# Patient Record
Sex: FEMALE | Race: WHITE | Employment: UNEMPLOYED | URBAN - NONMETROPOLITAN AREA
[De-identification: names, ages, dates, MRNs, and addresses within clinical notes are randomized per-mention and may not be internally consistent; named-entity substitution may affect disease eponyms.]

---

## 2020-07-23 ENCOUNTER — OFFICE VISIT (OUTPATIENT)
Dept: ORTHOPEDIC SURGERY | Age: 75
End: 2020-07-23
Payer: MEDICARE

## 2020-07-23 VITALS — HEIGHT: 61 IN | WEIGHT: 153 LBS | BODY MASS INDEX: 28.89 KG/M2

## 2020-07-23 PROBLEM — M23.203 DEGENERATIVE TEAR OF MEDIAL MENISCUS OF RIGHT KNEE: Status: ACTIVE | Noted: 2020-07-23

## 2020-07-23 PROBLEM — M25.561 ACUTE PAIN OF RIGHT KNEE: Status: ACTIVE | Noted: 2020-07-23

## 2020-07-23 PROBLEM — M17.11 PRIMARY OSTEOARTHRITIS OF RIGHT KNEE: Status: ACTIVE | Noted: 2020-07-23

## 2020-07-23 PROBLEM — M23.300 DEGENERATIVE TEAR OF LATERAL MENISCUS OF RIGHT KNEE: Status: ACTIVE | Noted: 2020-07-23

## 2020-07-23 PROCEDURE — 99203 OFFICE O/P NEW LOW 30 MIN: CPT | Performed by: ORTHOPAEDIC SURGERY

## 2020-07-23 NOTE — PROGRESS NOTES
KNEE VISIT      HISTORY OF PRESENT ILLNESS    Eli Grajeda is a 66 y.o. female who presents for evaluation of right knee pain she has had for last 4 to 5 years. She has had recurrent pain treated by medication injections in the past.  She has had bracing. She is not had an MRI but had some issues that involved her left hip done her right hip and her back that put her right knee on the back burner for the time being. Now other things to resolve and that she is left with right knee pain that she grades 6/10 despite wearing a copper sleeve as well as a hinged knee support. She complains of swelling and stiffness although Celebrex that she is taking seems to taken some the swelling out of her leg. She is having more more difficulty getting around walking on feels that the issue is getting worse over time. ROS    Well-documented patient history form dated 7/23/2020  All other ROS negative except for above. Past Surgical history    Past Surgical History:   Procedure Laterality Date    CATARACT REMOVAL WITH IMPLANT  1/14/11    LEFT EYE    EYE SURGERY  12/14/2010    cataract rt eye    HYSTERECTOMY      JOINT REPLACEMENT  1997    left hip       PAST MEDICAL    Past Medical History:   Diagnosis Date    Arthritis     Hypertension     Localized osteoarthrosis not specified whether primary or secondary, pelvic region and thigh 5/8/2015       Allergies    Allergies   Allergen Reactions    Prednisone Other (See Comments)     Face gets red and hot       Meds    Current Outpatient Medications   Medication Sig Dispense Refill    gabapentin (NEURONTIN) 300 MG capsule Take 1 capsule by mouth 3 times daily 90 capsule 0    diclofenac (VOLTAREN) 75 MG EC tablet Take 1 tablet by mouth 2 times daily 60 tablet 3    atenolol (TENORMIN) 50 MG tablet Take 50 mg by mouth every evening.  Naproxen Sodium (ALEVE) 220 MG CAPS Take 2 tablets by mouth daily.  Indications: as needed-last taken 11/29/10       No current Strength:  Intact. Reflexes:  Intact. Pulses:  Intact. Knee Exam:    Effusion: Negative    Range of Motion Right Left   Extension 0 0   Flexion 120 120     Provocative Test Right Left    Positive Negative Positive Negative   Anterior drawer [] [x] [] [x]   Lachman [] [x] [] [x]   Posterior drawer [] [x] [] [x]   Varus testing [x] [x] [] [x]   Valgus testing [x] [] [] [x]   Joint line tenderness [x] [] [] [x]     Additional Exam Comments: Her neurocirculatory lymphatic exam otherwise is normal symmetric in both lower extremities. She has pain mostly around the medial compartment of her knee and some laterally with direct palpation Baldemar's maneuver and varus and valgus stress. She has no gross instability. \    IMAGING STUDIES    X-rays 3 views of her right knee that she had taken prior to this visit demonstrate some grade 2 osteoarthritis most in the lateral compartment, with greater than 50% of joint space remaining    IMPRESSION    Right knee pain secondary to osteoarthritis with medial lateral meniscus tears degenerative    PLAN      1. Conservative care options including physical therapy, NSAIDs, bracing, and activity modification were discussed. 2.  The indications for therapeutic injections were discussed. 3.  The indications for additional imaging studies were discussed. 4.  After considering the various options discussed, the patient elected to pursue a course that includes obtaining MRI of the right knee for disposition since she has had 5 years of pain despite other intervention with no resolution. She should return after testing for disposition.

## 2020-07-24 ENCOUNTER — TELEPHONE (OUTPATIENT)
Dept: ORTHOPEDIC SURGERY | Age: 75
End: 2020-07-24

## 2020-07-24 NOTE — TELEPHONE ENCOUNTER
SPOKE WITH PATIENT IN REGARDS TO MRI APPROVAL RIGHT KNEE. INFORMED PATIENT MRI ORDER ALONG WITH MRI AUTHORIZATION WAS FAXED TO [address]. INFORMED PATIENT TO CALL AT THEIR CONVENIENCE TO SCHEDULE THAT MRI. ALSO, INFORMED PATIENT TO CALL OUR SCHEDULING DEPT TO SCHEDULE FOLLOW UP APPOINTMENT  WITH DR SOLORIO TO GO OVER THOSE RESULTS . INFORMED PATIENT HE WILL NOT GIVE RESULTS OVER THE TELEPHONE.  THE PATIENT UNDERSTOOD AND AGREED WITH THE PLAN

## 2020-08-12 ENCOUNTER — OFFICE VISIT (OUTPATIENT)
Dept: ORTHOPEDIC SURGERY | Age: 75
End: 2020-08-12
Payer: MEDICARE

## 2020-08-12 VITALS — BODY MASS INDEX: 28.89 KG/M2 | WEIGHT: 153 LBS | HEIGHT: 61 IN

## 2020-08-12 PROBLEM — M84.453A CLOSED SUBCHONDRAL INSUFFICIENCY FRACTURE OF FEMORAL CONDYLE (HCC): Status: ACTIVE | Noted: 2020-08-12

## 2020-08-12 PROBLEM — M84.469A INSUFFICIENCY FRACTURE OF TIBIA: Status: ACTIVE | Noted: 2020-08-12

## 2020-08-12 PROCEDURE — 4004F PT TOBACCO SCREEN RCVD TLK: CPT | Performed by: ORTHOPAEDIC SURGERY

## 2020-08-12 PROCEDURE — 1123F ACP DISCUSS/DSCN MKR DOCD: CPT | Performed by: ORTHOPAEDIC SURGERY

## 2020-08-12 PROCEDURE — G8417 CALC BMI ABV UP PARAM F/U: HCPCS | Performed by: ORTHOPAEDIC SURGERY

## 2020-08-12 PROCEDURE — G8428 CUR MEDS NOT DOCUMENT: HCPCS | Performed by: ORTHOPAEDIC SURGERY

## 2020-08-12 PROCEDURE — 99213 OFFICE O/P EST LOW 20 MIN: CPT | Performed by: ORTHOPAEDIC SURGERY

## 2020-08-12 PROCEDURE — 4040F PNEUMOC VAC/ADMIN/RCVD: CPT | Performed by: ORTHOPAEDIC SURGERY

## 2020-08-12 PROCEDURE — G8400 PT W/DXA NO RESULTS DOC: HCPCS | Performed by: ORTHOPAEDIC SURGERY

## 2020-08-12 PROCEDURE — 1090F PRES/ABSN URINE INCON ASSESS: CPT | Performed by: ORTHOPAEDIC SURGERY

## 2020-08-12 NOTE — PROGRESS NOTES
KNEE VISIT      HISTORY OF PRESENT ILLNESS    Hrotencia Arnold is a 66 y.o. female who presents for evaluation of right knee pain. She has had an MRI and is here for review. The nature and character of her pain remain the same. \  ROS    Well-documented patient history form dated 7/23/2020  All other ROS negative except for above. Past Surgical history    Past Surgical History:   Procedure Laterality Date    CATARACT REMOVAL WITH IMPLANT  1/14/11    LEFT EYE    EYE SURGERY  12/14/2010    cataract rt eye    HYSTERECTOMY      JOINT REPLACEMENT  1997    left hip       PAST MEDICAL    Past Medical History:   Diagnosis Date    Arthritis     Closed subchondral insufficiency fracture of femoral condyle ([address] 8/12/2020    Hypertension     Localized osteoarthrosis not specified whether primary or secondary, pelvic region and thigh 5/8/2015       Allergies    Allergies   Allergen Reactions    Prednisone Other (See Comments)     Face gets red and hot       Meds    Current Outpatient Medications   Medication Sig Dispense Refill    gabapentin (NEURONTIN) 300 MG capsule Take 1 capsule by mouth 3 times daily 90 capsule 0    diclofenac (VOLTAREN) 75 MG EC tablet Take 1 tablet by mouth 2 times daily 60 tablet 3    atenolol (TENORMIN) 50 MG tablet Take 50 mg by mouth every evening.  Naproxen Sodium (ALEVE) 220 MG CAPS Take 2 tablets by mouth daily. Indications: as needed-last taken 11/29/10       No current facility-administered medications for this visit. Social    Social History     Socioeconomic History    Marital status:       Spouse name: Not on file    Number of children: Not on file    Years of education: Not on file    Highest education level: Not on file   Occupational History    Not on file   Social Needs    Financial resource strain: Not on file    Food insecurity     Worry: Not on file     Inability: Not on file    Transportation needs     Medical: Not on file     Non-medical: Not on file   Tobacco Use    Smoking status: Never Smoker    Smokeless tobacco: Never Used   Substance and Sexual Activity    Alcohol use: No    Drug use: No    Sexual activity: Yes   Lifestyle    Physical activity     Days per week: Not on file     Minutes per session: Not on file    Stress: Not on file   Relationships    Social connections     Talks on phone: Not on file     Gets together: Not on file     Attends Restorationist service: Not on file     Active member of club or organization: Not on file     Attends meetings of clubs or organizations: Not on file     Relationship status: Not on file    Intimate partner violence     Fear of current or ex partner: Not on file     Emotionally abused: Not on file     Physically abused: Not on file     Forced sexual activity: Not on file   Other Topics Concern    Not on file   Social History Narrative    Not on file       Family HISTORY    Family History   Problem Relation Age of Onset    Arthritis Mother     Diabetes Mother     High Blood Pressure Mother     Diabetes Sister     High Blood Pressure Sister     Diabetes Brother     High Blood Pressure Brother        PHYSICAL EXAM    Vital Signs:  Ht 5' 1\" (1.549 m)   Wt 153 lb (69.4 kg)   BMI 28.91 kg/m²   General Appearance:  Normal body habitus. Alert and oriented to person, place, and time. Affect:  Normal.   Gait: Antalgic. Poor balance and coordination. Skin:  Intact. Sensation:  Intact. Strength:  Intact. Reflexes:  Intact. Pulses:  Intact.    Knee Exam:    Effusion: Negative    Range of Motion Right Left   Extension 0 0   Flexion 120 120     Provocative Test Right Left    Positive Negative Positive Negative   Anterior drawer [] [x] [] [x]   Lachman [] [x] [] [x]   Posterior drawer [] [x] [] [x]   Varus testing [x] [x] [] [x]   Valgus testing [x] [] [] [x]   Joint line tenderness [x] [] [] [x]     Additional Exam Comments: Her neurocirculatory lymphatic exam otherwise is normal symmetric in well as the necessity of other members of the healthcare team participating in the procedure. All questions were answered and the patient elected to proceed with the proposed procedure and signed the informed consent form. The patient was counseled at length about the risks of estevan Covid-19 during their perioperative period and any recovery window from their procedure. The patient was made aware that estevan Covid-19  may worsen their prognosis for recovering from their procedure  and lend to a higher morbidity and/or mortality risk. All material risks, benefits, and reasonable alternatives including postponing the procedure were discussed. The patient does wish to proceed with the procedure at this time.

## 2020-09-18 LAB
ADENOVIRUS: NOT DETECTED
BORDETELLA PARAPERTUSSIS: NOT DETECTED
BORDETELLA PERTUSSIS: NOT DETECTED
CHLAMYDOPHILA PNEUMONIA PCR: NOT DETECTED
CORONAVIRUS 229E: NOT DETECTED
CORONAVIRUS HKU1: NOT DETECTED
CORONAVIRUS NL63: NOT DETECTED
CORONAVIRUS OC43: NOT DETECTED
HUMAN METAPNEUMOVIRUS: NOT DETECTED
HUMAN RHINOVIRUS/ENTEROVIRUS: NOT DETECTED
INFLUENZA A (NO SUBTYPE) BY PCR: NOT DETECTED
INFLUENZA A H1-2009: NOT DETECTED
INFLUENZA A H3: NOT DETECTED
INFLUENZA A: NOT DETECTED
INFLUENZA B: NOT DETECTED
MYCOPLASMA PNEUMONIAE: NOT DETECTED
PARAINFLUENZA 2: NOT DETECTED
PARAINFLUENZA 3: NOT DETECTED
PARAINFLUENZA 4: NOT DETECTED
PARAINFLUENZA1: NOT DETECTED
RESPIRATORY SYNCYTIAL VIRUS: NOT DETECTED
SARS-COV-2: NOT DETECTED

## 2020-09-24 ENCOUNTER — OFFICE VISIT (OUTPATIENT)
Dept: ORTHOPEDIC SURGERY | Age: 75
End: 2020-09-24

## 2020-09-24 PROCEDURE — 99024 POSTOP FOLLOW-UP VISIT: CPT | Performed by: ORTHOPAEDIC SURGERY

## 2020-09-24 NOTE — PROGRESS NOTES
FOLLOW-UP VISIT      The patient returns for follow-up s/p right knee been arthroscopy with partial medial lateral meniscectomy and sub-chondroplasty of the lateral femoral condyle and lateral tibial plateau    Date of Surgery    9/18/2020    Wound Status    Sutures Removed, Incisions are healing well with no surrounding erythema, and minimal ecchymosis. Exam    She has no signs of infection DVT. She is happy with where her knee feels and says she can believe all the pain she had before surgery and how much relief she is gotten afterwards. She is walk without ambulatory aids and doing well.     Plan    Slow return to normal activity    Follow-up Appointment    4 weeks as needed

## 2021-07-26 ENCOUNTER — TELEPHONE (OUTPATIENT)
Dept: ORTHOPEDIC SURGERY | Age: 76
End: 2021-07-26

## 2021-08-03 ENCOUNTER — OFFICE VISIT (OUTPATIENT)
Dept: ORTHOPEDIC SURGERY | Age: 76
End: 2021-08-03
Payer: MEDICARE

## 2021-08-03 VITALS — WEIGHT: 153 LBS | BODY MASS INDEX: 27.11 KG/M2 | HEIGHT: 63 IN

## 2021-08-03 DIAGNOSIS — M25.561 ACUTE PAIN OF RIGHT KNEE: ICD-10-CM

## 2021-08-03 DIAGNOSIS — M17.11 PRIMARY OSTEOARTHRITIS OF RIGHT KNEE: Primary | ICD-10-CM

## 2021-08-03 PROCEDURE — 1123F ACP DISCUSS/DSCN MKR DOCD: CPT | Performed by: ORTHOPAEDIC SURGERY

## 2021-08-03 PROCEDURE — G8400 PT W/DXA NO RESULTS DOC: HCPCS | Performed by: ORTHOPAEDIC SURGERY

## 2021-08-03 PROCEDURE — 1036F TOBACCO NON-USER: CPT | Performed by: ORTHOPAEDIC SURGERY

## 2021-08-03 PROCEDURE — G8427 DOCREV CUR MEDS BY ELIG CLIN: HCPCS | Performed by: ORTHOPAEDIC SURGERY

## 2021-08-03 PROCEDURE — G8417 CALC BMI ABV UP PARAM F/U: HCPCS | Performed by: ORTHOPAEDIC SURGERY

## 2021-08-03 PROCEDURE — 4040F PNEUMOC VAC/ADMIN/RCVD: CPT | Performed by: ORTHOPAEDIC SURGERY

## 2021-08-03 PROCEDURE — 99213 OFFICE O/P EST LOW 20 MIN: CPT | Performed by: ORTHOPAEDIC SURGERY

## 2021-08-03 PROCEDURE — 1090F PRES/ABSN URINE INCON ASSESS: CPT | Performed by: ORTHOPAEDIC SURGERY

## 2021-08-03 NOTE — PROGRESS NOTES
KNEE VISIT      HISTORY OF PRESENT ILLNESS    Jama Arellano is a 64 y.o. female who presents for evaluation of right knee pain she has had of an increasingly severe nature over the last several months. She is known to past have osteoarthritis and although she had hip replacement 1997, at this time she is having pain in the knee and notices her knee is becoming more crooked and knock kneed and is causing severe pain laterally in the knee. She has pain down the other side as well as the hip and back. She did have a subchondroplasty almost a year ago which helped her for several months. She says she feels like she is falling apart. She denies any history of trauma. ROS    Well-documented patient history form dated 8/3/2021  All other ROS negative except for above. Past Surgical history    Past Surgical History:   Procedure Laterality Date    CATARACT REMOVAL WITH IMPLANT  1/14/11    LEFT EYE    EYE SURGERY  12/14/2010    cataract rt eye    HYSTERECTOMY      JOINT REPLACEMENT  1997    left hip       PAST MEDICAL    Past Medical History:   Diagnosis Date    Arthritis     Closed subchondral insufficiency fracture of femoral condyle ([address] 8/12/2020    Hypertension     Localized osteoarthrosis not specified whether primary or secondary, pelvic region and thigh 5/8/2015       Allergies    Allergies   Allergen Reactions    Prednisone Other (See Comments)     Face gets red and hot       Meds    Current Outpatient Medications   Medication Sig Dispense Refill    [START ON 8/25/2021] mupirocin (BACTROBAN) 2 % ointment APPLY 1/2 INCH TO INSIDE OF EACH NOSTRIL Q 12 HR STARTING 5 DAYS PRIOR TO SURGERY INCLUDING MORNING OF SURGERY. 1 Tube 0    gabapentin (NEURONTIN) 300 MG capsule Take 1 capsule by mouth 3 times daily 90 capsule 0    diclofenac (VOLTAREN) 75 MG EC tablet Take 1 tablet by mouth 2 times daily 60 tablet 3    atenolol (TENORMIN) 50 MG tablet Take 50 mg by mouth every evening.       Naproxen Sodium (ALEVE) 220 MG CAPS Take 2 tablets by mouth daily. Indications: as needed-last taken 11/29/10       No current facility-administered medications for this visit. Social    Social History     Socioeconomic History    Marital status:      Spouse name: Not on file    Number of children: Not on file    Years of education: Not on file    Highest education level: Not on file   Occupational History    Not on file   Tobacco Use    Smoking status: Never Smoker    Smokeless tobacco: Never Used   Substance and Sexual Activity    Alcohol use: No    Drug use: No    Sexual activity: Yes   Other Topics Concern    Not on file   Social History Narrative    Not on file     Social Determinants of Health     Financial Resource Strain:     Difficulty of Paying Living Expenses:    Food Insecurity:     Worried About Running Out of Food in the Last Year:     [address] in the Last Year:    Transportation Needs:     Lack of Transportation (Medical):      Lack of Transportation (Non-Medical):    Physical Activity:     Days of Exercise per Week:     Minutes of Exercise per Session:    Stress:     Feeling of Stress :    Social Connections:     Frequency of Communication with Friends and Family:     Frequency of Social Gatherings with Friends and Family:     Attends Latter-day Services:     Active Member of Clubs or Organizations:     Attends Club or Organization Meetings:     Marital Status:    Intimate Partner Violence:     Fear of Current or Ex-Partner:     Emotionally Abused:     Physically Abused:     Sexually Abused:        Family HISTORY    Family History   Problem Relation Age of Onset    Arthritis Mother     Diabetes Mother     High Blood Pressure Mother     Diabetes Sister     High Blood Pressure Sister     Diabetes Brother     High Blood Pressure Brother        PHYSICAL EXAM    Vital Signs:  Ht 5' 3\" (1.6 m)   Wt 153 lb (69.4 kg)   BMI 27.10 kg/m²   General Appearance: Normal body habitus. Alert and oriented to person, place, and time. Affect:  Normal.   Gait: Antalgic with genu valgum on the right versus the left. Good balance and coordination. Skin:  Intact. Sensation:  Intact. Strength:  Intact. Reflexes:  Intact. Pulses:  Intact. Knee Exam:    Effusion: 1+    Range of Motion Right Left   Extension -8 0   Flexion 105 115     Provocative Test Right Left    Positive Negative Positive Negative   Anterior drawer [] [] [] []   Lachman [] [] [] []   Posterior drawer [] [] [] []   Varus testing [x] [] [] [x]   Valgus testing [x] [] [] [x]   Joint line tenderness [x] [] [] [x]     Additional Exam Comments: Her neurocirculatory lymphatic exam otherwise appears normal symmetric in both lower extremities. She does have lateral compartment pain and crepitus and a valgus moment of about 15 degrees on her right side. She has pain with stress but no gross instability. IMAGING STUDIES    X-rays 3 views right knee demonstrate advanced arthritis tricompartmentally with complete bone-on-bone changes in the lateral compartment of her knee with increased valgus moment. IMPRESSION    Right knee pain secondary to severe end-stage osteoarthritis with the lateral compartment most affected    PLAN      1. Conservative care options including physical therapy, NSAIDs, bracing, and activity modification were discussed. 2.  The indications for therapeutic injections were discussed. 3.  The indications for additional imaging studies were discussed. 4.  After considering the various options discussed, the patient elected to pursue a course that includes proceeding with right total knee replacement 1 can be arranged. The patient was counseled at length about the risks of estevan Covid-19 during their perioperative period and any recovery window from their procedure.   The patient was made aware that estevan Covid-19  may worsen their prognosis for recovering from

## 2021-08-04 ENCOUNTER — TELEPHONE (OUTPATIENT)
Dept: ORTHOPEDIC SURGERY | Age: 76
End: 2021-08-04

## 2021-08-09 NOTE — TELEPHONE ENCOUNTER
AUTHORIZATION FOR OBSERVATION ONLY!     Auth: # [id number]    Date: 8/30/2021 THRU 9/29/2021  Type of SX:  Observation  [place] [facility] [id number]   DX Code: M17.11  SX area: Rt knee  Insurance: [facility]

## 2021-08-13 ENCOUNTER — HOSPITAL ENCOUNTER (OUTPATIENT)
Age: 76
Discharge: HOME | End: 2021-08-13
Payer: MEDICARE

## 2021-08-13 LAB
ALBUMIN SERPL-MCNC: 4.4 G/DL (ref 3.4–5)
ANION GAP SERPL CALCULATED.3IONS-SCNC: 14 MMOL/L (ref 3–16)
APTT: 34.3 SEC (ref 26.2–38.6)
BASOPHILS ABSOLUTE: 0.1 K/UL (ref 0–0.2)
BASOPHILS RELATIVE PERCENT: 1 %
BILIRUBIN URINE: NEGATIVE
BLOOD, URINE: NEGATIVE
BUN BLDV-MCNC: 25 MG/DL (ref 7–20)
CALCIUM SERPL-MCNC: 11 MG/DL (ref 8.3–10.6)
CHLORIDE BLD-SCNC: 97 MMOL/L (ref 99–110)
CLARITY: CLEAR
CO2: 26 MMOL/L (ref 21–32)
COLOR: YELLOW
CREAT SERPL-MCNC: 1.8 MG/DL (ref 0.6–1.2)
EKG ATRIAL RATE: 74 BPM
EKG DIAGNOSIS: NORMAL
EKG P AXIS: 112 DEGREES
EKG P-R INTERVAL: 144 MS
EKG Q-T INTERVAL: 398 MS
EKG QRS DURATION: 88 MS
EKG QTC CALCULATION (BAZETT): 441 MS
EKG R AXIS: 0 DEGREES
EKG T AXIS: 62 DEGREES
EKG VENTRICULAR RATE: 74 BPM
EOSINOPHILS ABSOLUTE: 0.3 K/UL (ref 0–0.6)
EOSINOPHILS RELATIVE PERCENT: 3.9 %
GFR AFRICAN AMERICAN: 33
GFR NON-AFRICAN AMERICAN: 27
GLUCOSE BLD-MCNC: 99 MG/DL (ref 70–99)
GLUCOSE URINE: NEGATIVE MG/DL
HCT VFR BLD CALC: 34.8 % (ref 36–48)
HEMOGLOBIN: 11.7 G/DL (ref 12–16)
INR BLD: 1 (ref 0.88–1.12)
KETONES, URINE: NEGATIVE MG/DL
LEUKOCYTE ESTERASE, URINE: NEGATIVE
LYMPHOCYTES ABSOLUTE: 1.4 K/UL (ref 1–5.1)
LYMPHOCYTES RELATIVE PERCENT: 18.7 %
MCH RBC QN AUTO: 28.7 PG (ref 26–34)
MCHC RBC AUTO-ENTMCNC: 33.5 G/DL (ref 31–36)
MCV RBC AUTO: 85.7 FL (ref 80–100)
MICROSCOPIC EXAMINATION: NORMAL
MONOCYTES ABSOLUTE: 0.8 K/UL (ref 0–1.3)
MONOCYTES RELATIVE PERCENT: 9.9 %
NEUTROPHILS ABSOLUTE: 5 K/UL (ref 1.7–7.7)
NEUTROPHILS RELATIVE PERCENT: 66.5 %
NITRITE, URINE: NEGATIVE
PDW BLD-RTO: 13.4 % (ref 12.4–15.4)
PH UA: 7.5 (ref 5–8)
PLATELET # BLD: 252 K/UL (ref 135–450)
PMV BLD AUTO: 8.2 FL (ref 5–10.5)
POTASSIUM SERPL-SCNC: 4.1 MMOL/L (ref 3.5–5.1)
PROTEIN UA: NEGATIVE MG/DL
PROTHROMBIN TIME: 11.3 SEC (ref 9.9–12.7)
RBC # BLD: 4.06 M/UL (ref 4–5.2)
SODIUM BLD-SCNC: 137 MMOL/L (ref 136–145)
SPECIFIC GRAVITY UA: 1.01 (ref 1–1.03)
TRANSFERRIN: 269 MG/DL (ref 200–360)
URINE TYPE: NORMAL
UROBILINOGEN, URINE: 0.2 E.U./DL
WBC # BLD: 7.6 K/UL (ref 4–11)

## 2021-08-13 PROCEDURE — 93005 ELECTROCARDIOGRAM TRACING: CPT

## 2021-08-13 PROCEDURE — 85025 COMPLETE CBC W/AUTO DIFF WBC: CPT

## 2021-08-13 PROCEDURE — 36415 COLL VENOUS BLD VENIPUNCTURE: CPT

## 2021-08-13 PROCEDURE — 81003 URINALYSIS AUTO W/O SCOPE: CPT

## 2021-08-13 PROCEDURE — 85730 THROMBOPLASTIN TIME PARTIAL: CPT

## 2021-08-13 PROCEDURE — 87086 URINE CULTURE/COLONY COUNT: CPT

## 2021-08-13 PROCEDURE — 84466 ASSAY OF TRANSFERRIN: CPT

## 2021-08-13 PROCEDURE — 82040 ASSAY OF SERUM ALBUMIN: CPT

## 2021-08-13 PROCEDURE — 80048 BASIC METABOLIC PNL TOTAL CA: CPT

## 2021-08-13 PROCEDURE — 85610 PROTHROMBIN TIME: CPT

## 2021-08-14 LAB — URINE CULTURE, ROUTINE: NORMAL

## 2021-08-23 ENCOUNTER — TELEPHONE (OUTPATIENT)
Dept: ORTHOPEDIC SURGERY | Age: 76
End: 2021-08-23

## 2021-08-24 ENCOUNTER — TELEPHONE (OUTPATIENT)
Dept: ORTHOPEDIC SURGERY | Age: 76
End: 2021-08-24

## 2021-09-02 DIAGNOSIS — M17.11 PRIMARY OSTEOARTHRITIS OF RIGHT KNEE: Primary | ICD-10-CM

## 2021-09-13 NOTE — TELEPHONE ENCOUNTER
OBSERVATION ONLY    Surgery date rescheduled to 10/4/2021. Authorization information the same.    New date range  10/4/2021 thru 11/03/2021

## 2021-09-15 ENCOUNTER — TELEPHONE (OUTPATIENT)
Dept: ORTHOPEDIC SURGERY | Age: 76
End: 2021-09-15

## 2021-09-15 NOTE — TELEPHONE ENCOUNTER
Surgery cancelled at this time. COVID: Pt to get a week prior to surgery  Pt had back surgery and due to complications now self caths every 4-6 hours. Orthopedic Nurse Navigator Summary  -  Patient Name: Chrissy Wong  Anticipated Date of Surgery:10/4/21  Using [facility]? Yes, Are they Registered: Yes  Attended Pre-Op Education Class: No  If No, why not? PCP:  Phone #:  Date of PCP Visit for H&P: 09/20/2021  Any Noted Concerns from PCP prior to surgery: No  If Yes, what concerns?:  Is the Patient in a Pain Management Program?: No  Review of Past Medical History Reveals History of:  -  Critical Lab Values:  - Hemoglobin (g/dL): Date Value 11.7  - Hematocrit (%): Date Value  - HgbA1C : Date Value 0.0  - Albumin : Date Value 4.4  - BUN (mg/dL) : Date Value 25.0  - CREATININE (mg/dL) : Date Value 1.8  - BMI (kg/m2) : Date Value  -  Coronary Artery Disease/HTN/CHF History: Yes  Cardiologist: HTN  Cardiac Clearance Necessary: No  Date of Cardiac Clearance Appt: On Plavix? No  If YES, when will they stop taking? Final Cardiac Recommendations: On any anticoagulation: No  -  Diabetes History: No  Most Recent HgbA1C: 0.0  PCP or Endocrine Recommendations:  Nutritionist/Dietician Consult Scheduled:  Final Plan For Diabetic Control:  Pulmonary: COPD/Emphysema/ Use of home oxygen: NONE  Alcohol use: Non-Drinker  -  DVT Risk Stratification: High Risk  Vascular Consult Ordered:  Date of Vascular Appt:  Hematology/Oncology Consult Ordered:  Date of Hematology/Oncology Appt:  Final Recommendation For DVT Prophylaxis:  Smoking history: Non-Smoker  Use of Estrogen:  -  BMI Greater than 40 at time of scheduling?: No  Has Surgeon been notified of BMI concern? No  Weight Loss Clinic Consult Ordered No  Date of Wt Loss Clinic Appt:  BMI at time of surgery (if went through [facility]):  -  Additional Medical Concerns: History of DVT following THR 5 years ago.   Additional Recommendations for above concerns:  Attended Pre-Hab Program: No  Anticipated Discharge Disposition: D/C home  Who will be with patient at home following discharge? 2 daughters will be staying and checking in on pt.   Equipment patient already has: standard walker  Bedroom on first or second floor: first  Bathroom on first or second floor: first  Weight bearing status: full  Pre-op ambulatory status:  Number of entry steps: ZERO if enters through back door  Caregiver assistance: full time  [facility]  09/15/2021

## 2021-09-17 ENCOUNTER — TELEPHONE (OUTPATIENT)
Dept: ORTHOPEDIC SURGERY | Age: 76
End: 2021-09-17

## 2021-09-20 ENCOUNTER — TELEPHONE (OUTPATIENT)
Dept: ORTHOPEDIC SURGERY | Age: 76
End: 2021-09-20

## 2021-09-20 NOTE — TELEPHONE ENCOUNTER
PATIENT IS AWARE [facility]LECT[facility]  [facility] WILL CALL PATIENT ONCE WE  [facility] SURGERY.

## 2021-10-15 ENCOUNTER — CLINICAL DOCUMENTATION (OUTPATIENT)
Dept: OTHER | Age: 76
End: 2021-10-15

## 2021-10-29 ENCOUNTER — TELEPHONE (OUTPATIENT)
Dept: ORTHOPEDIC SURGERY | Age: 76
End: 2021-10-29

## 2021-10-29 NOTE — TELEPHONE ENCOUNTER
LEFT MESSAGE FOR PATIENT RE: [facility] TKR. IF PATIENT WANTS SX SOONER CAN REF TO DR. Larry Raines.

## 2021-11-18 NOTE — TELEPHONE ENCOUNTER
OBSERVATION ONLY    Surgery date rescheduled to 12/06/2021. Authorization information the same.    New date range  12/06/2021 thru 01/05/2022

## 2021-11-23 ENCOUNTER — HOSPITAL ENCOUNTER (OUTPATIENT)
Age: 76
Discharge: HOME | End: 2021-11-23
Payer: MEDICARE

## 2021-11-23 LAB
APTT: 34.8 SEC (ref 26.2–38.6)
BILIRUBIN URINE: NEGATIVE
BLOOD, URINE: NEGATIVE
CLARITY: CLEAR
COLOR: YELLOW
GLUCOSE URINE: NEGATIVE MG/DL
INR BLD: 0.98 (ref 0.88–1.12)
KETONES, URINE: NEGATIVE MG/DL
LEUKOCYTE ESTERASE, URINE: NEGATIVE
MICROSCOPIC EXAMINATION: NORMAL
NITRITE, URINE: NEGATIVE
PH UA: 6 (ref 5–8)
PROTEIN UA: NEGATIVE MG/DL
PROTHROMBIN TIME: 11.1 SEC (ref 9.9–12.7)
SPECIFIC GRAVITY UA: 1.02 (ref 1–1.03)
URINE TYPE: NORMAL
UROBILINOGEN, URINE: 0.2 E.U./DL

## 2021-11-23 PROCEDURE — 85025 COMPLETE CBC W/AUTO DIFF WBC: CPT

## 2021-11-23 PROCEDURE — 82040 ASSAY OF SERUM ALBUMIN: CPT

## 2021-11-23 PROCEDURE — 81003 URINALYSIS AUTO W/O SCOPE: CPT

## 2021-11-23 PROCEDURE — 80048 BASIC METABOLIC PNL TOTAL CA: CPT

## 2021-11-23 PROCEDURE — 83036 HEMOGLOBIN GLYCOSYLATED A1C: CPT

## 2021-11-23 PROCEDURE — 87086 URINE CULTURE/COLONY COUNT: CPT

## 2021-11-23 PROCEDURE — 84466 ASSAY OF TRANSFERRIN: CPT

## 2021-11-23 PROCEDURE — 85610 PROTHROMBIN TIME: CPT

## 2021-11-23 PROCEDURE — 85730 THROMBOPLASTIN TIME PARTIAL: CPT

## 2021-11-24 LAB
ALBUMIN SERPL-MCNC: 4.4 G/DL (ref 3.4–5)
ANION GAP SERPL CALCULATED.3IONS-SCNC: 15 MMOL/L (ref 3–16)
BASOPHILS ABSOLUTE: 0 K/UL (ref 0–0.2)
BASOPHILS RELATIVE PERCENT: 0.6 %
BUN BLDV-MCNC: 31 MG/DL (ref 7–20)
CALCIUM SERPL-MCNC: 10 MG/DL (ref 8.3–10.6)
CHLORIDE BLD-SCNC: 96 MMOL/L (ref 99–110)
CO2: 26 MMOL/L (ref 21–32)
CREAT SERPL-MCNC: 1.8 MG/DL (ref 0.6–1.2)
EOSINOPHILS ABSOLUTE: 0.2 K/UL (ref 0–0.6)
EOSINOPHILS RELATIVE PERCENT: 3 %
ESTIMATED AVERAGE GLUCOSE: 108.3 MG/DL
GFR AFRICAN AMERICAN: 33
GFR NON-AFRICAN AMERICAN: 27
GLUCOSE BLD-MCNC: 92 MG/DL (ref 70–99)
HBA1C MFR BLD: 5.4 %
HCT VFR BLD CALC: 35.2 % (ref 36–48)
HEMOGLOBIN: 11.3 G/DL (ref 12–16)
LYMPHOCYTES ABSOLUTE: 1.4 K/UL (ref 1–5.1)
LYMPHOCYTES RELATIVE PERCENT: 21.5 %
MCH RBC QN AUTO: 27.6 PG (ref 26–34)
MCHC RBC AUTO-ENTMCNC: 32.1 G/DL (ref 31–36)
MCV RBC AUTO: 85.8 FL (ref 80–100)
MONOCYTES ABSOLUTE: 0.7 K/UL (ref 0–1.3)
MONOCYTES RELATIVE PERCENT: 10.1 %
NEUTROPHILS ABSOLUTE: 4.2 K/UL (ref 1.7–7.7)
NEUTROPHILS RELATIVE PERCENT: 64.8 %
PDW BLD-RTO: 13.4 % (ref 12.4–15.4)
PLATELET # BLD: 300 K/UL (ref 135–450)
PMV BLD AUTO: 8.6 FL (ref 5–10.5)
POTASSIUM SERPL-SCNC: 4 MMOL/L (ref 3.5–5.1)
RBC # BLD: 4.11 M/UL (ref 4–5.2)
SODIUM BLD-SCNC: 137 MMOL/L (ref 136–145)
TRANSFERRIN: 279 MG/DL (ref 200–360)
URINE CULTURE, ROUTINE: NORMAL
WBC # BLD: 6.5 K/UL (ref 4–11)

## 2021-11-29 ENCOUNTER — HOSPITAL ENCOUNTER (OUTPATIENT)
Age: 76
Discharge: HOME | End: 2021-11-29
Payer: MEDICARE

## 2021-11-29 LAB — SARS-COV-2: NOT DETECTED

## 2021-11-29 PROCEDURE — U0005 INFEC AGEN DETEC AMPLI PROBE: HCPCS

## 2021-11-29 PROCEDURE — U0003 INFECTIOUS AGENT DETECTION BY NUCLEIC ACID (DNA OR RNA); SEVERE ACUTE RESPIRATORY SYNDROME CORONAVIRUS 2 (SARS-COV-2) (CORONAVIRUS DISEASE [COVID-19]), AMPLIFIED PROBE TECHNIQUE, MAKING USE OF HIGH THROUGHPUT TECHNOLOGIES AS DESCRIBED BY CMS-2020-01-R: HCPCS

## 2021-12-01 NOTE — PROGRESS NOTES

## 2021-12-01 NOTE — PROGRESS NOTES
Preoperative Screening for Elective Surgery/Invasive Procedures While COVID-19 present in the community     Have you had any of the following symptoms?no  o Fever, chills  o Cough  o Shortness of breath  o Muscle aches/pain  o Diarrhea  o Abdominal pain, nausea, vomiting  o Loss or decrease in taste and / or smell   Risk of Exposure  o Have you recently been hospitalized for COVID-19 or flu-like illness, if so when?no  o Recently diagnosed with COVID-19, if so when?no  o Recently tested for COVID-19, if so when?yes 11/29/21 for surgery  o Have you been in close contact with a person or family member who currently has or recently had COVID-19? If yes, when and in what context?no  o Do you live with anybody who in the last 14 days has had fever, chills, shortness of breath, muscle aches, flu-like illness?no  o Do you have any close contacts or family members who are currently in the hospital for COVID-19 or flu-like illness? If yes, assess recent close contact with this person. no    Indicate if the patient has a positive screen by answering yes to one or more of the above questions. Patients who test positive or screen positive prior to surgery or on the day of surgery should be evaluated in conjunction with the surgeon/proceduralist/anesthesiologist to determine the urgency of the procedure.

## 2021-12-01 NOTE — PROGRESS NOTES
PRE OP INSTRUCTION SHEET   1. Do not eat or drink anything after 12 midnight  prior to surgery. This includes no water, chewing gum or mints. 2. Take the following pills will a small sip of water (see MAR)                                        3. Aspirin, Ibuprofen, Advil, Naproxen, Vitamin E, fish oil and other Anti-inflammatory products should be stopped for 5 days before surgery or as directed by your physician. 4. Check with your Doctor regarding stopping Plavix, Coumadin, Lovenox, Fragmin or other blood thinners   5. Do not smoke, and do not drink any alcoholic beverages 24 hours prior to surgery. This includes NA Beer. 6. You may brush your teeth and gargle the morning of surgery. DO NOT SWALLOW WATER   7. You MUST make arrangements for a responsible adult to take you home after your surgery. You will not be allowed to leave alone or drive yourself home. It is strongly suggested someone stay with you the first 24 hrs. Your surgery will be cancelled if you do not have a ride home. 8. A parent/legal guardian must accompany a child scheduled for surgery and plan to stay at the hospital until the child is discharged. Please do not bring other children with you. 9. Please wear simple, loose fitting clothing to the hospital.  Johana Eden not bring valuables (money, credit cards, checkbooks, etc.) Do not wear any makeup (including no eye makeup) or nail polish on your fingers or toes. 10. DO NOT wear any jewelry or piercings on day of surgery. All body piercing jewelry must be removed. 11. If you have dentures,glasses, or contacts they will be removed before going to the OR; we will provide you a container. 12. Please see your family doctor/and cardiologist for a history & physical and/or concerning medications. Bring any test results/reports from your physician's office. Have history and labs faxed to [phone].  Remember to bring Blood Bank bracelet on the day of surgery. 14. If you have a Living Will and Durable Power of  for Healthcare, please bring in a copy. 13. Notify your Surgeon if you develop any illness between now and surgery  time, cough, cold, fever, sore throat, nausea, vomiting, etc.  Please notify your surgeon if you experience dizziness, shortness of breath or blurred vision between now & the time of your surgery   16. DO NOT shave your operative site 96 hours prior to surgery. For face & neck surgery, men may use an electric razor 48 hours prior to surgery. 17. Shower with _x__Antibacterial soap (x_chlorhexidine for total joint  Pt's) shower two times before surgery.(the morning of and the night before. 18. To provide excellent care visitors will be limited to one in the room at any given time.   Please call pre admission testing if you any further questions [phone] or [phone]

## 2021-12-03 ENCOUNTER — TELEPHONE (OUTPATIENT)
Dept: ORTHOPEDIC SURGERY | Age: 76
End: 2021-12-03

## 2021-12-03 NOTE — TELEPHONE ENCOUNTER
Orthopedic Nurse Navigator Summary    COVID:  Covid test 11/29/21- negative  -  Patient Name: Brock Wyatt  Anticipated Date of Surgery: 12/06/21  Using [employer]? Yes, Are they Registered: Yes  Attended Pre-Op Education Class: Yes  If No, why not? PCP: Hung Drew  Phone #:  Date of PCP Visit for H&P: 11/29/2021  Any Noted Concerns from PCP prior to surgery: No  If Yes, what concerns?:  Is the Patient in a Pain Management Program?: No  Review of Past Medical History Reveals History of:  -  Critical Lab Values:  - Hemoglobin (g/dL): Date 11/23/2021 Value 11.3  - Hematocrit (%): Date 11/23/2021 Value 35.2  - HgbA1C : Date 11/23/2021 Value 5.4  - Albumin : Date 11/23/2021 Value 4.4  - BUN (mg/dL) : Date 11/23/2021 Value 31.0  - CREATININE (mg/dL) : Date 11/23/2021 Value 1.8  - BMI (kg/m2) : Date Value 27.0  -  Coronary Artery Disease/HTN/CHF History: Yes  Cardiologist:  Cardiac Clearance Necessary: No  Date of Cardiac Clearance Appt: On Plavix? If YES, when will they stop taking? Final Cardiac Recommendations: On any anticoagulation: No  -  Diabetes History: No  Most Recent HgbA1C: 5.4  PCP or Endocrine Recommendations:  Nutritionist/Dietician Consult Scheduled:  Final Plan For Diabetic Control:  Pulmonary: COPD/Emphysema/ Use of home oxygen:  Alcohol use: Non-Drinker  -  DVT Risk Stratification: High Risk  Vascular Consult Ordered:  Date of Vascular Appt:  Hematology/Oncology Consult Ordered:  Date of Hematology/Oncology Appt:  Final Recommendation For DVT Prophylaxis:  Smoking history: Non-Smoker  Use of Estrogen:  -  BMI Greater than 40 at time of scheduling?: No  Has Surgeon been notified of BMI concern? Weight Loss Clinic Consult Ordered  Date of Wt Loss Clinic Appt:  BMI at time of surgery (if went through [facility]):  -  Additional Medical Concerns:   Additional Recommendations for above concerns:  Attended Pre-Hab Program: No  Anticipated Discharge Disposition: Home with [facility] AT [facility]  Who will be with patient at home following discharge? daughter  Equipment patient already has: walker  Bedroom on first or second floor: first  Bathroom on first or second floor: first  Weight bearing status: wbat  Pre-op ambulatory status:  Number of entry steps: ZERO  Caregiver assistance: full time  -  Corey Sierra RN  12/03/2021

## 2021-12-06 ENCOUNTER — ANESTHESIA EVENT (OUTPATIENT)
Dept: OPERATING ROOM | Age: 76
End: 2021-12-06
Payer: MEDICARE

## 2021-12-06 ENCOUNTER — APPOINTMENT (OUTPATIENT)
Dept: GENERAL RADIOLOGY | Age: 76
End: 2021-12-06
Attending: ORTHOPAEDIC SURGERY
Payer: MEDICARE

## 2021-12-06 ENCOUNTER — ANESTHESIA (OUTPATIENT)
Dept: OPERATING ROOM | Age: 76
End: 2021-12-06
Payer: MEDICARE

## 2021-12-06 ENCOUNTER — HOSPITAL ENCOUNTER (OUTPATIENT)
Age: 76
Setting detail: OBSERVATION
Discharge: HOME | End: 2021-12-07
Attending: ORTHOPAEDIC SURGERY | Admitting: ORTHOPAEDIC SURGERY
Payer: MEDICARE

## 2021-12-06 VITALS
DIASTOLIC BLOOD PRESSURE: 53 MMHG | SYSTOLIC BLOOD PRESSURE: 99 MMHG | RESPIRATION RATE: 22 BRPM | OXYGEN SATURATION: 99 %

## 2021-12-06 DIAGNOSIS — M17.11 PRIMARY OSTEOARTHRITIS OF RIGHT KNEE: Primary | ICD-10-CM

## 2021-12-06 LAB
ABO/RH: NORMAL
ANTIBODY SCREEN: NORMAL

## 2021-12-06 PROCEDURE — 6360000002 HC RX W HCPCS: Performed by: ORTHOPAEDIC SURGERY

## 2021-12-06 PROCEDURE — G0378 HOSPITAL OBSERVATION PER HR: HCPCS

## 2021-12-06 PROCEDURE — 2500000003 HC RX 250 WO HCPCS: Performed by: NURSE ANESTHETIST, CERTIFIED REGISTERED

## 2021-12-06 PROCEDURE — 3600000015 HC SURGERY LEVEL 5 ADDTL 15MIN: Performed by: ORTHOPAEDIC SURGERY

## 2021-12-06 PROCEDURE — 3700000001 HC ADD 15 MINUTES (ANESTHESIA): Performed by: ORTHOPAEDIC SURGERY

## 2021-12-06 PROCEDURE — 3700000000 HC ANESTHESIA ATTENDED CARE: Performed by: ORTHOPAEDIC SURGERY

## 2021-12-06 PROCEDURE — 7100000000 HC PACU RECOVERY - FIRST 15 MIN: Performed by: ORTHOPAEDIC SURGERY

## 2021-12-06 PROCEDURE — 2709999900 HC NON-CHARGEABLE SUPPLY: Performed by: ORTHOPAEDIC SURGERY

## 2021-12-06 PROCEDURE — C1776 JOINT DEVICE (IMPLANTABLE): HCPCS | Performed by: ORTHOPAEDIC SURGERY

## 2021-12-06 PROCEDURE — 36415 COLL VENOUS BLD VENIPUNCTURE: CPT

## 2021-12-06 PROCEDURE — 7100000001 HC PACU RECOVERY - ADDTL 15 MIN: Performed by: ORTHOPAEDIC SURGERY

## 2021-12-06 PROCEDURE — 86900 BLOOD TYPING SEROLOGIC ABO: CPT

## 2021-12-06 PROCEDURE — 6360000002 HC RX W HCPCS: Performed by: NURSE ANESTHETIST, CERTIFIED REGISTERED

## 2021-12-06 PROCEDURE — 73560 X-RAY EXAM OF KNEE 1 OR 2: CPT

## 2021-12-06 PROCEDURE — 86850 RBC ANTIBODY SCREEN: CPT

## 2021-12-06 PROCEDURE — 2500000003 HC RX 250 WO HCPCS: Performed by: ORTHOPAEDIC SURGERY

## 2021-12-06 PROCEDURE — 86901 BLOOD TYPING SEROLOGIC RH(D): CPT

## 2021-12-06 PROCEDURE — L1830 KO IMMOB CANVAS LONG PRE OTS: HCPCS | Performed by: ORTHOPAEDIC SURGERY

## 2021-12-06 PROCEDURE — 6370000000 HC RX 637 (ALT 250 FOR IP): Performed by: ORTHOPAEDIC SURGERY

## 2021-12-06 PROCEDURE — 2580000003 HC RX 258: Performed by: ANESTHESIOLOGY

## 2021-12-06 PROCEDURE — 2580000003 HC RX 258: Performed by: ORTHOPAEDIC SURGERY

## 2021-12-06 PROCEDURE — C1713 ANCHOR/SCREW BN/BN,TIS/BN: HCPCS | Performed by: ORTHOPAEDIC SURGERY

## 2021-12-06 PROCEDURE — 3600000005 HC SURGERY LEVEL 5 BASE: Performed by: ORTHOPAEDIC SURGERY

## 2021-12-06 PROCEDURE — 2580000003 HC RX 258: Performed by: NURSE ANESTHETIST, CERTIFIED REGISTERED

## 2021-12-06 PROCEDURE — C9290 INJ, BUPIVACAINE LIPOSOME: HCPCS | Performed by: ORTHOPAEDIC SURGERY

## 2021-12-06 DEVICE — SIZE 4 TIBIAL TRAY NONPOROUS
Type: IMPLANTABLE DEVICE | Site: KNEE | Status: FUNCTIONAL
Brand: BALANCED KNEE SYSTEM

## 2021-12-06 DEVICE — SIZE 4 7MM TIBIAL INSERT CR
Type: IMPLANTABLE DEVICE | Site: KNEE | Status: FUNCTIONAL
Brand: BKS E-VITALIZE

## 2021-12-06 DEVICE — CEMENT BNE 40GM W/ GENT HI VISC RADPQ FOR REV SURG: Type: IMPLANTABLE DEVICE | Site: KNEE | Status: FUNCTIONAL

## 2021-12-06 DEVICE — RT SIZE 4 FEMORAL CR NONPOROUS
Type: IMPLANTABLE DEVICE | Site: KNEE | Status: FUNCTIONAL
Brand: BKS TRIMAX

## 2021-12-06 DEVICE — 32MM PATELLA, VITAMIN E
Type: IMPLANTABLE DEVICE | Site: KNEE | Status: FUNCTIONAL
Brand: BKS E-VITALIZE

## 2021-12-06 RX ORDER — BUPIVACAINE HYDROCHLORIDE 7.5 MG/ML
INJECTION, SOLUTION INTRASPINAL PRN
Status: DISCONTINUED | OUTPATIENT
Start: 2021-12-06 | End: 2021-12-06 | Stop reason: SDUPTHER

## 2021-12-06 RX ORDER — HYDROCHLOROTHIAZIDE 25 MG/1
25 TABLET ORAL DAILY
Status: DISCONTINUED | OUTPATIENT
Start: 2021-12-06 | End: 2021-12-07 | Stop reason: HOSPADM

## 2021-12-06 RX ORDER — MORPHINE SULFATE 2 MG/ML
2 INJECTION, SOLUTION INTRAMUSCULAR; INTRAVENOUS EVERY 5 MIN PRN
Status: DISCONTINUED | OUTPATIENT
Start: 2021-12-06 | End: 2021-12-06 | Stop reason: HOSPADM

## 2021-12-06 RX ORDER — MAGNESIUM HYDROXIDE 1200 MG/15ML
LIQUID ORAL CONTINUOUS PRN
Status: COMPLETED | OUTPATIENT
Start: 2021-12-06 | End: 2021-12-06

## 2021-12-06 RX ORDER — ONDANSETRON 4 MG/1
4 TABLET, ORALLY DISINTEGRATING ORAL EVERY 8 HOURS PRN
Status: DISCONTINUED | OUTPATIENT
Start: 2021-12-06 | End: 2021-12-07 | Stop reason: HOSPADM

## 2021-12-06 RX ORDER — LIDOCAINE HYDROCHLORIDE 10 MG/ML
0.3 INJECTION, SOLUTION EPIDURAL; INFILTRATION; INTRACAUDAL; PERINEURAL
Status: DISCONTINUED | OUTPATIENT
Start: 2021-12-06 | End: 2021-12-06 | Stop reason: HOSPADM

## 2021-12-06 RX ORDER — SODIUM CHLORIDE 9 MG/ML
25 INJECTION, SOLUTION INTRAVENOUS PRN
Status: DISCONTINUED | OUTPATIENT
Start: 2021-12-06 | End: 2021-12-06 | Stop reason: HOSPADM

## 2021-12-06 RX ORDER — OXYCODONE HYDROCHLORIDE 5 MG/1
5 TABLET ORAL EVERY 4 HOURS PRN
Status: DISCONTINUED | OUTPATIENT
Start: 2021-12-06 | End: 2021-12-07 | Stop reason: HOSPADM

## 2021-12-06 RX ORDER — OXYCODONE HYDROCHLORIDE AND ACETAMINOPHEN 5; 325 MG/1; MG/1
2 TABLET ORAL PRN
Status: DISCONTINUED | OUTPATIENT
Start: 2021-12-06 | End: 2021-12-06 | Stop reason: HOSPADM

## 2021-12-06 RX ORDER — SODIUM CHLORIDE 9 MG/ML
INJECTION, SOLUTION INTRAVENOUS CONTINUOUS
Status: DISCONTINUED | OUTPATIENT
Start: 2021-12-06 | End: 2021-12-07 | Stop reason: HOSPADM

## 2021-12-06 RX ORDER — MORPHINE SULFATE 2 MG/ML
1 INJECTION, SOLUTION INTRAMUSCULAR; INTRAVENOUS EVERY 5 MIN PRN
Status: DISCONTINUED | OUTPATIENT
Start: 2021-12-06 | End: 2021-12-06 | Stop reason: HOSPADM

## 2021-12-06 RX ORDER — ONDANSETRON 2 MG/ML
4 INJECTION INTRAMUSCULAR; INTRAVENOUS EVERY 6 HOURS PRN
Status: DISCONTINUED | OUTPATIENT
Start: 2021-12-06 | End: 2021-12-07 | Stop reason: HOSPADM

## 2021-12-06 RX ORDER — SODIUM CHLORIDE 0.9 % (FLUSH) 0.9 %
5-40 SYRINGE (ML) INJECTION PRN
Status: DISCONTINUED | OUTPATIENT
Start: 2021-12-06 | End: 2021-12-07 | Stop reason: HOSPADM

## 2021-12-06 RX ORDER — FAMOTIDINE 20 MG/1
20 TABLET, FILM COATED ORAL 2 TIMES DAILY
Status: DISCONTINUED | OUTPATIENT
Start: 2021-12-06 | End: 2021-12-07 | Stop reason: HOSPADM

## 2021-12-06 RX ORDER — SENNA AND DOCUSATE SODIUM 50; 8.6 MG/1; MG/1
1 TABLET, FILM COATED ORAL 2 TIMES DAILY
Status: DISCONTINUED | OUTPATIENT
Start: 2021-12-06 | End: 2021-12-07 | Stop reason: HOSPADM

## 2021-12-06 RX ORDER — SODIUM CHLORIDE 9 MG/ML
25 INJECTION, SOLUTION INTRAVENOUS PRN
Status: DISCONTINUED | OUTPATIENT
Start: 2021-12-06 | End: 2021-12-07 | Stop reason: HOSPADM

## 2021-12-06 RX ORDER — PROPOFOL 10 MG/ML
INJECTION, EMULSION INTRAVENOUS CONTINUOUS PRN
Status: DISCONTINUED | OUTPATIENT
Start: 2021-12-06 | End: 2021-12-06 | Stop reason: SDUPTHER

## 2021-12-06 RX ORDER — SODIUM CHLORIDE 0.9 % (FLUSH) 0.9 %
5-40 SYRINGE (ML) INJECTION EVERY 12 HOURS SCHEDULED
Status: DISCONTINUED | OUTPATIENT
Start: 2021-12-06 | End: 2021-12-07 | Stop reason: HOSPADM

## 2021-12-06 RX ORDER — ACETAMINOPHEN 325 MG/1
650 TABLET ORAL EVERY 6 HOURS
Status: DISCONTINUED | OUTPATIENT
Start: 2021-12-06 | End: 2021-12-07 | Stop reason: HOSPADM

## 2021-12-06 RX ORDER — ATENOLOL 50 MG/1
50 TABLET ORAL 2 TIMES DAILY
Status: DISCONTINUED | OUTPATIENT
Start: 2021-12-06 | End: 2021-12-07 | Stop reason: HOSPADM

## 2021-12-06 RX ORDER — SODIUM CHLORIDE 0.9 % (FLUSH) 0.9 %
5-40 SYRINGE (ML) INJECTION EVERY 12 HOURS SCHEDULED
Status: DISCONTINUED | OUTPATIENT
Start: 2021-12-06 | End: 2021-12-06 | Stop reason: HOSPADM

## 2021-12-06 RX ORDER — SODIUM CHLORIDE, SODIUM LACTATE, POTASSIUM CHLORIDE, CALCIUM CHLORIDE 600; 310; 30; 20 MG/100ML; MG/100ML; MG/100ML; MG/100ML
INJECTION, SOLUTION INTRAVENOUS CONTINUOUS PRN
Status: DISCONTINUED | OUTPATIENT
Start: 2021-12-06 | End: 2021-12-06 | Stop reason: SDUPTHER

## 2021-12-06 RX ORDER — SODIUM CHLORIDE, SODIUM LACTATE, POTASSIUM CHLORIDE, CALCIUM CHLORIDE 600; 310; 30; 20 MG/100ML; MG/100ML; MG/100ML; MG/100ML
INJECTION, SOLUTION INTRAVENOUS CONTINUOUS
Status: DISCONTINUED | OUTPATIENT
Start: 2021-12-06 | End: 2021-12-06

## 2021-12-06 RX ORDER — MEPERIDINE HYDROCHLORIDE 25 MG/ML
12.5 INJECTION INTRAMUSCULAR; INTRAVENOUS; SUBCUTANEOUS EVERY 5 MIN PRN
Status: DISCONTINUED | OUTPATIENT
Start: 2021-12-06 | End: 2021-12-06 | Stop reason: HOSPADM

## 2021-12-06 RX ORDER — ONDANSETRON 2 MG/ML
4 INJECTION INTRAMUSCULAR; INTRAVENOUS
Status: DISCONTINUED | OUTPATIENT
Start: 2021-12-06 | End: 2021-12-06 | Stop reason: HOSPADM

## 2021-12-06 RX ORDER — TRANEXAMIC ACID 100 MG/ML
1000 INJECTION, SOLUTION INTRAVENOUS 2 TIMES DAILY PRN
Status: DISCONTINUED | OUTPATIENT
Start: 2021-12-06 | End: 2021-12-06 | Stop reason: HOSPADM

## 2021-12-06 RX ORDER — OXYCODONE HYDROCHLORIDE AND ACETAMINOPHEN 5; 325 MG/1; MG/1
1 TABLET ORAL PRN
Status: DISCONTINUED | OUTPATIENT
Start: 2021-12-06 | End: 2021-12-06 | Stop reason: HOSPADM

## 2021-12-06 RX ORDER — OXYCODONE HYDROCHLORIDE 5 MG/1
10 TABLET ORAL EVERY 4 HOURS PRN
Status: DISCONTINUED | OUTPATIENT
Start: 2021-12-06 | End: 2021-12-07 | Stop reason: HOSPADM

## 2021-12-06 RX ORDER — SODIUM CHLORIDE 0.9 % (FLUSH) 0.9 %
5-40 SYRINGE (ML) INJECTION PRN
Status: DISCONTINUED | OUTPATIENT
Start: 2021-12-06 | End: 2021-12-06 | Stop reason: HOSPADM

## 2021-12-06 RX ADMIN — VANCOMYCIN HYDROCHLORIDE 1000 MG: 1 INJECTION, POWDER, LYOPHILIZED, FOR SOLUTION INTRAVENOUS at 07:55

## 2021-12-06 RX ADMIN — SODIUM CHLORIDE, POTASSIUM CHLORIDE, SODIUM LACTATE AND CALCIUM CHLORIDE: 600; 310; 30; 20 INJECTION, SOLUTION INTRAVENOUS at 07:20

## 2021-12-06 RX ADMIN — HYDROMORPHONE HYDROCHLORIDE 0.5 MG: 1 INJECTION, SOLUTION INTRAMUSCULAR; INTRAVENOUS; SUBCUTANEOUS at 16:52

## 2021-12-06 RX ADMIN — OXYCODONE 10 MG: 5 TABLET ORAL at 20:04

## 2021-12-06 RX ADMIN — PROPOFOL 100 MCG/KG/MIN: 10 INJECTION, EMULSION INTRAVENOUS at 08:16

## 2021-12-06 RX ADMIN — ASPIRIN 325 MG: 325 TABLET, COATED ORAL at 20:03

## 2021-12-06 RX ADMIN — HYDROMORPHONE HYDROCHLORIDE 0.5 MG: 1 INJECTION, SOLUTION INTRAMUSCULAR; INTRAVENOUS; SUBCUTANEOUS at 14:11

## 2021-12-06 RX ADMIN — SODIUM CHLORIDE: 9 INJECTION, SOLUTION INTRAVENOUS at 19:59

## 2021-12-06 RX ADMIN — DOCUSATE SODIUM 50MG AND SENNOSIDES 8.6MG 1 TABLET: 8.6; 5 TABLET, FILM COATED ORAL at 20:03

## 2021-12-06 RX ADMIN — ASPIRIN 325 MG: 325 TABLET, COATED ORAL at 12:59

## 2021-12-06 RX ADMIN — OXYCODONE 10 MG: 5 TABLET ORAL at 12:59

## 2021-12-06 RX ADMIN — ACETAMINOPHEN 650 MG: 325 TABLET ORAL at 18:52

## 2021-12-06 RX ADMIN — BUPIVACAINE HYDROCHLORIDE IN DEXTROSE 1.6 ML: 7.5 INJECTION, SOLUTION SUBARACHNOID at 08:10

## 2021-12-06 RX ADMIN — ACETAMINOPHEN 650 MG: 325 TABLET ORAL at 12:59

## 2021-12-06 RX ADMIN — VANCOMYCIN HYDROCHLORIDE 1000 MG: 1 INJECTION, POWDER, LYOPHILIZED, FOR SOLUTION INTRAVENOUS at 20:02

## 2021-12-06 RX ADMIN — SODIUM CHLORIDE: 9 INJECTION, SOLUTION INTRAVENOUS at 12:52

## 2021-12-06 RX ADMIN — SODIUM CHLORIDE, POTASSIUM CHLORIDE, SODIUM LACTATE AND CALCIUM CHLORIDE: 600; 310; 30; 20 INJECTION, SOLUTION INTRAVENOUS at 07:58

## 2021-12-06 RX ADMIN — HYDROMORPHONE HYDROCHLORIDE 0.5 MG: 1 INJECTION, SOLUTION INTRAMUSCULAR; INTRAVENOUS; SUBCUTANEOUS at 21:05

## 2021-12-06 RX ADMIN — HYDROCHLOROTHIAZIDE 25 MG: 25 TABLET ORAL at 12:59

## 2021-12-06 RX ADMIN — DOCUSATE SODIUM 50MG AND SENNOSIDES 8.6MG 1 TABLET: 8.6; 5 TABLET, FILM COATED ORAL at 12:59

## 2021-12-06 RX ADMIN — FAMOTIDINE 20 MG: 20 TABLET, FILM COATED ORAL at 20:05

## 2021-12-06 RX ADMIN — ATENOLOL 50 MG: 50 TABLET ORAL at 20:04

## 2021-12-06 ASSESSMENT — PAIN SCALES - GENERAL
PAINLEVEL_OUTOF10: 0
PAINLEVEL_OUTOF10: 7
PAINLEVEL_OUTOF10: 8
PAINLEVEL_OUTOF10: 0
PAINLEVEL_OUTOF10: 7
PAINLEVEL_OUTOF10: 0
PAINLEVEL_OUTOF10: 9
PAINLEVEL_OUTOF10: 6
PAINLEVEL_OUTOF10: 8
PAINLEVEL_OUTOF10: 9

## 2021-12-06 ASSESSMENT — PAIN DESCRIPTION - ONSET: ONSET: GRADUAL

## 2021-12-06 ASSESSMENT — PULMONARY FUNCTION TESTS
PIF_VALUE: 0
PIF_VALUE: 1
PIF_VALUE: 0
PIF_VALUE: 1
PIF_VALUE: 0
PIF_VALUE: 1
PIF_VALUE: 0
PIF_VALUE: 1
PIF_VALUE: 0
PIF_VALUE: 1
PIF_VALUE: 0
PIF_VALUE: 1
PIF_VALUE: 0
PIF_VALUE: 0

## 2021-12-06 ASSESSMENT — PAIN DESCRIPTION - PAIN TYPE
TYPE: ACUTE PAIN;SURGICAL PAIN
TYPE: ACUTE PAIN;SURGICAL PAIN

## 2021-12-06 ASSESSMENT — PAIN DESCRIPTION - DESCRIPTORS
DESCRIPTORS: ACHING;CONSTANT;DISCOMFORT
DESCRIPTORS: ACHING;SORE

## 2021-12-06 ASSESSMENT — PAIN DESCRIPTION - PROGRESSION: CLINICAL_PROGRESSION: GRADUALLY WORSENING

## 2021-12-06 ASSESSMENT — PAIN DESCRIPTION - LOCATION
LOCATION: KNEE
LOCATION: KNEE

## 2021-12-06 ASSESSMENT — PAIN - FUNCTIONAL ASSESSMENT
PAIN_FUNCTIONAL_ASSESSMENT: 0-10
PAIN_FUNCTIONAL_ASSESSMENT: PREVENTS OR INTERFERES SOME ACTIVE ACTIVITIES AND ADLS

## 2021-12-06 ASSESSMENT — PAIN DESCRIPTION - ORIENTATION
ORIENTATION: RIGHT
ORIENTATION: RIGHT

## 2021-12-06 ASSESSMENT — ENCOUNTER SYMPTOMS: SHORTNESS OF BREATH: 0

## 2021-12-06 ASSESSMENT — LIFESTYLE VARIABLES: SMOKING_STATUS: 0

## 2021-12-06 ASSESSMENT — PAIN DESCRIPTION - FREQUENCY: FREQUENCY: INTERMITTENT

## 2021-12-06 NOTE — OP NOTE
[address]                 [address] [address]                                OPERATIVE REPORT    PATIENT NAME: Shakila Pollock                     :        1945  MED REC NO:   [id number]                          ROOM:       3342  ACCOUNT NO:   [de-identified]                           ADMIT DATE: 2021  PROVIDER:     Lizette Borja MD    DATE OF PROCEDURE:  2021    PREOPERATIVE DIAGNOSIS:  Right knee osteoarthritis. POSTOPERATIVE DIAGNOSIS:  Right knee osteoarthritis. OPERATION PERFORMED:  Right total knee replacement. SURGEON:  Lizette Borja MD    ANESTHESIA:  Spinal.    BLOOD LOSS:  Less than 50 mL. COMPLICATIONS:  None noted. INDICATIONS FOR OPERATION:  This 68-year-old female who presented with a  history, exam, and testing consistent with severe end-stage  osteoarthritis, and after failure of all other modalities over the last  two years, she elected for further recommendation of surgical  intervention. DESCRIPTION OF THE OPERATION:  The patient was taken to the operating  room where a spinal anesthetic had been obtained. Antibiotics were  given IV piggyback preoperatively. A tourniquet was placed around the  right proximal thigh. The right knee was sterilely prepped and draped. The leg was exsanguinated with an Esmarch, and the tourniquet was  inflated to 350 mmHg. A longitudinal incision was then made over the  anterior knee, and soft tissue dissected down through skin and through  subcutaneous tissue in midline anterior approach. A medial parapatellar  approach was then performed and the patella was everted. The knee was  flexed and the fat pad was removed. Using the [employer] total knee replacement system, an  intramedullary guide was placed in the femur. Using the 7-degree valgus  cut, a flat cut was made. It was sized for a size 4 right non-beaded  cruciate-retaining femoral component. subcuticular stitch and Dermabond will  be used to glue the skin. The wound will be dressed in appropriate  manner and placed in a knee immobilizer. The patient appeared to tolerate the procedure well and will be taken to  the recovery room in stable condition.         Mike Shay MD    D: 12/06/2021 9:37:21       T: 12/06/2021 11:58:13     [email or web address]  Job#: [id number]     Doc#: [id number]    CC:

## 2021-12-06 NOTE — BRIEF OP NOTE
Brief Postoperative Note      Patient: Carilyn Cheadle  YOB: 1945  MRN: [id number]    Date of Procedure: 12/6/2021    Pre-Op Diagnosis: RIGHT KNEE OSTEOARTHRITIS    Post-Op Diagnosis: Same       Procedure(s):  RIGHT TOTAL KNEE REPLACEMENT    Surgeon(s):  Shelly Farrar MD    Assistant:  Surgical Assistant: Jatinder Fernando    Anesthesia: Monitor Anesthesia Care    Estimated Blood Loss (mL): Minimal    Complications: None    Specimens:   * No specimens in log *    Implants:  Implant Name Type Inv.  Item Serial No.  Lot No. LRB No. Used Action   CEMENT BNE 40GM W/ GENT HI VISC RADPQ FOR REV SURG  CEMENT BNE 40GM W/ GENT HI VISC RADPQ FOR REV SURG  [employer] [id number] Right 2 Implanted   COMPONENT PAT H32MM STD E VITALIZE TEJINDER SHABANA RND BAL SYS  COMPONENT PAT H32MM STD E VITALIZE TEJINDER SHABANA RND BAL SYS  [employer]WD [id number] Right 1 Implanted   INSERT TIB SZ 4 THK7MM CRUCE RET BKS E VITALIZE  INSERT TIB SZ 4 THK7MM CRUCE RET BKS E VITALIZE  [employer]TH[employer]EL[employer]WD [id number] Right 1 Implanted   TRAY TIB SZ 4 NP A BAL KNEE  TRAY TIB SZ 4 NP A BAL KNEE  [employer] [id number] Right 1 Implanted   COMPONENT FEM SZ 4 R NP CRUCE RET BKS TRIMAX  COMPONENT FEM SZ 4 R NP CRUCE RET BKS TRIMAX  ORTH[employer]ELOP[employer]WD [id number] Right 1 Implanted         Drains: * No LDAs found *    Findings: ryan    Electronically signed by Shelly Farrar MD on 12/6/2021 at 9:21 AM

## 2021-12-06 NOTE — PROGRESS NOTES
4 Eyes Skin Assessment   Four eyes skin assessment completed at this time by Lennox Lins, RN   and CHELI Clarke RN. Assessment revealed: intact skin, except very small scab left shin. The patient is being assessed for  admission    I agree that 2 RN's have performed a thorough Head to Toe Skin Assessment on the patient. ALL assessment sites listed below have been assessed.        Areas assessed by both nurses:  [x]   Head, Face, and Ears   [x]   Shoulders, Back, and Chest  [x]   Arms, Elbows, and Hands   [x]   Coccyx, Sacrum, and Ischum  [x]   Legs, Feet, and Skip Sever, RN

## 2021-12-06 NOTE — PROGRESS NOTES
4 Eyes Skin Assessment     The patient is being assess for   Admission    I agree that 2 RN's have performed a thorough Head to Toe Skin Assessment on the patient. ALL assessment sites listed below have been assessed. Areas assessed for pressure by both nurses:   [x]   Head, Face, and Ears   [x]   Shoulders, Back, and Chest, Abdomen  [x]   Arms, Elbows, and Hands   [x]   Coccyx, Sacrum, and Ischium  [x]   Legs, Feet, and Heels  Bruise right forearm,ace wrap knee immobilizer RLE. Skin Assessed Under all Medical Devices by both nurses:               All Mepilex Borders were peeled back and area peeked at by both nurses:  none  Please list where Mepilex Borders are located:  none             **SHARE this note so that the co-signing nurse is able to place an eSignature**    Co-signer eSignature: Electronically signed by Yasmin Smith RN on 44/5/14 at 2:39 PM EST    Does the Patient have Skin Breakdown related to pressure?   No              José Miguel Prevention initiated:  No   Wound Care Orders initiated:  No      [facility] nurse consulted for Pressure Injury (Stage 3,4, Unstageable, DTI, NWPT, Complex wounds)and New or Established Ostomies:  No      Primary Nurse eSignature: Electronically signed by Fior Cary RN on 12/6/21 at 2:20 PM EST

## 2021-12-06 NOTE — H&P
Update History & Physical    The patient's History and Physical was reviewed with the patient and I examined the patient. There was no change. The surgical site was confirmed by the patient and me. Plan: The risks, benefits, expected outcome, and alternative to the recommended procedure have been discussed with the patient. Patient understands and wants to proceed with the procedure.      Electronically signed by Cosmo Monteiro MD on 12/6/2021 at 7:59 AM

## 2021-12-06 NOTE — PROGRESS NOTES
Occupational/Physical Therapy  Orders received, chart reviewed. Attempted 2x to see patient this pm.  RN reports patient with high pain levels requiring medication on first attempt. On second attempt, patient still with pain and reports severe drowsiness due to pain meds and being up all night prior to surgery. Patient and family request OT/PT evaluations be completed tomorrow, 12/7/21. Will reattempt at that time.   Roni Trevino, OTR/L [id number]  CLAUDIA An[facility]

## 2021-12-06 NOTE — ANESTHESIA PROCEDURE NOTES
Spinal Block    Start time: 12/6/2021 8:06 AM  End time: 12/6/2021 8:12 AM  Reason for block: primary anesthetic  Staffing  Performed: resident/CRNA   Anesthesiologist: Mee Apley, MD  Resident/CRNA: IMELDA Murcia CRNA  Preanesthetic Checklist  Completed: patient identified, IV checked, site marked, risks and benefits discussed, surgical consent, monitors and equipment checked, pre-op evaluation, timeout performed, anesthesia consent given, oxygen available and patient being monitored  Spinal Block  Patient position: sitting  Prep: Betadine  Patient monitoring: cardiac monitor, continuous pulse ox and continuous capnometry  Approach: midline  Location: L3/L4  Provider prep: mask and sterile gloves  Local infiltration: lidocaine  Agent: bupivacaine  Dose: 1.6  Dose: 1.6  Needle  Needle type: Pencan   Needle gauge: 25 G  Needle length: 3.5 in  Lot number: [id number]  Expiration date: 3/30/2022  Assessment  Sensory level: T6  Events: cerebrospinal fluid  Swirl obtained: Yes  CSF: clear  Attempts: 1  Hemodynamics: stable

## 2021-12-06 NOTE — ANESTHESIA PRE PROCEDURE
Department of Anesthesiology  Preprocedure Note       Name:  Brain Pierre   Age:  64 y.o.  :  1945                                          MRN:  [id number]         Date:  2021      Surgeon: Arpita Sims):  Alfa Adams MD    Procedure: Procedure(s):  RIGHT TOTAL KNEE REPLACEMENT    Medications prior to admission:   Prior to Admission medications    Medication Sig Start Date End Date Taking? Authorizing Provider   celecoxib (CELEBREX) 200 MG capsule Take 200 mg by mouth daily   Yes Historical Provider, MD   famotidine (PEPCID) 20 MG tablet Take 20 mg by mouth 2 times daily   Yes Historical Provider, MD   hydroCHLOROthiazide (HYDRODIURIL) 25 MG tablet Take 25 mg by mouth daily   Yes Historical Provider, MD   mupirocin (BACTROBAN) 2 % ointment APPLY 1/2 INCH TO INSIDE OF EACH NOSTRIL Q 12 HR STARTING 5 DAYS PRIOR TO SURGERY INCLUDING MORNING OF SURGERY. 21  Yes Alfa Adams MD   atenolol (TENORMIN) 50 MG tablet Take 50 mg by mouth 2 times daily  12/13/10  Yes Historical Provider, MD       Current medications:    Current Facility-Administered Medications   Medication Dose Route Frequency Provider Last Rate Last Admin    vancomycin 1000 mg IVPB in 250 mL D5W addavial  1,000 mg IntraVENous Once Alfa Adams MD        tranexamic acid (CYKLOKAPRON) injection 1,000 mg  1,000 mg IntraVENous BID PRN Alfa Adams MD        lactated ringers infusion   IntraVENous Continuous Humphrey Shannon MD        sodium chloride flush 0.9 % injection 5-40 mL  5-40 mL IntraVENous 2 times per day Humphrey Shannon MD        sodium chloride flush 0.9 % injection 5-40 mL  5-40 mL IntraVENous PRN Humphrey Shannon MD        0.9 % sodium chloride infusion  25 mL IntraVENous PRN Humphrey Shannon MD        lidocaine PF 1 % injection 0.3 mL  0.3 mL IntraDERmal Once PRN Humphrey Shannon MD           Allergies:     Allergies   Allergen Reactions    Prednisone Other (See Comments)     Face gets red and hot       Problem List:    Patient Active Problem List   Diagnosis Code    Localized osteoarthrosis not specified whether primary or secondary, pelvic region and thigh M16.9    DDD (degenerative disc disease), lumbar M51.36    Acute pain of right knee M25.561    Primary osteoarthritis of right knee M17.11    Degenerative tear of lateral meniscus of right knee M23.300    Closed subchondral insufficiency fracture of femoral condyle (HCC) M84.453A    Insufficiency fracture of tibia M84.469A       Past Medical History:        Diagnosis Date    Arthritis     Closed subchondral insufficiency fracture of femoral condyle ([address] 8/12/2020    Hypertension     Localized osteoarthrosis not specified whether primary or secondary, pelvic region and thigh 5/8/2015       Past Surgical History:        Procedure Laterality Date    CATARACT REMOVAL WITH IMPLANT  1/14/11    LEFT EYE    EYE SURGERY  12/14/2010    cataract rt eye    HYSTERECTOMY      JOINT REPLACEMENT  1997    left hip       Social History:    Social History     Tobacco Use    Smoking status: Never Smoker    Smokeless tobacco: Never Used   Substance Use Topics    Alcohol use:  No                                Counseling given: Not Answered      Vital Signs (Current):   Vitals:    12/01/21 1530 12/06/21 0636   BP:  (!) 181/96   Pulse:  73   Resp:  20   Temp:  97 °F (36.1 °C)   TempSrc:  Infrared   SpO2:  98%   Weight: 151 lb (68.5 kg)    Height: 5' (1.524 m)                                               BP Readings from Last 3 Encounters:   12/06/21 (!) 181/96   09/24/15 112/58   09/08/15 172/74       NPO Status: Time of last liquid consumption: 2000                        Time of last solid consumption: 1700                        Date of last liquid consumption: 12/05/21                        Date of last solid food consumption: 12/05/21    BMI:   Wt Readings from Last 3 Encounters:   12/01/21 151 lb (68.5 kg)   08/03/21 153 lb (69.4 kg)   08/12/20 153 lb (69.4 kg) Body mass index is 29.49 kg/m². CBC:   Lab Results   Component Value Date    WBC 6.5 11/23/2021    RBC 4.11 11/23/2021    HGB 11.3 11/23/2021    HCT 35.2 11/23/2021    MCV 85.8 11/23/2021    RDW 13.4 11/23/2021     11/23/2021       CMP:   Lab Results   Component Value Date     11/23/2021    K 4.0 11/23/2021    CL 96 11/23/2021    CO2 26 11/23/2021    BUN 31 11/23/2021    CREATININE 1.8 11/23/2021    GFRAA 33 11/23/2021    LABGLOM 27 11/23/2021    GLUCOSE 92 11/23/2021    CALCIUM 10.0 11/23/2021       POC Tests: No results for input(s): POCGLU, POCNA, POCK, POCCL, POCBUN, POCHEMO, POCHCT in the last 72 hours. Coags:   Lab Results   Component Value Date    PROTIME 11.1 11/23/2021    INR 0.98 11/23/2021    APTT 34.8 11/23/2021       HCG (If Applicable): No results found for: PREGTESTUR, PREGSERUM, HCG, HCGQUANT     ABGs: No results found for: PHART, PO2ART, TEI8RNO, BEO0KUI, BEART, X9BDPKDC     Type & Screen (If Applicable):  No results found for: LABABO, LABRH    Drug/Infectious Status (If Applicable):  No results found for: HIV, HEPCAB    COVID-19 Screening (If Applicable):   Lab Results   Component Value Date    COVID19 Not Detected 11/29/2021           Anesthesia Evaluation  Patient summary reviewed no history of anesthetic complications:   Airway: Mallampati: II  TM distance: >3 FB   Neck ROM: full  Mouth opening: > = 3 FB Dental:    (+) upper dentures and lower dentures      Pulmonary:Negative Pulmonary ROS breath sounds clear to auscultation  (+) asthma (PRN INHALER, NO O2 REQ.):     (-) COPD, shortness of breath, recent URI, sleep apnea and not a current smoker          Patient did not smoke on day of surgery.                  Cardiovascular:    (+) hypertension:,     (-) pacemaker, valvular problems/murmurs, past MI, CAD, CABG/stent, dysrhythmias,  angina and  CHF    ECG reviewed  Rhythm: regular  Rate: normal           Beta Blocker:  Dose within 24 Hrs         Neuro/Psych:      (-) seizures, TIA, CVA and psychiatric history           GI/Hepatic/Renal:   (+) PUD (DISTANT, TAKES PEPCID), renal disease (self caths post back surg.):,      (-) GERD, liver disease, bowel prep and no morbid obesity       Endo/Other:    (+) : arthritis:., no malignancy/cancer. (-) diabetes mellitus, hypothyroidism, hyperthyroidism, blood dyscrasia, no malignancy/cancer               Abdominal:             Vascular: negative vascular ROS. Other Findings:           Anesthesia Plan      spinal     ASA 2     (PRN GETA IF CANNOT GET INTO SPINAL SPACE, IV SED W/ SAB  /  PT DECLINED PREOP BLK.)  Induction: intravenous. MIPS: Postoperative opioids intended and Prophylactic antiemetics administered. Anesthetic plan and risks discussed with patient. Plan discussed with CRNA. This pre-anesthesia assessment may be used as a history and physical.    DOS STAFF ADDENDUM:    Pt seen and examined, chart reviewed (including anesthesia, drug and allergy history). No interval changes to history and physical examination. Anesthetic plan, risks, benefits, alternatives, and personnel involved discussed with patient. Patient verbalized an understanding and agrees to proceed.       Hannah Sandhu MD  December 6, 2021  7:08 AM      Hannah Sandhu MD   12/6/2021

## 2021-12-06 NOTE — PROGRESS NOTES
Consult has been called to Dr. Shellie Morales on 12/6/21. Spoke with calista garza.  11:42 AM    Va Billings  12/6/2021

## 2021-12-07 VITALS
HEART RATE: 75 BPM | DIASTOLIC BLOOD PRESSURE: 73 MMHG | HEIGHT: 60 IN | RESPIRATION RATE: 16 BRPM | BODY MASS INDEX: 29.64 KG/M2 | OXYGEN SATURATION: 98 % | WEIGHT: 151 LBS | SYSTOLIC BLOOD PRESSURE: 151 MMHG | TEMPERATURE: 97.9 F

## 2021-12-07 PROBLEM — I10 HYPERTENSION: Status: ACTIVE | Noted: 2021-12-07

## 2021-12-07 LAB
BASOPHILS ABSOLUTE: 0 K/UL (ref 0–0.2)
BASOPHILS RELATIVE PERCENT: 0.4 %
EOSINOPHILS ABSOLUTE: 0.1 K/UL (ref 0–0.6)
EOSINOPHILS RELATIVE PERCENT: 1.5 %
HCT VFR BLD CALC: 28.6 % (ref 36–48)
HEMOGLOBIN: 9.2 G/DL (ref 12–16)
LYMPHOCYTES ABSOLUTE: 0.7 K/UL (ref 1–5.1)
LYMPHOCYTES RELATIVE PERCENT: 9.4 %
MCH RBC QN AUTO: 28 PG (ref 26–34)
MCHC RBC AUTO-ENTMCNC: 32.3 G/DL (ref 31–36)
MCV RBC AUTO: 86.8 FL (ref 80–100)
MONOCYTES ABSOLUTE: 0.8 K/UL (ref 0–1.3)
MONOCYTES RELATIVE PERCENT: 11.1 %
NEUTROPHILS ABSOLUTE: 5.6 K/UL (ref 1.7–7.7)
NEUTROPHILS RELATIVE PERCENT: 77.6 %
PDW BLD-RTO: 13.5 % (ref 12.4–15.4)
PLATELET # BLD: 212 K/UL (ref 135–450)
PMV BLD AUTO: 7.6 FL (ref 5–10.5)
RBC # BLD: 3.3 M/UL (ref 4–5.2)
WBC # BLD: 7.1 K/UL (ref 4–11)

## 2021-12-07 PROCEDURE — 97166 OT EVAL MOD COMPLEX 45 MIN: CPT

## 2021-12-07 PROCEDURE — 6360000002 HC RX W HCPCS: Performed by: INTERNAL MEDICINE

## 2021-12-07 PROCEDURE — G0378 HOSPITAL OBSERVATION PER HR: HCPCS

## 2021-12-07 PROCEDURE — 6360000002 HC RX W HCPCS: Performed by: ORTHOPAEDIC SURGERY

## 2021-12-07 PROCEDURE — 97161 PT EVAL LOW COMPLEX 20 MIN: CPT

## 2021-12-07 PROCEDURE — 97530 THERAPEUTIC ACTIVITIES: CPT

## 2021-12-07 PROCEDURE — 2580000003 HC RX 258: Performed by: ORTHOPAEDIC SURGERY

## 2021-12-07 PROCEDURE — 6370000000 HC RX 637 (ALT 250 FOR IP): Performed by: ORTHOPAEDIC SURGERY

## 2021-12-07 PROCEDURE — 99222 1ST HOSP IP/OBS MODERATE 55: CPT | Performed by: INTERNAL MEDICINE

## 2021-12-07 PROCEDURE — 97110 THERAPEUTIC EXERCISES: CPT

## 2021-12-07 PROCEDURE — 51701 INSERT BLADDER CATHETER: CPT

## 2021-12-07 PROCEDURE — 36415 COLL VENOUS BLD VENIPUNCTURE: CPT

## 2021-12-07 PROCEDURE — 85025 COMPLETE CBC W/AUTO DIFF WBC: CPT

## 2021-12-07 RX ORDER — OXYCODONE HYDROCHLORIDE 5 MG/1
5-10 TABLET ORAL
Qty: 40 TABLET | Refills: 0 | Status: SHIPPED | OUTPATIENT
Start: 2021-12-07 | End: 2021-12-14

## 2021-12-07 RX ORDER — HYDRALAZINE HYDROCHLORIDE 20 MG/ML
10 INJECTION INTRAMUSCULAR; INTRAVENOUS ONCE
Status: COMPLETED | OUTPATIENT
Start: 2021-12-07 | End: 2021-12-07

## 2021-12-07 RX ORDER — ONDANSETRON 4 MG/1
4 TABLET, ORALLY DISINTEGRATING ORAL EVERY 8 HOURS PRN
Qty: 15 TABLET | Refills: 0 | Status: SHIPPED | OUTPATIENT
Start: 2021-12-07 | End: 2022-10-11

## 2021-12-07 RX ADMIN — OXYCODONE 10 MG: 5 TABLET ORAL at 01:46

## 2021-12-07 RX ADMIN — HYDRALAZINE HYDROCHLORIDE 10 MG: 20 INJECTION INTRAMUSCULAR; INTRAVENOUS at 12:22

## 2021-12-07 RX ADMIN — OXYCODONE 10 MG: 5 TABLET ORAL at 12:22

## 2021-12-07 RX ADMIN — ACETAMINOPHEN 650 MG: 325 TABLET ORAL at 05:30

## 2021-12-07 RX ADMIN — ONDANSETRON HYDROCHLORIDE 4 MG: 2 INJECTION, SOLUTION INTRAMUSCULAR; INTRAVENOUS at 12:58

## 2021-12-07 RX ADMIN — HYDROCHLOROTHIAZIDE 25 MG: 25 TABLET ORAL at 09:02

## 2021-12-07 RX ADMIN — FAMOTIDINE 20 MG: 20 TABLET, FILM COATED ORAL at 09:02

## 2021-12-07 RX ADMIN — SODIUM CHLORIDE: 9 INJECTION, SOLUTION INTRAVENOUS at 03:37

## 2021-12-07 RX ADMIN — HYDROMORPHONE HYDROCHLORIDE 0.5 MG: 1 INJECTION, SOLUTION INTRAMUSCULAR; INTRAVENOUS; SUBCUTANEOUS at 10:14

## 2021-12-07 RX ADMIN — ASPIRIN 325 MG: 325 TABLET, COATED ORAL at 09:02

## 2021-12-07 RX ADMIN — OXYCODONE 10 MG: 5 TABLET ORAL at 05:30

## 2021-12-07 RX ADMIN — DOCUSATE SODIUM 50MG AND SENNOSIDES 8.6MG 1 TABLET: 8.6; 5 TABLET, FILM COATED ORAL at 09:02

## 2021-12-07 RX ADMIN — ATENOLOL 50 MG: 50 TABLET ORAL at 09:02

## 2021-12-07 RX ADMIN — ACETAMINOPHEN 650 MG: 325 TABLET ORAL at 00:06

## 2021-12-07 RX ADMIN — ACETAMINOPHEN 650 MG: 325 TABLET ORAL at 12:22

## 2021-12-07 RX ADMIN — HYDROMORPHONE HYDROCHLORIDE 0.5 MG: 1 INJECTION, SOLUTION INTRAMUSCULAR; INTRAVENOUS; SUBCUTANEOUS at 02:46

## 2021-12-07 RX ADMIN — Medication 10 ML: at 09:02

## 2021-12-07 ASSESSMENT — PAIN DESCRIPTION - PROGRESSION
CLINICAL_PROGRESSION: NOT CHANGED
CLINICAL_PROGRESSION: NOT CHANGED
CLINICAL_PROGRESSION: GRADUALLY WORSENING
CLINICAL_PROGRESSION: NOT CHANGED

## 2021-12-07 ASSESSMENT — PAIN SCALES - GENERAL
PAINLEVEL_OUTOF10: 10
PAINLEVEL_OUTOF10: 4
PAINLEVEL_OUTOF10: 7
PAINLEVEL_OUTOF10: 8
PAINLEVEL_OUTOF10: 4
PAINLEVEL_OUTOF10: 4
PAINLEVEL_OUTOF10: 9
PAINLEVEL_OUTOF10: 10
PAINLEVEL_OUTOF10: 3

## 2021-12-07 ASSESSMENT — PAIN - FUNCTIONAL ASSESSMENT
PAIN_FUNCTIONAL_ASSESSMENT: PREVENTS OR INTERFERES SOME ACTIVE ACTIVITIES AND ADLS
PAIN_FUNCTIONAL_ASSESSMENT: PREVENTS OR INTERFERES SOME ACTIVE ACTIVITIES AND ADLS
PAIN_FUNCTIONAL_ASSESSMENT: PREVENTS OR INTERFERES WITH ALL ACTIVE AND SOME PASSIVE ACTIVITIES
PAIN_FUNCTIONAL_ASSESSMENT: PREVENTS OR INTERFERES SOME ACTIVE ACTIVITIES AND ADLS

## 2021-12-07 ASSESSMENT — PAIN DESCRIPTION - PAIN TYPE
TYPE: SURGICAL PAIN
TYPE: ACUTE PAIN;SURGICAL PAIN
TYPE: SURGICAL PAIN

## 2021-12-07 ASSESSMENT — PAIN DESCRIPTION - ONSET
ONSET: ON-GOING
ONSET: PROGRESSIVE

## 2021-12-07 ASSESSMENT — PAIN DESCRIPTION - FREQUENCY
FREQUENCY: CONTINUOUS
FREQUENCY: INTERMITTENT
FREQUENCY: INTERMITTENT
FREQUENCY: CONTINUOUS

## 2021-12-07 ASSESSMENT — PAIN DESCRIPTION - ORIENTATION
ORIENTATION: RIGHT

## 2021-12-07 ASSESSMENT — PAIN DESCRIPTION - DESCRIPTORS
DESCRIPTORS: ACHING;SORE
DESCRIPTORS: ACHING;SORE;DISCOMFORT
DESCRIPTORS: ACHING;SORE;DISCOMFORT
DESCRIPTORS: ACHING;SORE

## 2021-12-07 ASSESSMENT — PAIN DESCRIPTION - LOCATION
LOCATION: KNEE

## 2021-12-07 NOTE — PLAN OF CARE
Problem: Falls - Risk of:  Goal: Will remain free from falls  Description: Will remain free from falls  12/7/2021 1437 by eHide Martinez RN  Outcome: Completed     Problem: Falls - Risk of:  Goal: Absence of physical injury  Description: Absence of physical injury  12/7/2021 1437 by Heide Martinez RN  Outcome: Completed     Problem: Pain:  Goal: Pain level will decrease  Description: Pain level will decrease  12/7/2021 1437 by Heide Martinez RN  Outcome: Completed     Problem: Pain:  Goal: Control of acute pain  Description: Control of acute pain  12/7/2021 1437 by Heide Martinez RN  Outcome: Completed     Problem: Pain:  Goal: Control of chronic pain  Description: Control of chronic pain  12/7/2021 1437 by Heide Martinez RN  Outcome: Completed     Problem: Pain:  Goal: Patient's pain/discomfort is manageable  Description: Patient's pain/discomfort is manageable  12/7/2021 1437 by Heide Martinez RN  Outcome: Completed     Problem: Infection:  Goal: Will remain free from infection  Description: Will remain free from infection  12/7/2021 1437 by Heide Martinez RN  Outcome: Completed     Problem: Safety:  Goal: Free from accidental physical injury  Description: Free from accidental physical injury  12/7/2021 1437 by Heide Martinez RN  Outcome: Completed     Problem: Safety:  Goal: Free from intentional harm  Description: Free from intentional harm  12/7/2021 1437 by Heide Martinez RN  Outcome: Completed     Problem: Daily Care:  Goal: Daily care needs are met  Description: Daily care needs are met  12/7/2021 1437 by Heide Martinez RN  Outcome: Completed     Problem: Skin Integrity:  Goal: Skin integrity will stabilize  Description: Skin integrity will stabilize  12/7/2021 1437 by Heide Martinez RN  Outcome: Completed     Problem: Discharge Planning:  Goal: Patients continuum of care needs are met  Description: Patients continuum of care needs are met  12/7/2021 1437 by Heide Martinez RN  Outcome: Completed

## 2021-12-07 NOTE — PROGRESS NOTES
Inpatient Physical Therapy Evaluation and Treatment    Unit: [facility]  Date:  12/7/2021  Patient Name:    Nelsy Saul  Admitting diagnosis:  Primary osteoarthritis of right knee [M17.11]  Admit Date:  12/6/2021  Precautions/Restrictions/WB Status/ Lines/ Wounds/ Oxygen: Fall risk, Bed/chair alarm, Lines -IV, WB Restrictions (WBAT RLE) and Knee immobilizer   H/o SANJIV in 2016    Treatment Time:  8:05 - 8:55  Treatment Number:  1   Timed Code Treatment Minutes: 40 minutes  Total Treatment Minutes:  50  minutes    Patient Goals for Therapy: \" go home today \"          Discharge Recommendations: Home 24 hr supervision and with home PT   DME needs for discharge: Needs Met - pt owns RW       Therapy recommendation for EMS Transport: can transport by wheelchair    Therapy recommendations for staff:   Assist of 1 with use of rolling walker (RW) and gait belt for all transfers and ambulation to/from [facility]  to/from chair    History of Present Illness: Pt is s/p elective R TKA on 12/6/21. Home Health S4 Level Recommendation:  Level 3 Safety  AM-PAC Mobility Score    AM-PAC Inpatient Mobility Raw Score : [address] Kristina Zayas scored a 16/24 on the AM-PAC short mobility form. Current research shows that an AM-PAC score of 18 or greater is typically associated with a discharge to the patient's home setting. Based on the patient's AM-PAC score and their current functional mobility deficits, it is recommended that the patient have 2-3 sessions per week of Physical Therapy at d/c to increase the patient's independence. At this time, this patient demonstrates the endurance and safety to discharge home with home health PT and a follow up treatment frequency of 2-3x/wk. Please see assessment section for further patient specific details. If patient discharges prior to next session this note will serve as a discharge summary. Please see below for the latest assessment towards goals. Preadmission Environment    Pt.  Lives Alone  Home environment:  two story home  Steps to enter first floor: 1 steps to enter - no rails, threshold  Steps to second floor: Full flight of 12-13 and hand rail: bilateral  Bathroom: tub/shower unit, grab bars and standard height commode  Equipment owned: RW, shower chair/bench, SPC and raised toilet    Preadmission Status:  Pt. Able to drive: Yes  Pt Fully independent with ADLs: Yes  Pt. Required assistance from family for: Independent PTA  Pt. independent for transfers and gait and walked with No Device  History of falls No     Pt reports that her children will stay with her 24hr upon D/C home. Pain   Yes  Location: R knee  Ratin /10  Pain Medicine Status: Received pain med prior to tx    Cognition    A&O x4   Able to follow 2 step commands    Subjective  Patient lying supine in bed with no family present. Pt agreeable to this PT eval & tx. Upper Extremity ROM/Strength  Please see OT evaluation. Lower Extremity ROM / Strength   AROM WFL: No  ROM limitations:   R Knee ext PROM: -8 deg  R Knee flex PROM: 72 deg    Formal strength testing deferred recent sx on RLE.  and BLE strength WFL, but not formally assessed with MMT.    LLE WFL    Lower Extremity Sensation    WFL    Lower Extremity Proprioception:   NT    Coordination and Tone  NT    Balance  Sitting:  Good ; Supervision  Comments:     Standing: Fair +; CGA and Min A   Comments:     Bed Mobility   Supine to Sit:    Min A   Sit to Supine:   Not Tested  Rolling:   Not Tested  Scooting in sitting: SBA  Scooting in supine:  Not Tested    Transfer Training     Sit to stand:   Min A   Stand to sit:   Min A   Bed to Chair:   Min A  with use of gait belt and rolling walker (RW)    Gait gait completed as indicated below  Distance:      5 ft  Deviations (firm surface/linoleum):  decreased reji, forward flexed posture, step to pattern and decreased stance time on right  Assistive Device Used:    gait belt and rolling walker (RW)  Level of Assist:    Min A   Comment: steady, painful with wt bearing on RLE, up to 7/10 per pt report    Stair Training deferred, pt unsafe/ not appropriate to complete stairs at this time    Activity Tolerance   Pt completed therapy session with No adverse symptoms noted w/activity    Supine at rest BP: 109/68  Sitting EOB BP: unable to obtain x2 attempts    Positioning Needs   Pt reclined in chair, alarm set, positioned in proper neutral alignment and pressure relief provided. Call light provided and all needs within reach   Attempted drop and dangle - pt only able to tolerate 15 minutes and then needed to elevate BLE 2/2 pain in R knee    Exercises Initiated  all completed bilaterally unless indicated  Ankle Pumps x 20 reps  Quad sets x 10 reps  Heel slides x 10 reps  LAQ x 10 reps    Other  None. Patient/Family Education   Pt educated on role of inpatient PT, POC, importance of continued activity, DC recommendations, safety awareness, transfer techniques, pacing activity and calling for assist with mobility. Assessment  Pt seen for Physical Therapy evaluation in acute care setting. Pt demonstrated decreased Activity tolerance, Balance, ROM, Safety and Strength as well as decreased independence with Ambulation, Bed Mobility  and Transfers. Recommending Home 24 hr supervision and with home PT upon discharge as patient functioning below baseline level and would benefit from continued therapy services    Goals : To be met in 3 visits:  1). Independent with LE Ex x 10 reps    To be met in 6 visits:  1). Supine to/from sit: Supervision  2). Sit to/from stand: Supervision  3). Bed to chair: Supervision  4). Gait: Ambulate 150 ft.  with  Supervision and use of rolling walker (RW)  5). Tolerate B LE exercises 3 sets of 10-15 reps  6).   Ascend/descend 1 curb step with CGA with use of no handrail and rolling walker (RW)    Rehabilitation Potential: Good  Strengths for achieving goals include:   Pt motivated, PLOF, Family Support and Pt cooperative   Barriers to achieving goals include:    No Barriers    Plan    To be seen 5-7 x / week BID while in acute care setting for therapeutic exercises, bed mobility, transfers, progressive gait training, balance training, and family/patient education. Signature: Electronically signed by Westley Beltran PT on 12/7/2021 at 9:47 AM       If patient discharges from this facility prior to next visit, this note will serve as the Discharge Summary.

## 2021-12-07 NOTE — ACP (ADVANCE CARE PLANNING)
Advance Care Planning   Healthcare Decision Maker:    Primary Decision Maker: Becca Brewer - [phone]    Click here to complete Healthcare Decision Makers including selection of the Healthcare Decision Maker Relationship (ie \"Primary\").

## 2021-12-07 NOTE — FLOWSHEET NOTE
12/06/21 1948   Vital Signs   Temp 98.1 °F (36.7 °C)   Temp Source Oral   Pulse 75   Heart Rate Source Monitor   Resp 16   /70   BP Location Right upper arm   Patient Position Semi fowlers   Level of Consciousness Alert (0)   MEWS Score 1   Oxygen Therapy   SpO2 96 %   O2 Device None (Room air)   Assessment complete- see lfowsheets. Pt resting in bed, PRN medication given per pt request within PRN order parameters. Pt denies further needs, call light within reach, bed alarm in place. Surgical dressing to RLE CDI. Pt aware to call for any needs or changes. Will continue to monitor.   Mckinley Dumont RN

## 2021-12-07 NOTE — PROGRESS NOTES
Patient giving discharge instructions both verbally and written along with follow up appointments, patient expressed full understanding. Patient left via wheelchair via car accompanied by family all belongings sent.

## 2021-12-07 NOTE — DISCHARGE SUMMARY
Department of Orthopedic Surgery  Physician Assistant   Discharge Summary    The Christine Flores is a 64 y.o. female underwent total knee replacement procedure without complication. Christine Flores was admitted to the floor following Her recovery in the PACU. Discharge Diagnosis  right Knee Replacement    Current Inpatient Medications    Current Facility-Administered Medications: atenolol (TENORMIN) tablet 50 mg, 50 mg, Oral, BID  famotidine (PEPCID) tablet 20 mg, 20 mg, Oral, BID  hydroCHLOROthiazide (HYDRODIURIL) tablet 25 mg, 25 mg, Oral, Daily  0.9 % sodium chloride infusion, , IntraVENous, Continuous  sodium chloride flush 0.9 % injection 5-40 mL, 5-40 mL, IntraVENous, 2 times per day  sodium chloride flush 0.9 % injection 5-40 mL, 5-40 mL, IntraVENous, PRN  0.9 % sodium chloride infusion, 25 mL, IntraVENous, PRN  acetaminophen (TYLENOL) tablet 650 mg, 650 mg, Oral, Q6H  oxyCODONE (ROXICODONE) immediate release tablet 5 mg, 5 mg, Oral, Q4H PRN **OR** oxyCODONE (ROXICODONE) immediate release tablet 10 mg, 10 mg, Oral, Q4H PRN  HYDROmorphone (DILAUDID) injection 0.25 mg, 0.25 mg, IntraVENous, Q3H PRN **OR** HYDROmorphone (DILAUDID) injection 0.5 mg, 0.5 mg, IntraVENous, Q3H PRN  sennosides-docusate sodium (SENOKOT-S) 8.6-50 MG tablet 1 tablet, 1 tablet, Oral, BID  magnesium hydroxide (MILK OF MAGNESIA) 400 MG/5ML suspension 30 mL, 30 mL, Oral, Daily PRN  ondansetron (ZOFRAN-ODT) disintegrating tablet 4 mg, 4 mg, Oral, Q8H PRN **OR** ondansetron (ZOFRAN) injection 4 mg, 4 mg, IntraVENous, Q6H PRN  aspirin EC tablet 325 mg, 325 mg, Oral, BID    Post-operatively the patients diet was advanced as tolerated and their incision was checked on POD #1. The incision is dressing in place, clean, dry and intact with no signs of infection. The patient remained neurovascularly intact in the lower extremity and had intact pulses distally. Patients calf remained soft and showed no evidence of DVT.   The patient was able to move their leg and ankle/foot without any problems post-operatively. Physical therapy and occupational therapy were consulted and began working with the patient post-operatively. The patient progressed with PT/OT as would be expected and continued to improve through their stay. The patients pain was initially controlled with IV medications but we were able to transition to oral pain medications soon after arrival to the floor and their pain remained under good control through their hospital stay. From a medical standpoint the patient remained stable and continued to have the medicine team follow throughout their stay. The patients dressing was changed/incison was checked on day of d/c. The patient will be discharged at this time to Home  with their current diet restrictions and will continue to follow the total knee precautions outlined to them by us and PT/OT. Condition on Discharge: Stable    Plan  Return visit in 2 weeks. .  Patient was instructed on the use of pain medications, the signs and symptoms of infection, and was given our number to call should they have any questions or concerns following discharge. For opioid prescriptions given at discharge the following statement is provided for compliance with [employer]SM[employer] rules. Patient being given increased dosage/quantity of opoid pain medication in excess of OSMB guidelines which noted a 30 MED daily of opioids due to the fact that he/she has undergone major orthopaedic surgery as outlined in rule 4731-11-13. Dosages and further duration of the pain medication will be re-evaluated at her post op visit in 2 weeks. Patient was educated on dosing expectations and limits of prescribing as a result of the new [facility] Board rules enacted August 31, 2017. Please also note that this is not the initial opoid prescription issued to this patient but a continuation of medication utilized during the hospital admission as noted in the medical record. [place]ARRS report has also been utilized to screen for any abuse history or suspicious activity as outlined in [place]. All efforts have been taken to prevent abuse potential and misuse of opioid medications including education, screening, and close clinical follow up.

## 2021-12-07 NOTE — PLAN OF CARE
Problem: Falls - Risk of:  Goal: Will remain free from falls  Description: Will remain free from falls  12/7/2021 1315 by Jose L Brink RN  Outcome: Ongoing     Problem: Falls - Risk of:  Goal: Absence of physical injury  Description: Absence of physical injury  12/7/2021 1315 by Jose L Brink RN  Outcome: Ongoing     Problem: Pain:  Goal: Pain level will decrease  Description: Pain level will decrease  12/7/2021 1315 by Jose L Brink RN  Outcome: Ongoing     Problem: Pain:  Goal: Control of acute pain  Description: Control of acute pain  12/7/2021 1315 by Jose L Brink RN  Outcome: Ongoing     Problem: Pain:  Goal: Control of chronic pain  Description: Control of chronic pain  12/7/2021 1315 by Jose L Brink RN  Outcome: Ongoing     Problem: Pain:  Goal: Patient's pain/discomfort is manageable  Description: Patient's pain/discomfort is manageable  12/7/2021 1315 by Jose L Brink RN  Outcome: Ongoing     Problem: Infection:  Goal: Will remain free from infection  Description: Will remain free from infection  12/7/2021 1315 by Jose L Brink RN  Outcome: Ongoing     Problem: Safety:  Goal: Free from accidental physical injury  Description: Free from accidental physical injury  12/7/2021 1315 by Jose L Brink RN  Outcome: Ongoing     Problem: Safety:  Goal: Free from intentional harm  Description: Free from intentional harm  12/7/2021 1315 by Jose L Brink RN  Outcome: Ongoing     Problem: Daily Care:  Goal: Daily care needs are met  Description: Daily care needs are met  12/7/2021 1315 by Jose L Brink RN  Outcome: Ongoing     Problem: Skin Integrity:  Goal: Skin integrity will stabilize  Description: Skin integrity will stabilize  12/7/2021 1315 by Jose L Brink RN  Outcome: Ongoing     Problem: Discharge Planning:  Goal: Patients continuum of care needs are met  Description: Patients continuum of care needs are met  12/7/2021 1315 by Jose L Brink RN  Outcome: Ongoing

## 2021-12-07 NOTE — CONSULTS
Dora Olson;  MRN: [id number] ; Admit Date: 12/6/2021  6:20 AM   Current Date and Time: 12/7/2021 10:44 AM    PCP  --  Meghan Holguin MD         Reason for Consultation - Medical Management during hospitalisation     HPI:   Patient is a 64 y.o.  female presents for elective right total knee arthoplasty by Dr. Winnie Burgos yesterday. Underwent surgery uneventfully and was transferred to floor for further post operative management. Pt mentions to me that  pain is controlled well  and worsens with leg movement. Denies any chestpain, sob or dizziness. Past Medical History:   Diagnosis Date    Arthritis     Closed subchondral insufficiency fracture of femoral condyle ([address] 08/12/2020    Hypertension     Localized osteoarthrosis not specified whether primary or secondary, pelvic region and thigh 05/08/2015    Neurogenic bladder     caused by a back surgery in 2017 per pt      Past Surgical History:   Procedure Laterality Date    CATARACT REMOVAL WITH IMPLANT  1/14/11    LEFT EYE    EYE SURGERY  12/14/2010    cataract rt eye    HYSTERECTOMY      JOINT REPLACEMENT  1997    left hip    OTHER SURGICAL HISTORY Right     RIGHT TOTAL KNEE REPLACEMENT     TOTAL KNEE ARTHROPLASTY Right 12/6/2021    RIGHT TOTAL KNEE REPLACEMENT performed by Jovani Mcdonald [facility] at [facility]      Medications Prior to Admission: celecoxib (CELEBREX) 200 MG capsule, Take 200 mg by mouth daily  famotidine (PEPCID) 20 MG tablet, Take 20 mg by mouth 2 times daily  hydroCHLOROthiazide (HYDRODIURIL) 25 MG tablet, Take 25 mg by mouth daily  mupirocin (BACTROBAN) 2 % ointment, APPLY 1/2 INCH TO INSIDE OF EACH NOSTRIL Q 12 HR STARTING 5 DAYS PRIOR TO SURGERY INCLUDING MORNING OF SURGERY.   atenolol (TENORMIN) 50 MG tablet, Take 50 mg by mouth 2 times daily   Allergies   Allergen Reactions    Prednisone Other (See Comments)     Face gets red and hot      Social History     Tobacco Use    Smoking status: Never Smoker  Smokeless tobacco: Never Used   Substance Use Topics    Alcohol use: No      Family History   Problem Relation Age of Onset    Arthritis Mother     Diabetes Mother     High Blood Pressure Mother     Diabetes Sister     High Blood Pressure Sister     Diabetes Brother     High Blood Pressure Brother         Objective:     VS: Temp: 97 °F (36.1 °C)  Pulse: 73  BP: (!) 181/96 (right arm and 174/84 left arm)  SpO2: 98 %        Physical Exam:  General:  Awake, alert and oriented. Appears to be not in any distress  Mucous Membranes:  Pink , anicteric  Neck: No JVD, no carotid bruit, no thyromegaly  Chest:  Clear to auscultation bilaterally, no added sounds  Cardiovascular:  RRR S1S2 heard, no murmurs or gallops  Abdomen:  Soft, undistended, non tender, no organomegaly, BS present  Extremities: s/p right TKR, expected edema,   no cyanosis. Distal pulses well felt  Neurological : no focal deficits        LABS:  CBC:  Lab Results   Component Value Date    WBC 7.1 12/07/2021    HGB 9.2 12/07/2021    HCT 28.6 12/07/2021    MCV 86.8 12/07/2021     12/07/2021    NEUTOPHILPCT 77.6 12/07/2021    LYMPHOPCT 9.4 12/07/2021    MONOPCT 11.1 12/07/2021    BASOPCT 0.4 12/07/2021    NEUTROABS 5.6 12/07/2021    LYMPHSABS 0.7 12/07/2021    MONOSABS 0.8 12/07/2021    EOSABS 0.1 12/07/2021    BASOSABS 0.0 12/07/2021     BMP:   Lab Results   Component Value Date     11/23/2021    K 4.0 11/23/2021    CO2 26 11/23/2021    BUN 31 11/23/2021    CREATININE 1.8 11/23/2021    CALCIUM 10.0 11/23/2021     Coagulation:   Lab Results   Component Value Date    INR 0.98 11/23/2021    APTT 34.8 11/23/2021             ACTIVE PROBLEM LIST:     Active Problems:    Primary osteoarthritis of right knee  Resolved Problems:    * No resolved hospital problems. *       PLAN:     1. Continue post op management, pain medication to prevent drowsiness. 2. BP elevated. On HCTZ and Atenolol. Maybe pain related. Will recheck.  I will give a one time of dose of Hydralazine. 3  Pain control. 4. PT/OT , incentive spirometry to prevent atelectasis   5. Cbc daily. She has acute blood loss anemia due to surgery. H and H stable. ASA for DVT prophylaxis. [id number] Chetna Nicolas for discharge.       Lucy Turner MD, MD 12/7/2021 10:44 AM

## 2021-12-07 NOTE — CARE COORDINATION
Case Management Assessment  Initial Evaluation      Patient Name: José Plaza  YOB: 1945  Diagnosis: Primary osteoarthritis of right knee [M17.11]  Date / Time: 12/6/2021  6:20 AM    Admission status/Date:12/6/21 observation  Chart Reviewed: Yes      Patient Interviewed: Yes   Family Interviewed:  No      Hospitalization in the last 30 days:  No      Health Care Decision Maker :   Primary Decision Maker: Becca Brewer - Child - [phone]    (CM - must 1st enter selection under Navigator - emergency contact- Ashian Relationship and pick relationship)   Who do you trust or have selected to make healthcare decisions for you      Met with:  patient  Interview conducted  (bedside/phone):    Current PCP:  Geoffrey Avila required for SNF : Y         3 night stay required -  Julián Julio  Support Systems/Care Needs: Family Members  Transportation: self    Meal Preparation: self    Housing  Living Arrangements:  Lives 2 story home  Steps: 3  Intent for return to present living arrangements: Yes  Identified Issues:     [address] with [address] : No Agency:(Services)     Passport/Waiver : No  :                      Phone Number:    Passport/Waiver Services:           Durable Medical Equiptment   DME Provider:   Equipment:   Walker_x__Cane___RTS_x__ BSC_x__Shower Chair_x__Hospital Bed___W/C____Other________  02 at ____Liter(s)---wears(frequency)_______ HHN ___ CPAP___ BiPap___   N/A____      Home O2 Use :  No    If No for home O2---if presently on O2 during hospitalization:  No  if yes CM to follow for potential DC O2 need  Informed of need for care provider to bring portable home O2 tank on day of discharge for nursing to connect prior to leaving:   Not Indicated  Verbalized agreement/Understanding:   Not Indicated    Community Service Affiliation  Dialysis:  No    · Agency:  · Location:  · Dialysis Schedule:  · Phone: · Fax: Other Community Services: (ex:PT/OT,Mental Health,Wound Clinic, Cardio/Pul [address]) none    DISCHARGE PLAN: Explained Case Management role/services. Ordetr for dc noted. Reviewd chart and met with pt at bedside. Role of CM explained. States lives home alone but will have 24/7 care at dc. Staes typically IPTA with all care. Discussed HHC and pt is agreeable and chooses [facility]. Spoke with Wilmer Moise at [facility] re: referral and faxed HC order and dc info. No  Other dc needs identified.  Family at bedside to transpoert home

## 2021-12-07 NOTE — PROGRESS NOTES
Inpatient Occupational Therapy  Evaluation and Treatment    Unit: [facility]  Date:  2021  Patient Name:    Jamie Zimmerman  Admitting diagnosis:  Primary osteoarthritis of right knee [M17.11]  Admit Date:  2021  Precautions/Restrictions/WB Status/ Lines/ Wounds/ Oxygen: Fall risk, Bed/chair alarm, Lines -IV, WB Restrictions (WBAT RLE s/p TKR) and Knee immobilizer    Treatment Time:  7021-5086  Treatment Number: 1   Timed code treatment minutes 16 minutes   Total Treatment minutes:   26   minutes    Patient Goals for Therapy:  \" go home \"      Discharge Recommendations: Home 24 hr assist and with home OT   DME needs for discharge: Needs Met       Therapy recommendations for staff:   Assist of 1 with use of rolling walker (RW) and gait belt for all transfers to/from [facility]  to/from chair    History of Present Illness: Pt is s/p elective R TKA on 21. Home Health S4 Level Recommendation:  Level 3 Safety  AM-PAC Score: AM-PAC Inpatient Daily Activity Raw Score: 16    Preadmission Environment    Pt. Lives Alone  Home environment:    two story home  Steps to enter first floor: 1 steps to enter - no rails, threshold  Steps to second floor: Full flight of 12-13 and hand rail: bilateral  Bathroom: tub/shower unit, grab bars and standard height commode  Equipment owned: RW, shower chair/bench, SPC and raised toilet     Preadmission Status:  Pt. Able to drive: Yes  Pt Fully independent with ADLs: Yes  Pt. Required assistance from family for: Independent PTA  Pt. independent for transfers and gait and walked with No Device  History of falls No      Pt reports that her children will stay with her 24hr upon D/C home. Pain  Yes  Ratin  Location:R knee/leg  Pain Medicine Status: RN notified      Cognition    A&O x4   Able to follow 2 step commands   Distracted by pain and feeling \"lowsy\"-queasy and states unable to eat in addition to pain    Subjective  Patient sitting up in chair with no family present.  Requesting To be met in 3 Visits:  1). Bed to toilet/BSC: Supervision    To be met in 5 Visits:  1). Supine to/from Sit:  SBA  2). Upper Body Bathing:   SBA  3). Lower Body Bathing:   Min A   4). Upper Body Dressing:  SBA  5). Lower Body Dressing:  Min A   6). Pt to demonstrate UE exs x 15 reps with minimal cues    Rehabilitation Potential:  Good for goals listed above. Strengths for achieving goals include: Pt motivated, PLOF, Family Support and Pt cooperative  Barriers to achieving goals include:  Complexity of condition and Pain     Plan: To be seen 3-5 x/wk while in acute care setting for therapeutic exercises, bed mobility, transfers, dressing, bathing, family/patient education, ADL/IADL retraining, energy conservation training.      Teagan Gil, PAVITHRAR/L [id number]            If patient discharges from this facility prior to next visit, this note will serve as the Discharge Summary

## 2021-12-09 ENCOUNTER — TELEPHONE (OUTPATIENT)
Dept: ORTHOPEDIC SURGERY | Age: 76
End: 2021-12-09

## 2021-12-09 NOTE — TELEPHONE ENCOUNTER
Spoke with Sandra Manriquez- patient's daughter, patient currently asleep    Incision status: No drainage or redness    Edema/Swelling/Teds: No issues with swelling    Pain level and status: patient reports very little pain at this time, hasn't taken pain medication since last night    Use of pain medications: Oxycodone, not taking a lot of the pain medication due to nausea. She was prescribed Zofran and that is helping but still feeling slightly nauseous. Blood thinner: Aspirin 325 mg PO BID    Bowels: No BM since before surgery. Patient took stool softener today. We discussed foods that may potentially help with constipation. She will call if patient unable to go. Home Care Agency active: Yes, starting therapy 12/10/21    Outpatient therapy: Patient is starting home health therapy 12/10/21, she has been doing ROM exercises since her surgery    Do you have all of your medications: yes    Changes in medications: No    Ortho Vitals: NA    Instructed pt to call Nurse Navigator or surgeon's office with any questions or concerns.      Follow up appointments:    Future Appointments   Date Time Provider Marcela Little   12/29/2021  1:10 PM Sumaya Cordero MD [facility]

## 2021-12-30 ENCOUNTER — OFFICE VISIT (OUTPATIENT)
Dept: ORTHOPEDIC SURGERY | Age: 76
End: 2021-12-30

## 2021-12-30 VITALS — WEIGHT: 151 LBS | HEIGHT: 60 IN | BODY MASS INDEX: 29.64 KG/M2

## 2021-12-30 DIAGNOSIS — Z96.651 S/P TKR (TOTAL KNEE REPLACEMENT), RIGHT: Primary | ICD-10-CM

## 2021-12-30 PROCEDURE — 99024 POSTOP FOLLOW-UP VISIT: CPT | Performed by: ORTHOPAEDIC SURGERY

## 2021-12-30 NOTE — PROGRESS NOTES
FOLLOW-UP VISIT      The patient returns for follow-up s/p right total knee replacement    Date of Surgery    12/6/2021    Wound Status     Incisions are healing well with no surrounding erythema, and minimal ecchymosis. Exam    She doing well with no signs of infection or DVT. Range of motion 0 to 120 degrees. X-rays 2 views of right knee reveals excellent alignment of position of all components. She is walk without amatory aids and happy with the result. She says it is made a big difference for her.     Plan    Slow return to normal activity and therapy on her own    Follow-up Appointment    6 weeks

## 2022-01-07 ENCOUNTER — TELEPHONE (OUTPATIENT)
Dept: ORTHOPEDIC SURGERY | Age: 77
End: 2022-01-07

## 2022-01-11 ENCOUNTER — TELEPHONE (OUTPATIENT)
Dept: ORTHOPEDIC SURGERY | Age: 77
End: 2022-01-11

## 2022-01-21 ENCOUNTER — TELEPHONE (OUTPATIENT)
Dept: ORTHOPEDIC SURGERY | Age: 77
End: 2022-01-21

## 2022-02-10 ENCOUNTER — OFFICE VISIT (OUTPATIENT)
Dept: ORTHOPEDIC SURGERY | Age: 77
End: 2022-02-10

## 2022-02-10 VITALS — HEIGHT: 60 IN | BODY MASS INDEX: 29.64 KG/M2 | WEIGHT: 151 LBS

## 2022-02-10 DIAGNOSIS — Z96.651 S/P TKR (TOTAL KNEE REPLACEMENT), RIGHT: Primary | ICD-10-CM

## 2022-02-10 PROCEDURE — 99024 POSTOP FOLLOW-UP VISIT: CPT | Performed by: ORTHOPAEDIC SURGERY

## 2022-10-11 ENCOUNTER — APPOINTMENT (OUTPATIENT)
Dept: GENERAL RADIOLOGY | Age: 77
DRG: 309 | End: 2022-10-11
Payer: MEDICARE

## 2022-10-11 ENCOUNTER — HOSPITAL ENCOUNTER (INPATIENT)
Age: 77
LOS: 2 days | Discharge: ANOTHER HOSPITAL | DRG: 309 | End: 2022-10-13
Attending: STUDENT IN AN ORGANIZED HEALTH CARE EDUCATION/TRAINING PROGRAM | Admitting: INTERNAL MEDICINE
Payer: MEDICARE

## 2022-10-11 DIAGNOSIS — I48.91 ATRIAL FIBRILLATION, UNSPECIFIED TYPE (HCC): Primary | ICD-10-CM

## 2022-10-11 DIAGNOSIS — I50.9 ACUTE CONGESTIVE HEART FAILURE, UNSPECIFIED HEART FAILURE TYPE (HCC): ICD-10-CM

## 2022-10-11 LAB
A/G RATIO: 1.9 (ref 1.1–2.2)
ALBUMIN SERPL-MCNC: 5 G/DL (ref 3.4–5)
ALP BLD-CCNC: 114 U/L (ref 40–129)
ALT SERPL-CCNC: 330 U/L (ref 10–40)
ANION GAP SERPL CALCULATED.3IONS-SCNC: 17 MMOL/L (ref 3–16)
AST SERPL-CCNC: 245 U/L (ref 15–37)
BASOPHILS ABSOLUTE: 0 K/UL (ref 0–0.2)
BASOPHILS RELATIVE PERCENT: 0.7 %
BILIRUB SERPL-MCNC: 0.6 MG/DL (ref 0–1)
BUN BLDV-MCNC: 36 MG/DL (ref 7–20)
CALCIUM SERPL-MCNC: 10.8 MG/DL (ref 8.3–10.6)
CHLORIDE BLD-SCNC: 89 MMOL/L (ref 99–110)
CO2: 22 MMOL/L (ref 21–32)
CREAT SERPL-MCNC: 1.7 MG/DL (ref 0.6–1.2)
EKG ATRIAL RATE: 129 BPM
EKG ATRIAL RATE: 129 BPM
EKG DIAGNOSIS: NORMAL
EKG DIAGNOSIS: NORMAL
EKG P-R INTERVAL: 88 MS
EKG Q-T INTERVAL: 316 MS
EKG Q-T INTERVAL: 334 MS
EKG QRS DURATION: 80 MS
EKG QRS DURATION: 84 MS
EKG QTC CALCULATION (BAZETT): 462 MS
EKG QTC CALCULATION (BAZETT): 487 MS
EKG R AXIS: -11 DEGREES
EKG R AXIS: 7 DEGREES
EKG T AXIS: -19 DEGREES
EKG T AXIS: 148 DEGREES
EKG VENTRICULAR RATE: 128 BPM
EKG VENTRICULAR RATE: 129 BPM
EOSINOPHILS ABSOLUTE: 0 K/UL (ref 0–0.6)
EOSINOPHILS RELATIVE PERCENT: 0.1 %
GFR AFRICAN AMERICAN: 35
GFR NON-AFRICAN AMERICAN: 29
GLUCOSE BLD-MCNC: 133 MG/DL (ref 70–99)
HCT VFR BLD CALC: 35.3 % (ref 36–48)
HEMOGLOBIN: 11.3 G/DL (ref 12–16)
INFLUENZA A: NOT DETECTED
INFLUENZA B: NOT DETECTED
LYMPHOCYTES ABSOLUTE: 0.8 K/UL (ref 1–5.1)
LYMPHOCYTES RELATIVE PERCENT: 11.6 %
MCH RBC QN AUTO: 27.1 PG (ref 26–34)
MCHC RBC AUTO-ENTMCNC: 32.2 G/DL (ref 31–36)
MCV RBC AUTO: 84.4 FL (ref 80–100)
MONOCYTES ABSOLUTE: 0.5 K/UL (ref 0–1.3)
MONOCYTES RELATIVE PERCENT: 6.6 %
NEUTROPHILS ABSOLUTE: 5.5 K/UL (ref 1.7–7.7)
NEUTROPHILS RELATIVE PERCENT: 81 %
PDW BLD-RTO: 13.7 % (ref 12.4–15.4)
PLATELET # BLD: 262 K/UL (ref 135–450)
PMV BLD AUTO: 8 FL (ref 5–10.5)
POTASSIUM REFLEX MAGNESIUM: 4.5 MMOL/L (ref 3.5–5.1)
PRO-BNP: ABNORMAL PG/ML (ref 0–449)
RBC # BLD: 4.18 M/UL (ref 4–5.2)
SARS-COV-2 RNA, RT PCR: NOT DETECTED
SODIUM BLD-SCNC: 128 MMOL/L (ref 136–145)
TOTAL PROTEIN: 7.6 G/DL (ref 6.4–8.2)
TROPONIN: <0.01 NG/ML
TROPONIN: <0.01 NG/ML
TSH SERPL DL<=0.05 MIU/L-ACNC: 3.09 UIU/ML (ref 0.27–4.2)
WBC # BLD: 6.8 K/UL (ref 4–11)

## 2022-10-11 PROCEDURE — 6370000000 HC RX 637 (ALT 250 FOR IP): Performed by: STUDENT IN AN ORGANIZED HEALTH CARE EDUCATION/TRAINING PROGRAM

## 2022-10-11 PROCEDURE — 87636 SARSCOV2 & INF A&B AMP PRB: CPT

## 2022-10-11 PROCEDURE — 93010 ELECTROCARDIOGRAM REPORT: CPT | Performed by: INTERNAL MEDICINE

## 2022-10-11 PROCEDURE — 80053 COMPREHEN METABOLIC PANEL: CPT

## 2022-10-11 PROCEDURE — 2060000000 HC ICU INTERMEDIATE R&B

## 2022-10-11 PROCEDURE — 83880 ASSAY OF NATRIURETIC PEPTIDE: CPT

## 2022-10-11 PROCEDURE — 96374 THER/PROPH/DIAG INJ IV PUSH: CPT

## 2022-10-11 PROCEDURE — 2500000003 HC RX 250 WO HCPCS: Performed by: INTERNAL MEDICINE

## 2022-10-11 PROCEDURE — 85025 COMPLETE CBC W/AUTO DIFF WBC: CPT

## 2022-10-11 PROCEDURE — 6360000002 HC RX W HCPCS: Performed by: STUDENT IN AN ORGANIZED HEALTH CARE EDUCATION/TRAINING PROGRAM

## 2022-10-11 PROCEDURE — 71045 X-RAY EXAM CHEST 1 VIEW: CPT

## 2022-10-11 PROCEDURE — 99285 EMERGENCY DEPT VISIT HI MDM: CPT

## 2022-10-11 PROCEDURE — 2580000003 HC RX 258: Performed by: INTERNAL MEDICINE

## 2022-10-11 PROCEDURE — 93005 ELECTROCARDIOGRAM TRACING: CPT | Performed by: STUDENT IN AN ORGANIZED HEALTH CARE EDUCATION/TRAINING PROGRAM

## 2022-10-11 PROCEDURE — 84443 ASSAY THYROID STIM HORMONE: CPT

## 2022-10-11 PROCEDURE — 6360000002 HC RX W HCPCS: Performed by: INTERNAL MEDICINE

## 2022-10-11 PROCEDURE — 36415 COLL VENOUS BLD VENIPUNCTURE: CPT

## 2022-10-11 PROCEDURE — 96376 TX/PRO/DX INJ SAME DRUG ADON: CPT

## 2022-10-11 PROCEDURE — 2500000003 HC RX 250 WO HCPCS: Performed by: STUDENT IN AN ORGANIZED HEALTH CARE EDUCATION/TRAINING PROGRAM

## 2022-10-11 PROCEDURE — 84484 ASSAY OF TROPONIN QUANT: CPT

## 2022-10-11 PROCEDURE — 96375 TX/PRO/DX INJ NEW DRUG ADDON: CPT

## 2022-10-11 PROCEDURE — 6370000000 HC RX 637 (ALT 250 FOR IP): Performed by: INTERNAL MEDICINE

## 2022-10-11 RX ORDER — SODIUM CHLORIDE 0.9 % (FLUSH) 0.9 %
5-40 SYRINGE (ML) INJECTION PRN
Status: DISCONTINUED | OUTPATIENT
Start: 2022-10-11 | End: 2022-10-13 | Stop reason: HOSPADM

## 2022-10-11 RX ORDER — METOPROLOL TARTRATE 5 MG/5ML
5 INJECTION INTRAVENOUS ONCE
Status: COMPLETED | OUTPATIENT
Start: 2022-10-11 | End: 2022-10-11

## 2022-10-11 RX ORDER — FAMOTIDINE 20 MG/1
20 TABLET, FILM COATED ORAL DAILY
Status: DISCONTINUED | OUTPATIENT
Start: 2022-10-11 | End: 2022-10-13 | Stop reason: HOSPADM

## 2022-10-11 RX ORDER — SODIUM CHLORIDE 0.9 % (FLUSH) 0.9 %
5-40 SYRINGE (ML) INJECTION EVERY 12 HOURS SCHEDULED
Status: DISCONTINUED | OUTPATIENT
Start: 2022-10-11 | End: 2022-10-13 | Stop reason: HOSPADM

## 2022-10-11 RX ORDER — MULTIVIT-MIN/IRON/FOLIC ACID/K 18-600-40
CAPSULE ORAL
COMMUNITY

## 2022-10-11 RX ORDER — METOPROLOL TARTRATE 5 MG/5ML
5 INJECTION INTRAVENOUS EVERY 6 HOURS PRN
Status: DISCONTINUED | OUTPATIENT
Start: 2022-10-11 | End: 2022-10-13 | Stop reason: HOSPADM

## 2022-10-11 RX ORDER — METOPROLOL TARTRATE 5 MG/5ML
5 INJECTION INTRAVENOUS EVERY 5 MIN PRN
Status: COMPLETED | OUTPATIENT
Start: 2022-10-11 | End: 2022-10-11

## 2022-10-11 RX ORDER — UBIDECARENONE 75 MG
50 CAPSULE ORAL DAILY
COMMUNITY

## 2022-10-11 RX ORDER — UBIDECARENONE 75 MG
50 CAPSULE ORAL DAILY
Status: DISCONTINUED | OUTPATIENT
Start: 2022-10-12 | End: 2022-10-13 | Stop reason: HOSPADM

## 2022-10-11 RX ORDER — ONDANSETRON 4 MG/1
4 TABLET, ORALLY DISINTEGRATING ORAL EVERY 8 HOURS PRN
Status: DISCONTINUED | OUTPATIENT
Start: 2022-10-11 | End: 2022-10-13 | Stop reason: HOSPADM

## 2022-10-11 RX ORDER — ACETAMINOPHEN 650 MG/1
650 SUPPOSITORY RECTAL EVERY 6 HOURS PRN
Status: DISCONTINUED | OUTPATIENT
Start: 2022-10-11 | End: 2022-10-13 | Stop reason: HOSPADM

## 2022-10-11 RX ORDER — ONDANSETRON 2 MG/ML
4 INJECTION INTRAMUSCULAR; INTRAVENOUS EVERY 6 HOURS PRN
Status: DISCONTINUED | OUTPATIENT
Start: 2022-10-11 | End: 2022-10-13 | Stop reason: HOSPADM

## 2022-10-11 RX ORDER — POLYETHYLENE GLYCOL 3350 17 G/17G
17 POWDER, FOR SOLUTION ORAL DAILY PRN
Status: DISCONTINUED | OUTPATIENT
Start: 2022-10-11 | End: 2022-10-13 | Stop reason: HOSPADM

## 2022-10-11 RX ORDER — ONDANSETRON 2 MG/ML
4 INJECTION INTRAMUSCULAR; INTRAVENOUS ONCE
Status: COMPLETED | OUTPATIENT
Start: 2022-10-11 | End: 2022-10-11

## 2022-10-11 RX ORDER — DILTIAZEM HYDROCHLORIDE 5 MG/ML
10 INJECTION INTRAVENOUS ONCE
Status: COMPLETED | OUTPATIENT
Start: 2022-10-11 | End: 2022-10-11

## 2022-10-11 RX ORDER — ENOXAPARIN SODIUM 100 MG/ML
1 INJECTION SUBCUTANEOUS EVERY 24 HOURS
Status: DISCONTINUED | OUTPATIENT
Start: 2022-10-11 | End: 2022-10-13 | Stop reason: HOSPADM

## 2022-10-11 RX ORDER — METOPROLOL TARTRATE 50 MG/1
50 TABLET, FILM COATED ORAL ONCE
Status: COMPLETED | OUTPATIENT
Start: 2022-10-11 | End: 2022-10-11

## 2022-10-11 RX ORDER — SODIUM CHLORIDE 9 MG/ML
INJECTION, SOLUTION INTRAVENOUS PRN
Status: DISCONTINUED | OUTPATIENT
Start: 2022-10-11 | End: 2022-10-13 | Stop reason: HOSPADM

## 2022-10-11 RX ORDER — ACETAMINOPHEN 325 MG/1
650 TABLET ORAL EVERY 6 HOURS PRN
Status: DISCONTINUED | OUTPATIENT
Start: 2022-10-11 | End: 2022-10-13 | Stop reason: HOSPADM

## 2022-10-11 RX ADMIN — ONDANSETRON HYDROCHLORIDE 4 MG: 2 INJECTION, SOLUTION INTRAMUSCULAR; INTRAVENOUS at 23:05

## 2022-10-11 RX ADMIN — METOPROLOL TARTRATE 50 MG: 50 TABLET, FILM COATED ORAL at 16:54

## 2022-10-11 RX ADMIN — DILTIAZEM HYDROCHLORIDE 10 MG: 5 INJECTION, SOLUTION INTRAVENOUS at 20:24

## 2022-10-11 RX ADMIN — ENOXAPARIN SODIUM 70 MG: 100 INJECTION SUBCUTANEOUS at 20:24

## 2022-10-11 RX ADMIN — METOPROLOL TARTRATE 5 MG: 5 INJECTION INTRAVENOUS at 15:54

## 2022-10-11 RX ADMIN — METOPROLOL TARTRATE 5 MG: 5 INJECTION, SOLUTION INTRAVENOUS at 17:39

## 2022-10-11 RX ADMIN — METOPROLOL TARTRATE 5 MG: 5 INJECTION INTRAVENOUS at 15:13

## 2022-10-11 RX ADMIN — Medication 10 ML: at 20:25

## 2022-10-11 RX ADMIN — ONDANSETRON HYDROCHLORIDE 4 MG: 2 INJECTION, SOLUTION INTRAMUSCULAR; INTRAVENOUS at 14:28

## 2022-10-11 RX ADMIN — DILTIAZEM HYDROCHLORIDE 5 MG/HR: 5 INJECTION, SOLUTION INTRAVENOUS at 20:26

## 2022-10-11 RX ADMIN — METOPROLOL TARTRATE 5 MG: 5 INJECTION INTRAVENOUS at 14:28

## 2022-10-11 RX ADMIN — METOPROLOL TARTRATE 5 MG: 5 INJECTION INTRAVENOUS at 16:22

## 2022-10-11 RX ADMIN — FAMOTIDINE 20 MG: 20 TABLET ORAL at 20:24

## 2022-10-11 ASSESSMENT — PAIN - FUNCTIONAL ASSESSMENT: PAIN_FUNCTIONAL_ASSESSMENT: NONE - DENIES PAIN

## 2022-10-11 NOTE — ED PROVIDER NOTES
[address] ED      CHIEF COMPLAINT  Shortness of Breath (Pt states that she was sent in from her PCP office for SOB and fast heart rate. Pt's daughter states that it started last night around 0130.)       HISTORY OF PRESENT ILLNESS  Mitra oHpson is a 76 y.o. female  who presents to the ED complaining of shortness of breath and tachycardia. Patient states that she was sitting on her couch when she started feeling progressively more short of breath. This was worse when she attempted to lay down. She went to her primary care today who noted her heart rates to be severely elevated and advised her to come into the ED. She denies any cough, nausea, vomiting, fevers. Denies any chest pain. No other complaints, modifying factors or associated symptoms. I have reviewed the following from the nursing documentation. Past Medical History:   Diagnosis Date    Arthritis     Closed subchondral insufficiency fracture of femoral condyle ([address] 08/12/2020    Hypertension     Localized osteoarthrosis not specified whether primary or secondary, pelvic region and thigh 05/08/2015    Neurogenic bladder     caused by a back surgery in 2017 per pt     Past Surgical History:   Procedure Laterality Date    CATARACT REMOVAL WITH IMPLANT  1/14/11    LEFT EYE    EYE SURGERY  12/14/2010    cataract rt eye    HYSTERECTOMY (CERVIX STATUS UNKNOWN)      [facility]    left hip    OTHER SURGICAL HISTORY Right     RIGHT TOTAL KNEE REPLACEMENT     TOTAL KNEE ARTHROPLASTY Right 12/6/2021    RIGHT TOTAL KNEE REPLACEMENT performed by Janay Stern MD at [facility] OR     Family History   Problem Relation Age of Onset    Arthritis Mother     Diabetes Mother     High Blood Pressure Mother     Diabetes Sister     High Blood Pressure Sister     Diabetes Brother     High Blood Pressure Brother      Social History     Socioeconomic History    Marital status:       Spouse name: Not on file    Number of children: Not on file    Years of education: Not on file    Highest education level: Not on file   Occupational History    Not on file   Tobacco Use    Smoking status: Never    Smokeless tobacco: Never   Substance and Sexual Activity    Alcohol use: No    Drug use: No    Sexual activity: Yes   Other Topics Concern    Not on file   Social History Narrative    Not on file     Social Determinants of Health     Financial Resource Strain: Not on file   Food Insecurity: Not on file   Transportation Needs: Not on file   Physical Activity: Not on file   Stress: Not on file   Social Connections: Not on file   Intimate Partner Violence: Not on file   Housing Stability: Not on file     No current facility-administered medications for this encounter. Current Outpatient Medications   Medication Sig Dispense Refill    aspirin 325 MG EC tablet Take 1 tablet by mouth 2 times daily 60 tablet 0    ondansetron (ZOFRAN ODT) 4 MG disintegrating tablet Take 1 tablet by mouth every 8 hours as needed for Nausea or Vomiting 15 tablet 0    celecoxib (CELEBREX) 200 MG capsule Take 200 mg by mouth daily      famotidine (PEPCID) 20 MG tablet Take 20 mg by mouth 2 times daily      hydroCHLOROthiazide (HYDRODIURIL) 25 MG tablet Take 25 mg by mouth daily      atenolol (TENORMIN) 50 MG tablet Take 50 mg by mouth 2 times daily        Allergies   Allergen Reactions    Prednisone Other (See Comments)     Face gets red and hot       REVIEW OF SYSTEMS  10 systems reviewed, pertinent positives per HPI otherwise noted to be negative. PHYSICAL EXAM  BP (!) 184/124   Pulse (!) 126   Temp 97.9 °F (36.6 °C) (Oral)   Resp 22   Ht 5' (1.524 m)   Wt 149 lb (67.6 kg)   SpO2 100%   BMI 29.10 kg/m²    General: Appears well. Alert  HEENT: Head atraumatic, Eyes normal inspection, PERRL. Normal ENT inspection, Pharynx normal. No signs of dehydration  NECK: Normal inspection  RESPIRATORY: Bilateral lower lobe Rales. No chest wall tenderness.  No respiratory distress  CVS: Heart rate tachycardic and rhythm irregular. No Murmurs  ABDOMEN/GI: Soft, Non-tender, No distention  BACK: Normal inspection  EXTREMITIES: Non-Tender. Full ROM. Normal appearance. No Pedal edema  NEURO: Alert and oriented. Sensation normal. Motor normal  PSYCH: Mood normal. Affect normal.  SKIN: Color normal. No rash. Warm, Dry    LABS  I have reviewed all labs for this visit.    Results for orders placed or performed during the hospital encounter of 10/11/22   COVID-19 & Influenza Combo    Specimen: Nasopharyngeal Swab   Result Value Ref Range    SARS-CoV-2 RNA, RT PCR NOT DETECTED NOT DETECTED    INFLUENZA A NOT DETECTED NOT DETECTED    INFLUENZA B NOT DETECTED NOT DETECTED   CBC with Auto Differential   Result Value Ref Range    WBC 6.8 4.0 - 11.0 K/uL    RBC 4.18 4.00 - 5.20 M/uL    Hemoglobin 11.3 (L) 12.0 - 16.0 g/dL    Hematocrit 35.3 (L) 36.0 - 48.0 %    MCV 84.4 80.0 - 100.0 fL    MCH 27.1 26.0 - 34.0 pg    MCHC 32.2 31.0 - 36.0 g/dL    RDW 13.7 12.4 - 15.4 %    Platelets 513 073 - 845 K/uL    MPV 8.0 5.0 - 10.5 fL    Neutrophils % 81.0 %    Lymphocytes % 11.6 %    Monocytes % 6.6 %    Eosinophils % 0.1 %    Basophils % 0.7 %    Neutrophils Absolute 5.5 1.7 - 7.7 K/uL    Lymphocytes Absolute 0.8 (L) 1.0 - 5.1 K/uL    Monocytes Absolute 0.5 0.0 - 1.3 K/uL    Eosinophils Absolute 0.0 0.0 - 0.6 K/uL    Basophils Absolute 0.0 0.0 - 0.2 K/uL   CMP w/ Reflex to MG   Result Value Ref Range    Sodium 128 (L) 136 - 145 mmol/L    Potassium reflex Magnesium 4.5 3.5 - 5.1 mmol/L    Chloride 89 (L) 99 - 110 mmol/L    CO2 22 21 - 32 mmol/L    Anion Gap 17 (H) 3 - 16    Glucose 133 (H) 70 - 99 mg/dL    BUN 36 (H) 7 - 20 mg/dL    Creatinine 1.7 (H) 0.6 - 1.2 mg/dL    GFR Non-African American 29 (A) >60    GFR  35 (A) >60    Calcium 10.8 (H) 8.3 - 10.6 mg/dL    Total Protein 7.6 6.4 - 8.2 g/dL    Albumin 5.0 3.4 - 5.0 g/dL    Albumin/Globulin Ratio 1.9 1.1 - 2.2    Total Bilirubin 0.6 0.0 - 1.0 mg/dL    Alkaline Phosphatase 114 40 - 129 U/L     (H) 10 - 40 U/L     (H) 15 - 37 U/L   Troponin   Result Value Ref Range    Troponin <0.01 <0.01 ng/mL   TSH   Result Value Ref Range    TSH 3.09 0.27 - 4.20 uIU/mL   Brain Natriuretic Peptide   Result Value Ref Range    Pro-BNP 13,655 (H) 0 - 449 pg/mL   EKG 12 Lead   Result Value Ref Range    Ventricular Rate 129 BPM    Atrial Rate 129 BPM    P-R Interval 88 ms    QRS Duration 84 ms    Q-T Interval 316 ms    QTc Calculation (Bazett) 462 ms    R Axis 7 degrees    T Axis 148 degrees    Diagnosis       Sinus tachycardia with short MA with Premature atrial complexesNonspecific ST and T wave abnormalityAbnormal ECGWhen compared with ECG of 13-AUG-2021 12:59,Premature atrial complexes are now PresentVent. rate has increased BY  55 BPMST now depressed in Anterior leadsT wave inversion now evident in Lateral leads     EKG 12 Lead   Result Value Ref Range    Ventricular Rate 128 BPM    Atrial Rate 129 BPM    QRS Duration 80 ms    Q-T Interval 334 ms    QTc Calculation (Bazett) 487 ms    R Axis -11 degrees    T Axis -19 degrees    Diagnosis       Undetermined rhythmST & T wave abnormality, consider inferior ischemiaAbnormal ECGWhen compared with ECG of 11-OCT-2022 11:57, (unconfirmed)Current undetermined rhythm precludes rhythm comparison, needs reviewT wave inversion now evident in Inferior leadsNonspecific T wave abnormality now evident in Anterior leads         ECG  The Ekg interpreted by me shows  Sinus tachycardia with a rate of 129 bpm.  Normal axis. No acute injury pattern. MA 88, QRS 84, QTc 462. Repeat EKG: A. fib with a ventricular rate of 126 bpm.  Left axis deviation. No acute injury pattern. QRS 84, QTc 495. RADIOLOGY  XR CHEST PORTABLE   Final Result   Bibasilar atelectasis versus airspace disease. ED COURSE/MDM  Patient seen and evaluated. Old records reviewed.  Labs and imaging reviewed and results discussed with patient. Presenting with dyspnea on exertion and noted to have elevated heart rates at her PCPs office. Initial EKG here appears to have P waves, however I note that she is irregular on the monitor. EKG was repeated multiple times and finally have a clear picture of A. fib with RVR and a rate of 126 bpm.  She is on atenolol at home and states that she took this this morning. Due to that, we will treat with IV metoprolol. She is given 3 doses of 5 mg IV metoprolol with minimal improvement. Given a dose of oral metoprolol 50mg and 2 more doses of 5 mg IV metoprolol. Despite this patient's heart rates were still in the 1 teens to 80s. Did discuss with Dr. Lily Martinez, who agreed to admit the patient to her service. I did start to order a diltiazem drip for the patient's rates, however this is already been ordered by Dr. Lily Martinez. Due to the patient's new onset A. fib with significant CHF, plan for admission for further treatment and evaluation. Critical care    Critical care time 32 minutes exclusive from separate billable procedures that were performed. The following was considered in the determination of critical care but not limited to the level of medical decision making, intensive cardiac and/or respiratory monitoring, frequent vital sign monitoring, evaluation of laboratory studies, evaluation of radiographic studies, oxygen monitoring, and constant monitoring and speaking to family at bedside. Is this patient to be included in the SEP-1 Core Measure due to severe sepsis or septic shock? No   Exclusion criteria - the patient is NOT to be included for SEP-1 Core Measure due to: Infection is not suspected    During the patient's ED course, the patient was given:  Medications - No data to display     CLINICAL IMPRESSION  1. Atrial fibrillation, unspecified type ([facility] [address]    2.  Acute congestive heart failure, unspecified heart failure type ([facility])        Blood pressure 126/89, pulse (!) 120, temperature 97.9 °F (36.6 °C), temperature source Oral, resp. rate 19, height 5' (1.524 m), weight 149 lb (67.6 kg), SpO2 96 %. DISPOSITION  Javier Rudd was admitted in stable condition. Patient was given scripts for the following medications. I counseled patient how to take these medications. New Prescriptions    No medications on file       Follow-up with:  No follow-up provider specified. DISCLAIMER: This chart was created using Dragon dictation software. Efforts were made by me to ensure accuracy, however some errors may be present due to limitations of this technology and occasionally words are not transcribed correctly.      Nicole Spain DO  10/11/22 1804

## 2022-10-11 NOTE — ED NOTES
[facility] message sent to Dr. Soha Griffith RN  10/11/22 723 [facility] sent to Dr. Alpesh James Consult completed with call back from Dr. Ruslan Whitley speaking with Dr. Lupe Camarillo RN  10/11/22 [id number]

## 2022-10-11 NOTE — ED NOTES
Chief Complaint   Patient presents with    Shortness of Breath     Pt states that she was sent in from her PCP office for SOB and fast heart rate. Pt's daughter states that it started last night around 0130. MEWS Score: 4/ Level of Consciousness: Alert (0)    Vitals:    10/11/22 1720 10/11/22 1727 10/11/22 1728 10/11/22 1732   BP:  (!) 124/93  126/89   Pulse: (!) 124  (!) 129 (!) 120   Resp: 20 24 19   Temp:       TempSrc:       SpO2:  97%  96%   Weight:       Height:              Allergies   Allergen Reactions    Prednisone Other (See Comments)     Face gets red and hot       No current facility-administered medications for this encounter. Current Outpatient Medications   Medication Sig Dispense Refill    vitamin B-12 (CYANOCOBALAMIN) 100 MCG tablet Take 50 mcg by mouth daily      Cholecalciferol (VITAMIN D) 50 MCG (2000 UT) CAPS capsule Take by mouth      celecoxib (CELEBREX) 200 MG capsule Take 200 mg by mouth daily      famotidine (PEPCID) 20 MG tablet Take 20 mg by mouth daily      hydroCHLOROthiazide (HYDRODIURIL) 25 MG tablet Take 25 mg by mouth in the morning and at bedtime      atenolol (TENORMIN) 50 MG tablet Take 50 mg by mouth 2 times daily         List of medications patient is currently taking is complete. Source of medications in list are updated.        Patient Active Problem List   Diagnosis    Localized osteoarthrosis not specified whether primary or secondary, pelvic region and thigh    DDD (degenerative disc disease), lumbar    Acute pain of right knee    Primary osteoarthritis of right knee    Degenerative tear of lateral meniscus of right knee    Closed subchondral insufficiency fracture of femoral condyle ([address]    Insufficiency fracture of tibia    Hypertension    Anemia associated with acute blood loss    S/P TKR (total knee replacement), right    A-fib ([address]                     Vitals:    10/11/22 1720 10/11/22 1727 10/11/22 1728 10/11/22 1732   BP:  (!) 124/93 126/89   Pulse: (!) 124  (!) 129 (!) 120   Resp: 20 24 19   Temp:       TempSrc:       SpO2:  97%  96%   Weight:       Height:            Pt a/o x 4; straight caths self; family at bedside notified on plan of care; pt given dinner; no complaints at this time. -----------------------------------------------------------------------------------------    Pt was updated about admission. To be filled out after report is complete*    Bed assignment:     Report called to * on *. Pertinent labs, allergies, medications and conditions were reviewed. Present Skin issues include . Patient belongings that will be going with the patient during admission include . Emergency contact  was notified of admission.      YOUNG Chase RN  10/11/22 [id number]

## 2022-10-12 PROBLEM — I47.19 NARROW COMPLEX TACHYCARDIA: Status: ACTIVE | Noted: 2022-10-12

## 2022-10-12 PROBLEM — N18.4 STAGE 4 CHRONIC KIDNEY DISEASE (HCC): Status: ACTIVE | Noted: 2022-10-12

## 2022-10-12 PROBLEM — I47.1 NARROW COMPLEX TACHYCARDIA (HCC): Status: ACTIVE | Noted: 2022-10-12

## 2022-10-12 PROBLEM — I45.5 SINUS PAUSE: Status: ACTIVE | Noted: 2022-10-12

## 2022-10-12 LAB
A/G RATIO: 1.8 (ref 1.1–2.2)
ALBUMIN SERPL-MCNC: 4.2 G/DL (ref 3.4–5)
ALP BLD-CCNC: 102 U/L (ref 40–129)
ALT SERPL-CCNC: 247 U/L (ref 10–40)
ANION GAP SERPL CALCULATED.3IONS-SCNC: 11 MMOL/L (ref 3–16)
AST SERPL-CCNC: 159 U/L (ref 15–37)
BILIRUB SERPL-MCNC: 0.8 MG/DL (ref 0–1)
BUN BLDV-MCNC: 36 MG/DL (ref 7–20)
CALCIUM SERPL-MCNC: 9.5 MG/DL (ref 8.3–10.6)
CHLORIDE BLD-SCNC: 90 MMOL/L (ref 99–110)
CO2: 24 MMOL/L (ref 21–32)
CREAT SERPL-MCNC: 1.8 MG/DL (ref 0.6–1.2)
GFR AFRICAN AMERICAN: 33
GFR NON-AFRICAN AMERICAN: 27
GLUCOSE BLD-MCNC: 110 MG/DL (ref 70–99)
INR BLD: 1.24 (ref 0.87–1.14)
POTASSIUM REFLEX MAGNESIUM: 4.1 MMOL/L (ref 3.5–5.1)
PROTHROMBIN TIME: 15.5 SEC (ref 11.7–14.5)
SODIUM BLD-SCNC: 125 MMOL/L (ref 136–145)
TOTAL PROTEIN: 6.6 G/DL (ref 6.4–8.2)
TROPONIN: <0.01 NG/ML
TROPONIN: <0.01 NG/ML

## 2022-10-12 PROCEDURE — 2500000003 HC RX 250 WO HCPCS: Performed by: INTERNAL MEDICINE

## 2022-10-12 PROCEDURE — 2580000003 HC RX 258: Performed by: INTERNAL MEDICINE

## 2022-10-12 PROCEDURE — 6360000002 HC RX W HCPCS: Performed by: INTERNAL MEDICINE

## 2022-10-12 PROCEDURE — 99223 1ST HOSP IP/OBS HIGH 75: CPT | Performed by: INTERNAL MEDICINE

## 2022-10-12 PROCEDURE — 84484 ASSAY OF TROPONIN QUANT: CPT

## 2022-10-12 PROCEDURE — 85610 PROTHROMBIN TIME: CPT

## 2022-10-12 PROCEDURE — 36415 COLL VENOUS BLD VENIPUNCTURE: CPT

## 2022-10-12 PROCEDURE — 6370000000 HC RX 637 (ALT 250 FOR IP): Performed by: INTERNAL MEDICINE

## 2022-10-12 PROCEDURE — 2060000000 HC ICU INTERMEDIATE R&B

## 2022-10-12 PROCEDURE — 80053 COMPREHEN METABOLIC PANEL: CPT

## 2022-10-12 RX ADMIN — DILTIAZEM HYDROCHLORIDE 2.5 MG/HR: 5 INJECTION, SOLUTION INTRAVENOUS at 16:24

## 2022-10-12 RX ADMIN — ENOXAPARIN SODIUM 70 MG: 100 INJECTION SUBCUTANEOUS at 21:04

## 2022-10-12 RX ADMIN — ONDANSETRON HYDROCHLORIDE 4 MG: 2 INJECTION, SOLUTION INTRAMUSCULAR; INTRAVENOUS at 15:05

## 2022-10-12 RX ADMIN — FAMOTIDINE 20 MG: 20 TABLET ORAL at 09:50

## 2022-10-12 RX ADMIN — ONDANSETRON HYDROCHLORIDE 4 MG: 2 INJECTION, SOLUTION INTRAMUSCULAR; INTRAVENOUS at 05:00

## 2022-10-12 RX ADMIN — ONDANSETRON HYDROCHLORIDE 4 MG: 2 INJECTION, SOLUTION INTRAMUSCULAR; INTRAVENOUS at 23:06

## 2022-10-12 RX ADMIN — Medication 50 MCG: at 09:50

## 2022-10-12 RX ADMIN — ACETAMINOPHEN 650 MG: 325 TABLET ORAL at 01:12

## 2022-10-12 ASSESSMENT — PAIN SCALES - GENERAL: PAINLEVEL_OUTOF10: 3

## 2022-10-12 NOTE — PROGRESS NOTES
Patient admitted to room 325 from ED. Patient oriented to room, call light, bed rails, phone, lights and bathroom. Patient instructed about the schedule of the day including: vital sign frequency, lab draws, possible tests, frequency of MD and staff rounds, daily weights, I &O's and prescribed diet. Telemetry box in place, patient aware of placement and reason. Bed locked, in lowest position, side rails up 2/4, call light within reach. Recliner Assessment  Patient is able to demonstrate the ability to move from a reclining position to an upright position within the recliner. 4 Eyes Skin Assessment     The patient is being assess for   Admission    I agree that 2 RN's have performed a thorough Head to Toe Skin Assessment on the patient. ALL assessment sites listed below have been assessed. Areas assessed for pressure by both nurses:   [x]   Head, Face, and Ears   [x]   Shoulders, Back, and Chest, Abdomen  [x]   Arms, Elbows, and Hands   [x]   Coccyx, Sacrum, and Ischium  [x]   Legs, Feet, and Heels        Skin Assessed Under all Medical Devices by both nurses:  NA               All Mepilex Borders were peeled back and area peeked at by both nurses:  No: NA  Please list where Mepilex Borders are located:  NA             **SHARE this note so that the co-signing nurse is able to place an eSignature**    Co-signer eSignature: Electronically signed by Amy Chinchilla RN on 10/12/22 at 2:26 AM EDT    Does the Patient have Skin Breakdown related to pressure?   No              José Miguel Prevention initiated:  NA   Wound Care Orders initiated:  NA      [facility] nurse consulted for Pressure Injury (Stage 3,4, Unstageable, DTI, NWPT, Complex wounds)and New or Established Ostomies:  NA      Primary Nurse eSignature: Electronically signed by Michelle Hansen RN on 10/11/22 at 8:34 PM EDT

## 2022-10-12 NOTE — PROGRESS NOTES
Patient once again verbalizing complaints of nausea. Remains NPO  -  medicated w/ zofran 4mg IV slowly. Remains in Afib w/ rates in 120s. Call light in patient's reach. Still awaiting bed at [facility].

## 2022-10-12 NOTE — CONSULTS
[address] [facility]  [phone]        Reason for Consultation/Chief Complaint: \"I have been having sob. \"  Consulted for new afib  No known cardiologist    History of Present Illness:  Willie Mcwilliams is a 76 y.o. patient who presented to [facility] 10/11/2022 with complaints of sob and tachycardia. She has PMH significant for HTN, neurogenic bladder, and arthritis. No past cardiac testing. She presented from PCP office for elevated heart rate and c/o SOB. She reports 2-3 weeks of increasing TITUS which got worse last few days. Took flu shot at PCP office last week and thought SOB related to shot. Also with brief, occasional chest pressure across chest. Denies any palpitations. CXR noted bibasilar atelectasis vs ASD. EKG noted atypical atrial short vs atrial tachycardia; nonspecific ST and T wave abnormality; 129 bpm.  Repeat EKG noted Narrow QRS tachycardiaconsider atrial tachycardia vs atypical flutter; ST abnl consider anterolateral ischemia 129 bmp. Given 2 doses IV metoprolol, 50mg po dose metoprolol and started on diltiazem gtt with hard to control HR's. Overnight had > 5 second pause and dilt gtt decreased from 10mg to 2.5m/hr. LABS: , K 4.5, Cl 89, CO2 22, BUN/Cr 36/1.7, anion gap 17-repeat 11, GFR 29-repeat 27, ca 10.8, Pro BNP 13,655, -repeat 247, -repeat 159, H/H 11.3/35.3, Blanche <0.01 x3. Patient with no c/o palpitations, dizziness, edema, or orthopnea/PND. I have been asked to provide consultation regarding further management and testing. Past Medical History:   has a past medical history of Arthritis, Closed subchondral insufficiency fracture of femoral condyle ([address] Hypertension, Localized osteoarthrosis not specified whether primary or secondary, pelvic region and thigh, and Neurogenic bladder. Surgical History:   has a past surgical history that includes Hysterectomy; joint replacement (1997); eye surgery (12/14/2010);  Cataract removal with implant (1/14/11); other surgical history (Right); and Total knee arthroplasty (Right, 12/6/2021). Social History:   reports that she has never smoked. She has never used smokeless tobacco. She reports that she does not drink alcohol and does not use drugs. Family History:  family history includes Arthritis in her mother; Diabetes in her brother, mother, and sister; High Blood Pressure in her brother, mother, and sister. Home Medications:  Were reviewed and are listed in nursing record. and/or listed below  Prior to Admission medications    Medication Sig Start Date End Date Taking?  Authorizing Provider   vitamin B-12 (CYANOCOBALAMIN) 100 MCG tablet Take 50 mcg by mouth daily   Yes Historical Provider, MD   Cholecalciferol (VITAMIN D) 50 MCG (2000 UT) CAPS capsule Take by mouth   Yes Historical Provider, MD   celecoxib (CELEBREX) 200 MG capsule Take 200 mg by mouth daily    Historical Provider, MD   famotidine (PEPCID) 20 MG tablet Take 20 mg by mouth daily    Historical Provider, MD   hydroCHLOROthiazide (HYDRODIURIL) 25 MG tablet Take 25 mg by mouth in the morning and at bedtime    Historical Provider, MD   atenolol (TENORMIN) 50 MG tablet Take 50 mg by mouth 2 times daily  12/13/10   Historical Provider, MD        Allergies:  Prednisone     Review of Systems:   12 point ROS negative in all areas as listed below except as in Ruby  Constitutional, EENT, Cardiovascular, pulmonary, GI, , Musculoskeletal, skin, neurological, hematological, endocrine, Psychiatric    Physical Examination:    Vitals:    10/12/22 0202   BP: 107/76   Pulse: 98   Resp: 18   Temp: 98.1 °F (36.7 °C)   SpO2: 95%    Weight: 156 lb 6.4 oz (70.9 kg)         General Appearance:  Alert, cooperative, no distress, appears stated age   Head:  Normocephalic, without obvious abnormality, atraumatic   Eyes:  PERRL, conjunctiva/corneas clear       Nose: Nares normal, no drainage or sinus tenderness   Throat: Lips, mucosa, and tongue normal   Neck: Supple, symmetrical, trachea midline, no adenopathy, thyroid: not enlarged, symmetric, no tenderness/mass/nodules, no carotid bruit or JVD       Lungs:   Clear to auscultation bilaterally, respirations unlabored   Chest Wall:  No tenderness or deformity   Heart:  +tachycardic and irregular, S1, S2 normal, no murmur, rub or gallop   Abdomen:   Soft, non-tender, bowel sounds active all four quadrants,  no masses, no organomegaly           Extremities: Extremities normal, atraumatic, no cyanosis or edema   Pulses: 2+ and symmetric   Skin: Skin color, texture, turgor normal, no rashes or lesions   Pysch: Normal mood and affect   Neurologic: Normal gross motor and sensory exam.         Labs  CBC:   Lab Results   Component Value Date/Time    WBC 6.8 10/11/2022 12:05 PM    RBC 4.18 10/11/2022 12:05 PM    HGB 11.3 10/11/2022 12:05 PM    HCT 35.3 10/11/2022 12:05 PM    MCV 84.4 10/11/2022 12:05 PM    RDW 13.7 10/11/2022 12:05 PM     10/11/2022 12:05 PM     CMP:    Lab Results   Component Value Date/Time     10/12/2022 06:04 AM    K 4.1 10/12/2022 06:04 AM    CL 90 10/12/2022 06:04 AM    CO2 24 10/12/2022 06:04 AM    BUN 36 10/12/2022 06:04 AM    CREATININE 1.8 10/12/2022 06:04 AM    GFRAA 33 10/12/2022 06:04 AM    AGRATIO 1.8 10/12/2022 06:04 AM    LABGLOM 27 10/12/2022 06:04 AM    GLUCOSE 110 10/12/2022 06:04 AM    PROT 6.6 10/12/2022 06:04 AM    CALCIUM 9.5 10/12/2022 06:04 AM    BILITOT 0.8 10/12/2022 06:04 AM    ALKPHOS 102 10/12/2022 06:04 AM     10/12/2022 06:04 AM     10/12/2022 06:04 AM     PT/INR:  No results found for: PTINR  Lab Results   Component Value Date    TROPONINI <0.01 10/12/2022       EKG:  I have reviewed EKG with the following interpretation:  Impression:  See HPI    Assessment:  Nohemy Tavares is a 76 y.o. patient who presented to [facility] 10/11/2022 with complaints of sob and tachycardia. She has PMH significant for HTN, neurogenic bladder, and arthritis.  No past cardiac testing. She presented from PCP office for elevated heart rate and c/o SOB. She reports 2-3 weeks of increasing TITUS which got worse last few days. Took flu shot at PCP office last week and thought SOB related to shot. Also with brief, occasional chest pressure across chest. Denies any palpitations. CXR noted bibasilar atelectasis vs ASD. EKG noted atypical atrial short vs atrial tachycardia; nonspecific ST and T wave abnormality; 129 bpm.  Repeat EKG noted Narrow QRS tachycardiaconsider atrial tachycardia vs atypical flutter; ST abnl consider anterolateral ischemia 129 bmp. Given 2 doses IV metoprolol, 50mg po dose metoprolol and started on diltiazem gtt with hard to control HR's. Overnight had > 5 second pause and dilt gtt decreased from 10mg to 2.5m/hr. LABS: , K 4.5, Cl 89, CO2 22, BUN/Cr 36/1.7, anion gap 17-repeat 11, GFR 29-repeat 27, ca 10.8, Pro BNP 13,655, -repeat 247, -repeat 159, H/H 11.3/35.3, Blanche <0.01 x3. Diagnosis of atrial tachyarrhythmia initially possible atypical flutter vs tach and now atrial fibrillation. Recs:  Concern with atrial rhythm that may be harder to control with meds and significant sinus pause using AV belia blocking agents which is concerning for possible underlying SSS. I d/w EP partner and will transfer for EP evaluation at [facility] given above inadequate HR's and prolonged sinus pause on med tx  Continue lovenox 70mg SQ for AC. Renal dose. Continue low dose dilt gtt for now    D/W IM doc and he will arrange transfer to [facility] for EP evaluation. I d/w patient and daughter who works on [address] [place] [address].      Patient Active Problem List   Diagnosis    Localized osteoarthrosis not specified whether primary or secondary, pelvic region and thigh    DDD (degenerative disc disease), lumbar    Acute pain of right knee    Primary osteoarthritis of right knee    Degenerative tear of lateral meniscus of right knee    Closed subchondral insufficiency fracture of femoral condyle ([address]    Insufficiency fracture of tibia    Hypertension    Anemia associated with acute blood loss    S/P TKR (total knee replacement), right    A-fib [facility])       Thank you for allowing to us to participate in the care or Jasper Gates. Further evaluation will be based upon the patient's clinical course and testing results.

## 2022-10-12 NOTE — PROGRESS NOTES
End of shift report given to [facility]  -  care transferred. Patient resting in bed denying complaints, talking on phone w/ visitor at bedside. Remaining in Afib on cardizem gtt at 2.5mg/hr . Call light in patient's reach.

## 2022-10-12 NOTE — PLAN OF CARE
Problem: Discharge Planning  Goal: Discharge to home or other facility with appropriate resources  Outcome: Progressing     Problem: Safety - Adult  Goal: Free from fall injury  Outcome: Progressing     Problem: Respiratory - Adult  Goal: Achieves optimal ventilation and oxygenation  Outcome: Progressing     Problem: Cardiovascular - Adult  Goal: Maintains optimal cardiac output and hemodynamic stability  Outcome: Progressing  Goal: Absence of cardiac dysrhythmias or at baseline  Outcome: Progressing     Problem: Skin/Tissue Integrity - Adult  Goal: Skin integrity remains intact  Outcome: Progressing  Goal: Incisions, wounds, or drain sites healing without S/S of infection  Outcome: Progressing  Goal: Oral mucous membranes remain intact  Outcome: Progressing     Problem: Musculoskeletal - Adult  Goal: Return mobility to safest level of function  Outcome: Progressing  Goal: Maintain proper alignment of affected body part  Outcome: Progressing  Goal: Return ADL status to a safe level of function  Outcome: Progressing

## 2022-10-12 NOTE — PROGRESS NOTES
Pt had 5.45 second pause. [facility]) cardiologist on call number called and paged sent to on call physician notifying them on long pause  AT [phone]. At [phone] On call cardiologist returned call and advised to place defibrillator pads on patient at this time. Provider stated okay to continue cardizem gtt but if she has another long pause greater than 5 seconds call back and we will most likely stop the gtt. Pt and family made aware of situation and Pads placed on pt at 0402.

## 2022-10-12 NOTE — PROGRESS NOTES
Called at [phone], Consult has been called to Dr. Jovana Billy on 10/12/22. Spoke with [facility].  7:38 AM    [employer]  10/12/2022

## 2022-10-12 NOTE — DISCHARGE SUMMARY
Please see combined HP/DC summary      Patient will be transferred to [facility] for EP evaluation.       Louie Larson PA-C  10/12/2022 11:50 AM

## 2022-10-12 NOTE — PROGRESS NOTES
Spoke w/ pharmacy  -  dosing in appropriate for patient's kidney function. Dr. Ontiveros Frames updated.

## 2022-10-12 NOTE — PROGRESS NOTES
Pt given 10mg bolus diltiazem and started on dilt gtt at 5mg/hr per order at 2024. Pt Hr is still sustaining 120's-130's. Dr. Kana Ellis notified via perfect serve of HR at 2134. Dr. Kana Ellis called at [address] [facility] and gave telephone orders to increase dilt gtt from 5mg/hr to 10mg/hr. Orders placed and infusion rate increased at 0034.

## 2022-10-12 NOTE — H&P
Hospital Medicine History & Physical      PCP: Jason Menendez MD    Date of Admission: 10/11/2022  Date of Discharge: 10/12/2022     Discharging Provider: Hever Retana PA-C  Attending Physician: Martina Finch MD     Date of Service: Pt seen/examined on 10/12/2022      Chief Complaint:    Chief Complaint   Patient presents with    Shortness of Breath     Pt states that she was sent in from her PCP office for SOB and fast heart rate. Pt's daughter states that it started last night around 0130. History Of Present Illness: The patient is a 76 y.o. female with HTN, GERD, hx of TB, hx of rheumatic fever who presented to [facility] ED with complaint of SOB/TITUS since Friday. Patient accompanied by daughter who is PCA on 2W here at [facility] who contributes to history. Patient endorses severe TITUS even with short distances, non-productive cough, chest congestion, lightheadedness with standing and nausea since Friday. This seemed to improve over the weekend but reportedly worsening early Tuesday morning which prompted ED evaluation. She denies hx of known lung or cardiac issues apart from those listed above. She denies fevers, chills, substernal chest pain, abd pain, vomiting, diarrhea, numbness, tingling or swelling of her extremities. Patient noted to have several pauses on tele monitor over night approx 3 sec. This am around 0400 approx 6 sec pause. Patient states she was awake at that time and denies sxs. Defibrillation pad placed. She is stable on room air. Labs remarkable for elevated Cr, Hyponatremia, elevated ProBNP, transaminitis. Trops neg. Cardiology saw patient this am and recommended transfer to [facility] for EP evaluation. I did discuss CODE STATUS in depth with the patient with daughter at bedside. She wishes to remain FULL CODE.     Past Medical History:        Diagnosis Date    Arthritis     Closed subchondral insufficiency fracture of femoral condyle ([address] 08/12/2020    Hypertension Localized osteoarthrosis not specified whether primary or secondary, pelvic region and thigh 05/08/2015    Neurogenic bladder     caused by a back surgery in 2017 per pt       Past Surgical History:        Procedure Laterality Date    CATARACT REMOVAL WITH IMPLANT  1/14/11    LEFT EYE    EYE SURGERY  12/14/2010    cataract rt eye    HYSTERECTOMY (CERVIX STATUS UNKNOWN)      JOINT REPLACEMENT  1997    left hip    OTHER SURGICAL HISTORY Right     RIGHT TOTAL KNEE REPLACEMENT     TOTAL KNEE ARTHROPLASTY Right 12/6/2021    RIGHT TOTAL KNEE REPLACEMENT performed by Beth Hernandez MD at [facility]       Medications Prior to Admission:    Prior to Admission medications    Medication Sig Start Date End Date Taking? Authorizing Provider   vitamin B-12 (CYANOCOBALAMIN) 100 MCG tablet Take 50 mcg by mouth daily   Yes Historical Provider, MD   Cholecalciferol (VITAMIN D) 50 MCG (2000 UT) CAPS capsule Take by mouth   Yes Historical Provider, MD   celecoxib (CELEBREX) 200 MG capsule Take 200 mg by mouth daily    Historical Provider, MD   famotidine (PEPCID) 20 MG tablet Take 20 mg by mouth daily    Historical Provider, MD   hydroCHLOROthiazide (HYDRODIURIL) 25 MG tablet Take 25 mg by mouth in the morning and at bedtime    Historical Provider, MD   atenolol (TENORMIN) 50 MG tablet Take 50 mg by mouth 2 times daily  12/13/10   Historical Provider, MD       Allergies:  Prednisone    Social History:  The patient currently lives at home    TOBACCO:   reports that she has never smoked. She has never used smokeless tobacco.  ETOH:   reports no history of alcohol use.       Family History:   Positive as follows:        Problem Relation Age of Onset    Arthritis Mother     Diabetes Mother     High Blood Pressure Mother     Diabetes Sister     High Blood Pressure Sister     Diabetes Brother     High Blood Pressure Brother        REVIEW OF SYSTEMS:       Constitutional: Negative for fever   HENT: Negative for sore throat   Eyes: Negative for redness   Respiratory: +dyspnea worse with exertion, +non-productive cough +chest congestion  Cardiovascular: Negative for chest pain   Gastrointestinal: Negative for vomiting, diarrhea +nausea  Genitourinary: Negative for hematuria   Musculoskeletal: Negative for arthralgias   Skin: Negative for rash   Neurological: Negative for syncope   Hematological: Negative for adenopathy   Psychiatric/Behavorial: Negative for anxiety    PHYSICAL EXAM:    /87   Pulse (!) 120   Temp 97.1 °F (36.2 °C) (Oral)   Resp 16   Ht 5' (1.524 m)   Wt 156 lb 6.4 oz (70.9 kg)   SpO2 96%   BMI 30.54 kg/m²     Gen: No distress. Alert. Eyes: PERRL. No sclera icterus. No conjunctival injection. ENT: No discharge. Pharynx clear. Neck: No JVD. No Carotid Bruit. Trachea midline. Resp: No accessory muscle use. No crackles. No wheezes. No rhonchi. CV: Irregular rate. Irregular rhythm. No murmur. No rub. No edema. Capillary Refill: Brisk,< 3 seconds   Peripheral Pulses: +2 palpable, equal bilaterally   GI: Non-tender. Non-distended. No masses. No organomegaly. Normal bowel sounds. No hernia. Skin: Warm and dry. No nodule on exposed extremities. No rash on exposed extremities. M/S: No cyanosis. No joint deformity. No clubbing. Neuro: Awake. Grossly nonfocal    Psych: Oriented x 3. No anxiety or agitation. Wing Alejandra MD have reviewed the chart on [address] [place] [id number],[address] and personally interviewed and examined patient, reviewed the data (labs and imaging) and after discussion with my PA formulated the plan. Agree with note with the following edits. HPI: A 76year old female admitted with SOB and light headedness  Found to be in A. Fib with RVR. Started on cardizem drip. Found to have long sinus pauses on telemetry    I reviewed the patient's Past Medical History, Past Surgical History, Medications, and Allergies.      Physical exam:    /87   Pulse (!) 120   Temp 97.1 °F (36.2 °C) (Oral) Resp 16   Ht 5' (1.524 m)   Wt 156 lb 6.4 oz (70.9 kg)   SpO2 96%   BMI 30.54 kg/m²     Gen: No distress. Alert. Eyes: PERRL. No sclera icterus. No conjunctival injection. ENT: No discharge. Pharynx clear. Neck: Trachea midline. Normal thyroid. Resp: No accessory muscle use. No crackles. No wheezes. No rhonchi. No dullness on percussion. CV: irregular rhythm. No murmur or rub. No edema. GI: Non-tender. Non-distended. No masses. No organomegaly. Normal bowel sounds. No hernia. Skin: Warm and dry. No nodule on exposed extremities. No rash on exposed extremities. Lymph: No cervical LAD. No supraclavicular LAD. M/S: No cyanosis. No joint deformity. No clubbing. Neuro: Awake. Moves all 4 extremities, non focal  Psych: Oriented x 3. No anxiety or agitation. LOLLY Coto     CBC:   Recent Labs     10/11/22  1205   WBC 6.8   HGB 11.3*   HCT 35.3*   MCV 84.4        BMP:   Recent Labs     10/11/22  1205 10/12/22  0604   * 125*   K 4.5 4.1   CL 89* 90*   CO2 22 24   BUN 36* 36*   CREATININE 1.7* 1.8*     LIVER PROFILE:   Recent Labs     10/11/22  1205 10/12/22  0604   * 159*   * 247*   BILITOT 0.6 0.8   ALKPHOS 114 102     PT/INR:   Recent Labs     10/12/22  0604   PROTIME 15.5*   INR 1.24*       CARDIAC ENZYMES  Recent Labs     10/11/22  2151 10/12/22  0226 10/12/22  0604   TROPONINI <0.01 <0.01 <0.01       CULTURES   Latest Reference Range & Units 10/11/22 12:14   INFLUENZA A NOT DETECTED  NOT DETECTED   INFLUENZA B NOT DETECTED  NOT DETECTED   SARS-CoV-2 RNA, RT PCR NOT DETECTED  NOT DETECTED       EKG:  I have reviewed the EKG with the following interpretation:   atypical atrial flutter vs atrial tachcardia  Nonspecific ST and T wave abnormality  Abnormal ECG  When compared with ECG of 13-AUG-2021 12:59,  atypcal atrial flutter vs atach replaces NSR  Vent.  rate has increased BY 55 BPM  Confirmed by MEG WALLACE, Jaden Schroeder ([id number]) on 10/11/2022 6:29:19 PM    Narrow QRS tachycardia  consider atrial tachycardia vs atypical flutter  ST & T wave abnormality, consider anterolateral ischemia  Abnormal ECG  Confirmed by REYNA WEAVER MD ([id number]) on 10/11/2022 6:31:06 PM    RADIOLOGY  XR CHEST PORTABLE   Final Result   Bibasilar atelectasis versus airspace disease. ASSESSMENT/PLAN:  #Atrial tachyarrhythmia  -Atrial tachycardia vs atypical atrial flutter  -initially felt to be Afib RVR in the ED, new onset  -placed on cardizem gtt and full dose lovenox  -serial trops neg x4  -EKG x 2 above  -TSH wnl  -ECHO ordered  -patient noted to have several pauses on tele monitor over night approx 3 sec. This am around 0400 approx 6 sec pause. Patient states she was awake at that time and denies sxs. Defibrillation pad placed.  -Cardiology saw patient this am and recommended transfer to [facility] for EP evaluation  -Spoke with hospitalist at [facility] who has accepted patient for transfer     #Elevated ProBNP  -13K  -no overt fluid overload   -no hx of CHF per patient    #Transaminitis  -no labs to compare  -endorses remote hx of hepatitis ~ 25 years ago  -denies hx of alcohol/drug abuse or HIV   -denies abd pain  -monitor CMP    #? CKD IV  -limited values in chart  -Cr and GFR stable since 2021    #HTN  -BP initially elevated; now stable  -home BP meds held for now    Hyponatremia  Fluid restriction    #GERD  -continue PPI    #Hx of TB  #Hx of Rheumatic fever  -pt endorses as a child    DVT Prophylaxis: Full dose lovenox  Diet: ADULT DIET; Regular; Low Fat/Low Chol/High Fiber/TRANG; 1500 ml  Code Status: Full Code    Rg Burgess PA-C  10/12/2022 11:45 AM      Patient to be discharged in stable condition to [facility] for EP evaluation. Agree with above    FELIZ Coto.

## 2022-10-12 NOTE — PROGRESS NOTES
Patient and patient's daughter updated on plan of care. Patient denying complaints of chest pain or nausea at present time. Remains in Afib 110-120s on diltiazem gtt at 2.5mg/hr. aware to remain NPO.

## 2022-10-12 NOTE — PROGRESS NOTES
Patient assessment and VS as per flowsheet  -  denying complaints of pain or nausea at present time. No shortness of breath at rest.  Remains in Afib on cardizem gtt at 2.5mg/hr. NPO per nursing order until seen by cardiology.

## 2022-10-12 NOTE — PROGRESS NOTES
Pt heart rate now hanging out between 70's- 90's. Diltazem gtt titrated down from 10mg/hr to 5mg/hr per protocol at 84 17 85. Heart rate still hanging 70's-80's gtt titrated to 2.5mg/hr at 0514 per protocol.

## 2022-10-13 ENCOUNTER — APPOINTMENT (OUTPATIENT)
Dept: CARDIAC CATH/INVASIVE PROCEDURES | Age: 77
DRG: 243 | End: 2022-10-13
Attending: INTERNAL MEDICINE
Payer: MEDICARE

## 2022-10-13 ENCOUNTER — ANESTHESIA EVENT (OUTPATIENT)
Dept: CARDIAC CATH/INVASIVE PROCEDURES | Age: 77
DRG: 243 | End: 2022-10-13
Payer: MEDICARE

## 2022-10-13 ENCOUNTER — TELEPHONE (OUTPATIENT)
Dept: CARDIOLOGY CLINIC | Age: 77
End: 2022-10-13

## 2022-10-13 ENCOUNTER — HOSPITAL ENCOUNTER (INPATIENT)
Age: 77
LOS: 6 days | Discharge: HOME | DRG: 243 | End: 2022-10-19
Attending: INTERNAL MEDICINE | Admitting: INTERNAL MEDICINE
Payer: MEDICARE

## 2022-10-13 ENCOUNTER — ANESTHESIA (OUTPATIENT)
Dept: CARDIAC CATH/INVASIVE PROCEDURES | Age: 77
DRG: 243 | End: 2022-10-13
Payer: MEDICARE

## 2022-10-13 VITALS
WEIGHT: 155.13 LBS | SYSTOLIC BLOOD PRESSURE: 136 MMHG | RESPIRATION RATE: 18 BRPM | HEART RATE: 120 BPM | OXYGEN SATURATION: 95 % | DIASTOLIC BLOOD PRESSURE: 76 MMHG | BODY MASS INDEX: 30.46 KG/M2 | HEIGHT: 60 IN | TEMPERATURE: 97.3 F

## 2022-10-13 PROBLEM — I48.19 PERSISTENT ATRIAL FIBRILLATION (HCC): Status: ACTIVE | Noted: 2022-10-11

## 2022-10-13 PROBLEM — R00.0 TACHYARRHYTHMIA: Status: ACTIVE | Noted: 2022-10-13

## 2022-10-13 PROBLEM — E66.9 OBESITY: Status: ACTIVE | Noted: 2022-10-13

## 2022-10-13 LAB
ALBUMIN SERPL-MCNC: 4.1 G/DL (ref 3.4–5)
ALP BLD-CCNC: 101 U/L (ref 40–129)
ALT SERPL-CCNC: 198 U/L (ref 10–40)
ANION GAP SERPL CALCULATED.3IONS-SCNC: 12 MMOL/L (ref 3–16)
AST SERPL-CCNC: 89 U/L (ref 15–37)
BILIRUB SERPL-MCNC: 0.7 MG/DL (ref 0–1)
BILIRUBIN DIRECT: <0.2 MG/DL (ref 0–0.3)
BILIRUBIN, INDIRECT: ABNORMAL MG/DL (ref 0–1)
BUN BLDV-MCNC: 35 MG/DL (ref 7–20)
CALCIUM SERPL-MCNC: 9.3 MG/DL (ref 8.3–10.6)
CHLORIDE BLD-SCNC: 88 MMOL/L (ref 99–110)
CO2: 24 MMOL/L (ref 21–32)
CREAT SERPL-MCNC: 1.8 MG/DL (ref 0.6–1.2)
GFR AFRICAN AMERICAN: 33
GFR NON-AFRICAN AMERICAN: 27
GLUCOSE BLD-MCNC: 118 MG/DL (ref 70–99)
HCT VFR BLD CALC: 29.2 % (ref 36–48)
HEMOGLOBIN: 9.8 G/DL (ref 12–16)
LV EF: 56 %
LVEF MODALITY: NORMAL
MCH RBC QN AUTO: 28.3 PG (ref 26–34)
MCHC RBC AUTO-ENTMCNC: 33.5 G/DL (ref 31–36)
MCV RBC AUTO: 84.4 FL (ref 80–100)
PDW BLD-RTO: 13.3 % (ref 12.4–15.4)
PLATELET # BLD: 211 K/UL (ref 135–450)
PMV BLD AUTO: 7.6 FL (ref 5–10.5)
POTASSIUM SERPL-SCNC: 3.5 MMOL/L (ref 3.5–5.1)
RBC # BLD: 3.45 M/UL (ref 4–5.2)
SODIUM BLD-SCNC: 124 MMOL/L (ref 136–145)
TOTAL PROTEIN: 6.8 G/DL (ref 6.4–8.2)
TSH REFLEX: 2.14 UIU/ML (ref 0.27–4.2)
WBC # BLD: 7.5 K/UL (ref 4–11)

## 2022-10-13 PROCEDURE — 2500000003 HC RX 250 WO HCPCS: Performed by: NURSE ANESTHETIST, CERTIFIED REGISTERED

## 2022-10-13 PROCEDURE — 2580000003 HC RX 258: Performed by: NURSE ANESTHETIST, CERTIFIED REGISTERED

## 2022-10-13 PROCEDURE — 97530 THERAPEUTIC ACTIVITIES: CPT

## 2022-10-13 PROCEDURE — 93320 DOPPLER ECHO COMPLETE: CPT | Performed by: INTERNAL MEDICINE

## 2022-10-13 PROCEDURE — 94761 N-INVAS EAR/PLS OXIMETRY MLT: CPT

## 2022-10-13 PROCEDURE — 80048 BASIC METABOLIC PNL TOTAL CA: CPT

## 2022-10-13 PROCEDURE — 6370000000 HC RX 637 (ALT 250 FOR IP): Performed by: NURSE PRACTITIONER

## 2022-10-13 PROCEDURE — 84443 ASSAY THYROID STIM HORMONE: CPT

## 2022-10-13 PROCEDURE — 2700000000 HC OXYGEN THERAPY PER DAY

## 2022-10-13 PROCEDURE — 99223 1ST HOSP IP/OBS HIGH 75: CPT | Performed by: INTERNAL MEDICINE

## 2022-10-13 PROCEDURE — 92960 CARDIOVERSION ELECTRIC EXT: CPT | Performed by: INTERNAL MEDICINE

## 2022-10-13 PROCEDURE — 3700000000 HC ANESTHESIA ATTENDED CARE

## 2022-10-13 PROCEDURE — 97165 OT EVAL LOW COMPLEX 30 MIN: CPT

## 2022-10-13 PROCEDURE — 80076 HEPATIC FUNCTION PANEL: CPT

## 2022-10-13 PROCEDURE — 6360000002 HC RX W HCPCS: Performed by: NURSE PRACTITIONER

## 2022-10-13 PROCEDURE — 36415 COLL VENOUS BLD VENIPUNCTURE: CPT

## 2022-10-13 PROCEDURE — 97116 GAIT TRAINING THERAPY: CPT

## 2022-10-13 PROCEDURE — 2500000003 HC RX 250 WO HCPCS: Performed by: NURSE PRACTITIONER

## 2022-10-13 PROCEDURE — 6370000000 HC RX 637 (ALT 250 FOR IP): Performed by: INTERNAL MEDICINE

## 2022-10-13 PROCEDURE — 3700000001 HC ADD 15 MINUTES (ANESTHESIA)

## 2022-10-13 PROCEDURE — 2580000003 HC RX 258: Performed by: INTERNAL MEDICINE

## 2022-10-13 PROCEDURE — 93325 DOPPLER ECHO COLOR FLOW MAPG: CPT

## 2022-10-13 PROCEDURE — 6360000002 HC RX W HCPCS: Performed by: INTERNAL MEDICINE

## 2022-10-13 PROCEDURE — 92960 CARDIOVERSION ELECTRIC EXT: CPT

## 2022-10-13 PROCEDURE — 93325 DOPPLER ECHO COLOR FLOW MAPG: CPT | Performed by: INTERNAL MEDICINE

## 2022-10-13 PROCEDURE — 6360000002 HC RX W HCPCS: Performed by: NURSE ANESTHETIST, CERTIFIED REGISTERED

## 2022-10-13 PROCEDURE — 93320 DOPPLER ECHO COMPLETE: CPT

## 2022-10-13 PROCEDURE — 93306 TTE W/DOPPLER COMPLETE: CPT

## 2022-10-13 PROCEDURE — 2580000003 HC RX 258: Performed by: NURSE PRACTITIONER

## 2022-10-13 PROCEDURE — 93312 ECHO TRANSESOPHAGEAL: CPT

## 2022-10-13 PROCEDURE — 93312 ECHO TRANSESOPHAGEAL: CPT | Performed by: INTERNAL MEDICINE

## 2022-10-13 PROCEDURE — 85027 COMPLETE CBC AUTOMATED: CPT

## 2022-10-13 PROCEDURE — 2060000000 HC ICU INTERMEDIATE R&B

## 2022-10-13 PROCEDURE — 5A2204Z RESTORATION OF CARDIAC RHYTHM, SINGLE: ICD-10-PCS | Performed by: INTERNAL MEDICINE

## 2022-10-13 PROCEDURE — 97162 PT EVAL MOD COMPLEX 30 MIN: CPT

## 2022-10-13 RX ORDER — UBIDECARENONE 75 MG
50 CAPSULE ORAL DAILY
Status: DISCONTINUED | OUTPATIENT
Start: 2022-10-13 | End: 2022-10-13 | Stop reason: RX

## 2022-10-13 RX ORDER — SODIUM CHLORIDE 9 MG/ML
INJECTION, SOLUTION INTRAVENOUS PRN
Status: DISCONTINUED | OUTPATIENT
Start: 2022-10-13 | End: 2022-10-19 | Stop reason: HOSPADM

## 2022-10-13 RX ORDER — METOPROLOL TARTRATE 5 MG/5ML
5 INJECTION INTRAVENOUS EVERY 6 HOURS PRN
Status: DISCONTINUED | OUTPATIENT
Start: 2022-10-13 | End: 2022-10-16

## 2022-10-13 RX ORDER — PROPOFOL 10 MG/ML
INJECTION, EMULSION INTRAVENOUS PRN
Status: DISCONTINUED | OUTPATIENT
Start: 2022-10-13 | End: 2022-10-13 | Stop reason: SDUPTHER

## 2022-10-13 RX ORDER — ENOXAPARIN SODIUM 100 MG/ML
1 INJECTION SUBCUTANEOUS EVERY 24 HOURS
Status: DISCONTINUED | OUTPATIENT
Start: 2022-10-13 | End: 2022-10-13

## 2022-10-13 RX ORDER — SODIUM CHLORIDE 0.9 % (FLUSH) 0.9 %
5-40 SYRINGE (ML) INJECTION EVERY 12 HOURS SCHEDULED
Status: DISCONTINUED | OUTPATIENT
Start: 2022-10-13 | End: 2022-10-19 | Stop reason: HOSPADM

## 2022-10-13 RX ORDER — ONDANSETRON 4 MG/1
4 TABLET, ORALLY DISINTEGRATING ORAL EVERY 8 HOURS PRN
Status: DISCONTINUED | OUTPATIENT
Start: 2022-10-13 | End: 2022-10-19 | Stop reason: HOSPADM

## 2022-10-13 RX ORDER — HYDROCHLOROTHIAZIDE 25 MG/1
25 TABLET ORAL 2 TIMES DAILY
Status: DISCONTINUED | OUTPATIENT
Start: 2022-10-13 | End: 2022-10-14

## 2022-10-13 RX ORDER — ATENOLOL 25 MG/1
50 TABLET ORAL 2 TIMES DAILY
Status: DISCONTINUED | OUTPATIENT
Start: 2022-10-13 | End: 2022-10-14

## 2022-10-13 RX ORDER — SODIUM CHLORIDE 0.9 % (FLUSH) 0.9 %
5-40 SYRINGE (ML) INJECTION PRN
Status: DISCONTINUED | OUTPATIENT
Start: 2022-10-13 | End: 2022-10-19 | Stop reason: HOSPADM

## 2022-10-13 RX ORDER — SODIUM CHLORIDE 9 MG/ML
INJECTION, SOLUTION INTRAVENOUS CONTINUOUS PRN
Status: DISCONTINUED | OUTPATIENT
Start: 2022-10-13 | End: 2022-10-13 | Stop reason: SDUPTHER

## 2022-10-13 RX ORDER — POLYETHYLENE GLYCOL 3350 17 G/17G
17 POWDER, FOR SOLUTION ORAL DAILY PRN
Status: DISCONTINUED | OUTPATIENT
Start: 2022-10-13 | End: 2022-10-19 | Stop reason: HOSPADM

## 2022-10-13 RX ORDER — ACETAMINOPHEN 325 MG/1
650 TABLET ORAL EVERY 6 HOURS PRN
Status: DISCONTINUED | OUTPATIENT
Start: 2022-10-13 | End: 2022-10-19 | Stop reason: HOSPADM

## 2022-10-13 RX ORDER — LIDOCAINE HYDROCHLORIDE 20 MG/ML
INJECTION, SOLUTION INFILTRATION; PERINEURAL PRN
Status: DISCONTINUED | OUTPATIENT
Start: 2022-10-13 | End: 2022-10-13 | Stop reason: SDUPTHER

## 2022-10-13 RX ORDER — ONDANSETRON 2 MG/ML
4 INJECTION INTRAMUSCULAR; INTRAVENOUS EVERY 6 HOURS PRN
Status: DISCONTINUED | OUTPATIENT
Start: 2022-10-13 | End: 2022-10-19 | Stop reason: HOSPADM

## 2022-10-13 RX ORDER — ACETAMINOPHEN 650 MG/1
650 SUPPOSITORY RECTAL EVERY 6 HOURS PRN
Status: DISCONTINUED | OUTPATIENT
Start: 2022-10-13 | End: 2022-10-19 | Stop reason: HOSPADM

## 2022-10-13 RX ORDER — FAMOTIDINE 20 MG/1
20 TABLET, FILM COATED ORAL DAILY
Status: DISCONTINUED | OUTPATIENT
Start: 2022-10-13 | End: 2022-10-19 | Stop reason: HOSPADM

## 2022-10-13 RX ADMIN — SODIUM CHLORIDE: 9 INJECTION, SOLUTION INTRAVENOUS at 14:25

## 2022-10-13 RX ADMIN — SODIUM CHLORIDE, PRESERVATIVE FREE 10 ML: 5 INJECTION INTRAVENOUS at 22:23

## 2022-10-13 RX ADMIN — PROPOFOL 200 MG: 10 INJECTION, EMULSION INTRAVENOUS at 14:28

## 2022-10-13 RX ADMIN — HYDROCHLOROTHIAZIDE 25 MG: 25 TABLET ORAL at 22:16

## 2022-10-13 RX ADMIN — METOPROLOL TARTRATE 5 MG: 5 INJECTION INTRAVENOUS at 04:36

## 2022-10-13 RX ADMIN — APIXABAN 5 MG: 5 TABLET, FILM COATED ORAL at 22:16

## 2022-10-13 RX ADMIN — ATENOLOL 50 MG: 25 TABLET ORAL at 22:16

## 2022-10-13 RX ADMIN — LIDOCAINE HYDROCHLORIDE 60 MG: 20 INJECTION, SOLUTION INFILTRATION; PERINEURAL at 14:28

## 2022-10-13 RX ADMIN — DILTIAZEM HYDROCHLORIDE 5 MG/HR: 5 INJECTION, SOLUTION INTRAVENOUS at 03:51

## 2022-10-13 RX ADMIN — AMIODARONE HYDROCHLORIDE 1 MG/MIN: 50 INJECTION, SOLUTION INTRAVENOUS at 18:14

## 2022-10-13 RX ADMIN — DILTIAZEM HYDROCHLORIDE 10 MG/HR: 5 INJECTION, SOLUTION INTRAVENOUS at 17:58

## 2022-10-13 RX ADMIN — ACETAMINOPHEN 650 MG: 325 TABLET ORAL at 22:20

## 2022-10-13 RX ADMIN — ENOXAPARIN SODIUM 70 MG: 80 INJECTION SUBCUTANEOUS at 15:05

## 2022-10-13 ASSESSMENT — PAIN SCALES - GENERAL
PAINLEVEL_OUTOF10: 3
PAINLEVEL_OUTOF10: 0

## 2022-10-13 ASSESSMENT — PAIN DESCRIPTION - LOCATION: LOCATION: HEAD

## 2022-10-13 NOTE — PROGRESS NOTES
complications  Activity Tolerance Comments: RN requests PT not ambulate pt further than to/from bathroom due to issues with tachycardia with scheduled cardioversion procedure later today. Plan   General Plan: 3-5 times per week  Specific Instructions for Next Treatment: Progress ther ex and mobility as tolerated  Current Treatment Recommendations: Strengthening, Balance training, Functional mobility training, Transfer training, Endurance training, Gait training, Home exercise program, Safety education & training, Patient/Caregiver education & training, Therapeutic activities  Safety Devices: Call light within reach, Left in bed, Nurse notified     Restrictions  Restrictions/Precautions  Restrictions/Precautions: Up as Tolerated, General Precautions  Position Activity Restriction  Other position/activity restrictions: Tachycardic, IV     Subjective   General  Chart Reviewed: Yes  Patient assessed for rehabilitation services?: Yes  Family / Caregiver Present: Yes (daughter)  Referring Practitioner: Dr. Florecita John  Referral Date : 10/13/22  Diagnosis: Tachyarrhythmia  Follows Commands: Within Functional Limits  General Comment  Comments: Pt resting in bed upon entry of therapy staff  Subjective & Pain: Pt c/o 3-4/10 headache at rest.  Pt agreeable to work with PT this morning before her procedure, very pleasant and cooperative.   States she feels a little weaker now than normal.    Social/Functional History  Social/Functional History  Lives With: Alone  Type of Home: House  Home Layout: Two level, Able to Live on Main level with bedroom/bathroom  Home Access: Level entry, Stairs to enter with rails  Entrance Stairs - Number of Steps: level entry through back entrance, 3 KIMMIE through front porch with B handrails  Entrance Stairs - Rails: Both  Bathroom Shower/Tub: Tub/Shower unit  Bathroom Toilet: Standard  Bathroom Equipment: Shower chair, 3-in-1 commode (pt has shower chair available although does not need to use it)  Bathroom Accessibility: Walker accessible  Home Equipment: janel Mcgregor  Has the patient had two or more falls in the past year or any fall with injury in the past year?: No  ADL Assistance: Independent (pt typically self-caths for urination (since 2017))  Homemaking Assistance: Independent (pt (I) with typical homemaking tasks, daughter helps with yard work and heavier household tasks like vacuuming)  Ambulation Assistance: Independent (without AD)  Transfer Assistance: Independent  Active : Yes  Occupation: Retired  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[address])  Leisure & Hobbies: Crocheting, sewing    Vision/Hearing  Vision  Vision: Impaired  Vision Exceptions: Wears glasses for reading  Hearing  Hearing: Within functional limits      Cognition   Orientation  Overall Orientation Status: Within Functional Limits     Objective   Vitals  Heart Rate: (!) 128  Heart Rate Source: Brachial  BP: 123/79  BP Location: Right upper arm  BP Method: Automatic  Patient Position: Semi fowlers  MAP (Calculated): 93.67  Resp: 24  SpO2: 94 %  O2 Device: None (Room air)    Observation/Palpation  Posture: Good  Gross Assessment  AROM: Within functional limits  Strength: Generally decreased, functional  Coordination: Generally decreased, functional  Tone: Normal     Bed Mobility Training  Overall Level of Assistance: Stand-by assistance; Adaptive equipment (HOB elevated and use of bed rails)  Supine to Sit: Modified independent  Sit to Supine: Modified independent  Scooting: Independent    Balance  Sitting: Intact  Standing: Impaired  Standing - Static: Good  Standing - Dynamic: Fair;Occasional    Transfer Training  Overall Level of Assistance: Stand-by assistance (no AD)  Interventions: Safety awareness training;Verbal cues; Visual cues  Sit to Stand: Stand-by assistance  Stand to Sit: Stand-by assistance  Bed to Chair: Stand-by assistance (without AD, mildly unsteady but no overt LOB)  Toilet Transfer: Stand-by assistance    Gait Training  Overall Level of Assistance: Stand-by assistance  Interventions: Safety awareness training;Verbal cues; Visual cues  Speed/Dotty: Pace decreased (< 100 feet/min); Shuffled; Slow  Step Length: Left shortened;Right shortened  Gait Abnormalities: Shuffling gait; Decreased step clearance;Trunk sway increased  Distance (ft): 30 Feet (kept amb distance limited to bed<>bathroom only per RN request due to issues with tachycardia)  Assistive Device: Other (comment) (no device, pt occasionally pushing own IV pole for support)    AM-PAC Score  AM-PAC Inpatient Mobility Raw Score : 20 (10/13/22 1216)  AM-PAC Inpatient T-Scale Score : 47.67 (10/13/22 1216)  Mobility Inpatient CMS 0-100% Score: 35.83 (10/13/22 1216)  Mobility Inpatient CMS G-Code Modifier : CJ (10/13/22 1216)    Goals  Short Term Goals  Time Frame for Short Term Goals: 1 week, 10/20/22 (unless otherwise specified)  Short Term Goal 1: Pt will transfer supine <-> sit with modified(I) - MET 10/13/22  Short Term Goal 2: Pt will transfer sit <-> stand and bed>chair with modified(I)  Short Term Goal 3: Pt will ambulate x 150 feet without AD with supervision/modified(I)  Short Term Goal 4: By 10/16/22: Pt will tolerate 12-15 reps BLE exercise for strengthening, balance, and endurance  Patient Goals   Patient Goals : \"To go home\"       Education  Patient Education  Education Given To: Patient; Family (pt & dtr)  Education Provided: Role of Therapy;Plan of Care;Precautions;Transfer Training;Family Education  Education Method: Demonstration;Verbal  Barriers to Learning: None  Education Outcome: Verbalized understanding;Demonstrated understanding      Therapy Time   Individual Concurrent Group Co-treatment   Time In 1125         Time Out 1150         Minutes 25         Timed Code Treatment Minutes: [address] [place], [place] #[id number]    If pt is unable to be seen after this session, please let this note serve as discharge summary. Please see case management note for discharge disposition. Thank you.

## 2022-10-13 NOTE — H&P
Hospital Medicine History & Physical      PCP: Yousuf Macario MD    Date of Admission: 10/13/2022    Date of Service: Pt seen/examined on 10/13/2022 and Admitted to Inpatient with expected LOS greater than two midnights due to medical therapy. Chief Complaint:  tachyarrhythmia    History Of Present Illness:      76 y.o. female, with PMH of HTN, obesity and CKD, who was a direct admit from [facility] to [facility] with new onset atrial fibrillation. History obtained from the patient and review of EMR. The patient stated she had a few days of feeling short of breath and palpitations. She was sent to [facility] on 10/11/2022 from her primary care office after being seen for shortness of breath and tachycardia. A twelve-lead EKG was obtained that revealed atrial fibrillation with RVR. The patient was given a 10 mg bolus of diltiazem and started on a diltiazem drip at 5mg/h. The patient's heart rate sustained at 120-130 and diltiazem drip was increased to 10 mg/h. The patient was admitted to [facility] at this time. Cardiology was consulted. The patient began having occasional pauses with the longest 1 being 3.08 seconds. The patient did remain asymptomatic. Dr. Fadia Chang from cardiology was in to see the patient. Recommendation made to transfer patient to [facility], [facility]. The patient was eventually transferred to [facility] for further evaluation and treatment. She remains on 2.5 mg/h of diltiazem drip. The patient is resting quietly and comfortably in bed. She denies any shortness of breath and/or chest pain at this time. Cardiology/EP has been consulted for the a.m.  Echo has been ordered for a. m. The patient denied any other associated symptoms as well as any aggravating and/or alleviating factors. At the time of this assessment, the patient was resting comfortably in bed.  She currently denies any chest pain, back pain, abdominal pain, shortness of breath, numbness, tingling, N/V/C/D, fever and/or chills. Past Medical History:          Diagnosis Date    Arthritis     Closed subchondral insufficiency fracture of femoral condyle ([facility] [address] 08/12/2020    Hypertension     Localized osteoarthrosis not specified whether primary or secondary, pelvic region and thigh 05/08/2015    Neurogenic bladder     caused by a back surgery in 2017 per pt     Past Surgical History:          Procedure Laterality Date    CATARACT REMOVAL WITH IMPLANT  1/14/11    LEFT EYE    EYE SURGERY  12/14/2010    cataract rt eye    HYSTERECTOMY (CERVIX STATUS UNKNOWN)      [facility]    left hip    OTHER SURGICAL HISTORY Right     RIGHT TOTAL KNEE REPLACEMENT     TOTAL KNEE ARTHROPLASTY Right 12/6/2021    RIGHT TOTAL KNEE REPLACEMENT performed by Jeannine Conrad MD at [facility]     Medications Prior to Admission:      Prior to Admission medications    Medication Sig Start Date End Date Taking? Authorizing Provider   vitamin B-12 (CYANOCOBALAMIN) 100 MCG tablet Take 50 mcg by mouth daily    Historical Provider, MD   Cholecalciferol (VITAMIN D) 50 MCG (2000 UT) CAPS capsule Take by mouth    Historical Provider, MD   celecoxib (CELEBREX) 200 MG capsule Take 200 mg by mouth daily    Historical Provider, MD   famotidine (PEPCID) 20 MG tablet Take 20 mg by mouth daily    Historical Provider, MD   hydroCHLOROthiazide (HYDRODIURIL) 25 MG tablet Take 25 mg by mouth in the morning and at bedtime    Historical Provider, MD   atenolol (TENORMIN) 50 MG tablet Take 50 mg by mouth 2 times daily  12/13/10   Historical Provider, MD     Allergies:  Prednisone    Social History:      The patient currently lives at home    TOBACCO:   reports that she has never smoked. She has never used smokeless tobacco.  ETOH:   reports no history of alcohol use.   E-cigarette/Vaping       Questions Responses    E-cigarette/Vaping Use     Start Date     Passive Exposure     Quit Date     Counseling Given     Comments           Family History:      Reviewed and negative in regards to presenting illness/complaint. Problem Relation Age of Onset    Arthritis Mother     Diabetes Mother     High Blood Pressure Mother     Diabetes Sister     High Blood Pressure Sister     Diabetes Brother     High Blood Pressure Brother      REVIEW OF SYSTEMS COMPLETED:   Pertinent positives as noted in the HPI. All other systems reviewed and negative. PHYSICAL EXAM PERFORMED:    BP (!) 151/90   Pulse (!) 129   Temp 98.1 °F (36.7 °C)   Resp 18   SpO2 97%     General appearance:  Pleasant, obese, elderly female in no apparent distress, appears stated age and cooperative. HEENT:  Pupils equal, round, and reactive to light. Extra ocular muscles intact. Conjunctivae/corneas clear. Neck: Supple, with full range of motion. No jugular venous distention. Trachea midline. Respiratory:  Normal respiratory effort. Clear to auscultation, bilaterally without Rales/Wheezes/Rhonchi. Cardiovascular:  Irregular rate and rhythm, atrial fibrillation with normal S1/S2 without murmurs, rubs or gallops. Abdomen: Soft, obese, round non-tender, non-distended with normal bowel sounds. Musculoskeletal:  No clubbing, cyanosis or edema bilaterally. Full range of motion without deformity. Skin: Skin color, texture, turgor normal.  No significant rashes or lesions.   Neurologic:  Neurovascularly intact Cranial nerves: II-XII intact, grossly non-focal.  Psychiatric:  Alert and oriented, thought content appropriate, normal insight  Capillary Refill: Brisk,3 seconds, normal  Peripheral Pulses: +2 palpable, equal bilaterally     Labs:     Recent Labs     10/11/22  1205   WBC 6.8   HGB 11.3*   HCT 35.3*        Recent Labs     10/11/22  1205 10/12/22  0604   * 125*   K 4.5 4.1   CL 89* 90*   CO2 22 24   BUN 36* 36*   CREATININE 1.7* 1.8*   CALCIUM 10.8* 9.5     Recent Labs     10/11/22  1205 10/12/22  0604   * 159*   * 247*   BILITOT 0.6 0.8   ALKPHOS 114 102     Recent Labs 10/12/22  0604   INR 1.24*     Recent Labs     10/11/22  2151 10/12/22  0226 10/12/22  0604   TROPONINI <0.01 <0.01 <0.01     Urinalysis:      Lab Results   Component Value Date/Time    NITRU Negative 11/23/2021 05:20 PM    BLOODU Negative 11/23/2021 05:20 PM    SPECGRAV 1.020 11/23/2021 05:20 PM    GLUCOSEU Negative 11/23/2021 05:20 PM     Radiology:     CXR: I have reviewed the CXR with the following interpretation: Bibasilar atelectasis versus airspace disease    EKG:  I have reviewed the EKG with the following interpretation: Narrow QRS tachycardia. consider atrial tachycardia vs atypical flutter. ST & T wave abnormality, consider anterolateral ischemia. Abnormal ECG. Confirmed by Sunitha Gonzalez MD, [address] [place] [address] ([facility]) on 10/11/2022 6:31:06 PM    No orders to display     Consults:    None    ASSESSMENT:    Active Hospital Problems    Diagnosis Date Noted    Tachyarrhythmia [R00.0] 10/13/2022     Priority: Medium    Obesity [E66.9] 10/13/2022     Priority: Medium    New onset atrial fibrillation ([facility] [address] [I48.91] 10/11/2022     Priority: Medium    Hypertension [I10] 12/07/2021     PLAN:    New onset atrial fibrillation  -cardizem gtt  -ECHO in am  -tele monitoring  -TSH  -cardiology consult - appreciate recommendations in advance  -[id number] Score 5    Obesity  With Body mass index is 30.3 kg/m². Complicating assessment and treatment. Placing patient at risk for multiple co-morbidities as well as early death and contributing to the patient's presentation.  Counseled on weight loss    Essential HTN  -continue atenolol and hctz    CKD, CR 1.8 on admission  -appears to be baseline  -bmp in am    Acute transaminitis   -Pt with elevated AST/ALT  -unclear etiology  -will trend    Hyponatremia   -gentle hydration  -bmp in am    Chronic normocytic anemia  -no s/s of bleeding at this time  -cbc in am    DVT Prophylaxis: Lovenox    Diet: No diet orders on file    Code Status: Prior    PT/OT Eval Status: No indication for need at this time    Dispo - 2-3 days pending clinical improvement     Luba Velasco, APRN - CNP    Thank you Aaron Jin MD for the opportunity to be involved in this patient's care.  If you have any questions or concerns please feel free to contact me at [phone].  --------------------Anticipated Dr. Oliverio sloan-------------------------

## 2022-10-13 NOTE — PROGRESS NOTES
Call placed to cath lab to clarify patient to be given Cardizem and Amio infusions simultaneously.  Confirmation from [facility] to run both infusions as ordered

## 2022-10-13 NOTE — CONSULTS
[address] [address]                                  CONSULTATION    PATIENT NAME: Anne Edmonds                     :        1945  MED REC NO:   [id number]                          ROOM:       0423  ACCOUNT NO:   [de-identified]                           ADMIT DATE: 10/13/2022  PROVIDER:     Jam Skaggs MD    CONSULT DATE:  10/13/2022    CARDIAC ELECTROPHYSIOLOGY CONSULTATION    REASON FOR CONSULTATION:  Atrial fibrillation. HISTORY OF PRESENT ILLNESS:  The patient is a 57-year-old woman with  history of hypertension and bilateral hip replacement, who is admitted  for evaluation of recently discovered atrial fibrillation. The patient  reports that she has had some shortness of breath with exertion for most  of the summer, but has maintained a reasonable functional status. In  the evening hours of 10/09/2022, she noticed abrupt worsening of her  shortness of breath. She attempted to go to an urgent care facility,  but was unable to be seen by a provider related to the waiting. She  contacted her primary care physician, who recommended evaluation in the  emergency department. She came to the emergency department at [facility] on 10/11/2022. At that time, ECG demonstrated atrial  tachycardia with a heart rate of 129 beats per minute. Subsequent ECGs  have demonstrated a less organized type of atrial arrhythmia. Her rates  have been difficult to control. She received IV diltiazem as well as  metoprolol. She has had some pauses in atrial fibrillation, which have  ranged from 3 to 5 seconds in duration. It is not clear if these are  symptomatic. Her rate has otherwise remained elevated. She has no  known history of cardiac arrhythmias. She has no known history of  structural heart disease, though does report that she had rheumatic  fever as a child.   She has not undergone echocardiography in the past.   She does report that she saw her primary care physician on 10/06/2022  and was felt to be in normal rhythm at that time. She is currently  being treated with p.o. atenolol 50 mg p.o. b.i.d. as well as IV  diltiazem at 10 mg per hour. She is receiving subcutaneous Lovenox 70  mg q.24 h. PAST MEDICAL HISTORY:  1. Hypertension. 2.  Status post bilateral hip replacements. 3.  Status post back surgery. 4.  Neurogenic bladder. MEDICATIONS:  At the time of admission include atenolol 50 mg p.o.  b.i.d., Celebrex 200 mg p.o. daily, Pepcid 20 mg p.o. daily,  hydrochlorothiazide 25 mg p.o. daily and multivitamins. ALLERGIES:  Include PREDNISONE.    SOCIAL HISTORY:  The patient is a nonsmoker. She does not consume  alcohol at this time. FAMILY HISTORY:  Remarkable for diabetes, arthritis and hypertension in  mother. There is also history of hypertension, diabetes in two  siblings. REVIEW OF SYSTEMS:  Demonstrates no recent change in appetite or change  in weight. The patient has not had recent fever or chills. She does  not describe palpitations. She has not had chest pain, near-syncope or  syncope. She describes the above-mentioned shortness of breath, which  is several months in duration with abrupt worsening on 10/09/2022. All  other components of the 14-system review are negative. PHYSICAL EXAMINATION:  VITAL SIGNS:  Blood pressure is 147/97, heart rate is 125 beats per  minute and irregular. GENERAL:  The patient is awake, alert, oriented and in no discomfort. HEENT:  Exam demonstrates normocephalic, atraumatic head. There is no  scleral icterus. Pupils are round and reactive. NECK:  Supple without thyromegaly. LUNGS:  Demonstrate a few right basilar crackles. There is no  consolidation. CARDIOVASCULAR:  Exam reveals a rapid irregular rhythm. The apical  impulse is discrete. S1 and S2 are normal.  There is no audible murmur  or gallop.   The jugular venous pressure is difficult to assess. ABDOMEN:  Obese, soft, nontender. EXTREMITIES:  Demonstrate no pitting pretibial dependent edema. There  is no cyanosis. There is no evidence for chronic venous stasis. SKIN:  Otherwise warm and dry without skin rash. 12 lead ECG from 10/11/2022 demonstrates atrial tachycardia with an  average heart rate of 128 beats per minute. There are nonspecific  ST-segment and T-wave abnormalities. IMPRESSION:  1. Atrial tachycardia of unknown onset. 2.  Hypertension. 3.  Dyspnea on exertion. 4.  Chronic kidney disease. The patient presents with acute worsening of her baseline dyspnea on  exertion and is found to have rapidly conducted atrial tachycardia at  the time of admission. She was treated with beta-blocker and IV  diltiazem and her heart rates have been difficult to control. In view  of fact that the onset of the atrial tachycardia is not known, would  recommend transesophageal echocardiography to exclude left atrial  appendage thrombus. This would be followed by cardioversion to restore  sinus rhythm. After restoration of sinus rhythm, a transthoracic echo  may be of benefit. She will likely also benefit from ambulatory ECG  monitoring as she was unaware of the tachycardia. Pharmacologic therapy  in the future may be beneficial for her, but the choice of pharmacologic  agent will depend on these findings from her echocardiography. RECOMMENDATIONS:  1. Transesophageal echocardiography followed by cardioversion. 2.  Transthoracic echo after restoration of sinus rhythm. 3.  Full dose anticoagulation with factor Xa inhibitor. She would be a  candidate for reduced dose Xarelto at 15 mg per day, but would still  require the standard dose of Eliquis at 5 mg q.12 h. based on age under  [de-identified] years and body mass greater than 60 kg. Thanks for the opportunity to assist in the care of the patient.   Please  contact me if you have any questions regarding her evaluation.         Valery Reynolds MD    D: 10/13/2022 9:39:05       T: 10/13/2022 9:42:40     [email or web address]  Job#: [id number]     Doc#: [id number]    CC:

## 2022-10-13 NOTE — PROGRESS NOTES
Occupational Therapy  Facility/Department: [facility] C4 PCU  Occupational Therapy Initial Assessment    Name: Yadira Najera  : 1945  MRN: [id number]  Date of Service: 10/13/2022    Discharge Recommendations:  Home with assist PRN  OT Equipment Recommendations  Equipment Needed: No       Patient Diagnosis(es): There were no encounter diagnoses. Past Medical History:  has a past medical history of Arthritis, Closed subchondral insufficiency fracture of femoral condyle ([address] Hypertension, Localized osteoarthrosis not specified whether primary or secondary, pelvic region and thigh, and Neurogenic bladder. Past Surgical History:  has a past surgical history that includes Hysterectomy; joint replacement (); eye surgery (2010); Cataract removal with implant (11); other surgical history (Right); and Total knee arthroplasty (Right, 2021). Assessment   Performance deficits / Impairments: Decreased high-level IADLs;Decreased endurance;Decreased ADL status; Decreased functional mobility   Assessment: Pt is a 69yo woman admitted from [facility] with tachyarrhythmia. Pt I at baseline and lives alone with children checkin in daily. Pt SBA grossly this day for all mobility and ADLs. Pt functioning slightly below baseline and would benefit from acute OT for the above deficits.  d/c home with prn A  Prognosis: Good  Decision Making: Low Complexity  REQUIRES OT FOLLOW-UP: Yes  Activity Tolerance  Activity Tolerance: Patient Tolerated treatment well        Plan   Occupational Therapy Plan  Times Per Week: 2-3x/wk     Restrictions  Restrictions/Precautions  Restrictions/Precautions: Up as Tolerated, General Precautions  Position Activity Restriction  Other position/activity restrictions: Tachycardic, IV    Subjective   General  Chart Reviewed: Yes  Patient assessed for rehabilitation services?: Yes  Family / Caregiver Present: Yes (dtr)  Referring Practitioner: Arash Mcleod MD  Diagnosis: Tachycardia  Subjective  Subjective: Pt pleasant and agreeable to eval. Pt denies pain. RN approves eval however requests pt return to bed for upcoming cardioversion     Social/Functional History  Social/Functional History  Lives With: Alone  Type of Home: House  Home Layout: Two level, Able to Live on Main level with bedroom/bathroom  Home Access: Level entry, Stairs to enter with rails  Entrance Stairs - Number of Steps: level entry through back entrance, 3 KIMMIE through front porch with B handrails  Entrance Stairs - Rails: Both  Bathroom Shower/Tub: Tub/Shower unit  Bathroom Toilet: Standard  Bathroom Equipment: Shower chair, 3-in-1 commode (pt has shower chair available although does not need to use it)  Bathroom Accessibility: Walker accessible  Home Equipment: [place], rolling  Has the patient had two or more falls in the past year or any fall with injury in the past year?: No  ADL Assistance: Independent (pt typically self-caths for urination (since 2017))  Homemaking Assistance: Independent (pt (I) with typical homemaking tasks, daughter helps with yard work and heavier household tasks like vacuuming)  Ambulation Assistance: Independent (without AD)  Transfer Assistance: Independent  Active : Yes  Occupation: Retired  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 User may not have seen previous data.)  Overall Level of Assistance: Stand-by assistance; Adaptive equipment (HOB elevated and use of bed rails)  Supine to Sit: Modified independent  Sit to Supine: Modified independent  Scooting: Independent  Balance  Sitting: Intact  Standing: Impaired  Standing - Static: Good  Standing - Dynamic: Fair;Occasional  Transfer Training  Transfer Training: Yes (Simultaneous filing. User may not have seen previous data.)  Overall Level of Assistance: Stand-by assistance (no AD)  Interventions: Safety awareness training;Verbal cues; Visual cues  Sit to Stand: Stand-by assistance  Stand to Sit: Stand-by assistance  Bed to Chair: Stand-by assistance (without AD, mildly unsteady but no overt LOB)  Toilet Transfer: Stand-by assistance  Gait Training  Gait Training: Yes  Gait  Overall Level of Assistance: Stand-by assistance  Interventions: Safety awareness training;Verbal cues; Visual cues  Speed/Dotty: Pace decreased (< 100 feet/min); Shuffled; Slow  Step Length: Left shortened;Right shortened  Gait Abnormalities: Shuffling gait; Decreased step clearance;Trunk sway increased  Distance (ft): 30 Feet (kept amb distance limited to bed<>bathroom only per RN request due to issues with tachycardia)  Assistive Device: Other (comment) (no device, pt occasionally pushing own IV pole for support)     AROM: Within functional limits  PROM: Within functional limits  Strength: Within functional limits  Coordination: Within functional limits  Tone: Normal  Sensation: Intact  ADL  Feeding: Stand by assistance  Grooming: Stand by assistance  Toileting: Stand by assistance     Activity Tolerance  Activity Tolerance: Patient tolerated evaluation without incident;Patient tolerated treatment well;Treatment limited secondary to medical complications  Activity Tolerance Comments: RN requests PT not ambulate pt further than to/from bathroom due to issues with tachycardia with scheduled cardioversion procedure later today.         Vision  Vision: Impaired  Vision Exceptions: Wears glasses for reading  Hearing  Hearing: Within functional limits  Cognition  Overall Cognitive Status: WFL  Orientation  Overall Orientation Status: Within Functional Limits                  Education Given To: Patient; Family  Education Provided: Role of Therapy;Transfer Training;Equipment;Plan of Care;ADL Adaptive Strategies  Education Method: Demonstration;Verbal  Barriers to Learning: None  Education Outcome: Verbalized understanding;Demonstrated understanding       AM-PAC Score        AM-PAC Inpatient Daily Activity Raw Score: 19 (10/13/22 1258)  AM-PAC Inpatient ADL T-Scale Score : 40.22 (10/13/22 1258)  ADL Inpatient CMS 0-100% Score: 42.8 (10/13/22 1258)  ADL Inpatient CMS G-Code Modifier : CK (10/13/22 1258)    Goals  Short Term Goals  Time Frame for Short Term Goals: 1wk (10/20/22)-- unless noted elsewhere  Short Term Goal 1: Pt will complete LE dressing with Mod I  Short Term Goal 2: Pt will complete functional t/fs with I and LRAD  Short Term Goal 3: Pt will complete 5 minute stand at sink for grooming with Mod I  Patient Goals   Patient goals : \"to go home\"       Therapy Time   Individual Concurrent Group Co-treatment   Time In 1130         Time Out 1148         Minutes 18         Timed Code Treatment Minutes: 8 Minutes (10 minute eval)       Chelsea Mckinley, OT  If pt is unable to be seen after this session, please let this note serve as discharge summary. Please see case management note for discharge disposition. Thank you.

## 2022-10-13 NOTE — PROGRESS NOTES
Pt sitting in bed watching TV. She denies any pain at this time and states her nausea isn't too bad. Cardizem gtt continues @ 2.5mL/hr. Assessment complete-see flowsheet. Medications given-see MAR. Pt denies further needs, call light and bedside table within reach. Will continue to monitor.

## 2022-10-13 NOTE — PROCEDURES
ELECTROPHYSIOLOGY REPORT    : Rosa Randhawa MD    COMPLICATIONS:  None     ANESTHESIA: MAC    HISTORY:  76 y.o. woman with atrial fibrillation and pauses presenting for MADISON-guided cardioversion. DETAILS OF PROCEDURE:      The patient was in the [address] [place] [address]  in stable condition. The patient was in a fasting, nonsedated state. She was hemodynamically stable. The risks, benefits and alternatives of the procedure were discussed with the patient and family in detail. The risks including, but not limited to, the risks of stroke, asystole, skin burns were discussed with the patient. The patient considered her options and opted to proceed with MADISON-guided external electrical cardioversion as planned. Written informed consent was obtained and placed on the chart. At this time, a timeout protocol was completed to identify the patient and the procedure being performed. The patient was attached to continuous electrocardiographic monitoring with noninvasive blood pressure monitoring as well. Anterior and posterior chest defibrillation pads were attached in the typical position. A full trans-esophageal echocardiogram study was performed. Multi-plane imaging of the cardiac structures and chambers were obtained along with Doppler data. A full report is dictated separately. In brief, there were no significant cardiac abnormalities and there was no evidence of any left atrial/left atrial appendage thrombus. Once adequate sedation was confirmed, a 125 joule synchronized biphasic external shock was delivered and failed to convert the patient to normal sinus rhythm. Subsequently, 3 attempts (all at 200 Joules) were made but still without any success. Patient remained in atrial fibrillation. The patient maintained adequate oxygenation throughout the procedure. She remained hemodynamically stable.   Within a few minutes of the successful cardioversion, the patient started to wake up and had no evidence of any neurologic sequelae. She continued to recover and remained hemodynamically stable with adequate vital signs and oxygen saturation. At the end of the procedure, the patient was monitored in the Pre-Post area until she recovers back to baseline. SUMMARY:    1. Failed attempts of external electrical cardioversion. RECOMMENDATIONS:    1.  Bed rest x 1 hour.   2.  Continue telemetry monitoring while in the hospital.

## 2022-10-13 NOTE — PROGRESS NOTES
4 Eyes Skin Assessment     NAME:  Mar Mcdonald  YOB: 1945  MEDICAL RECORD NUMBER:  [id number]    The patient is being assess for  Admission    I agree that 2 RN's have performed a thorough Head to Toe Skin Assessment on the patient. ALL assessment sites listed below have been assessed. Areas assessed by both nurses:    Head, Face, Ears, Shoulders, Back, Chest, Arms, Elbows, Hands, Sacrum. Buttock, Coccyx, Ischium, and Legs. Feet and Heels        Does the Patient have a Wound?  No noted wound(s)       José Miguel Prevention initiated:  No   Wound Care Orders initiated:  NA    Pressure Injury (Stage 3,4, Unstageable, DTI, NWPT, and Complex wounds) if present place referral/consult order under [de-identified] NA    New and Established Ostomies if present place consult order under : NA      Nurse 1 eSignature: Electronically signed by Creed Riedel, RN on 10/13/22 at 7:32 AM EDT    **SHARE this note so that the co-signing nurse is able to place an eSignature**    Nurse 2 eSignature: Electronically signed by Austen Linton RN on 10/13/22 at 9:34 AM EDT

## 2022-10-13 NOTE — ANESTHESIA PRE PROCEDURE
Department of Anesthesiology  Preprocedure Note       Name:  Yousuf Martinez   Age:  76 y.o.  :  1945                                          MRN:  [id number]         Date:  10/13/2022      Surgeon: * Surgery not found *    Procedure:     Medications prior to admission:   Prior to Admission medications    Medication Sig Start Date End Date Taking?  Authorizing Provider   vitamin B-12 (CYANOCOBALAMIN) 100 MCG tablet Take 50 mcg by mouth daily    Historical Provider, MD   Cholecalciferol (VITAMIN D) 50 MCG (2000) CAPS capsule Take by mouth    Historical Provider, MD   celecoxib (CELEBREX) 200 MG capsule Take 200 mg by mouth daily    Historical Provider, MD   famotidine (PEPCID) 20 MG tablet Take 20 mg by mouth daily    Historical Provider, MD   hydroCHLOROthiazide (HYDRODIURIL) 25 MG tablet Take 25 mg by mouth in the morning and at bedtime    Historical Provider, MD   atenolol (TENORMIN) 50 MG tablet Take 50 mg by mouth 2 times daily  12/13/10   Historical Provider, MD       Current medications:    Current Facility-Administered Medications   Medication Dose Route Frequency Provider Last Rate Last Admin    atenolol (TENORMIN) tablet 50 mg  50 mg Oral BID IMELDA Costello - CNP        hydroCHLOROthiazide (HYDRODIURIL) tablet 25 mg  25 mg Oral BID IMELDA Costello - CNP        enoxaparin (LOVENOX) injection 70 mg  1 mg/kg SubCUTAneous Q24H IMELDA Costello - CNP        famotidine (PEPCID) tablet 20 mg  20 mg Oral Daily IMELDA Costello - CNP        dilTIAZem 125 mg in dextrose 5 % 125 mL infusion  10 mg/hr IntraVENous Continuous IMELDA Costello - CNP 10 mL/hr at 10/13/22 0540 10 mg/hr at 10/13/22 0540    metoprolol (LOPRESSOR) injection 5 mg  5 mg IntraVENous Q6H PRN Edelmira Dumont APRN - CNP   5 mg at 10/13/22 0436    sodium chloride flush 0.9 % injection 5-40 mL  5-40 mL IntraVENous 2 times per day Edelmira Dumont APRN - CNP        sodium chloride flush 0.9 % injection 5-40 mL  5-40 mL IntraVENous PRN Meir Nikolski, APRN - CNP        0.9 % sodium chloride infusion   IntraVENous PRN Meir Nikolski, APRN - CNP        ondansetron (ZOFRAN-ODT) disintegrating tablet 4 mg  4 mg Oral Q8H PRN Meir Nikolski, APRN - CNP        Or    ondansetron (ZOFRAN) injection 4 mg  4 mg IntraVENous Q6H PRN Meir Nikolski, APRN - CNP        polyethylene glycol (GLYCOLAX) packet 17 g  17 g Oral Daily PRN Meir Nikolski, APRN - CNP        acetaminophen (TYLENOL) tablet 650 mg  650 mg Oral Q6H PRN Meir Nikolski, APRN - CNP        Or    acetaminophen (TYLENOL) suppository 650 mg  650 mg Rectal Q6H PRN Meir Nikolski, APRN - CNP           Allergies:     Allergies   Allergen Reactions    Prednisone Other (See Comments)     Face gets red and hot       Problem List:    Patient Active Problem List   Diagnosis Code    Localized osteoarthrosis not specified whether primary or secondary, pelvic region and thigh M16.10    DDD (degenerative disc disease), lumbar M51.36    Acute pain of right knee M25.561    Primary osteoarthritis of right knee M17.11    Degenerative tear of lateral meniscus of right knee M23.300    Closed subchondral insufficiency fracture of femoral condyle (HCC) M84.453A    Insufficiency fracture of tibia M84.469A    Hypertension I10    Anemia associated with acute blood loss D62    S/P TKR (total knee replacement), right Z96.651    New onset atrial fibrillation (HCC) I48.91    Narrow complex tachycardia (HCC) I47.1    Sinus pause I45.5    Stage 4 chronic kidney disease ([address] N18.4    Tachyarrhythmia R00.0    Obesity E66.9       Past Medical History:        Diagnosis Date    Arthritis     Closed subchondral insufficiency fracture of femoral condyle ([facility] 08/12/2020    Hypertension     Localized osteoarthrosis not specified whether primary or secondary, pelvic region and thigh 05/08/2015    Neurogenic bladder     caused by a back surgery in 2017 per pt       Past Surgical History:        Procedure Laterality Date    CATARACT REMOVAL WITH IMPLANT  1/14/11    LEFT EYE    EYE SURGERY  12/14/2010    cataract rt eye    HYSTERECTOMY (CERVIX STATUS UNKNOWN)      JOINT REPLACEMENT  1997    left hip    OTHER SURGICAL HISTORY Right     RIGHT TOTAL KNEE REPLACEMENT     TOTAL KNEE ARTHROPLASTY Right 12/6/2021    RIGHT TOTAL KNEE REPLACEMENT performed by Hortencia Abad MD at [place] [address] History:    Social History     Tobacco Use    Smoking status: Never    Smokeless tobacco: Never   Substance Use Topics    Alcohol use: No                                Counseling given: Not Answered      Vital Signs (Current):   Vitals:    10/13/22 0540 10/13/22 0629 10/13/22 0745 10/13/22 1201   BP:   (!) 147/97 123/79   Pulse: (!) 125 (!) 121 (!) 125 (!) 128   Resp: 16 16 24    Temp:       TempSrc:       SpO2:   96% 94%   Weight:       Height:                                                  BP Readings from Last 3 Encounters:   10/13/22 123/79   10/12/22 136/76   12/07/21 (!) 151/73       NPO Status:                                                                                 BMI:   Wt Readings from Last 3 Encounters:   10/13/22 154 lb 3.2 oz (69.9 kg)   10/13/22 155 lb 2 oz (70.4 kg)   02/10/22 151 lb (68.5 kg)     Body mass index is 30.12 kg/m².     CBC:   Lab Results   Component Value Date/Time    WBC 7.5 10/13/2022 04:31 AM    RBC 3.45 10/13/2022 04:31 AM    HGB 9.8 10/13/2022 04:31 AM    HCT 29.2 10/13/2022 04:31 AM    MCV 84.4 10/13/2022 04:31 AM    RDW 13.3 10/13/2022 04:31 AM     10/13/2022 04:31 AM       CMP:   Lab Results   Component Value Date/Time     10/13/2022 04:31 AM    K 3.5 10/13/2022 04:31 AM    K 4.1 10/12/2022 06:04 AM    CL 88 10/13/2022 04:31 AM    CO2 24 10/13/2022 04:31 AM    BUN 35 10/13/2022 04:31 AM    CREATININE 1.8 10/13/2022 04:31 AM    GFRAA 33 10/13/2022 04:31 AM    AGRATIO 1.8 10/12/2022 06:04 AM    LABGLOM 27 10/13/2022 04:31 AM    GLUCOSE 118 10/13/2022 04:31 AM PROT 6.8 10/13/2022 04:31 AM    CALCIUM 9.3 10/13/2022 04:31 AM    BILITOT 0.7 10/13/2022 04:31 AM    ALKPHOS 101 10/13/2022 04:31 AM    AST 89 10/13/2022 04:31 AM     10/13/2022 04:31 AM       POC Tests: No results for input(s): POCGLU, POCNA, POCK, POCCL, POCBUN, POCHEMO, POCHCT in the last 72 hours. Coags:   Lab Results   Component Value Date/Time    PROTIME 15.5 10/12/2022 06:04 AM    INR 1.24 10/12/2022 06:04 AM    APTT 34.8 11/23/2021 05:25 PM       HCG (If Applicable): No results found for: PREGTESTUR, PREGSERUM, HCG, HCGQUANT     ABGs: No results found for: PHART, PO2ART, TRB7EHV, EUT9SSO, BEART, H9TENKBM     Type & Screen (If Applicable):  No results found for: LABABO, LABRH    Drug/Infectious Status (If Applicable):  No results found for: HIV, HEPCAB    COVID-19 Screening (If Applicable):   Lab Results   Component Value Date/Time    COVID19 NOT DETECTED 10/11/2022 12:14 PM           Anesthesia Evaluation  Patient summary reviewed and Nursing notes reviewed no history of anesthetic complications:   Airway: Mallampati: II     Neck ROM: full     Dental:          Pulmonary:                              Cardiovascular:    (+) hypertension:, dysrhythmias: atrial fibrillation,                   Neuro/Psych:               GI/Hepatic/Renal:             Endo/Other:                     Abdominal:             Vascular: Other Findings:           Anesthesia Plan      MAC     ASA 3     (Medications & allergies reviewed  All available lab & EKG data reviewed)  Induction: intravenous. Anesthetic plan and risks discussed with patient. Plan discussed with CRNA.                     Brandon Potter MD   10/13/2022

## 2022-10-13 NOTE — PROGRESS NOTES
[facility] here to  pt. Pt sent with one bag of belongings packed by daughter. Pt left on cardizem gtt @ 2.5mL/hr and transport monitor. Daughter given room and phone number of [facility] Cueva. 0200:   Report called to receiving [employer]Flash.

## 2022-10-13 NOTE — CARE COORDINATION
Call placed to daughter Hannah Friedman listed in chart as primary contact and decision maker. Left  for return call. Patient in cath lab for cardioversion with MADISON. PT/OT recommending home with PRN assistance. Per chart review patient lives at home alone but states she can have 24 hour assistance. IPTA. Has used [facility] in past. Will follow up when patient returns to floor.

## 2022-10-13 NOTE — PROGRESS NOTES
Received call from [employer]. Pt to be transferred to [facility], room 423. [facility] transport to be here @ 0200. Pt and daughter Javon Monroy updated on current POC, both are agreeable.

## 2022-10-13 NOTE — PROGRESS NOTES
Patient direct admit from [facility] PCU to bed 423. Arrived via stretcher per transport. Pt alert and oriented x4. Ambulatory. Pt with cardizem gtt infusing at 2.5 mg/hr. Patient oriented to room, call light, bed rails, phone, lights and bathroom. Patient instructed about the schedule of the day including: vital sign frequency, lab draws, possible tests, daily weights, and I &O's. Patient instructed about NPO diet, how to use 8MENU, and television. Telemetry box in place, patient aware of placement and reason. Bed locked, in lowest position, side rails up 2/4, call light within reach. Notified Dr. Davion Trinh regarding pt' arrival via perfect serve and Dr. Davion Trinh read receipt noted. Awaiting for orders. Pt resting quietly in bed. Continue to monitor.

## 2022-10-13 NOTE — PLAN OF CARE
Problem: Discharge Planning  Goal: Discharge to home or other facility with appropriate resources  10/13/2022 0049 by Osorio Munoz RN  Outcome: Progressing  10/12/2022 1616 by Homero Bloch, RN  Outcome: Progressing     Problem: Safety - Adult  Goal: Free from fall injury  10/13/2022 0049 by Osorio Munoz RN  Outcome: Progressing  10/12/2022 1616 by Homero Bloch, RN  Outcome: Progressing     Problem: Respiratory - Adult  Goal: Achieves optimal ventilation and oxygenation  10/13/2022 0049 by Osorio Munoz RN  Outcome: Progressing  10/12/2022 1616 by Homero Bloch, RN  Outcome: Progressing     Problem: Cardiovascular - Adult  Goal: Maintains optimal cardiac output and hemodynamic stability  10/13/2022 0049 by Osorio Munoz RN  Outcome: Progressing  10/12/2022 1616 by Homero Bloch, RN  Outcome: Progressing  Goal: Absence of cardiac dysrhythmias or at baseline  10/13/2022 0049 by Osorio Munoz RN  Outcome: Progressing  10/12/2022 1616 by Homero Bloch, RN  Outcome: Progressing     Problem: Skin/Tissue Integrity - Adult  Goal: Skin integrity remains intact  10/13/2022 0049 by Osorio Munoz RN  Outcome: Progressing  Flowsheets (Taken 10/13/2022 0044)  Skin Integrity Remains Intact: Monitor for areas of redness and/or skin breakdown  10/12/2022 1616 by Homero Bloch, RN  Outcome: Progressing  Goal: Incisions, wounds, or drain sites healing without S/S of infection  10/13/2022 0049 by Osorio Munoz RN  Outcome: Progressing  10/12/2022 1616 by Homero Bloch, RN  Outcome: Progressing  Goal: Oral mucous membranes remain intact  10/13/2022 0049 by Osorio Munoz RN  Outcome: Progressing  10/12/2022 1616 by Homero Bloch, RN  Outcome: Progressing     Problem: Musculoskeletal - Adult  Goal: Return mobility to safest level of function  10/13/2022 0049 by Osorio Munoz RN  Outcome: Progressing  10/12/2022 1616 by Homero Bloch, RN  Outcome: Progressing  Goal: Maintain proper alignment of affected body part  10/13/2022 0049 by Ramses Reyes RN  Outcome: Progressing  10/12/2022 1616 by Anthony Garduno RN  Outcome: Progressing  Goal: Return ADL status to a safe level of function  10/13/2022 0049 by Ramses Reyes RN  Outcome: Progressing  10/12/2022 1616 by Anthony Garduno RN  Outcome: Progressing

## 2022-10-13 NOTE — ANESTHESIA POSTPROCEDURE EVALUATION
Department of Anesthesiology  Postprocedure Note    Patient: Lennie Seen  MRN: [id number]  YOB: 1945  Date of evaluation: 10/13/2022      Procedure Summary     Date: 10/13/22 Room / Location: [facility] Cardiac Cath Lab    Anesthesia Start: 4751 Anesthesia Stop: 1446    Procedure: CARDIOVERSION WITH A MADISON Diagnosis:     Scheduled Providers:  Responsible Provider: Patrice Escobar MD    Anesthesia Type: MAC ASA Status: 3          Anesthesia Type: No value filed.     Barrett Phase I:      Barrett Phase II:        Anesthesia Post Evaluation    Comments: Postoperative Anesthesia Note    Name:    Lennie Seen  MRN:      [id number]    Patient Vitals in the past 12 hrs:  10/13/22 1201, BP:123/79, Pulse:(!) 128, SpO2:94 %  10/13/22 0745, BP:(!) 147/97, Pulse:(!) 125, Resp:24, SpO2:96 %  10/13/22 0629, Pulse:(!) 121, Resp:16  10/13/22 0540, Pulse:(!) 125, Resp:16  10/13/22 0507, BP:(!) 150/107, Pulse:(!) 124, Resp:18, SpO2:98 %  10/13/22 0451, BP:(!) 143/99, Pulse:(!) 129, Resp:18, SpO2:96 %  10/13/22 0441, BP:(!) 144/99, Pulse:(!) 129, Resp:18, SpO2:95 %  10/13/22 0434, BP:(!) 156/92, Pulse:(!) 127, Resp:18, SpO2:95 %  10/13/22 0352, BP:138/81, Temp:98 °F (36.7 °C), Temp src:Oral, Pulse:(!) 128, Resp:18, SpO2:95 %     LABS:    CBC  Lab Results       Component                Value               Date/Time                  WBC                      7.5                 10/13/2022 04:31 AM        HGB                      9.8 (L)             10/13/2022 04:31 AM        HCT                      29.2 (L)            10/13/2022 04:31 AM        PLT                      211                 10/13/2022 04:31 AM   RENAL  Lab Results       Component                Value               Date/Time                  NA                       124 (L)             10/13/2022 04:31 AM        K                        3.5                 10/13/2022 04:31 AM        K                        4.1                 10/12/2022 06:04 AM        CL 88 (L)              10/13/2022 04:31 AM        CO2                      24                  10/13/2022 04:31 AM        BUN                      35 (H)              10/13/2022 04:31 AM        CREATININE               1.8 (H)             10/13/2022 04:31 AM        GLUCOSE                  118 (H)             10/13/2022 04:31 AM   COAGS  Lab Results       Component                Value               Date/Time                  PROTIME                  15.5 (H)            10/12/2022 06:04 AM        INR                      1.24 (H)            10/12/2022 06:04 AM        APTT                     34.8                11/23/2021 05:25 PM     Intake & Output:  In: 212 (I.V.:212)  Out: 400 (Urine:400)    Nausea & Vomiting:  No    Level of Consciousness:  Awake    Pain Assessment:  Adequate analgesia    Anesthesia Complications:  No apparent anesthetic complications    SUMMARY      Vital signs stable  OK to discharge from Stage I post anesthesia care.   Care transferred from Anesthesiology department on discharge from perioperative area

## 2022-10-14 LAB
ALBUMIN SERPL-MCNC: 4.2 G/DL (ref 3.4–5)
ALBUMIN SERPL-MCNC: 4.3 G/DL (ref 3.4–5)
ALP BLD-CCNC: 100 U/L (ref 40–129)
ALT SERPL-CCNC: 156 U/L (ref 10–40)
ANION GAP SERPL CALCULATED.3IONS-SCNC: 12 MMOL/L (ref 3–16)
ANION GAP SERPL CALCULATED.3IONS-SCNC: 16 MMOL/L (ref 3–16)
ANION GAP SERPL CALCULATED.3IONS-SCNC: 17 MMOL/L (ref 3–16)
ANION GAP SERPL CALCULATED.3IONS-SCNC: 21 MMOL/L (ref 3–16)
AST SERPL-CCNC: 63 U/L (ref 15–37)
BASOPHILS ABSOLUTE: 0.1 K/UL (ref 0–0.2)
BASOPHILS RELATIVE PERCENT: 1.2 %
BILIRUB SERPL-MCNC: 1 MG/DL (ref 0–1)
BILIRUBIN DIRECT: 0.3 MG/DL (ref 0–0.3)
BILIRUBIN, INDIRECT: 0.7 MG/DL (ref 0–1)
BUN BLDV-MCNC: 29 MG/DL (ref 7–20)
BUN BLDV-MCNC: 31 MG/DL (ref 7–20)
BUN BLDV-MCNC: 31 MG/DL (ref 7–20)
BUN BLDV-MCNC: 34 MG/DL (ref 7–20)
CALCIUM SERPL-MCNC: 8.5 MG/DL (ref 8.3–10.6)
CALCIUM SERPL-MCNC: 9.2 MG/DL (ref 8.3–10.6)
CALCIUM SERPL-MCNC: 9.3 MG/DL (ref 8.3–10.6)
CALCIUM SERPL-MCNC: 9.5 MG/DL (ref 8.3–10.6)
CHLORIDE BLD-SCNC: 81 MMOL/L (ref 99–110)
CHLORIDE BLD-SCNC: 86 MMOL/L (ref 99–110)
CHLORIDE BLD-SCNC: 86 MMOL/L (ref 99–110)
CHLORIDE BLD-SCNC: 89 MMOL/L (ref 99–110)
CHLORIDE URINE RANDOM: <20 MMOL/L
CO2: 20 MMOL/L (ref 21–32)
CO2: 21 MMOL/L (ref 21–32)
CO2: 23 MMOL/L (ref 21–32)
CO2: 24 MMOL/L (ref 21–32)
CREAT SERPL-MCNC: 1.6 MG/DL (ref 0.6–1.2)
CREAT SERPL-MCNC: 1.8 MG/DL (ref 0.6–1.2)
EKG ATRIAL RATE: 60 BPM
EKG ATRIAL RATE: 93 BPM
EKG DIAGNOSIS: NORMAL
EKG DIAGNOSIS: NORMAL
EKG P AXIS: 46 DEGREES
EKG P-R INTERVAL: 224 MS
EKG Q-T INTERVAL: 500 MS
EKG Q-T INTERVAL: 540 MS
EKG QRS DURATION: 108 MS
EKG QRS DURATION: 98 MS
EKG QTC CALCULATION (BAZETT): 482 MS
EKG QTC CALCULATION (BAZETT): 500 MS
EKG R AXIS: -2 DEGREES
EKG R AXIS: -6 DEGREES
EKG T AXIS: 43 DEGREES
EKG T AXIS: 53 DEGREES
EKG VENTRICULAR RATE: 48 BPM
EKG VENTRICULAR RATE: 60 BPM
EOSINOPHILS ABSOLUTE: 0.1 K/UL (ref 0–0.6)
EOSINOPHILS RELATIVE PERCENT: 1.5 %
GFR AFRICAN AMERICAN: 33
GFR AFRICAN AMERICAN: 38
GFR NON-AFRICAN AMERICAN: 27
GFR NON-AFRICAN AMERICAN: 31
GLUCOSE BLD-MCNC: 104 MG/DL (ref 70–99)
GLUCOSE BLD-MCNC: 110 MG/DL (ref 70–99)
GLUCOSE BLD-MCNC: 114 MG/DL (ref 70–99)
GLUCOSE BLD-MCNC: 207 MG/DL (ref 70–99)
HCT VFR BLD CALC: 29.6 % (ref 36–48)
HEMOGLOBIN: 9.6 G/DL (ref 12–16)
INR BLD: 1.54 (ref 0.87–1.14)
LYMPHOCYTES ABSOLUTE: 1.3 K/UL (ref 1–5.1)
LYMPHOCYTES RELATIVE PERCENT: 15.9 %
MCH RBC QN AUTO: 28.1 PG (ref 26–34)
MCHC RBC AUTO-ENTMCNC: 32.6 G/DL (ref 31–36)
MCV RBC AUTO: 86.1 FL (ref 80–100)
MONOCYTES ABSOLUTE: 0.9 K/UL (ref 0–1.3)
MONOCYTES RELATIVE PERCENT: 11.1 %
NEUTROPHILS ABSOLUTE: 5.7 K/UL (ref 1.7–7.7)
NEUTROPHILS RELATIVE PERCENT: 70.3 %
OSMOLALITY URINE: 487 MOSM/KG (ref 390–1070)
PDW BLD-RTO: 13.4 % (ref 12.4–15.4)
PHOSPHORUS: 3.5 MG/DL (ref 2.5–4.9)
PLATELET # BLD: 245 K/UL (ref 135–450)
PMV BLD AUTO: 7.7 FL (ref 5–10.5)
POTASSIUM REFLEX MAGNESIUM: 3.8 MMOL/L (ref 3.5–5.1)
POTASSIUM REFLEX MAGNESIUM: 3.9 MMOL/L (ref 3.5–5.1)
POTASSIUM SERPL-SCNC: 4 MMOL/L (ref 3.5–5.1)
POTASSIUM SERPL-SCNC: 4.3 MMOL/L (ref 3.5–5.1)
POTASSIUM, UR: 15.9 MMOL/L
POTASSIUM, UR: 67.5 MMOL/L
PROTHROMBIN TIME: 18.4 SEC (ref 11.7–14.5)
RBC # BLD: 3.44 M/UL (ref 4–5.2)
SODIUM BLD-SCNC: 118 MMOL/L (ref 136–145)
SODIUM BLD-SCNC: 122 MMOL/L (ref 136–145)
SODIUM BLD-SCNC: 128 MMOL/L (ref 136–145)
SODIUM BLD-SCNC: 128 MMOL/L (ref 136–145)
SODIUM URINE: 25 MMOL/L
SODIUM URINE: <20 MMOL/L
TOTAL PROTEIN: 6.8 G/DL (ref 6.4–8.2)
TROPONIN: 0.01 NG/ML
WBC # BLD: 8.1 K/UL (ref 4–11)

## 2022-10-14 PROCEDURE — 84133 ASSAY OF URINE POTASSIUM: CPT

## 2022-10-14 PROCEDURE — 93010 ELECTROCARDIOGRAM REPORT: CPT | Performed by: INTERNAL MEDICINE

## 2022-10-14 PROCEDURE — 6370000000 HC RX 637 (ALT 250 FOR IP): Performed by: NURSE PRACTITIONER

## 2022-10-14 PROCEDURE — 6370000000 HC RX 637 (ALT 250 FOR IP): Performed by: INTERNAL MEDICINE

## 2022-10-14 PROCEDURE — 2060000000 HC ICU INTERMEDIATE R&B

## 2022-10-14 PROCEDURE — 80076 HEPATIC FUNCTION PANEL: CPT

## 2022-10-14 PROCEDURE — 82436 ASSAY OF URINE CHLORIDE: CPT

## 2022-10-14 PROCEDURE — 2580000003 HC RX 258: Performed by: INTERNAL MEDICINE

## 2022-10-14 PROCEDURE — 80069 RENAL FUNCTION PANEL: CPT

## 2022-10-14 PROCEDURE — 6370000000 HC RX 637 (ALT 250 FOR IP)

## 2022-10-14 PROCEDURE — 83935 ASSAY OF URINE OSMOLALITY: CPT

## 2022-10-14 PROCEDURE — 93005 ELECTROCARDIOGRAM TRACING: CPT | Performed by: INTERNAL MEDICINE

## 2022-10-14 PROCEDURE — 85025 COMPLETE CBC W/AUTO DIFF WBC: CPT

## 2022-10-14 PROCEDURE — 84484 ASSAY OF TROPONIN QUANT: CPT

## 2022-10-14 PROCEDURE — 6360000002 HC RX W HCPCS: Performed by: INTERNAL MEDICINE

## 2022-10-14 PROCEDURE — 2580000003 HC RX 258: Performed by: NURSE PRACTITIONER

## 2022-10-14 PROCEDURE — 84300 ASSAY OF URINE SODIUM: CPT

## 2022-10-14 PROCEDURE — 99233 SBSQ HOSP IP/OBS HIGH 50: CPT

## 2022-10-14 PROCEDURE — 36415 COLL VENOUS BLD VENIPUNCTURE: CPT

## 2022-10-14 PROCEDURE — 93005 ELECTROCARDIOGRAM TRACING: CPT

## 2022-10-14 PROCEDURE — 97110 THERAPEUTIC EXERCISES: CPT

## 2022-10-14 PROCEDURE — 85610 PROTHROMBIN TIME: CPT

## 2022-10-14 RX ORDER — AMIODARONE HYDROCHLORIDE 200 MG/1
100 TABLET ORAL DAILY
Status: DISCONTINUED | OUTPATIENT
Start: 2022-10-14 | End: 2022-10-19 | Stop reason: HOSPADM

## 2022-10-14 RX ADMIN — APIXABAN 5 MG: 5 TABLET, FILM COATED ORAL at 09:11

## 2022-10-14 RX ADMIN — SODIUM CHLORIDE, PRESERVATIVE FREE 10 ML: 5 INJECTION INTRAVENOUS at 20:52

## 2022-10-14 RX ADMIN — AMIODARONE HYDROCHLORIDE 100 MG: 200 TABLET ORAL at 11:35

## 2022-10-14 RX ADMIN — FAMOTIDINE 20 MG: 20 TABLET, FILM COATED ORAL at 09:11

## 2022-10-14 RX ADMIN — AMIODARONE HYDROCHLORIDE 1 MG/MIN: 50 INJECTION, SOLUTION INTRAVENOUS at 02:15

## 2022-10-14 RX ADMIN — Medication 7.5 MG: at 11:33

## 2022-10-14 RX ADMIN — SODIUM CHLORIDE, PRESERVATIVE FREE 10 ML: 5 INJECTION INTRAVENOUS at 09:12

## 2022-10-14 RX ADMIN — VASOPRESSIN: 20 INJECTION, SOLUTION INTRAVENOUS at 15:36

## 2022-10-14 RX ADMIN — ONDANSETRON 4 MG: 4 TABLET, ORALLY DISINTEGRATING ORAL at 00:49

## 2022-10-14 RX ADMIN — APIXABAN 5 MG: 5 TABLET, FILM COATED ORAL at 20:51

## 2022-10-14 ASSESSMENT — PAIN SCALES - GENERAL
PAINLEVEL_OUTOF10: 0

## 2022-10-14 NOTE — PROGRESS NOTES
[address]     Electrophysiology                                     Progress Note    Admission date:  10/13/2022    Reason for follow up visit: AF    HPI/CC: Robby Cooper was admitted on 10/13/2022 with shortness of breath. In the ED EKG showed AF with a heart rate of 129 bpm. She received IV metoprolol and Cardizem. She was having 3-5 seconds pauses in AF. On 10/14/2022 she underwent MADISON with unsuccessful cardioversion. Overnight she developed nausea, dizziness and chest tightness. Chest tightness improved with burping. EKG showed AF with complete heart block. Complete heart block lasted ~15 minutes, then she converted to SR. Nephrology consulted for hyponatremia. Rhythm has been SR with rates in the 40's-50's. Subjective: She feels well this morning and is seen with family at bedside. She denies current chest pain, dizziness, palpitations and shortness of breath. She has been up walking to the bathroom and felt tired afterwards. Vitals:  Blood pressure (!) 151/91, pulse 53, temperature 97.7 °F (36.5 °C), temperature source Oral, resp. rate 17, height 5' (1.524 m), weight 156 lb 9.6 oz (71 kg), SpO2 97 %.   Temp  Av.9 °F (36.6 °C)  Min: 97.5 °F (36.4 °C)  Max: 98.5 °F (36.9 °C)  Pulse  Av.9  Min: 47  Max: 128  BP  Min: 113/76  Max: 154/84  SpO2  Av.9 %  Min: 94 %  Max: 100 %    24 hour I/O    Intake/Output Summary (Last 24 hours) at 10/14/2022 0912  Last data filed at 10/13/2022 1446  Gross per 24 hour   Intake 200 ml   Output --   Net 200 ml     Current Facility-Administered Medications   Medication Dose Route Frequency Provider Last Rate Last Admin    tolvaptan (SAMSCA) pre-split tablet 7.5 mg  7.5 mg Oral Once Daniel Vasques MD        famotidine (PEPCID) tablet 20 mg  20 mg Oral Daily IMELDA Salmon CNP        metoprolol (LOPRESSOR) injection 5 mg  5 mg IntraVENous Q6H PRN IMELDA Salmon CNP   5 mg at 10/13/22 0436    sodium chloride flush 0.9 % injection 5-40 mL throughout the study. The left ventricle is normal in size with mild concentric hypertrophy. Left ventricular systolic function is normal with a 3D calculated EF of 56%. Septal flattening in diastole and systole (\"D-shaped septum\") consistent   with right ventricular volume and pressure overload. Elevated left atrial pressures with a septal E/e' ratio of 30.3. Right ventricular systolic function is reduced. The left atrium appears severely enlarged. The right atrium is mildly enlarged. Mild mitral regurgitation. Tenting and poor coaptation tricuspid valve leaflets with severe functional   tricuspid regurgitation. Systolic pulmonary artery pressure (SPAP) is elevated and estimated at 50   mmHg (right atrial pressure 15 mmHg) consistent with moderate pulmonary   hypertension. Degree of pulmonary hypertension may be under-estimated in the   setting of severe tricuspid regurgitation. All labs and testing reviewed.   Lab Review     Renal Profile:   Lab Results   Component Value Date/Time    CREATININE 1.6 10/14/2022 03:33 AM    BUN 29 10/14/2022 03:33 AM     10/14/2022 03:33 AM    K 3.9 10/14/2022 03:33 AM    CL 81 10/14/2022 03:33 AM    CO2 20 10/14/2022 03:33 AM     CBC:    Lab Results   Component Value Date/Time    WBC 8.1 10/14/2022 03:32 AM    RBC 3.44 10/14/2022 03:32 AM    HGB 9.6 10/14/2022 03:32 AM    HCT 29.6 10/14/2022 03:32 AM    MCV 86.1 10/14/2022 03:32 AM    RDW 13.4 10/14/2022 03:32 AM     10/14/2022 03:32 AM     BNP:  No results found for: BNP  Fasting Lipid Panel:  No results found for: CHOL, HDL, TRIG  Cardiac Enzymes:  CK/MbTroponin  Lab Results   Component Value Date/Time    TROPONINI 0.01 10/14/2022 03:33 AM     PT/ INR   Lab Results   Component Value Date/Time    INR 1.54 10/14/2022 03:32 AM    INR 1.24 10/12/2022 06:04 AM    INR 0.98 11/23/2021 05:25 PM    PROTIME 18.4 10/14/2022 03:32 AM    PROTIME 15.5 10/12/2022 06:04 AM    PROTIME 11.1 11/23/2021 05:25 PM PTT No results found for: PTT No results found for: MG   Lab Results   Component Value Date/Time    TSH 3.09 10/11/2022 12:05 PM     Assessment:  Paroxysmal atrial fibrillation: ongoing   -[id number] score: 3 (age, gender, HTN)    -s/p MADISON and unsuccessful cardioversion 10/13/2022   -converted to [place] ISIDRO Nicolas 10/14/2022  CHB:   -brief overnight   -symptomatic   HTN: suboptimal   Bilateral hip replacement   Neurogenic bladder   -straight caths herself routinely   Hyponatremia: new, nephrology consulted    Plan:   1. EKG to document conversion to sinus rhythm  2. Discontinue IV amiodarone. Start amiodarone 100 mg PO daily. There is evidence that amiodarone was successful in converting her to SR  3. Discontinue atenolol until further assessment of heart rates   4. Discontinue hydrochlorothiazide due to hyponatremia. Further recommendations per nephrology   5. Continue Eliquis 5 mg PO daily (does not qualify for reduced dose due to age < [de-identified]years old and weight > 60 kg)  6.  She will require ongoing telemetry monitoring in SR to assess if she is a candidate for pacemaker given pauses in AF and CHB    Discussed plan of care with Dr. Lori aVzquez, APRBETO-CNP  [facility]  [phone]

## 2022-10-14 NOTE — PROGRESS NOTES
Hospitalist Progress Note      PCP: Leeroy Arevalo MD    Date of Admission: 10/13/2022    Chief Complaint: tachyarrhythmia    Hospital Course: reviewed     Subjective: resting in bed, off dilt ggt, family at bedside , sodium dropped      Medications:  Reviewed    Infusion Medications    sodium chloride       Scheduled Medications    amiodarone  100 mg Oral Daily    famotidine  20 mg Oral Daily    sodium chloride flush  5-40 mL IntraVENous 2 times per day    apixaban  5 mg Oral BID     PRN Meds: metoprolol, sodium chloride flush, sodium chloride, ondansetron **OR** ondansetron, polyethylene glycol, acetaminophen **OR** acetaminophen      Intake/Output Summary (Last 24 hours) at 10/14/2022 1153  Last data filed at 10/13/2022 1446  Gross per 24 hour   Intake 200 ml   Output --   Net 200 ml       Physical Exam Performed:    BP (!) 151/91   Pulse 53   Temp 97.7 °F (36.5 °C) (Oral)   Resp 17   Ht 5' (1.524 m)   Wt 156 lb 9.6 oz (71 kg)   SpO2 97%   BMI 30.58 kg/m²         Labs:   Recent Labs     10/11/22  1205 10/13/22  0431 10/14/22  0332   WBC 6.8 7.5 8.1   HGB 11.3* 9.8* 9.6*   HCT 35.3* 29.2* 29.6*    211 245     Recent Labs     10/12/22  0604 10/13/22  0431 10/14/22  0333   * 124* 118*   K 4.1 3.5 3.9   CL 90* 88* 81*   CO2 24 24 20*   BUN 36* 35* 29*   CREATININE 1.8* 1.8* 1.6*   CALCIUM 9.5 9.3 8.5     Recent Labs     10/12/22  0604 10/13/22  0431 10/14/22  0333   * 89* 63*   * 198* 156*   BILIDIR  --  <0.2 0.3   BILITOT 0.8 0.7 1.0   ALKPHOS 102 101 100     Recent Labs     10/12/22  0604 10/14/22  0332   INR 1.24* 1.54*     Recent Labs     10/12/22  0226 10/12/22  0604 10/14/22  0333   TROPONINI <0.01 <0.01 0.01       Urinalysis:      Lab Results   Component Value Date/Time    NITRU Negative 11/23/2021 05:20 PM    BLOODU Negative 11/23/2021 05:20 PM    SPECGRAV 1.020 11/23/2021 05:20 PM    GLUCOSEU Negative 11/23/2021 05:20 PM       Radiology:  No orders to display Assessment/Plan:    Active Hospital Problems    Diagnosis     Tachyarrhythmia [R00.0]      Priority: Medium    Obesity [E66.9]      Priority: Medium    Persistent atrial fibrillation (HCC) [I48.19]      Priority: Medium    Hypertension [I10]      New onset atrial fibrillation- with assoc pauses  -cardizem gtt continued  -ECHO in am  -tele monitored  -TSH  -cardiology consulted - appreciate recommendations in advance  -[id number] Score 5     Obesity  With Body mass index is 30.3 kg/m². Complicating assessment and treatment. Placing patient at risk for multiple co-morbidities as well as early death and contributing to the patient's presentation.  Counseled on weight loss      HTN-continue atenolol and hctz     CKD, CR 1.8 on admission  -appears to be baseline  -monitored bmp     Acute transaminitis   -Pt with elevated AST/ALT  -unclear etiology, trended labs     Hyponatremia   -gentle hydration  -monitored bmp    -apprec nephro recs    Chronic normocytic anemia  -no s/s of bleeding at this time  -cbc followed    DVT Prophylaxis: lovenox  Diet: Diet NPO Exceptions are: Sips of Water with Meds  Code Status: Full Code  PT/OT Eval Status: not ordered    Dispo - pending cards recs, nephro recs given drop in sodium    Appropriate for A1 Discharge Unit: No      Melanie Lechuga MD

## 2022-10-14 NOTE — PROGRESS NOTES
Patient c/o new onset chest pain. Amiodarone stopped. Another EKG obtained, showed junctional rhythm with HR of 48. Chan Lewis NP aware, ordered labs. Lab at bedside to obtain labs.

## 2022-10-14 NOTE — CONSULTS
[facility] Consult Note           Reason for Consult:  Hyponatremia  Requesting Physician:  Dr. Tara Chatterjee    Chief Complaint:  Shortness of breath, palpitations  History Obtained From:  patient, electronic medical record    History of Present Ilness:    76year old female with Atrial fibrillation, CKD-4, HTN presented with shortness of breath, palpitations. We have been asked to assist in further mgmt of her Hyponatremia. SNa initially 128 on admission to [facility], previously wnl at 137 from Nov 2021. SNa has been trending down to 118 today. Started on hctz per MAR, lase dose on 10/13, now stopped. Denies any tremors, confusion, shortness of breath, +intake reduced, +weak. Past Medical History:        Diagnosis Date    Arthritis     Closed subchondral insufficiency fracture of femoral condyle ([facility] [address] 08/12/2020    Hypertension     Localized osteoarthrosis not specified whether primary or secondary, pelvic region and thigh 05/08/2015    Neurogenic bladder     caused by a back surgery in 2017 per pt       Past Surgical History:        Procedure Laterality Date    CATARACT REMOVAL WITH IMPLANT  1/14/11    LEFT EYE    EYE SURGERY  12/14/2010    cataract rt eye    HYSTERECTOMY (CERVIX STATUS UNKNOWN)      JOINT REPLACEMENT  1997    left hip    OTHER SURGICAL HISTORY Right     RIGHT TOTAL KNEE REPLACEMENT     TOTAL KNEE ARTHROPLASTY Right 12/6/2021    RIGHT TOTAL KNEE REPLACEMENT performed by Abby Burns MD at [address] [place] [address] Medications:    No current facility-administered medications on file prior to encounter.      Current Outpatient Medications on File Prior to Encounter   Medication Sig Dispense Refill    vitamin B-12 (CYANOCOBALAMIN) 100 MCG tablet Take 50 mcg by mouth daily      Cholecalciferol (VITAMIN D) 50 MCG (2000 UT) CAPS capsule Take by mouth      celecoxib (CELEBREX) 200 MG capsule Take 200 mg by mouth daily      famotidine (PEPCID) 20 MG tablet Take 20 mg by mouth daily      hydroCHLOROthiazide (HYDRODIURIL) 25 MG tablet Take 25 mg by mouth in the morning and at bedtime      atenolol (TENORMIN) 50 MG tablet Take 50 mg by mouth 2 times daily          Allergies:  Prednisone    Social History:    Social History     Socioeconomic History    Marital status:      Spouse name: Not on file    Number of children: Not on file    Years of education: Not on file    Highest education level: Not on file   Occupational History    Not on file   Tobacco Use    Smoking status: Never    Smokeless tobacco: Never   Substance and Sexual Activity    Alcohol use: No    Drug use: No    Sexual activity: Yes   Other Topics Concern    Not on file   Social History Narrative    Not on file     Social Determinants of Health     Financial Resource Strain: Not on file   Food Insecurity: Not on file   Transportation Needs: Not on file   Physical Activity: Not on file   Stress: Not on file   Social Connections: Not on file   Intimate Partner Violence: Not on file   Housing Stability: Not on file       Family History:   Family History   Problem Relation Age of Onset    Arthritis Mother     Diabetes Mother     High Blood Pressure Mother     Diabetes Sister     High Blood Pressure Sister     Diabetes Brother     High Blood Pressure Brother        Review of Systems:   Pertinent positives stated above in HPI. Remainder of 10 point review of systems were reviewed and were negative.     Physical exam:   Constitutional:  VITALS:  BP (!) 169/93   Pulse 62   Temp 97.7 °F (36.5 °C) (Oral)   Resp 18   Ht 5' (1.524 m)   Wt 156 lb 9.6 oz (71 kg)   SpO2 97%   BMI 30.58 kg/m²   Gen: alert, awake, nad  Skin: no rash, turgor wnl  Heent:  eomi, mmm  Neck: no bruits or jvd noted, thyroid normal  Cardiovascular:  S1, S2 without m/r/g  Respiratory: CTA B without w/r/r; respiratory effort normal  Abdomen:  +bs, soft, nt, nd, no hepatosplenomegaly  Ext: no lower extremity edema  Psychiatric: mood and affect appropriate; judgement and insight intact  Musculoskeletal:  Rom, muscular strength limited; digits, nails normal    Data/  CBC:   Lab Results   Component Value Date/Time    WBC 8.1 10/14/2022 03:32 AM    RBC 3.44 10/14/2022 03:32 AM    HGB 9.6 10/14/2022 03:32 AM    HCT 29.6 10/14/2022 03:32 AM    MCV 86.1 10/14/2022 03:32 AM    MCH 28.1 10/14/2022 03:32 AM    MCHC 32.6 10/14/2022 03:32 AM    RDW 13.4 10/14/2022 03:32 AM     10/14/2022 03:32 AM    MPV 7.7 10/14/2022 03:32 AM     BMP:    Lab Results   Component Value Date/Time     10/14/2022 03:33 AM    K 3.9 10/14/2022 03:33 AM    CL 81 10/14/2022 03:33 AM    CO2 20 10/14/2022 03:33 AM    BUN 29 10/14/2022 03:33 AM    LABALBU 4.2 10/14/2022 03:33 AM    CREATININE 1.6 10/14/2022 03:33 AM    CALCIUM 8.5 10/14/2022 03:33 AM    GFRAA 38 10/14/2022 03:33 AM    LABGLOM 31 10/14/2022 03:33 AM    GLUCOSE 207 10/14/2022 03:33 AM         Assessment/    - Hyponatremia - acute in setting of hctz with impaired urinary dilution    - Chronic kidney disease stage 4 - baseline SCr 1.7-1.8, appears to have background Hypertensive Nephrosclerosis, follows with local Nephrologist in [facility]    - Neurogenic bladder - on [facility]    - Hypertension - poorly controlled    - Atrial fibrillation - s/p MADISON & unsuccessful cardioversion, requiring IV amiodarone, plans per Cardiology      Plan/    - Urine electrolytes, urine osmolality  - Prn dose of samsca, may need short course of concentrated saline pending repeat labs this PM  - Remove any fluid restriction while on samsca  - Trend labs, bp's, & urine output      Thank you for the consultation. Please do not hesitate to call with questions. ____________________________________  Gill Moreno MD  [facility]  [email or web address]  Office: [phone]

## 2022-10-14 NOTE — PROGRESS NOTES
Physical Therapy  Facility/Department: [place] [address] PCU  Daily Treatment Note  NAME: Ubaldo Martins  : 1945  MRN: [id number]    Date of Service: 10/14/2022    Discharge Recommendations:  Home with assist PRN, Home with Home health PT (will CTA for possible home PT needs)   PT Equipment Recommendations  Equipment Needed: No    [facility] 6 Clicks Inpatient Mobility:  AM-PAC Mobility Inpatient   How much difficulty turning over in bed?: None  How much difficulty sitting down on / standing up from a chair with arms?: A Little  How much difficulty moving from lying on back to sitting on side of bed?: None  How much help from another person moving to and from a bed to a chair?: A Little  How much help from another person needed to walk in hospital room?: A Little  How much help from another person for climbing 3-5 steps with a railing?: A Little  AM-PAC Inpatient Mobility Raw Score : 20  AM-PAC Inpatient T-Scale Score : 47.67  Mobility Inpatient CMS 0-100% Score: 35.83  Mobility Inpatient CMS G-Code Modifier : CJ     Patient Diagnosis(es): There were no encounter diagnoses. Assessment   Assessment: pt agreeable to seated therex 2/2 fatigue from getting washed up in bathroom, RN in room, pt able to carry on conversation and perform therex without difficulty, pt with good recall of therex from knee and hip surgeries in past, pt will benefit from continued Acute Inpatient Skilled PT to address functional mobility deficits, agree with previous PT rec for home with assist prn, HHPT, no AD needs at this time  Activity Tolerance: Patient limited by fatigue  Equipment Needed: No     Plan    Physcial Therapy Plan  General Plan: 3-5 times per week  Specific Instructions for Next Treatment: Progress ther ex and mobility as tolerated  Current Treatment Recommendations: Strengthening;Balance training;Functional mobility training;Transfer training; Endurance training;Gait training;Home exercise program;Safety education & training;Patient/Caregiver education & training; Therapeutic activities     Restrictions  Restrictions/Precautions  Restrictions/Precautions: Up as Tolerated, General Precautions  Position Activity Restriction  Other position/activity restrictions: Tachycardic, IV     Subjective    Subjective  Subjective: pt seated eob with shortness of breath, fatigued from washing up in bathroom with RN  Pain: none  Orientation  Overall Orientation Status: Within Functional Limits  Cognition  Overall Cognitive Status: WFL     Objective   Vitals           PT Exercises  Exercise Treatment: seated B LE ankle DF/PF, knee ext, hip flex, glute sets x 10 reps     Safety Devices  Type of Devices: Call light within reach; Left in bed;Nurse notified       Goals  Short Term Goals  Time Frame for Short Term Goals: 1 week, 10/20/22 (unless otherwise specified)  Short Term Goal 1: Pt will transfer supine <-> sit with modified(I) - MET 10/13/22  Short Term Goal 2: Pt will transfer sit <-> stand and bed>chair with modified(I)  Short Term Goal 3: Pt will ambulate x 150 feet without AD with supervision/modified(I)  Short Term Goal 4: By 10/16/22: Pt will tolerate 12-15 reps BLE exercise for strengthening, balance, and endurance  Patient Goals   Patient Goals : \"To go home\"    Education  Patient Education  Education Given To: Patient  Education Provided: Home Exercise Program  Education Method: Demonstration;Verbal  Barriers to Learning: None  Education Outcome: Verbalized understanding;Demonstrated understanding    Therapy Time   Individual Concurrent Group Co-treatment   Time In 1513         Time Out 1521         Minutes 8         Timed Code Treatment Minutes: 8 Minutes     If pt is unable to be seen after this session, please let this note serve as discharge summary. Please see case management note for discharge disposition. Thank you.    Halima Araya, PT

## 2022-10-14 NOTE — PROGRESS NOTES
Notified from lab of critically low sodium level of 118. Siderails x3 were padded. Nori Willingham NP made aware.

## 2022-10-14 NOTE — PROGRESS NOTES
Patient's HR dipped into the 40s. Patient c/o dizziness and lightheadedness, yet still able to carry on a conversation. EKG obtained, showed junctional bradycardia. Messaged Renee Escudero NP, awaiting response. Diltiazem drip stopped at this time.

## 2022-10-14 NOTE — CARE COORDINATION
CASE MANAGEMENT INITIAL ASSESSMENT      Reviewed chart and completed assessment with patient:  Family present: daughter Aries Butler  Explained Case Management role/services to : Daughter and patient    Primary contact information:see below    Health Care Decision Maker :   Primary Decision Maker: Becca Brewer - Child - [phone]          Can this person be reached and be able to respond quickly, such as within a few minutes or hours? Yes      Admit date/status:inpatient  Diagnosis:Tachycardia   Is this a Readmission?:  No      Insurance:[employer] Medicare   Precert required for SNF: Yes       3 night stay required: No    Living arrangements, Adls, care needs, prior to admission:Lives at home with alone but daughters can stay with her anytime she needs help    Durable Medical Equipment at home:  Walker_x_Cane_x_RTS__ BSC__Shower Chair__  02__ HHN__ CPAP__  BiPap__  Hospital Bed__ W/C___ Other_____    Services in the home and/or outpatient, prior to admission:None active and denies need for home care    Current [facility]                                Medications:No barriers Prescription coverage? Yes Will pt require financial assistance with medications No     Transportation needs: Daughter       PT/OT recs:not seen    Hospital Exemption Notification (HEN):N/A    Barriers to discharge:None identified    Plan/comments: To return home with family support when medically stable. Will follow.       ECOC on chart for MD signature

## 2022-10-15 LAB
ALBUMIN SERPL-MCNC: 4.1 G/DL (ref 3.4–5)
ALP BLD-CCNC: 114 U/L (ref 40–129)
ALT SERPL-CCNC: 189 U/L (ref 10–40)
ANION GAP SERPL CALCULATED.3IONS-SCNC: 14 MMOL/L (ref 3–16)
AST SERPL-CCNC: 83 U/L (ref 15–37)
BILIRUB SERPL-MCNC: 0.6 MG/DL (ref 0–1)
BILIRUBIN DIRECT: <0.2 MG/DL (ref 0–0.3)
BILIRUBIN, INDIRECT: ABNORMAL MG/DL (ref 0–1)
BUN BLDV-MCNC: 35 MG/DL (ref 7–20)
CALCIUM SERPL-MCNC: 9.2 MG/DL (ref 8.3–10.6)
CHLORIDE BLD-SCNC: 93 MMOL/L (ref 99–110)
CO2: 23 MMOL/L (ref 21–32)
CREAT SERPL-MCNC: 1.8 MG/DL (ref 0.6–1.2)
EKG ATRIAL RATE: 214 BPM
EKG DIAGNOSIS: NORMAL
EKG Q-T INTERVAL: 406 MS
EKG QRS DURATION: 100 MS
EKG QTC CALCULATION (BAZETT): 533 MS
EKG R AXIS: -2 DEGREES
EKG T AXIS: 130 DEGREES
EKG VENTRICULAR RATE: 104 BPM
GFR AFRICAN AMERICAN: 33
GFR NON-AFRICAN AMERICAN: 27
GLUCOSE BLD-MCNC: 104 MG/DL (ref 70–99)
OSMOLALITY URINE: 174 MOSM/KG (ref 390–1070)
POTASSIUM REFLEX MAGNESIUM: 3.6 MMOL/L (ref 3.5–5.1)
SODIUM BLD-SCNC: 130 MMOL/L (ref 136–145)
TOTAL PROTEIN: 6.3 G/DL (ref 6.4–8.2)

## 2022-10-15 PROCEDURE — 6370000000 HC RX 637 (ALT 250 FOR IP): Performed by: INTERNAL MEDICINE

## 2022-10-15 PROCEDURE — 6370000000 HC RX 637 (ALT 250 FOR IP): Performed by: NURSE PRACTITIONER

## 2022-10-15 PROCEDURE — 36415 COLL VENOUS BLD VENIPUNCTURE: CPT

## 2022-10-15 PROCEDURE — 2500000003 HC RX 250 WO HCPCS: Performed by: NURSE PRACTITIONER

## 2022-10-15 PROCEDURE — 80048 BASIC METABOLIC PNL TOTAL CA: CPT

## 2022-10-15 PROCEDURE — 2060000000 HC ICU INTERMEDIATE R&B

## 2022-10-15 PROCEDURE — 6370000000 HC RX 637 (ALT 250 FOR IP)

## 2022-10-15 PROCEDURE — 80076 HEPATIC FUNCTION PANEL: CPT

## 2022-10-15 PROCEDURE — 2580000003 HC RX 258: Performed by: NURSE PRACTITIONER

## 2022-10-15 PROCEDURE — 99232 SBSQ HOSP IP/OBS MODERATE 35: CPT

## 2022-10-15 PROCEDURE — 93010 ELECTROCARDIOGRAM REPORT: CPT | Performed by: INTERNAL MEDICINE

## 2022-10-15 RX ORDER — ATENOLOL 25 MG/1
25 TABLET ORAL DAILY
Status: DISCONTINUED | OUTPATIENT
Start: 2022-10-15 | End: 2022-10-16

## 2022-10-15 RX ADMIN — FAMOTIDINE 20 MG: 20 TABLET, FILM COATED ORAL at 09:21

## 2022-10-15 RX ADMIN — SODIUM CHLORIDE, PRESERVATIVE FREE 10 ML: 5 INJECTION INTRAVENOUS at 09:22

## 2022-10-15 RX ADMIN — SODIUM CHLORIDE, PRESERVATIVE FREE 10 ML: 5 INJECTION INTRAVENOUS at 21:08

## 2022-10-15 RX ADMIN — APIXABAN 5 MG: 5 TABLET, FILM COATED ORAL at 21:08

## 2022-10-15 RX ADMIN — ATENOLOL 25 MG: 25 TABLET ORAL at 09:22

## 2022-10-15 RX ADMIN — APIXABAN 5 MG: 5 TABLET, FILM COATED ORAL at 09:21

## 2022-10-15 RX ADMIN — AMIODARONE HYDROCHLORIDE 100 MG: 200 TABLET ORAL at 09:21

## 2022-10-15 NOTE — PROGRESS NOTES
[facility]     Electrophysiology                                     Progress Note    Admission date:  10/13/2022    Reason for follow up visit: AF    HPI/CC: Severa Alderman was admitted on 10/13/2022 with shortness of breath. In the ED EKG showed AF with a heart rate of 129 bpm. She received IV metoprolol and Cardizem. She was having 3-5 seconds pauses in AF. On 10/14/2022 she underwent MADISON with unsuccessful cardioversion. Overnight she developed nausea, dizziness and chest tightness. Chest tightness improved with burping. EKG showed AF with complete heart block. Complete heart block lasted ~15 minutes, then she converted to SR. Nephrology consulted for hyponatremia. Last evening at 1700 she flipped back into AF. Rhythm has been AF with rates in the 110's-120's. Subjective: She feels great today. She was finally able to sleep last night and got cleaned up this morning. She felt brief palpitations overnight. She denies chest pain, shortness of breath, and dizziness. Vitals:  Blood pressure 133/73, pulse (!) 116, temperature 98.1 °F (36.7 °C), temperature source Oral, resp. rate 18, height 5' (1.524 m), weight 156 lb 9.6 oz (71 kg), SpO2 94 %.   Temp  Av.1 °F (36.7 °C)  Min: 97.7 °F (36.5 °C)  Max: 98.6 °F (37 °C)  Pulse  Av.8  Min: 62  Max: 116  BP  Min: 124/55  Max: 169/93  SpO2  Av.8 %  Min: 93 %  Max: 98 %    24 hour I/O    Intake/Output Summary (Last 24 hours) at 10/15/2022 0833  Last data filed at 10/15/2022 0556  Gross per 24 hour   Intake 669.38 ml   Output 1890 ml   Net -1220.62 ml       Current Facility-Administered Medications   Medication Dose Route Frequency Provider Last Rate Last Admin    atenolol (TENORMIN) tablet 25 mg  25 mg Oral Daily IMELDA May CNP        amiodarone (CORDARONE) tablet 100 mg  100 mg Oral Daily MIELDA May CNP   100 mg at 10/14/22 1135    famotidine (PEPCID) tablet 20 mg  20 mg Oral Daily IMELDA Arenas - BANDAR   20 mg at 10/14/22 0911    metoprolol (LOPRESSOR) injection 5 mg  5 mg IntraVENous Q6H PRN Rylee Rasp, APRN - CNP   5 mg at 10/13/22 0436    sodium chloride flush 0.9 % injection 5-40 mL  5-40 mL IntraVENous 2 times per day Rylee Rasp, APRN - CNP   10 mL at 10/14/22 2052    sodium chloride flush 0.9 % injection 5-40 mL  5-40 mL IntraVENous PRN Rylee Rasp, APRN - CNP        0.9 % sodium chloride infusion   IntraVENous PRN Rylee Rasp, APRN - CNP        ondansetron (ZOFRAN-ODT) disintegrating tablet 4 mg  4 mg Oral Q8H PRN Rylee Rasp, APRN - CNP   4 mg at 10/14/22 0049    Or    ondansetron (ZOFRAN) injection 4 mg  4 mg IntraVENous Q6H PRN Rylee Rasp, APRN - CNP        polyethylene glycol (GLYCOLAX) packet 17 g  17 g Oral Daily PRN Rylee Rasp, APRN - CNP        acetaminophen (TYLENOL) tablet 650 mg  650 mg Oral Q6H PRN Rylee Rasp, APRN - CNP   650 mg at 10/13/22 2220    Or    acetaminophen (TYLENOL) suppository 650 mg  650 mg Rectal Q6H PRN Rylee Rasp, APRN - CNP        apixaban Reita Keys) tablet 5 mg  5 mg Oral BID Homero Wagner MD   5 mg at 10/14/22 2051       Objective:     Telemetry monitor: AF     Physical Exam:  Constitutional and general appearance: alert, cooperative, no distress, and appears stated age  HEENT: PERRL, no cervical lymphadenopathy. No masses palpable. Normal oral mucosa  Respiratory:  Normal excursion and expansion without use of accessory muscles  Resp auscultation: Normal breath sounds without wheezing, rhonchi, and rales  Cardiovascular:   The apical impulse is not displaced  Heart tones are crisp and normal. Irregular S1 and S2.  Jugular venous pulsation Normal  The carotid upstroke is normal in amplitude and contour without delay or bruit  Peripheral pulses are symmetrical and full   Abdomen:  No masses or tenderness  Bowel sounds present  Extremities:   No cyanosis or clubbing   No lower extremity edema   Skin: warm and dry  Neurological:  Alert and oriented  Moves all extremities well  No abnormalities of mood, affect, memory, mentation, or behavior are noted    Data    Echo 10/14/2022:    Conclusions      Summary   Irregular rhythm noted throughout the study. The left ventricle is normal in size with mild concentric hypertrophy. Left ventricular systolic function is normal with a 3D calculated EF of 56%. Septal flattening in diastole and systole (\"D-shaped septum\") consistent   with right ventricular volume and pressure overload. Elevated left atrial pressures with a septal E/e' ratio of 30.3. Right ventricular systolic function is reduced. The left atrium appears severely enlarged. The right atrium is mildly enlarged. Mild mitral regurgitation. Tenting and poor coaptation tricuspid valve leaflets with severe functional   tricuspid regurgitation. Systolic pulmonary artery pressure (SPAP) is elevated and estimated at 50   mmHg (right atrial pressure 15 mmHg) consistent with moderate pulmonary   hypertension. Degree of pulmonary hypertension may be under-estimated in the   setting of severe tricuspid regurgitation. All labs and testing reviewed.   Lab Review     Renal Profile:   Lab Results   Component Value Date/Time    CREATININE 1.8 10/15/2022 02:17 AM    BUN 35 10/15/2022 02:17 AM     10/15/2022 02:17 AM    K 3.6 10/15/2022 02:17 AM    CL 93 10/15/2022 02:17 AM    CO2 23 10/15/2022 02:17 AM     CBC:    Lab Results   Component Value Date/Time    WBC 8.1 10/14/2022 03:32 AM    RBC 3.44 10/14/2022 03:32 AM    HGB 9.6 10/14/2022 03:32 AM    HCT 29.6 10/14/2022 03:32 AM    MCV 86.1 10/14/2022 03:32 AM    RDW 13.4 10/14/2022 03:32 AM     10/14/2022 03:32 AM     BNP:  No results found for: BNP  Fasting Lipid Panel:  No results found for: CHOL, HDL, TRIG  Cardiac Enzymes:  CK/MbTroponin  Lab Results   Component Value Date/Time    TROPONINI 0.01 10/14/2022 03:33 AM     PT/ INR   Lab Results   Component Value Date/Time    INR 1.54 10/14/2022 03:32 AM    INR 1.24 10/12/2022 06:04 AM    INR 0.98 11/23/2021 05:25 PM    PROTIME 18.4 10/14/2022 03:32 AM    PROTIME 15.5 10/12/2022 06:04 AM    PROTIME 11.1 11/23/2021 05:25 PM     PTT No results found for: PTT No results found for: MG   Lab Results   Component Value Date/Time    TSH 3.09 10/11/2022 12:05 PM     Assessment:  Paroxysmal atrial fibrillation: ongoing   -[id number] score: 3 (age, gender, HTN)    -s/p MADISON and unsuccessful cardioversion 10/13/2022   -converted to [place] ISIDRO Nicolas 10/14/2022 at 0400, back in AF 10/14/2022 at 1700  CHB: stable   -brief overnight 10/14/2022   -symptomatic   HTN: suboptimal   Bilateral hip replacement   Neurogenic bladder   -straight caths herself routinely   Hyponatremia: new, nephrology consulted    Plan:   1. Restart atenolol 25 mg PO daily due to elevated heart rates in AF  2. Continue amiodarone 100 mg PO daily  3. Continue Eliquis 5 mg PO daily (does not qualify for reduced dose due to age < [de-identified]years old and weight > 60 kg)  4. Hyponatremia management per nephrology  5. She will require ongoing telemetry monitoring to assess for ongoing pauses and complete heart block in AF. If this is recurrent, she would be a candidate for a permanent pacemaker.         IMELDA Stockton-BANDAR  [facility]  [phone]

## 2022-10-15 NOTE — PLAN OF CARE
Called by the RN regarding sodium of 128 from 6 pm  UO is around 800 ml in the last 8 hours  Patient does self cath  Stop concentrated saline solution  Obtain stat BMP and urine studies/urine osm  Nurse to call me with results    Osmel Easton MD

## 2022-10-15 NOTE — PROGRESS NOTES
Pt still in Aflutter rhythm; rates . Asymptomatic and denies any complaints at this time. No events of bradycardia throughout shift. UO overnight has been adequate. Sodium came up to 130 for 0200 lab check- notified overnight nephrologist via secure message- no new orders at this time. BP and O2 stable.

## 2022-10-15 NOTE — PROGRESS NOTES
[facility]   Follow-up note           Reason for Consult:  Hyponatremia  Requesting Physician:  Dr. Samina Navarro history  Patient is resting in bed, denies any new complaints  Patient received 7.5 mg of Samsca on 10/14    Last 24 h uop 1.9 L    ROS: No chest pain/shortness of breath/fever/nausea/vomiting  PSFH: No visitor    Scheduled Meds:   atenolol  25 mg Oral Daily    amiodarone  100 mg Oral Daily    famotidine  20 mg Oral Daily    sodium chloride flush  5-40 mL IntraVENous 2 times per day    apixaban  5 mg Oral BID     Continuous Infusions:   sodium chloride       PRN Meds:.metoprolol, sodium chloride flush, sodium chloride, ondansetron **OR** ondansetron, polyethylene glycol, acetaminophen **OR** acetaminophen    History of Present Ilness:    76year old female with Atrial fibrillation, CKD-4, HTN presented with shortness of breath, palpitations. We have been asked to assist in further mgmt of her Hyponatremia. SNa initially 128 on admission to [facility], previously wnl at 137 from Nov 2021. SNa has been trending down to 118 today. Started on hctz per MAR, lase dose on 10/13, now stopped. Denies any tremors, confusion, shortness of breath, +intake reduced, +weak.     Physical exam:   Constitutional:  VITALS:  /85   Pulse (!) 115   Temp 98.4 °F (36.9 °C) (Oral)   Resp 18   Ht 5' (1.524 m)   Wt 156 lb 9.6 oz (71 kg)   SpO2 97%   BMI 30.58 kg/m²   Gen: alert, awake, nad  Skin: no rash, turgor wnl  Heent:  eomi, mmm  Neck: no bruits or jvd noted, thyroid normal  Cardiovascular:  S1, S2 without m/r/g  Respiratory: CTA B without w/r/r; respiratory effort normal  Abdomen:  +bs, soft, nt, nd, no hepatosplenomegaly  Ext: no lower extremity edema  Psychiatric: mood and affect appropriate; judgement and insight intact  Musculoskeletal:  Rom, muscular strength limited; digits, nails normal    Data/  CBC:   Lab Results   Component Value Date/Time WBC 8.1 10/14/2022 03:32 AM    RBC 3.44 10/14/2022 03:32 AM    HGB 9.6 10/14/2022 03:32 AM    HCT 29.6 10/14/2022 03:32 AM    MCV 86.1 10/14/2022 03:32 AM    MCH 28.1 10/14/2022 03:32 AM    MCHC 32.6 10/14/2022 03:32 AM    RDW 13.4 10/14/2022 03:32 AM     10/14/2022 03:32 AM    MPV 7.7 10/14/2022 03:32 AM     BMP:    Lab Results   Component Value Date/Time     10/15/2022 02:17 AM    K 3.6 10/15/2022 02:17 AM    CL 93 10/15/2022 02:17 AM    CO2 23 10/15/2022 02:17 AM    BUN 35 10/15/2022 02:17 AM    LABALBU 4.1 10/15/2022 02:17 AM    CREATININE 1.8 10/15/2022 02:17 AM    CALCIUM 9.2 10/15/2022 02:17 AM    GFRAA 33 10/15/2022 02:17 AM    LABGLOM 27 10/15/2022 02:17 AM    GLUCOSE 104 10/15/2022 02:17 AM         Assessment/    - Hyponatremia - acute in setting of hctz with impaired urinary dilution  Urine sodium of less than 20, osmolality 487   Appropriate increase in sodium considering sodium of 124 from 10/13   Sodium was 137 on November 2021    - Chronic kidney disease stage 4 - baseline SCr 1.7-1.8, appears to have background Hypertensive Nephrosclerosis, follows with local Nephrologist in [facility]    - Neurogenic bladder - on [address]    - Hypertension - poorly controlled    - Atrial fibrillation - s/p MADISON & unsuccessful cardioversion, requiring IV amiodarone, plans per Cardiology      Plan/    -Fluid restriction 1500 mL/day  -Encourage protein intake  - Trend labs, bp's, & urine output      Thank you for the consultation. Please do not hesitate to call with questions. ____________________________________  Misael Cespedes MD  [facility]  [email or web address]  Office: [phone]

## 2022-10-15 NOTE — PLAN OF CARE
Problem: Discharge Planning  Goal: Discharge to home or other facility with appropriate resources  Outcome: Progressing     Problem: Safety - Adult  Goal: Free from fall injury  Outcome: Progressing     Problem: Cardiovascular - Adult  Goal: Maintains optimal cardiac output and hemodynamic stability  Outcome: Progressing     Problem: Skin/Tissue Integrity - Adult  Goal: Skin integrity remains intact  Outcome: Progressing

## 2022-10-15 NOTE — PROGRESS NOTES
Notified on-call nephrology- Dr. Snyder Dense about rapid increase in sodium level for 1800 this evening. Pt sodium was at 128, from 118 on the AM labs. Repeated BMP and urine labs per nephrology request. Sodium still 128 after fluids stopped. Fluids ordered to be d/c'd and repeat BMP at 0200. Will notify with sodium greater than 128. VSS- however pt has had rhythm change. Pt. Converted from [place] ISIDRO Nicolas to [address] [place] [address] at 1700 per [facility]. Rates . Pt denies any CP, palpitations, or SOB. She states she feels better than yesterday. EKG obtained to confirm rhythm change, placed in chart. Overnight hospitalist NP notified of above events.

## 2022-10-15 NOTE — PROGRESS NOTES
Hospitalist Progress Note      PCP: Leeroy Arevalo MD    Date of Admission: 10/13/2022     Chief Complaint: tachyarrhythmia     Hospital Course: reviewed      Subjective: resting in bed, remains off dilt ggt, family at bedside , sodium improved         Medications:  Reviewed    Infusion Medications    sodium chloride       Scheduled Medications    atenolol  25 mg Oral Daily    amiodarone  100 mg Oral Daily    famotidine  20 mg Oral Daily    sodium chloride flush  5-40 mL IntraVENous 2 times per day    apixaban  5 mg Oral BID     PRN Meds: metoprolol, sodium chloride flush, sodium chloride, ondansetron **OR** ondansetron, polyethylene glycol, acetaminophen **OR** acetaminophen      Intake/Output Summary (Last 24 hours) at 10/15/2022 1332  Last data filed at 10/15/2022 0914  Gross per 24 hour   Intake 1029.38 ml   Output 1850 ml   Net -820.62 ml       Physical Exam Performed:    /85   Pulse (!) 115   Temp 98.4 °F (36.9 °C) (Oral)   Resp 18   Ht 5' (1.524 m)   Wt 156 lb 9.6 oz (71 kg)   SpO2 97%   BMI 30.58 kg/m²     General appearance: No apparent distress, appears stated age and cooperative. HEENT: Pupils equal, round, and reactive to light. Conjunctivae/corneas clear. Neck: Supple, with full range of motion. No jugular venous distention. Trachea midline. Respiratory:  Normal respiratory effort. Clear to auscultation, bilaterally without Rales/Wheezes/Rhonchi. Cardiovascular: Regular rate and rhythm with normal S1/S2 without murmurs, rubs or gallops. Abdomen: Soft, non-tender, non-distended with normal bowel sounds. Musculoskeletal: No clubbing, cyanosis or edema bilaterally. Full range of motion without deformity. Skin: Skin color, texture, turgor normal.  No rashes or lesions. Neurologic:  Neurovascularly intact without any focal sensory/motor deficits.  Cranial nerves: II-XII intact, grossly non-focal.  Psychiatric: Alert and oriented, thought content appropriate, normal insight  Capillary Refill: Brisk, 3 seconds, normal   Peripheral Pulses: +2 palpable, equal bilaterally       Labs:   Recent Labs     10/13/22  0431 10/14/22  0332   WBC 7.5 8.1   HGB 9.8* 9.6*   HCT 29.2* 29.6*    245     Recent Labs     10/14/22  1317 10/14/22  1804 10/14/22  2144 10/15/22  0217   * 128* 128* 130*   K 4.3 3.8 4.0 3.6   CL 86* 86* 89* 93*   CO2 24 21 23 23   BUN 34* 31* 31* 35*   CREATININE 1.8* 1.8* 1.8* 1.8*   CALCIUM 9.3 9.5 9.2 9.2   PHOS 3.5  --   --   --      Recent Labs     10/13/22  0431 10/14/22  0333 10/15/22  0217   AST 89* 63* 83*   * 156* 189*   BILIDIR <0.2 0.3 <0.2   BILITOT 0.7 1.0 0.6   ALKPHOS 101 100 114     Recent Labs     10/14/22  0332   INR 1.54*     Recent Labs     10/14/22  0333   TROPONINI 0.01       Urinalysis:      Lab Results   Component Value Date/Time    NITRU Negative 11/23/2021 05:20 PM    BLOODU Negative 11/23/2021 05:20 PM    SPECGRAV 1.020 11/23/2021 05:20 PM    GLUCOSEU Negative 11/23/2021 05:20 PM       Radiology:  No orders to display           Assessment/Plan:    Active Hospital Problems    Diagnosis     Tachyarrhythmia [R00.0]      Priority: Medium    Obesity [E66.9]      Priority: Medium    Persistent atrial fibrillation ([address] [I48.19]      Priority: Medium    Hypertension [I10]        New onset atrial fibrillation- with assoc pauses  -cardizem gtt was required  -ECHO done 10/13(ef 56%, rv fcn reduced, mild mr, severe tr, mod pulm htn)  -tele monitored  -TSH wnl  -cardiology consulted - appreciate recommendations in advance  -[id number] Score 5     Obesity  With Body mass index is 30.3 kg/m². Complicating assessment and treatment. Placing patient at risk for multiple co-morbidities as well as early death and contributing to the patient's presentation.  Counseled on weight loss      HTN-continue atenolol (dose adjusted/inc'd) and hctz     CKD, CR 1.8 on admission  -appears to be baseline  -monitored bmp     Acute transaminitis   -Pt with elevated AST/ALT  -unclear etiology, trended labs     Hyponatremia   -gentle hydration  -monitored bmp    -apprec nephro recs     Chronic normocytic anemia  -no s/s of bleeding at this time  -cbc followed     DVT Prophylaxis: lovenox  Diet: ADULT DIET;  Regular  Code Status: Full Code  PT/OT Eval Status: rec home prn assist    Dispo - pending stable sodium, cards recs    Appropriate for A1 Discharge Unit: No      Joey Young MD

## 2022-10-16 LAB
ANION GAP SERPL CALCULATED.3IONS-SCNC: 11 MMOL/L (ref 3–16)
BUN BLDV-MCNC: 35 MG/DL (ref 7–20)
CALCIUM SERPL-MCNC: 9.4 MG/DL (ref 8.3–10.6)
CHLORIDE BLD-SCNC: 96 MMOL/L (ref 99–110)
CO2: 25 MMOL/L (ref 21–32)
CREAT SERPL-MCNC: 1.6 MG/DL (ref 0.6–1.2)
EKG ATRIAL RATE: 278 BPM
EKG DIAGNOSIS: NORMAL
EKG Q-T INTERVAL: 356 MS
EKG QRS DURATION: 88 MS
EKG QTC CALCULATION (BAZETT): 525 MS
EKG R AXIS: -12 DEGREES
EKG T AXIS: 134 DEGREES
EKG VENTRICULAR RATE: 131 BPM
GFR AFRICAN AMERICAN: 38
GFR NON-AFRICAN AMERICAN: 31
GLUCOSE BLD-MCNC: 124 MG/DL (ref 70–99)
MAGNESIUM: 1.9 MG/DL (ref 1.8–2.4)
POTASSIUM REFLEX MAGNESIUM: 3.9 MMOL/L (ref 3.5–5.1)
SODIUM BLD-SCNC: 132 MMOL/L (ref 136–145)

## 2022-10-16 PROCEDURE — 6370000000 HC RX 637 (ALT 250 FOR IP): Performed by: INTERNAL MEDICINE

## 2022-10-16 PROCEDURE — 36415 COLL VENOUS BLD VENIPUNCTURE: CPT

## 2022-10-16 PROCEDURE — 99233 SBSQ HOSP IP/OBS HIGH 50: CPT

## 2022-10-16 PROCEDURE — 2060000000 HC ICU INTERMEDIATE R&B

## 2022-10-16 PROCEDURE — 83735 ASSAY OF MAGNESIUM: CPT

## 2022-10-16 PROCEDURE — 80048 BASIC METABOLIC PNL TOTAL CA: CPT

## 2022-10-16 PROCEDURE — 93005 ELECTROCARDIOGRAM TRACING: CPT

## 2022-10-16 PROCEDURE — 2500000003 HC RX 250 WO HCPCS

## 2022-10-16 PROCEDURE — 2580000003 HC RX 258: Performed by: NURSE PRACTITIONER

## 2022-10-16 PROCEDURE — 6370000000 HC RX 637 (ALT 250 FOR IP)

## 2022-10-16 PROCEDURE — 2500000003 HC RX 250 WO HCPCS: Performed by: NURSE PRACTITIONER

## 2022-10-16 PROCEDURE — 2580000003 HC RX 258

## 2022-10-16 PROCEDURE — 93010 ELECTROCARDIOGRAM REPORT: CPT | Performed by: INTERNAL MEDICINE

## 2022-10-16 PROCEDURE — 6360000002 HC RX W HCPCS: Performed by: INTERNAL MEDICINE

## 2022-10-16 PROCEDURE — 6370000000 HC RX 637 (ALT 250 FOR IP): Performed by: NURSE PRACTITIONER

## 2022-10-16 RX ORDER — MAGNESIUM SULFATE 1 G/100ML
1000 INJECTION INTRAVENOUS ONCE
Status: COMPLETED | OUTPATIENT
Start: 2022-10-16 | End: 2022-10-16

## 2022-10-16 RX ORDER — METOPROLOL SUCCINATE 25 MG/1
25 TABLET, EXTENDED RELEASE ORAL DAILY
Status: DISCONTINUED | OUTPATIENT
Start: 2022-10-16 | End: 2022-10-19 | Stop reason: HOSPADM

## 2022-10-16 RX ORDER — METOPROLOL TARTRATE 5 MG/5ML
5 INJECTION INTRAVENOUS ONCE
Status: COMPLETED | OUTPATIENT
Start: 2022-10-16 | End: 2022-10-16

## 2022-10-16 RX ADMIN — METOPROLOL SUCCINATE 25 MG: 25 TABLET, EXTENDED RELEASE ORAL at 09:25

## 2022-10-16 RX ADMIN — AMIODARONE HYDROCHLORIDE 100 MG: 200 TABLET ORAL at 09:25

## 2022-10-16 RX ADMIN — APIXABAN 5 MG: 5 TABLET, FILM COATED ORAL at 22:28

## 2022-10-16 RX ADMIN — METOPROLOL TARTRATE 5 MG: 5 INJECTION INTRAVENOUS at 03:32

## 2022-10-16 RX ADMIN — APIXABAN 5 MG: 5 TABLET, FILM COATED ORAL at 09:25

## 2022-10-16 RX ADMIN — METOPROLOL TARTRATE 5 MG: 5 INJECTION INTRAVENOUS at 00:03

## 2022-10-16 RX ADMIN — METOPROLOL TARTRATE 5 MG: 5 INJECTION INTRAVENOUS at 10:57

## 2022-10-16 RX ADMIN — DILTIAZEM HYDROCHLORIDE 7.5 MG/HR: 5 INJECTION, SOLUTION INTRAVENOUS at 21:05

## 2022-10-16 RX ADMIN — POTASSIUM BICARBONATE 40 MEQ: 782 TABLET, EFFERVESCENT ORAL at 15:13

## 2022-10-16 RX ADMIN — DILTIAZEM HYDROCHLORIDE 2.5 MG/HR: 5 INJECTION, SOLUTION INTRAVENOUS at 13:19

## 2022-10-16 RX ADMIN — FAMOTIDINE 20 MG: 20 TABLET, FILM COATED ORAL at 09:25

## 2022-10-16 RX ADMIN — SODIUM CHLORIDE, PRESERVATIVE FREE 10 ML: 5 INJECTION INTRAVENOUS at 09:25

## 2022-10-16 RX ADMIN — MAGNESIUM SULFATE HEPTAHYDRATE 1000 MG: 1 INJECTION, SOLUTION INTRAVENOUS at 15:16

## 2022-10-16 NOTE — PROGRESS NOTES
Overnight events: paged overnight NP Lucillie Day at approximately 0300 about pt HR sustaining above 120 bpm in uncontrolled aflutter after ordered PRN dose of IV metoprolol was given. Pt c/o SOB. Denies any chest pains or dizziness. Endorses feelings of \"flutter\" with ambulation, TITUS. NP ordered additional IV dose of metoprolol. Paged nocturnist at [address] to f/u that HR is still hanging 120-140 at rest. MD adjusted medications for morning. Pt BP tolerated medication well. 's-130's as of 0700. Other VSS. Pt denies any other symptoms/needs at this time.

## 2022-10-16 NOTE — PROGRESS NOTES
Hospitalist Progress Note      PCP: Sawyer Alexis MD    Date of Admission: 10/13/2022    Chief Complaint: tachyarrhythmia     Hospital Course: reviewed     Subjective: had afib rvr episode and SOB assoc with it, required addit iv metoprolol       Medications:  Reviewed    Infusion Medications    sodium chloride       Scheduled Medications    metoprolol succinate  25 mg Oral Daily    amiodarone  100 mg Oral Daily    famotidine  20 mg Oral Daily    sodium chloride flush  5-40 mL IntraVENous 2 times per day    apixaban  5 mg Oral BID     PRN Meds: metoprolol, sodium chloride flush, sodium chloride, ondansetron **OR** ondansetron, polyethylene glycol, acetaminophen **OR** acetaminophen      Intake/Output Summary (Last 24 hours) at 10/16/2022 0902  Last data filed at 10/16/2022 0339  Gross per 24 hour   Intake 680 ml   Output 400 ml   Net 280 ml       Physical Exam Performed:    /70   Pulse (!) 115   Temp 98.3 °F (36.8 °C) (Oral)   Resp 18   Ht 5' (1.524 m)   Wt 156 lb 9.6 oz (71 kg)   SpO2 98%   BMI 30.58 kg/m²     General appearance: No apparent distress, appears stated age and cooperative. HEENT: Pupils equal, round, and reactive to light. Conjunctivae/corneas clear. Neck: Supple, with full range of motion. No jugular venous distention. Trachea midline. Respiratory:  Normal respiratory effort. Clear to auscultation, bilaterally without Rales/Wheezes/Rhonchi. Cardiovascular: irregular rate and rhythm with normal S1/S2 without murmurs, rubs or gallops. Abdomen: Soft, non-tender, non-distended with normal bowel sounds. Musculoskeletal: No clubbing, cyanosis or edema bilaterally. Full range of motion without deformity. Skin: Skin color, texture, turgor normal.  No rashes or lesions. Neurologic:  Neurovascularly intact without any focal sensory/motor deficits.  Cranial nerves: II-XII intact, grossly non-focal.  Psychiatric: Alert and oriented, thought content appropriate, normal insight  Capillary Refill: Brisk, 3 seconds, normal   Peripheral Pulses: +2 palpable, equal bilaterally       Labs:   Recent Labs     10/14/22  0332   WBC 8.1   HGB 9.6*   HCT 29.6*        Recent Labs     10/14/22  1317 10/14/22  1804 10/14/22  2144 10/15/22  0217 10/16/22  0417   *   < > 128* 130* 132*   K 4.3   < > 4.0 3.6 3.9   CL 86*   < > 89* 93* 96*   CO2 24   < > 23 23 25   BUN 34*   < > 31* 35* 35*   CREATININE 1.8*   < > 1.8* 1.8* 1.6*   CALCIUM 9.3   < > 9.2 9.2 9.4   PHOS 3.5  --   --   --   --     < > = values in this interval not displayed. Recent Labs     10/14/22  0333 10/15/22  0217   AST 63* 83*   * 189*   BILIDIR 0.3 <0.2   BILITOT 1.0 0.6   ALKPHOS 100 114     Recent Labs     10/14/22  0332   INR 1.54*     Recent Labs     10/14/22  0333   TROPONINI 0.01       Urinalysis:      Lab Results   Component Value Date/Time    NITRU Negative 11/23/2021 05:20 PM    BLOODU Negative 11/23/2021 05:20 PM    SPECGRAV 1.020 11/23/2021 05:20 PM    GLUCOSEU Negative 11/23/2021 05:20 PM       Radiology:  No orders to display           Assessment/Plan:    Active Hospital Problems    Diagnosis     Tachyarrhythmia [R00.0]      Priority: Medium    Obesity [E66.9]      Priority: Medium    Persistent atrial fibrillation ([address] [I48.19]      Priority: Medium    Hypertension [I10]         New onset atrial fibrillation- with assoc pauses  -cardizem gtt was required  -ECHO done 10/13(ef 56%, rv fcn reduced, mild mr, severe tr, mod pulm htn)  -tele monitored  -TSH wnl  -cardiology consulted - appreciate recommendations in advance  -[id number] Score 5     Obesity  With Body mass index is 30.3 kg/m². Complicating assessment and treatment. Placing patient at risk for multiple co-morbidities as well as early death and contributing to the patient's presentation.  Counseled on weight loss      HTN-continue atenolol (dose adjusted/inc'd) and hctz     CKD, CR 1.8 on admission  -appears to be baseline  -monitored bmp     Acute transaminitis   -Pt with elevated AST/ALT  -unclear etiology, trended labs     Hyponatremia -went as low as 118  -gentle hydration given  -monitored bmp    -apprec nephro recs     Chronic normocytic anemia  -no s/s of bleeding at this time  -cbc followed     DVT Prophylaxis: lovenox  Diet: ADULT DIET;  Regular; 1500 ml  Code Status: Full Code  PT/OT Eval Status: not needed    Dispo - pending cards recs and possible pacer placement, apprec nephro recs    Appropriate for A1 Discharge Unit: No      Kenia Stoddard MD

## 2022-10-16 NOTE — PROGRESS NOTES
[facility]   Follow-up note           Reason for Consult:  Hyponatremia  Requesting Physician:  Dr. Anna Oh history  Patient is resting in bed, concerned about her fast heart rate  Patient received 7.5 mg of Samsca on 10/14    Last 24 h uop 400 mL    ROS: No chest pain/shortness of breath/fever/nausea/vomiting  PSFH: No visitor    Scheduled Meds:   metoprolol succinate  25 mg Oral Daily    amiodarone  100 mg Oral Daily    famotidine  20 mg Oral Daily    sodium chloride flush  5-40 mL IntraVENous 2 times per day    apixaban  5 mg Oral BID     Continuous Infusions:   sodium chloride       PRN Meds:.metoprolol, sodium chloride flush, sodium chloride, ondansetron **OR** ondansetron, polyethylene glycol, acetaminophen **OR** acetaminophen    History of Present Ilness:    76year old female with Atrial fibrillation, CKD-4, HTN presented with shortness of breath, palpitations. We have been asked to assist in further mgmt of her Hyponatremia. SNa initially 128 on admission to [facility], previously wnl at 137 from Nov 2021. SNa has been trending down to 118 today. Started on hctz per MAR, lase dose on 10/13, now stopped. Denies any tremors, confusion, shortness of breath, +intake reduced, +weak.     Physical exam:   Constitutional:  VITALS:  /70   Pulse (!) 134   Temp 98.3 °F (36.8 °C)   Resp 18   Ht 5' (1.524 m)   Wt 156 lb 9.6 oz (71 kg)   SpO2 98%   BMI 30.58 kg/m²   Gen: alert, awake, nad  Skin: no rash, turgor wnl  Heent:  eomi, mmm  Neck: no bruits or jvd noted, thyroid normal  Cardiovascular:  S1, S2 without m/r/g  Respiratory: CTA B without w/r/r; respiratory effort normal  Abdomen:  +bs, soft, nt, nd, no hepatosplenomegaly  Ext: no lower extremity edema  Psychiatric: mood and affect appropriate; judgement and insight intact  Musculoskeletal:  Rom, muscular strength limited; digits, nails normal    Data/  CBC:   Lab Results   Component Value Date/Time    WBC 8.1 10/14/2022 03:32 AM    RBC 3.44 10/14/2022 03:32 AM    HGB 9.6 10/14/2022 03:32 AM    HCT 29.6 10/14/2022 03:32 AM    MCV 86.1 10/14/2022 03:32 AM    MCH 28.1 10/14/2022 03:32 AM    MCHC 32.6 10/14/2022 03:32 AM    RDW 13.4 10/14/2022 03:32 AM     10/14/2022 03:32 AM    MPV 7.7 10/14/2022 03:32 AM     BMP:    Lab Results   Component Value Date/Time     10/16/2022 04:17 AM    K 3.9 10/16/2022 04:17 AM    CL 96 10/16/2022 04:17 AM    CO2 25 10/16/2022 04:17 AM    BUN 35 10/16/2022 04:17 AM    LABALBU 4.1 10/15/2022 02:17 AM    CREATININE 1.6 10/16/2022 04:17 AM    CALCIUM 9.4 10/16/2022 04:17 AM    GFRAA 38 10/16/2022 04:17 AM    LABGLOM 31 10/16/2022 04:17 AM    GLUCOSE 124 10/16/2022 04:17 AM         Assessment/    - Hyponatremia - acute in setting of hctz with impaired urinary dilution  Urine sodium of less than 20, osmolality 487   Appropriate increase in sodium considering sodium of 124 from 10/13   Sodium was 137 on November 2021    - Chronic kidney disease stage 4 - baseline SCr 1.7-1.8, appears to have background Hypertensive Nephrosclerosis, follows with local Nephrologist in [facility]    - Neurogenic bladder - on [address]    - Hypertension - poorly controlled    - Atrial fibrillation - s/p MADISON & unsuccessful cardioversion, requiring IV amiodarone, plans per Cardiology      Plan/    -Fluid restriction 1500 mL/day  -Encourage protein intake  - Trend labs, bp's, & urine output      Thank you for the consultation. Please do not hesitate to call with questions. ____________________________________  Selina Winslow MD  [facility]  [email or web address]  Office: [phone]

## 2022-10-16 NOTE — PROGRESS NOTES
[address]     Electrophysiology                                     Progress Note    Admission date:  10/13/2022    Reason for follow up visit: AF    HPI/CC: Levi Clark was admitted on 10/13/2022 with shortness of breath. In the ED EKG showed AF with a heart rate of 129 bpm. She received IV metoprolol and Cardizem. She was having 3-5 seconds pauses in AF. On 10/14/2022 she underwent MADISON with unsuccessful cardioversion. On 10/14/2022 she developed nausea, dizziness and chest tightness. Chest tightness improved with burping. EKG showed AF with complete heart block. Complete heart block lasted ~15 minutes, then she converted to SR. Nephrology consulted for hyponatremia. On 10/15/2022, she flipped back into AF. Rhythm has been AF with rates in the 100's-130's. Overnight she was given PRN IV metoprolol. Subjective: She feels more short of breath when she goes to the bathroom. She can feel her heart beating fast with activity. She denies dizziness, chest pain and nausea. Vitals:  Blood pressure 132/85, pulse (!) 128, temperature 98.1 °F (36.7 °C), temperature source Oral, resp. rate 18, height 5' (1.524 m), weight 156 lb 9.6 oz (71 kg), SpO2 98 %.   Temp  Av.2 °F (36.8 °C)  Min: 98 °F (36.7 °C)  Max: 98.4 °F (36.9 °C)  Pulse  Av  Min: 73  Max: 130  BP  Min: 128/70  Max: 162/98  SpO2  Av.5 %  Min: 95 %  Max: 98 %    24 hour I/O    Intake/Output Summary (Last 24 hours) at 10/16/2022 1211  Last data filed at 10/16/2022 2565  Gross per 24 hour   Intake 680 ml   Output 400 ml   Net 280 ml       Current Facility-Administered Medications   Medication Dose Route Frequency Provider Last Rate Last Admin    metoprolol succinate (TOPROL XL) extended release tablet 25 mg  25 mg Oral Daily IMELDA Christianson CNP   25 mg at 10/16/22 5598    amiodarone (CORDARONE) tablet 100 mg  100 mg Oral Daily IMELDA Christianson - CNP   100 mg at 10/16/22 0925    famotidine (PEPCID) tablet 20 mg  20 mg Oral Daily Johnathan Naif, APRN - CNP   20 mg at 10/16/22 0925    metoprolol (LOPRESSOR) injection 5 mg  5 mg IntraVENous Q6H PRN Johnathan Naif, APRN - CNP   5 mg at 10/16/22 1057    sodium chloride flush 0.9 % injection 5-40 mL  5-40 mL IntraVENous 2 times per day Johnathan Naif, APRN - CNP   10 mL at 10/16/22 3786    sodium chloride flush 0.9 % injection 5-40 mL  5-40 mL IntraVENous PRN Johnathan Naif, APRN - CNP        0.9 % sodium chloride infusion   IntraVENous PRN Johnathan Naif, APRN - CNP        ondansetron (ZOFRAN-ODT) disintegrating tablet 4 mg  4 mg Oral Q8H PRN Johnathan Naif, APRN - CNP   4 mg at 10/14/22 0049    Or    ondansetron (ZOFRAN) injection 4 mg  4 mg IntraVENous Q6H PRN Johnathan Naif, APRN - CNP        polyethylene glycol (GLYCOLAX) packet 17 g  17 g Oral Daily PRN Johnathan Naif, APRN - CNP        acetaminophen (TYLENOL) tablet 650 mg  650 mg Oral Q6H PRN Johnathan Naif, APRN - CNP   650 mg at 10/13/22 2220    Or    acetaminophen (TYLENOL) suppository 650 mg  650 mg Rectal Q6H PRN Johnathan Naif, APRN - CNP        apixaban [place]) tablet 5 mg  5 mg Oral BID Marianne Bowie MD   5 mg at 10/16/22 5852       Objective:     Telemetry monitor: AF     Physical Exam:  Constitutional and general appearance: alert, cooperative, no distress, and appears stated age  HEENT: PERRL, no cervical lymphadenopathy. No masses palpable. Normal oral mucosa  Respiratory:  Normal excursion and expansion without use of accessory muscles  Resp auscultation: Normal breath sounds without wheezing, rhonchi, and rales  Cardiovascular:   The apical impulse is not displaced  Heart tones are crisp and normal. Irregular S1 and S2.  Jugular venous pulsation Normal  The carotid upstroke is normal in amplitude and contour without delay or bruit  Peripheral pulses are symmetrical and full   Abdomen:  No masses or tenderness  Bowel sounds present  Extremities:   No cyanosis or clubbing   No lower extremity edema   Skin: warm and dry  Neurological:  Alert and oriented  Moves all extremities well  No abnormalities of mood, affect, memory, mentation, or behavior are noted    Data    Echo 10/14/2022:    Conclusions      Summary   Irregular rhythm noted throughout the study. The left ventricle is normal in size with mild concentric hypertrophy. Left ventricular systolic function is normal with a 3D calculated EF of 56%. Septal flattening in diastole and systole (\"D-shaped septum\") consistent   with right ventricular volume and pressure overload. Elevated left atrial pressures with a septal E/e' ratio of 30.3. Right ventricular systolic function is reduced. The left atrium appears severely enlarged. The right atrium is mildly enlarged. Mild mitral regurgitation. Tenting and poor coaptation tricuspid valve leaflets with severe functional tricuspid regurgitation. Systolic pulmonary artery pressure (SPAP) is elevated and estimated at 50   mmHg (right atrial pressure 15 mmHg) consistent with moderate pulmonary   hypertension. Degree of pulmonary hypertension may be under-estimated in the setting of severe tricuspid regurgitation. All labs and testing reviewed.   Lab Review     Renal Profile:   Lab Results   Component Value Date/Time    CREATININE 1.6 10/16/2022 04:17 AM    BUN 35 10/16/2022 04:17 AM     10/16/2022 04:17 AM    K 3.9 10/16/2022 04:17 AM    CL 96 10/16/2022 04:17 AM    CO2 25 10/16/2022 04:17 AM     CBC:    Lab Results   Component Value Date/Time    WBC 8.1 10/14/2022 03:32 AM    RBC 3.44 10/14/2022 03:32 AM    HGB 9.6 10/14/2022 03:32 AM    HCT 29.6 10/14/2022 03:32 AM    MCV 86.1 10/14/2022 03:32 AM    RDW 13.4 10/14/2022 03:32 AM     10/14/2022 03:32 AM     BNP:  No results found for: BNP  Fasting Lipid Panel:  No results found for: CHOL, HDL, TRIG  Cardiac Enzymes:  CK/MbTroponin  Lab Results   Component Value Date/Time    TROPONINI 0.01 10/14/2022 03:33 AM     PT/ INR   Lab Results   Component Value Date/Time    INR 1.54 10/14/2022 03:32 AM    INR 1.24 10/12/2022 06:04 AM    INR 0.98 11/23/2021 05:25 PM    PROTIME 18.4 10/14/2022 03:32 AM    PROTIME 15.5 10/12/2022 06:04 AM    PROTIME 11.1 11/23/2021 05:25 PM     PTT No results found for: PTT   Lab Results   Component Value Date/Time    MG 1.90 10/16/2022 04:17 AM      Lab Results   Component Value Date/Time    TSH 3.09 10/11/2022 12:05 PM     Assessment:  Paroxysmal atrial fibrillation with tachy-gopi: ongoing   -[id number] score: 3 (age, gender, HTN)    -s/p MADISON and unsuccessful cardioversion 10/13/2022   -converted to [employer] 10/14/2022 at 0400, back in AF 10/14/2022 at 1700  CHB: stable   -brief overnight 10/14/2022   -occurred with conversion to SR   -symptomatic    -no further recurrence  HTN: suboptimal   Bilateral hip replacement   Neurogenic bladder   -straight caths herself routinely  CKD stage 4   Hyponatremia: stable, improving    -nephrology following    Plan:   Atenolol discontinued overnight. Start Toprol 25 mg PO this morning due to elevated heart rates in AF  Start Cardizem gtt at 2.5 mL/hr for goal HR < 100 bpm  3. Continue amiodarone 100 mg PO daily  4. Continue Eliquis 5 mg PO daily (does not qualify for reduced dose due to age < [de-identified]years old and weight > 60 kg)  5. Given sustained elevated rates in AF, she will need increase in rate controlling medications. Due to pauses in AF and complete heart block, permanent pacemaker implant is recommended. If clinical status remains stable, will plan to proceed with pacemaker implant on Tuesday 10/18/2022. Risks and benefits discussed with patient. All current questions answered. 5. Hyponatremia management per nephrology      Discussed PPM with Dr. Oanh Martinez.       Jazmyn Cruz, APRN-CNP  [facility]  [phone]

## 2022-10-17 LAB
ANION GAP SERPL CALCULATED.3IONS-SCNC: 14 MMOL/L (ref 3–16)
BUN BLDV-MCNC: 29 MG/DL (ref 7–20)
CALCIUM SERPL-MCNC: 9.4 MG/DL (ref 8.3–10.6)
CHLORIDE BLD-SCNC: 96 MMOL/L (ref 99–110)
CO2: 23 MMOL/L (ref 21–32)
CREAT SERPL-MCNC: 1.3 MG/DL (ref 0.6–1.2)
GFR AFRICAN AMERICAN: 48
GFR NON-AFRICAN AMERICAN: 39
GLUCOSE BLD-MCNC: 108 MG/DL (ref 70–99)
POTASSIUM REFLEX MAGNESIUM: 4.2 MMOL/L (ref 3.5–5.1)
SODIUM BLD-SCNC: 133 MMOL/L (ref 136–145)

## 2022-10-17 PROCEDURE — 2580000003 HC RX 258: Performed by: NURSE PRACTITIONER

## 2022-10-17 PROCEDURE — 36415 COLL VENOUS BLD VENIPUNCTURE: CPT

## 2022-10-17 PROCEDURE — 6370000000 HC RX 637 (ALT 250 FOR IP)

## 2022-10-17 PROCEDURE — 6370000000 HC RX 637 (ALT 250 FOR IP): Performed by: NURSE PRACTITIONER

## 2022-10-17 PROCEDURE — 6370000000 HC RX 637 (ALT 250 FOR IP): Performed by: INTERNAL MEDICINE

## 2022-10-17 PROCEDURE — 80048 BASIC METABOLIC PNL TOTAL CA: CPT

## 2022-10-17 PROCEDURE — 2060000000 HC ICU INTERMEDIATE R&B

## 2022-10-17 PROCEDURE — 99233 SBSQ HOSP IP/OBS HIGH 50: CPT | Performed by: NURSE PRACTITIONER

## 2022-10-17 RX ORDER — SODIUM CHLORIDE 0.9 % (FLUSH) 0.9 %
5-40 SYRINGE (ML) INJECTION PRN
Status: DISCONTINUED | OUTPATIENT
Start: 2022-10-17 | End: 2022-10-19 | Stop reason: HOSPADM

## 2022-10-17 RX ORDER — SODIUM CHLORIDE 0.9 % (FLUSH) 0.9 %
5-40 SYRINGE (ML) INJECTION EVERY 12 HOURS SCHEDULED
Status: DISCONTINUED | OUTPATIENT
Start: 2022-10-17 | End: 2022-10-19 | Stop reason: HOSPADM

## 2022-10-17 RX ORDER — SODIUM CHLORIDE 9 MG/ML
INJECTION, SOLUTION INTRAVENOUS PRN
Status: DISCONTINUED | OUTPATIENT
Start: 2022-10-17 | End: 2022-10-19 | Stop reason: HOSPADM

## 2022-10-17 RX ORDER — HYDRALAZINE HYDROCHLORIDE 20 MG/ML
10 INJECTION INTRAMUSCULAR; INTRAVENOUS EVERY 6 HOURS PRN
Status: DISCONTINUED | OUTPATIENT
Start: 2022-10-17 | End: 2022-10-19 | Stop reason: HOSPADM

## 2022-10-17 RX ADMIN — METOPROLOL SUCCINATE 25 MG: 25 TABLET, EXTENDED RELEASE ORAL at 09:34

## 2022-10-17 RX ADMIN — AMIODARONE HYDROCHLORIDE 100 MG: 200 TABLET ORAL at 09:34

## 2022-10-17 RX ADMIN — SODIUM CHLORIDE, PRESERVATIVE FREE 5 ML: 5 INJECTION INTRAVENOUS at 20:22

## 2022-10-17 RX ADMIN — FAMOTIDINE 20 MG: 20 TABLET, FILM COATED ORAL at 09:34

## 2022-10-17 RX ADMIN — SODIUM CHLORIDE, PRESERVATIVE FREE 10 ML: 5 INJECTION INTRAVENOUS at 09:34

## 2022-10-17 RX ADMIN — APIXABAN 5 MG: 5 TABLET, FILM COATED ORAL at 09:34

## 2022-10-17 RX ADMIN — APIXABAN 5 MG: 5 TABLET, FILM COATED ORAL at 20:17

## 2022-10-17 NOTE — PROGRESS NOTES
Hospitalist Progress Note      PCP: Yolande Matos MD    Date of Admission: 10/13/2022    Chief Complaint: Tachycardia    Hospital Course: Admitted with tachyarrhythmia with pauses. Evaluated by cardiology and cardiac electrophysiology. Pacemaker planned for tomorrow. Heart rate has been uncontrolled with episodes of bradycardia. Patient is known to have chronic kidney disease. Also came in with hyponatremia. Hydrochlorothiazide stopped. Patient also was on Celebrex. Now that Celebrex is discontinued, patient's creatinine is also lower than her previously recorded baseline. Subjective: No chest pain, no shortness of breath, no nausea, no vomiting, no abdominal pain. Medications:  Reviewed    Infusion Medications    dilTIAZem (CARDIZEM) 125 mg in dextrose 5% 125 mL infusion Stopped (10/17/22 0556)    sodium chloride       Scheduled Medications    metoprolol succinate  25 mg Oral Daily    amiodarone  100 mg Oral Daily    famotidine  20 mg Oral Daily    sodium chloride flush  5-40 mL IntraVENous 2 times per day    apixaban  5 mg Oral BID     PRN Meds: sodium chloride flush, sodium chloride, ondansetron **OR** ondansetron, polyethylene glycol, acetaminophen **OR** acetaminophen    No intake or output data in the 24 hours ending 10/17/22 1116    Physical Exam Performed:    BP (!) 151/57   Pulse 61   Temp 97.7 °F (36.5 °C) (Oral)   Resp 18   Ht 5' (1.524 m)   Wt 156 lb 9.6 oz (71 kg)   SpO2 99%   BMI 30.58 kg/m²     General appearance: No apparent distress, appears stated age and cooperative. HEENT: Pupils equal, round, and reactive to light. Conjunctivae/corneas clear. Neck: Supple, with full range of motion. No jugular venous distention. Trachea midline. Respiratory:  Normal respiratory effort. Clear to auscultation, bilaterally without Rales/Wheezes/Rhonchi. Cardiovascular: Irregularly irregular rhythm with normal S1/S2 without murmurs, rubs or gallops.   Abdomen: Soft, non-tender, non-distended with normal bowel sounds. Musculoskeletal: No clubbing, cyanosis or edema bilaterally. Full range of motion without deformity. Skin: Skin color, texture, turgor normal.  No rashes or lesions. Neurologic:  Neurovascularly intact without any focal sensory/motor deficits. Cranial nerves: II-XII intact, grossly non-focal.  Psychiatric: Alert and oriented, thought content appropriate, normal insight  Capillary Refill: Brisk, 3 seconds, normal   Peripheral Pulses: +2 palpable, equal bilaterally       Labs:   No results for input(s): WBC, HGB, HCT, PLT in the last 72 hours. Recent Labs     10/14/22  1317 10/14/22  1804 10/15/22  0217 10/16/22  0417 10/17/22  0421   *   < > 130* 132* 133*   K 4.3   < > 3.6 3.9 4.2   CL 86*   < > 93* 96* 96*   CO2 24   < > 23 25 23   BUN 34*   < > 35* 35* 29*   CREATININE 1.8*   < > 1.8* 1.6* 1.3*   CALCIUM 9.3   < > 9.2 9.4 9.4   PHOS 3.5  --   --   --   --     < > = values in this interval not displayed. Recent Labs     10/15/22  0217   AST 83*   *   BILIDIR <0.2   BILITOT 0.6   ALKPHOS 114     No results for input(s): INR in the last 72 hours. No results for input(s): Dusty Jacqueline in the last 72 hours. Urinalysis:      Lab Results   Component Value Date/Time    NITRU Negative 11/23/2021 05:20 PM    BLOODU Negative 11/23/2021 05:20 PM    SPECGRAV 1.020 11/23/2021 05:20 PM    GLUCOSEU Negative 11/23/2021 05:20 PM       Radiology:  No orders to display           Assessment/Plan:    Active Hospital Problems    Diagnosis     Tachyarrhythmia [R00.0]      Priority: Medium    Obesity [E66.9]      Priority: Medium    Persistent atrial fibrillation ([facility] [address] [I48.19]      Priority: Medium    Hypertension [I10]      PLAN:    Paroxysmal atrial fibrillation with tachy/bradycardia  Cardiac electrophysiology following. Pacemaker is planned for tomorrow. Heart rate is acceptable.   Continue Eliquis    Chronic kidney disease stage IV  Creatinine below baseline after nonsteroid stopped. Continue to monitor. Nephrology input appreciated. Hyponatremia  Improving sodium after hydrochlorothiazide discontinued. Continue to monitor  Nephrology following. Hypertension  Blood pressure still elevated but acceptable. It may be easier to titrate antihypertensive medications once pacemaker is implanted    Obesity  Body mass index is 30.58 kg/m². Complicating assessment and treatment, placing patient at high risk for multiple co-morbidities as well as early death and contributing to the patient's presentation. Counseled on weight loss. Discussed with the patient. Questions answered    Discussed with cardiac EP    DVT Prophylaxis: Eliquis  Diet: ADULT DIET;  Regular; 1500 ml  Code Status: Full Code  PT/OT Eval Status: Ordered    Dispo -inpatient stay pending pacemaker and renal function stabilization    Appropriate for A1 Discharge Unit: No      Theodore Giraldo MD

## 2022-10-17 NOTE — PROGRESS NOTES
[facility], [place]     Electrophysiology                                     Progress Note    Admission date:  10/13/2022    Reason for follow up visit: AF    HPI/CC: Willie Mcwilliams was admitted on 10/13/2022 with shortness of breath. EKG showed rapid atrial fibrillation. She was given IV metoprolol and Cardizem and was noted to have 3 to 5-second pauses in atrial fibrillation. She was also started on amiodarone. On 10/14/2022, she underwent MADISON and unsuccessful cardioversion. Later in the day she developed nausea, dizziness and chest tightness. EKG showed atrial fibrillation with complete heart block. This lasted approximately 15 minutes and then she converted to sinus rhythm. On 10/15/2022, she was noted to be back in atrial fibrillation. She spontaneously converted to sinus rhythm this morning at 0416. Has also been treated for hyponatremia. Rhythm has been sinus/sinus bradycardia. Subjective: Patient reports she feels much better in sinus rhythm. Reports that she has more energy. Denies chest pain, shortness of breath, palpitations or dizziness. Vitals:  Blood pressure (!) 151/57, pulse 61, temperature 97.7 °F (36.5 °C), temperature source Oral, resp. rate 18, height 5' (1.524 m), weight 156 lb 9.6 oz (71 kg), SpO2 99 %.   Temp  Av.2 °F (36.8 °C)  Min: 97.7 °F (36.5 °C)  Max: 98.4 °F (36.9 °C)  Pulse  Av  Min: 55  Max: 128  BP  Min: 132/85  Max: 151/57  SpO2  Av.5 %  Min: 95 %  Max: 99 %    24 hour I/O  No intake or output data in the 24 hours ending 10/17/22 0953  Current Facility-Administered Medications   Medication Dose Route Frequency Provider Last Rate Last Admin    metoprolol succinate (TOPROL XL) extended release tablet 25 mg  25 mg Oral Daily IMELDA Steel - CNP   25 mg at 10/17/22 0934    dilTIAZem 125 mg in dextrose 5 % 125 mL infusion  2.5-15 mg/hr IntraVENous Continuous Alejandrina Lui APRN - CNP   Stopped at 10/17/22 0556    amiodarone (CORDARONE) tablet 100 mg  100 mg Oral Daily IMELDA Hunt - CNP   100 mg at 10/17/22 0934    famotidine (PEPCID) tablet 20 mg  20 mg Oral Daily Leno Rivera APRN - CNP   20 mg at 10/17/22 0934    sodium chloride flush 0.9 % injection 5-40 mL  5-40 mL IntraVENous 2 times per day Leno Rivera APRN - CNP   10 mL at 10/17/22 0934    sodium chloride flush 0.9 % injection 5-40 mL  5-40 mL IntraVENous PRN Leno Rivera APRN - CNP        0.9 % sodium chloride infusion   IntraVENous PRN Leno Rivera APRN - CNP        ondansetron (ZOFRAN-ODT) disintegrating tablet 4 mg  4 mg Oral Q8H PRN Leno Rivera APRN - CNP   4 mg at 10/14/22 0049    Or    ondansetron (ZOFRAN) injection 4 mg  4 mg IntraVENous Q6H PRN Leno Rivera APRN - CNP        polyethylene glycol (GLYCOLAX) packet 17 g  17 g Oral Daily PRN Leno Rivera APRN - CNP        acetaminophen (TYLENOL) tablet 650 mg  650 mg Oral Q6H PRN Leno Rivera APRN - CNP   650 mg at 10/13/22 2220    Or    acetaminophen (TYLENOL) suppository 650 mg  650 mg Rectal Q6H PRN Leno Rivera APRN - CNP        apixaban [place]) tablet 5 mg  5 mg Oral BID Hussein Lauren MD   5 mg at 10/17/22 9942       Objective:     Telemetry monitor: SR    Physical Exam:  Constitutional and general appearance: alert, cooperative, no distress, and appears stated age  [de-identified]: PERRL, no cervical lymphadenopathy. No masses palpable. Normal oral mucosa  Respiratory:  Normal excursion and expansion without use of accessory muscles  Resp auscultation: Normal breath sounds without wheezing, rhonchi, and rales  Cardiovascular:   The apical impulse is not displaced  Heart tones are crisp and normal. regular S1 and S2.  Jugular venous pulsation Normal  The carotid upstroke is normal in amplitude and contour without delay or bruit  Peripheral pulses are symmetrical and full   Abdomen:  No masses or tenderness  Bowel sounds present  Extremities:   No cyanosis or clubbing   No lower extremity edema   Skin: warm and dry  Neurological:  Alert and oriented  Moves all extremities well  No abnormalities of mood, affect, memory, mentation, or behavior are noted    Data  Echo 10/14/2022:    Conclusions      Summary   Irregular rhythm noted throughout the study. The left ventricle is normal in size with mild concentric hypertrophy. Left ventricular systolic function is normal with a 3D calculated EF of 56%. Septal flattening in diastole and systole (\"D-shaped septum\") consistent   with right ventricular volume and pressure overload. Elevated left atrial pressures with a septal E/e' ratio of 30.3. Right ventricular systolic function is reduced. The left atrium appears severely enlarged. The right atrium is mildly enlarged. Mild mitral regurgitation. Tenting and poor coaptation tricuspid valve leaflets with severe functional tricuspid regurgitation. Systolic pulmonary artery pressure (SPAP) is elevated and estimated at 50   mmHg (right atrial pressure 15 mmHg) consistent with moderate pulmonary   hypertension. Degree of pulmonary hypertension may be under-estimated in the setting of severe tricuspid regurgitation. All labs and testing reviewed.   Lab Review     Renal Profile:   Lab Results   Component Value Date/Time    CREATININE 1.3 10/17/2022 04:21 AM    BUN 29 10/17/2022 04:21 AM     10/17/2022 04:21 AM    K 4.2 10/17/2022 04:21 AM    CL 96 10/17/2022 04:21 AM    CO2 23 10/17/2022 04:21 AM     CBC:    Lab Results   Component Value Date/Time    WBC 8.1 10/14/2022 03:32 AM    RBC 3.44 10/14/2022 03:32 AM    HGB 9.6 10/14/2022 03:32 AM    HCT 29.6 10/14/2022 03:32 AM    MCV 86.1 10/14/2022 03:32 AM    RDW 13.4 10/14/2022 03:32 AM     10/14/2022 03:32 AM     BNP:  No results found for: BNP  Fasting Lipid Panel:  No results found for: CHOL, HDL, TRIG  Cardiac Enzymes:  CK/MbTroponin  Lab Results   Component Value Date/Time    TROPONINI 0.01 10/14/2022 03:33 AM     PT/ INR   Lab Results   Component Value Date/Time    INR 1.54 10/14/2022 03:32 AM    INR 1.24 10/12/2022 06:04 AM    INR 0.98 11/23/2021 05:25 PM    PROTIME 18.4 10/14/2022 03:32 AM    PROTIME 15.5 10/12/2022 06:04 AM    PROTIME 11.1 11/23/2021 05:25 PM     PTT No results found for: PTT   Lab Results   Component Value Date/Time    MG 1.90 10/16/2022 04:17 AM      Lab Results   Component Value Date/Time    TSH 3.09 10/11/2022 12:05 PM       Assessment:  Paroxysmal atrial fibrillation: stable    -noted to have frequent pauses on telemetry    -[id number] score 4 (age, gender, HTN)   -TSH normal 10/2022   -s/p unsuccessful DCCV 10/14/2022  Complete heart block: stable, noted on 10/14/2022  Moderate pulmonary hypertension  Severe TR   HTN: controlled   CKD   Hyponatremia   Normocytic anemia     Plan:   1. Continue amiodarone, Eliquis, and Toprol  2. Given frequent pauses in AF and episode of heart block, patient would benefit from permanent pacemaker. Tentatively plan to implant tomorrow. NPO at midnight (risks, benefits, and alternative therapy discussed). 3. Maintain normal electrolytes  4.  Continue to monitor on telemetry     Medications and plan reviewed with Dr. Jackie Ramirez, [address]  [phone]

## 2022-10-17 NOTE — CARE COORDINATION
10/17/22 From home with her daughter Sav Fry, denied any needs, likely going for pacemaker Tuesday or Wednesday, DC pending that and renal labs.

## 2022-10-17 NOTE — PROGRESS NOTES
Nephrology Progress Note   [facility] [employer]      This patient is a 76year old female whom we are following for hyponatremia and CKD. Subjective: The patient was seen and examined. Off Diltiazem drip, HR 55-80s range. No complaint. Family History: No family at bedside  ROS: No nausea or vomiting      Vitals:  BP (!) 156/76   Pulse 69   Temp 99.2 °F (37.3 °C) (Oral)   Resp 18   Ht 5' (1.524 m)   Wt 156 lb 9.6 oz (71 kg)   SpO2 100%   BMI 30.58 kg/m²   I/O last 3 completed shifts: In: 680 [P.O.:680]  Out: 400 [Urine:400]  No intake/output data recorded. Physical Exam  Vitals reviewed. Constitutional:       General: She is not in acute distress. HENT:      Head: Normocephalic and atraumatic. Eyes:      General: No scleral icterus. Conjunctiva/sclera: Conjunctivae normal.   Cardiovascular:      Rate and Rhythm: Normal rate. Heart sounds: No friction rub. Pulmonary:      Effort: Pulmonary effort is normal. No respiratory distress. Abdominal:      General: Bowel sounds are normal. There is no distension. Tenderness: There is no abdominal tenderness. Musculoskeletal:      Right lower leg: No edema. Left lower leg: No edema. Neurological:      Mental Status: She is alert. Medications:   metoprolol succinate  25 mg Oral Daily    amiodarone  100 mg Oral Daily    famotidine  20 mg Oral Daily    sodium chloride flush  5-40 mL IntraVENous 2 times per day    apixaban  5 mg Oral BID         Labs:  No results for input(s): WBC, HGB, HCT, MCV, PLT in the last 72 hours.   Recent Labs     10/14/22  1317 10/14/22  1804 10/15/22  0217 10/16/22  0417 10/17/22  0421   *   < > 130* 132* 133*   K 4.3   < > 3.6 3.9 4.2   CL 86*   < > 93* 96* 96*   CO2 24   < > 23 25 23   GLUCOSE 110*   < > 104* 124* 108*   PHOS 3.5  --   --   --   --    MG  --   --   --  1.90  --    BUN 34*   < > 35* 35* 29*   CREATININE 1.8*   < > 1.8* 1.6* 1.3*   LABGLOM 27*   < > 27* 31* 39*   GFRAA 33* < > 33* 38* 48*    < > = values in this interval not displayed. Assessment/     - Hyponatremia - acute in setting of hctz with impaired urinary dilution  Urine sodium of less than 20, osmolality 487                Appropriate increase in sodium considering sodium of 124 from 10/13                Sodium was 137 on November 2021      Improving.     - Chronic kidney disease stage 4 - baseline SCr 1.7-1.8, appears to have background Hypertensive Nephrosclerosis, follows with local Nephrologist in [place]. Stable. - Neurogenic bladder - on ISC     - Hypertension - poorly controlled     - Atrial fibrillation - s/p MADISON & unsuccessful cardioversion, requiring IV amiodarone, plans per Cardiology        Plan/     - Continue fluid restriction 1500 mL/day  - Encourage protein intake  - Ok to be discharged from renal standpoint when ok with others. Please do not hesitate to contact me at [phone] if with questions. Thank you! Daja Retana MD  [facility] [employer]  10/17/2022

## 2022-10-17 NOTE — PROGRESS NOTES
Occupational Therapy Attempt     RN approved therapy attempt, chart reviewed. Upon arrival, pt in bed, stating that she is ambulating independently within room and has completed all ADLs this morning. Denied further therapy needs. Will sign off at this time, will need new orders post op Pacemaker placement.      Richard Avila, OTR/L [id number]

## 2022-10-17 NOTE — PROGRESS NOTES
Physical Therapy  Attempted to see pt for PT. Pt states she would like to complete breakfast first. Pt seated EOB with family present. Will re-attempt as schedule permits. Thank you.   Luigi Peralta, PT, DPT

## 2022-10-18 ENCOUNTER — NURSE ONLY (OUTPATIENT)
Dept: CARDIOLOGY CLINIC | Age: 77
End: 2022-10-18

## 2022-10-18 ENCOUNTER — ANESTHESIA (OUTPATIENT)
Dept: CARDIAC CATH/INVASIVE PROCEDURES | Age: 77
DRG: 243 | End: 2022-10-18
Payer: MEDICARE

## 2022-10-18 ENCOUNTER — ANESTHESIA EVENT (OUTPATIENT)
Dept: CARDIAC CATH/INVASIVE PROCEDURES | Age: 77
DRG: 243 | End: 2022-10-18
Payer: MEDICARE

## 2022-10-18 DIAGNOSIS — I49.5 SICK SINUS SYNDROME (HCC): Primary | ICD-10-CM

## 2022-10-18 DIAGNOSIS — Z95.0 PACEMAKER: ICD-10-CM

## 2022-10-18 PROBLEM — R00.1 BRADYCARDIA: Status: ACTIVE | Noted: 2022-10-18

## 2022-10-18 LAB
ANION GAP SERPL CALCULATED.3IONS-SCNC: 16 MMOL/L (ref 3–16)
BASOPHILS ABSOLUTE: 0 K/UL (ref 0–0.2)
BASOPHILS RELATIVE PERCENT: 0.3 %
BUN BLDV-MCNC: 28 MG/DL (ref 7–20)
CALCIUM SERPL-MCNC: 9.2 MG/DL (ref 8.3–10.6)
CHLORIDE BLD-SCNC: 95 MMOL/L (ref 99–110)
CO2: 23 MMOL/L (ref 21–32)
CREAT SERPL-MCNC: 1.6 MG/DL (ref 0.6–1.2)
EOSINOPHILS ABSOLUTE: 0.1 K/UL (ref 0–0.6)
EOSINOPHILS RELATIVE PERCENT: 1.7 %
GFR SERPL CREATININE-BSD FRML MDRD: 32 ML/MIN/{1.73_M2}
GLUCOSE BLD-MCNC: 110 MG/DL (ref 70–99)
GLUCOSE BLD-MCNC: 123 MG/DL (ref 70–99)
HCT VFR BLD CALC: 29.5 % (ref 36–48)
HEMOGLOBIN: 9.4 G/DL (ref 12–16)
LYMPHOCYTES ABSOLUTE: 1 K/UL (ref 1–5.1)
LYMPHOCYTES RELATIVE PERCENT: 14.6 %
MCH RBC QN AUTO: 27.6 PG (ref 26–34)
MCHC RBC AUTO-ENTMCNC: 31.9 G/DL (ref 31–36)
MCV RBC AUTO: 86.5 FL (ref 80–100)
MONOCYTES ABSOLUTE: 0.8 K/UL (ref 0–1.3)
MONOCYTES RELATIVE PERCENT: 11.1 %
NEUTROPHILS ABSOLUTE: 5.2 K/UL (ref 1.7–7.7)
NEUTROPHILS RELATIVE PERCENT: 72.3 %
PDW BLD-RTO: 13.9 % (ref 12.4–15.4)
PERFORMED ON: ABNORMAL
PLATELET # BLD: 270 K/UL (ref 135–450)
PMV BLD AUTO: 7.6 FL (ref 5–10.5)
POTASSIUM REFLEX MAGNESIUM: 4.1 MMOL/L (ref 3.5–5.1)
RBC # BLD: 3.42 M/UL (ref 4–5.2)
SODIUM BLD-SCNC: 134 MMOL/L (ref 136–145)
WBC # BLD: 7.2 K/UL (ref 4–11)

## 2022-10-18 PROCEDURE — 2500000003 HC RX 250 WO HCPCS: Performed by: NURSE ANESTHETIST, CERTIFIED REGISTERED

## 2022-10-18 PROCEDURE — C1894 INTRO/SHEATH, NON-LASER: HCPCS

## 2022-10-18 PROCEDURE — 6360000002 HC RX W HCPCS: Performed by: NURSE PRACTITIONER

## 2022-10-18 PROCEDURE — 6370000000 HC RX 637 (ALT 250 FOR IP): Performed by: NURSE PRACTITIONER

## 2022-10-18 PROCEDURE — 02H63JZ INSERTION OF PACEMAKER LEAD INTO RIGHT ATRIUM, PERCUTANEOUS APPROACH: ICD-10-PCS | Performed by: INTERNAL MEDICINE

## 2022-10-18 PROCEDURE — 2060000000 HC ICU INTERMEDIATE R&B

## 2022-10-18 PROCEDURE — 33208 INSRT HEART PM ATRIAL & VENT: CPT

## 2022-10-18 PROCEDURE — 0JH606Z INSERTION OF PACEMAKER, DUAL CHAMBER INTO CHEST SUBCUTANEOUS TISSUE AND FASCIA, OPEN APPROACH: ICD-10-PCS | Performed by: INTERNAL MEDICINE

## 2022-10-18 PROCEDURE — 36415 COLL VENOUS BLD VENIPUNCTURE: CPT

## 2022-10-18 PROCEDURE — 2580000003 HC RX 258

## 2022-10-18 PROCEDURE — 2580000003 HC RX 258: Performed by: NURSE ANESTHETIST, CERTIFIED REGISTERED

## 2022-10-18 PROCEDURE — 80048 BASIC METABOLIC PNL TOTAL CA: CPT

## 2022-10-18 PROCEDURE — 6370000000 HC RX 637 (ALT 250 FOR IP)

## 2022-10-18 PROCEDURE — 2500000003 HC RX 250 WO HCPCS

## 2022-10-18 PROCEDURE — 2580000003 HC RX 258: Performed by: NURSE PRACTITIONER

## 2022-10-18 PROCEDURE — 6360000002 HC RX W HCPCS

## 2022-10-18 PROCEDURE — 85025 COMPLETE CBC W/AUTO DIFF WBC: CPT

## 2022-10-18 PROCEDURE — 33208 INSRT HEART PM ATRIAL & VENT: CPT | Performed by: INTERNAL MEDICINE

## 2022-10-18 PROCEDURE — 6360000002 HC RX W HCPCS: Performed by: NURSE ANESTHETIST, CERTIFIED REGISTERED

## 2022-10-18 PROCEDURE — C1785 PMKR, DUAL, RATE-RESP: HCPCS

## 2022-10-18 PROCEDURE — C1898 LEAD, PMKR, OTHER THAN TRANS: HCPCS

## 2022-10-18 PROCEDURE — 6370000000 HC RX 637 (ALT 250 FOR IP): Performed by: INTERNAL MEDICINE

## 2022-10-18 PROCEDURE — 02HK3JZ INSERTION OF PACEMAKER LEAD INTO RIGHT VENTRICLE, PERCUTANEOUS APPROACH: ICD-10-PCS | Performed by: INTERNAL MEDICINE

## 2022-10-18 PROCEDURE — 99233 SBSQ HOSP IP/OBS HIGH 50: CPT | Performed by: NURSE PRACTITIONER

## 2022-10-18 PROCEDURE — 97110 THERAPEUTIC EXERCISES: CPT

## 2022-10-18 PROCEDURE — 97116 GAIT TRAINING THERAPY: CPT

## 2022-10-18 PROCEDURE — 2709999900 HC NON-CHARGEABLE SUPPLY

## 2022-10-18 RX ORDER — SODIUM CHLORIDE, SODIUM LACTATE, POTASSIUM CHLORIDE, CALCIUM CHLORIDE 600; 310; 30; 20 MG/100ML; MG/100ML; MG/100ML; MG/100ML
INJECTION, SOLUTION INTRAVENOUS CONTINUOUS PRN
Status: DISCONTINUED | OUTPATIENT
Start: 2022-10-18 | End: 2022-10-18 | Stop reason: SDUPTHER

## 2022-10-18 RX ORDER — ONDANSETRON 2 MG/ML
INJECTION INTRAMUSCULAR; INTRAVENOUS PRN
Status: DISCONTINUED | OUTPATIENT
Start: 2022-10-18 | End: 2022-10-18 | Stop reason: SDUPTHER

## 2022-10-18 RX ORDER — LIDOCAINE HYDROCHLORIDE 20 MG/ML
INJECTION, SOLUTION EPIDURAL; INFILTRATION; INTRACAUDAL; PERINEURAL PRN
Status: DISCONTINUED | OUTPATIENT
Start: 2022-10-18 | End: 2022-10-18 | Stop reason: SDUPTHER

## 2022-10-18 RX ORDER — SODIUM CHLORIDE 0.9 % (FLUSH) 0.9 %
5-40 SYRINGE (ML) INJECTION PRN
Status: DISCONTINUED | OUTPATIENT
Start: 2022-10-18 | End: 2022-10-19 | Stop reason: HOSPADM

## 2022-10-18 RX ORDER — PROPOFOL 10 MG/ML
INJECTION, EMULSION INTRAVENOUS PRN
Status: DISCONTINUED | OUTPATIENT
Start: 2022-10-18 | End: 2022-10-18 | Stop reason: SDUPTHER

## 2022-10-18 RX ORDER — SODIUM CHLORIDE 9 MG/ML
INJECTION, SOLUTION INTRAVENOUS PRN
Status: DISCONTINUED | OUTPATIENT
Start: 2022-10-18 | End: 2022-10-19 | Stop reason: HOSPADM

## 2022-10-18 RX ORDER — OXYCODONE HYDROCHLORIDE 5 MG/1
5 TABLET ORAL PRN
Status: DISCONTINUED | OUTPATIENT
Start: 2022-10-18 | End: 2022-10-18 | Stop reason: ALTCHOICE

## 2022-10-18 RX ORDER — ONDANSETRON 2 MG/ML
4 INJECTION INTRAMUSCULAR; INTRAVENOUS
Status: DISCONTINUED | OUTPATIENT
Start: 2022-10-18 | End: 2022-10-18 | Stop reason: ALTCHOICE

## 2022-10-18 RX ORDER — LABETALOL HYDROCHLORIDE 5 MG/ML
5 INJECTION, SOLUTION INTRAVENOUS EVERY 10 MIN PRN
Status: DISCONTINUED | OUTPATIENT
Start: 2022-10-18 | End: 2022-10-18 | Stop reason: ALTCHOICE

## 2022-10-18 RX ORDER — DIPHENHYDRAMINE HYDROCHLORIDE 50 MG/ML
12.5 INJECTION INTRAMUSCULAR; INTRAVENOUS
Status: DISCONTINUED | OUTPATIENT
Start: 2022-10-18 | End: 2022-10-18 | Stop reason: ALTCHOICE

## 2022-10-18 RX ORDER — SODIUM CHLORIDE 0.9 % (FLUSH) 0.9 %
5-40 SYRINGE (ML) INJECTION EVERY 12 HOURS SCHEDULED
Status: DISCONTINUED | OUTPATIENT
Start: 2022-10-18 | End: 2022-10-19 | Stop reason: HOSPADM

## 2022-10-18 RX ORDER — EPHEDRINE SULFATE 50 MG/ML
INJECTION INTRAVENOUS PRN
Status: DISCONTINUED | OUTPATIENT
Start: 2022-10-18 | End: 2022-10-18 | Stop reason: SDUPTHER

## 2022-10-18 RX ORDER — OXYCODONE HYDROCHLORIDE 5 MG/1
10 TABLET ORAL PRN
Status: DISCONTINUED | OUTPATIENT
Start: 2022-10-18 | End: 2022-10-18 | Stop reason: ALTCHOICE

## 2022-10-18 RX ORDER — MORPHINE SULFATE 2 MG/ML
2 INJECTION, SOLUTION INTRAMUSCULAR; INTRAVENOUS EVERY 4 HOURS PRN
Status: DISCONTINUED | OUTPATIENT
Start: 2022-10-18 | End: 2022-10-19 | Stop reason: HOSPADM

## 2022-10-18 RX ORDER — FENTANYL CITRATE 50 UG/ML
INJECTION, SOLUTION INTRAMUSCULAR; INTRAVENOUS PRN
Status: DISCONTINUED | OUTPATIENT
Start: 2022-10-18 | End: 2022-10-18 | Stop reason: SDUPTHER

## 2022-10-18 RX ORDER — SODIUM CHLORIDE 9 MG/ML
INJECTION, SOLUTION INTRAVENOUS CONTINUOUS PRN
Status: DISCONTINUED | OUTPATIENT
Start: 2022-10-18 | End: 2022-10-18 | Stop reason: SDUPTHER

## 2022-10-18 RX ORDER — CEFAZOLIN SODIUM 1 G/3ML
INJECTION, POWDER, FOR SOLUTION INTRAMUSCULAR; INTRAVENOUS PRN
Status: DISCONTINUED | OUTPATIENT
Start: 2022-10-18 | End: 2022-10-18 | Stop reason: SDUPTHER

## 2022-10-18 RX ADMIN — HYDRALAZINE HYDROCHLORIDE 10 MG: 20 INJECTION INTRAMUSCULAR; INTRAVENOUS at 12:01

## 2022-10-18 RX ADMIN — HYDRALAZINE HYDROCHLORIDE 10 MG: 20 INJECTION INTRAMUSCULAR; INTRAVENOUS at 00:30

## 2022-10-18 RX ADMIN — SODIUM CHLORIDE, PRESERVATIVE FREE 10 ML: 5 INJECTION INTRAVENOUS at 08:46

## 2022-10-18 RX ADMIN — ONDANSETRON 4 MG: 2 INJECTION INTRAMUSCULAR; INTRAVENOUS at 17:11

## 2022-10-18 RX ADMIN — APIXABAN 5 MG: 5 TABLET, FILM COATED ORAL at 20:53

## 2022-10-18 RX ADMIN — EPHEDRINE SULFATE 5 MG: 50 INJECTION INTRAVENOUS at 17:29

## 2022-10-18 RX ADMIN — CEFAZOLIN 1 G: 1 INJECTION, POWDER, FOR SOLUTION INTRAMUSCULAR; INTRAVENOUS at 17:11

## 2022-10-18 RX ADMIN — METOPROLOL SUCCINATE 25 MG: 25 TABLET, EXTENDED RELEASE ORAL at 08:46

## 2022-10-18 RX ADMIN — EPHEDRINE SULFATE 5 MG: 50 INJECTION INTRAVENOUS at 18:04

## 2022-10-18 RX ADMIN — LIDOCAINE HYDROCHLORIDE 60 MG: 20 INJECTION, SOLUTION EPIDURAL; INFILTRATION; INTRACAUDAL; PERINEURAL at 17:11

## 2022-10-18 RX ADMIN — ACETAMINOPHEN 650 MG: 325 TABLET ORAL at 20:56

## 2022-10-18 RX ADMIN — MORPHINE SULFATE 2 MG: 2 INJECTION, SOLUTION INTRAMUSCULAR; INTRAVENOUS at 23:44

## 2022-10-18 RX ADMIN — SODIUM CHLORIDE, PRESERVATIVE FREE 5 ML: 5 INJECTION INTRAVENOUS at 22:24

## 2022-10-18 RX ADMIN — PROPOFOL 100 MG: 10 INJECTION, EMULSION INTRAVENOUS at 17:11

## 2022-10-18 RX ADMIN — AMIODARONE HYDROCHLORIDE 100 MG: 200 TABLET ORAL at 08:46

## 2022-10-18 RX ADMIN — FENTANYL CITRATE 50 MCG: 50 INJECTION INTRAMUSCULAR; INTRAVENOUS at 17:11

## 2022-10-18 RX ADMIN — APIXABAN 5 MG: 5 TABLET, FILM COATED ORAL at 08:46

## 2022-10-18 RX ADMIN — FAMOTIDINE 20 MG: 20 TABLET, FILM COATED ORAL at 08:46

## 2022-10-18 RX ADMIN — SODIUM CHLORIDE: 9 INJECTION, SOLUTION INTRAVENOUS at 17:11

## 2022-10-18 RX ADMIN — SODIUM CHLORIDE, SODIUM LACTATE, POTASSIUM CHLORIDE, AND CALCIUM CHLORIDE: .6; .31; .03; .02 INJECTION, SOLUTION INTRAVENOUS at 17:01

## 2022-10-18 NOTE — PROGRESS NOTES
Nephrology Progress Note   [facility]      This patient is a 76year old female whom we are following for hyponatremia and CKD. Subjective: The patient was seen and examined. BP running high. For PPM placement today. Family History: No family at bedside  ROS: No nausea or vomiting      Vitals:  BP (!) 167/83   Pulse 62   Temp 98.5 °F (36.9 °C) (Oral)   Resp 18   Ht 5' (1.524 m)   Wt 156 lb 9.6 oz (71 kg)   SpO2 97%   BMI 30.58 kg/m²   No intake/output data recorded. No intake/output data recorded. Physical Exam  Vitals reviewed. Constitutional:       General: She is not in acute distress. HENT:      Head: Normocephalic and atraumatic. Eyes:      General: No scleral icterus. Conjunctiva/sclera: Conjunctivae normal.   Cardiovascular:      Rate and Rhythm: Normal rate. Heart sounds: No friction rub. Pulmonary:      Effort: Pulmonary effort is normal. No respiratory distress. Abdominal:      General: Bowel sounds are normal. There is no distension. Tenderness: There is no abdominal tenderness. Musculoskeletal:      Right lower leg: No edema. Left lower leg: No edema. Neurological:      Mental Status: She is alert.          Medications:   sodium chloride flush  5-40 mL IntraVENous 2 times per day    metoprolol succinate  25 mg Oral Daily    amiodarone  100 mg Oral Daily    famotidine  20 mg Oral Daily    sodium chloride flush  5-40 mL IntraVENous 2 times per day    apixaban  5 mg Oral BID         Labs:  Recent Labs     10/18/22  0418   WBC 7.2   HGB 9.4*   HCT 29.5*   MCV 86.5        Recent Labs     10/16/22  0417 10/17/22  0421 10/18/22  0418   * 133* 134*   K 3.9 4.2 4.1   CL 96* 96* 95*   CO2 25 23 23   GLUCOSE 124* 108* 110*   MG 1.90  --   --    BUN 35* 29* 28*   CREATININE 1.6* 1.3* 1.6*   LABGLOM 31* 39* 32*   GFRAA 38* 48*  --          Assessment/Plan:     - Hyponatremia - acute in setting of hctz with impaired urinary dilution  Urine sodium of less than 20, osmolality 487                Appropriate increase in sodium considering sodium of 124 from 10/13                Sodium was 137 on November 2021      Improving/stable. Continue fluid restriction 1.5L/day. Encourage protein intake. - Chronic kidney disease stage 4 - baseline SCr 1.7-1.8, appears to have background Hypertensive Nephrosclerosis, follows with local Nephrologist in [facility]. Stable. - Neurogenic bladder - on ISC     - Hypertension - poorly controlled. Meds adjustement after PPM placement. - Atrial fibrillation - s/p MADISON & unsuccessful cardioversion, required IV amiodarone, plans per Cardiology. For PPM placement today. Discussed with Dr. Heaven Pompa. Please do not hesitate to contact me at [phone] if with questions. Thank you! Clyde Faust MD  [facility] [employer]  10/18/2022

## 2022-10-18 NOTE — CARE COORDINATION
Getting pacemaker today. IPTA and from home with daughter. Has recommendations for home PT/OT and PRN assistance. Will discuss home care when returns from pacemaker surgery and can arrange if agreeable.

## 2022-10-18 NOTE — PROGRESS NOTES
Hospitalist Progress Note      PCP: Jojo Devi MD    Date of Admission: 10/13/2022    Chief Complaint: Tachycardia    Hospital Course: Admitted with tachyarrhythmia with pauses. Evaluated by cardiology and cardiac electrophysiology. Pacemaker planned for tomorrow. Heart rate has been uncontrolled with episodes of bradycardia. Patient is known to have chronic kidney disease. Also came in with hyponatremia. Hydrochlorothiazide stopped. Patient also was on Celebrex. This was discontinued. Creatinine 1.6 today. Patient will have pacemaker later today    Subjective: No chest pain, no shortness of breath, no nausea, no vomiting, no abdominal pain. No new issues or problems. Medications:  Reviewed    Infusion Medications    sodium chloride      dilTIAZem (CARDIZEM) 125 mg in dextrose 5% 125 mL infusion Stopped (10/17/22 0556)    sodium chloride       Scheduled Medications    sodium chloride flush  5-40 mL IntraVENous 2 times per day    metoprolol succinate  25 mg Oral Daily    amiodarone  100 mg Oral Daily    famotidine  20 mg Oral Daily    sodium chloride flush  5-40 mL IntraVENous 2 times per day    apixaban  5 mg Oral BID     PRN Meds: sodium chloride flush, sodium chloride, hydrALAZINE, sodium chloride flush, sodium chloride, ondansetron **OR** ondansetron, polyethylene glycol, acetaminophen **OR** acetaminophen    No intake or output data in the 24 hours ending 10/18/22 1353    Physical Exam Performed:    BP (!) 173/73   Pulse 63   Temp 98 °F (36.7 °C) (Oral)   Resp 18   Ht 5' (1.524 m)   Wt 156 lb 9.6 oz (71 kg)   SpO2 97%   BMI 30.58 kg/m²     General appearance: No apparent distress, appears stated age and cooperative. HEENT: Pupils equal, round, and reactive to light. Conjunctivae/corneas clear. Neck: Supple, with full range of motion. No jugular venous distention. Trachea midline. Respiratory:  Normal respiratory effort.  Clear to auscultation, bilaterally without Rales/Wheezes/Rhonchi. Cardiovascular: Irregularly irregular rhythm with normal S1/S2 without murmurs, rubs or gallops. Abdomen: Soft, non-tender, non-distended with normal bowel sounds. Musculoskeletal: No clubbing, cyanosis or edema bilaterally. Full range of motion without deformity. Skin: Skin color, texture, turgor normal.  No rashes or lesions. Neurologic:  Neurovascularly intact without any focal sensory/motor deficits. Cranial nerves: II-XII intact, grossly non-focal.  Psychiatric: Alert and oriented, thought content appropriate, normal insight  Capillary Refill: Brisk, 3 seconds, normal   Peripheral Pulses: +2 palpable, equal bilaterally     I examined the patient's today (10/18/22). Physical exam is similar to yesterday (10/17). Labs:   Recent Labs     10/18/22  0418   WBC 7.2   HGB 9.4*   HCT 29.5*        Recent Labs     10/16/22  0417 10/17/22  0421 10/18/22  0418   * 133* 134*   K 3.9 4.2 4.1   CL 96* 96* 95*   CO2 25 23 23   BUN 35* 29* 28*   CREATININE 1.6* 1.3* 1.6*   CALCIUM 9.4 9.4 9.2       No results for input(s): AST, ALT, BILIDIR, BILITOT, ALKPHOS in the last 72 hours. No results for input(s): INR in the last 72 hours. No results for input(s): Pamela Clifford in the last 72 hours. Urinalysis:      Lab Results   Component Value Date/Time    NITRU Negative 11/23/2021 05:20 PM    BLOODU Negative 11/23/2021 05:20 PM    SPECGRAV 1.020 11/23/2021 05:20 PM    GLUCOSEU Negative 11/23/2021 05:20 PM       Radiology:  No orders to display           Assessment/Plan:    Active Hospital Problems    Diagnosis     Tachyarrhythmia [R00.0]      Priority: Medium    Obesity [E66.9]      Priority: Medium    Persistent atrial fibrillation ([address] [I48.19]      Priority: Medium    Hypertension [I10]      PLAN:    Paroxysmal atrial fibrillation with tachy/bradycardia  Cardiac electrophysiology following. Pacemaker is planned for later today. Heart rate is acceptable.   Continue Eliquis. Cardiology input appreciated    Chronic kidney disease stage IV  Creatinine at 1.6. Nephrology following. Off non-steroids. Hyponatremia  Improving sodium after hydrochlorothiazide discontinued. Continue to monitor  Nephrology following. Input appreciated. Hypertension  Blood pressure still elevated but acceptable. It may be easier to titrate antihypertensive medications once pacemaker is implanted. Currently no changes in medical management    Obesity  Body mass index is 30.58 kg/m². Complicating assessment and treatment, placing patient at high risk for multiple co-morbidities as well as early death and contributing to the patient's presentation. Counseled on weight loss. Discussed with the patient. Questions answered    Discussed with cardiac EP    Discussed with nephrology    DVT Prophylaxis: Eliquis  Diet: Diet NPO Exceptions are: Sips of Water with Meds  Code Status: Full Code  PT/OT Eval Status: Ordered    Dispo -inpatient stay pending pacemaker and renal function stabilization.   Best case scenario would be to discharge tomorrow after pacemaker check    Appropriate for A1 Discharge Unit: Diane Zamudio MD

## 2022-10-18 NOTE — ANESTHESIA PRE PROCEDURE
Department of Anesthesiology  Preprocedure Note       Name:  Wille Meigs   Age:  76 y.o.  :  1945                                          MRN:  [id number]         Date:  10/18/2022      Surgeon: * Surgery not found *    Procedure:     Medications prior to admission:   Prior to Admission medications    Medication Sig Start Date End Date Taking?  Authorizing Provider   vitamin B-12 (CYANOCOBALAMIN) 100 MCG tablet Take 50 mcg by mouth daily    Historical Provider, MD   Cholecalciferol (VITAMIN D) 50 MCG (2000) CAPS capsule Take by mouth    Historical Provider, MD   famotidine (PEPCID) 20 MG tablet Take 20 mg by mouth daily    Historical Provider, MD   hydroCHLOROthiazide (HYDRODIURIL) 25 MG tablet Take 25 mg by mouth in the morning and at bedtime    Historical Provider, MD   atenolol (TENORMIN) 50 MG tablet Take 50 mg by mouth 2 times daily  12/13/10   Historical Provider, MD       Current medications:    Current Facility-Administered Medications   Medication Dose Route Frequency Provider Last Rate Last Admin    sodium chloride flush 0.9 % injection 5-40 mL  5-40 mL IntraVENous 2 times per day Chloe Mallory APRN - CNP   10 mL at 10/18/22 0846    sodium chloride flush 0.9 % injection 5-40 mL  5-40 mL IntraVENous PRN Chloe Malmedahl, APRN - CNP        0.9 % sodium chloride infusion   IntraVENous PRN Chloe Nicholsonmedalanna, APRN - CNP        hydrALAZINE (APRESOLINE) injection 10 mg  10 mg IntraVENous Q6H PRN Anna Cartagena APRN - CNP   10 mg at 10/18/22 1201    metoprolol succinate (TOPROL XL) extended release tablet 25 mg  25 mg Oral Daily Yinahailee Iqbal APRN - CNP   25 mg at 10/18/22 0846    dilTIAZem 125 mg in dextrose 5 % 125 mL infusion  2.5-15 mg/hr IntraVENous Continuous Yina Iqbal APRN - CNP   Stopped at 10/17/22 0556    amiodarone (CORDARONE) tablet 100 mg  100 mg Oral Daily Yinahailee Iqbal, APRN - CNP   100 mg at 10/18/22 0846    famotidine (PEPCID) tablet 20 mg  20 mg Oral Daily Renee Ordoñez, APRN - CNP   20 mg at 10/18/22 0846    sodium chloride flush 0.9 % injection 5-40 mL  5-40 mL IntraVENous 2 times per day Abilio Stephens APRN - CNP   5 mL at 10/17/22 2022    sodium chloride flush 0.9 % injection 5-40 mL  5-40 mL IntraVENous PRN Abilio Stephens APRN - CNP        0.9 % sodium chloride infusion   IntraVENous PRN Abilio Stephens APRN - CNP        ondansetron (ZOFRAN-ODT) disintegrating tablet 4 mg  4 mg Oral Q8H PRN Abilio Stephens, APRN - CNP   4 mg at 10/14/22 0049    Or    ondansetron (ZOFRAN) injection 4 mg  4 mg IntraVENous Q6H PRN Abilio Stephens APRN - CNP        polyethylene glycol (GLYCOLAX) packet 17 g  17 g Oral Daily PRN Abilio Stephens, APRN - CNP        acetaminophen (TYLENOL) tablet 650 mg  650 mg Oral Q6H PRN Abilio Stephens APRN - CNP   650 mg at 10/13/22 2220    Or    acetaminophen (TYLENOL) suppository 650 mg  650 mg Rectal Q6H PRN Abilio Stephens APRN - CNP        apixaban (ELIQUIS) tablet 5 mg  5 mg Oral BID Holland Crump MD   5 mg at 10/18/22 0846       Allergies:     Allergies   Allergen Reactions    Prednisone Other (See Comments)     Face gets red and hot       Problem List:    Patient Active Problem List   Diagnosis Code    Localized osteoarthrosis not specified whether primary or secondary, pelvic region and thigh M16.10    DDD (degenerative disc disease), lumbar M51.36    Acute pain of right knee M25.561    Primary osteoarthritis of right knee M17.11    Degenerative tear of lateral meniscus of right knee M23.300    Closed subchondral insufficiency fracture of femoral condyle (HCC) M84.453A    Insufficiency fracture of tibia M84.469A    Hypertension I10    Anemia associated with acute blood loss D62    S/P TKR (total knee replacement), right Z96.651    Persistent atrial fibrillation (HCC) I48.19    Narrow complex tachycardia (HCC) I47.1    Sinus pause I45.5    Stage 4 chronic kidney disease ([facility] [address] N18.4    Tachyarrhythmia R00.0    Obesity E66.9       Past Medical History:        Diagnosis Date    Arthritis     Closed subchondral insufficiency fracture of femoral condyle ([address] 08/12/2020    Hypertension     Localized osteoarthrosis not specified whether primary or secondary, pelvic region and thigh 05/08/2015    Neurogenic bladder     caused by a back surgery in 2017 per pt       Past Surgical History:        Procedure Laterality Date    CATARACT REMOVAL WITH IMPLANT  1/14/11    LEFT EYE    EYE SURGERY  12/14/2010    cataract rt eye    HYSTERECTOMY (CERVIX STATUS UNKNOWN)     [address]    left hip    OTHER SURGICAL HISTORY Right     RIGHT TOTAL KNEE REPLACEMENT     TOTAL KNEE ARTHROPLASTY Right 12/6/2021    RIGHT TOTAL KNEE REPLACEMENT performed by Meghan Wyatt MD at [place] [address] History:    Social History     Tobacco Use    Smoking status: Never    Smokeless tobacco: Never   Substance Use Topics    Alcohol use: No                                Counseling given: Not Answered      Vital Signs (Current):   Vitals:    10/18/22 0500 10/18/22 0845 10/18/22 0923 10/18/22 1145   BP: (!) 162/76 (!) 167/83 (!) 167/83 (!) 173/73   Pulse: 67 62 62 63   Resp: 18 18  18   Temp: 98.2 °F (36.8 °C) 98.5 °F (36.9 °C)  98 °F (36.7 °C)   TempSrc: Oral Oral  Oral   SpO2: 96% 97% 97% 97%   Weight:       Height:                                                  BP Readings from Last 3 Encounters:   10/18/22 (!) 173/73   10/12/22 136/76   12/07/21 (!) 151/73       NPO Status:                                                                                 BMI:   Wt Readings from Last 3 Encounters:   10/14/22 156 lb 9.6 oz (71 kg)   10/13/22 155 lb 2 oz (70.4 kg)   02/10/22 151 lb (68.5 kg)     Body mass index is 30.58 kg/m².     CBC:   Lab Results   Component Value Date/Time    WBC 7.2 10/18/2022 04:18 AM    RBC 3.42 10/18/2022 04:18 AM    HGB 9.4 10/18/2022 04:18 AM    HCT 29.5 10/18/2022 04:18 AM    MCV 86.5 10/18/2022 04:18 AM    RDW 13.9 10/18/2022 04:18 AM     10/18/2022 04:18 AM       CMP:   Lab Results   Component Value Date/Time     10/18/2022 04:18 AM    K 4.1 10/18/2022 04:18 AM    CL 95 10/18/2022 04:18 AM    CO2 23 10/18/2022 04:18 AM    BUN 28 10/18/2022 04:18 AM    CREATININE 1.6 10/18/2022 04:18 AM    GFRAA 48 10/17/2022 04:21 AM    AGRATIO 1.8 10/12/2022 06:04 AM    LABGLOM 32 10/18/2022 04:18 AM    GLUCOSE 110 10/18/2022 04:18 AM    PROT 6.3 10/15/2022 02:17 AM    CALCIUM 9.2 10/18/2022 04:18 AM    BILITOT 0.6 10/15/2022 02:17 AM    ALKPHOS 114 10/15/2022 02:17 AM    AST 83 10/15/2022 02:17 AM     10/15/2022 02:17 AM       POC Tests: No results for input(s): POCGLU, POCNA, POCK, POCCL, POCBUN, POCHEMO, POCHCT in the last 72 hours.     Coags:   Lab Results   Component Value Date/Time    PROTIME 18.4 10/14/2022 03:32 AM    INR 1.54 10/14/2022 03:32 AM    APTT 34.8 11/23/2021 05:25 PM       HCG (If Applicable): No results found for: PREGTESTUR, PREGSERUM, HCG, HCGQUANT     ABGs: No results found for: PHART, PO2ART, VXZ4MJX, QOD0IXS, BEART, V2JTVKOV     Type & Screen (If Applicable):  No results found for: LABABO, LABRH    Drug/Infectious Status (If Applicable):  No results found for: HIV, HEPCAB    COVID-19 Screening (If Applicable):   Lab Results   Component Value Date/Time    COVID19 NOT DETECTED 10/11/2022 12:14 PM           Anesthesia Evaluation  Patient summary reviewed no history of anesthetic complications:   Airway: Mallampati: III  TM distance: >3 FB   Neck ROM: full  Mouth opening: < 3 FB   Dental:    (+) upper dentures and lower dentures      Pulmonary:normal exam  breath sounds clear to auscultation      (-) COPD, asthma and sleep apnea                           Cardiovascular:  Exercise tolerance: good (>4 METS),   (+) hypertension:, valvular problems/murmurs: MR, dysrhythmias: atrial fibrillation, pulmonary hypertension:,     (-) CAD,  angina and  TITUS      Rhythm: regular  Rate: normal                    Neuro/Psych: (-) seizures and TIA           GI/Hepatic/Renal:   (+) GERD: well controlled, renal disease: CRI,      (-) liver disease       Endo/Other:        (-) diabetes mellitus               Abdominal:             Vascular: negative vascular ROS. Other Findings:           Anesthesia Plan      general     ASA 3     (I discussed with the patient the risks and benefits of PIV, anesthesia, IV Narcotics, PACU. All questions were answered the patient agrees with the plan and wishes to proceed)  Induction: intravenous.                             Oracio Valentin MD   10/18/2022

## 2022-10-18 NOTE — PROGRESS NOTES
Physical Therapy  Facility/Department: [facility] C4 PCU  Daily Treatment Note  NAME: Mitra Hopson  : 1945  MRN: [id number]    Date of Service: 10/18/2022    Discharge Recommendations:  Home with assist PRN, Home with Home health PT   PT Equipment Recommendations  Equipment Needed: No  [facility] 6 Clicks Inpatient Mobility:  AM-PAC Mobility Inpatient   How much difficulty turning over in bed?: None  How much difficulty sitting down on / standing up from a chair with arms?: A Little  How much difficulty moving from lying on back to sitting on side of bed?: None  How much help from another person moving to and from a bed to a chair?: A Little  How much help from another person needed to walk in hospital room?: A Little  How much help from another person for climbing 3-5 steps with a railing?: A Little  AM-PAC Inpatient Mobility Raw Score : 20  AM-PAC Inpatient T-Scale Score : 47.67  Mobility Inpatient CMS 0-100% Score: 35.83  Mobility Inpatient CMS G-Code Modifier : CJ    Patient Diagnosis(es): There were no encounter diagnoses. Assessment   Assessment: pt demos superv  A for tranfers and sba for ambulation, pt somewhat limited by activity tolerance but with good recall of therex from knee and hip surgeries in past, pt will benefit from continued Acute Inpatient Skilled PT to address functional mobility deficits, agree with previous PT rec for home with assist prn, HHPT, no AD needs at this time  Activity Tolerance: Patient limited by fatigue  Equipment Needed: No     Plan    Physcial Therapy Plan  General Plan: 3-5 times per week  Specific Instructions for Next Treatment: Progress ther ex and mobility as tolerated  Current Treatment Recommendations: Strengthening;Balance training;Functional mobility training;Transfer training; Endurance training;Gait training;Home exercise program;Safety education & training;Patient/Caregiver education & training; Therapeutic activities Pt will ambulate x 150 feet without AD with supervision/modified(I)  -10/18 90' NAD sba  Short Term Goal 4: By 10/16/22: Pt will tolerate 12-15 reps BLE exercise for strengthening, balance, and endurance  -10/18 met  Patient Goals   Patient Goals :  \"To go home\"    Education  Patient Education  Education Given To: Patient  Education Provided: Home Exercise Program  Education Method: Demonstration;Verbal  Barriers to Learning: None  Education Outcome: Verbalized understanding;Demonstrated understanding    Therapy Time   Individual Concurrent Group Co-treatment   Time In [phone]         Time Out 0900         Minutes 25         Timed Code Treatment Minutes: [address] [facility]

## 2022-10-18 NOTE — ANESTHESIA POSTPROCEDURE EVALUATION
Department of Anesthesiology  Postprocedure Note    Patient: Yousuf Martinez  MRN: [id number]  YOB: 1945  Date of evaluation: 10/18/2022      Procedure Summary     Date: 10/18/22 Room / Location: [facility] Cardiac Cath Lab    Anesthesia Start: 1701 Anesthesia Stop: 5384    Procedure: PACEMAKER Diagnosis:     Scheduled Providers:  Responsible Provider: Arturo De La Cruz MD    Anesthesia Type: general ASA Status: 3          Anesthesia Type: No value filed.     Barrett Phase I: Barrett Score: 10    Barrett Phase II:        Anesthesia Post Evaluation    Comments: Postoperative Anesthesia Note    Name:    Yousuf Martinez  MRN:      [id number]    Patient Vitals in the past 12 hrs:  10/18/22 1842, BP:131/65, Temp:97 °F (36.1 °C), Temp src:Temporal, Pulse:70, Resp:17, SpO2:95 %  10/18/22 1145, BP:(!) 173/73, Temp:98 °F (36.7 °C), Temp src:Oral, Pulse:63, Resp:18, SpO2:97 %  10/18/22 0923, BP:(!) 167/83, Pulse:62, SpO2:97 %  10/18/22 0845, BP:(!) 167/83, Temp:98.5 °F (36.9 °C), Temp src:Oral, Pulse:62, Resp:18, SpO2:97 %     LABS:    CBC  Lab Results       Component                Value               Date/Time                  WBC                      7.2                 10/18/2022 04:18 AM        HGB                      9.4 (L)             10/18/2022 04:18 AM        HCT                      29.5 (L)            10/18/2022 04:18 AM        PLT                      270                 10/18/2022 04:18 AM   RENAL  Lab Results       Component                Value               Date/Time                  NA                       134 (L)             10/18/2022 04:18 AM        K                        4.1                 10/18/2022 04:18 AM        CL                       95 (L)              10/18/2022 04:18 AM        CO2                      23                  10/18/2022 04:18 AM        BUN                      28 (H)              10/18/2022 04:18 AM        CREATININE               1.6 (H)             10/18/2022 04:18 AM GLUCOSE                  110 (H)             10/18/2022 04:18 AM   COAGS  Lab Results       Component                Value               Date/Time                  PROTIME                  18.4 (H)            10/14/2022 03:32 AM        INR                      1. 54 (H)            10/14/2022 03:32 AM        APTT                     34.8                11/23/2021 05:25 PM     Intake & Output:  @46WLJT@    Nausea & Vomiting:  No    Level of Consciousness:  Awake    Pain Assessment:  Adequate analgesia    Anesthesia Complications:  No apparent anesthetic complications    SUMMARY      Vital signs stable  OK to discharge from Stage I post anesthesia care.   Care transferred from Anesthesiology department on discharge from perioperative area

## 2022-10-18 NOTE — PROGRESS NOTES
Patient to PACU from Cath Lab. Report received from [facility]. Patient with awake and stable on Room Air. Patient denies pain when assessed. ICE applied. LUE sling in place. VS obtained and filed. Will continue to monitor. Patient tolerating ice chips.

## 2022-10-18 NOTE — PROCEDURES
PROCEDURE PERFORMED:     1. Implantation of a dual-chamber pacemaker (PPM)    Cannot find surgical log. Surgeon: Abril Magallanes MD    Complications: None    Estimated blood loss: Minimal    Anesthesia: general anesthesia    ANTIBIOTIC GIVEN:  Cefazolin 1 g IV. History/Indication: 76 y.o. female  with history of sinus node dysfunction, paroxysmal atrial fibrillation and 5 second pauses. DETAILS OF PROCEDURE: The patient was brought to the electrophysiology laboratory in stable condition. The patient was in a fasting, nonsedated state. The risks, benefits and alternatives of the procedure were discussed with the patient. The risks including, but not limited to, the risks of infection, bleeding, vascular injury, injury to cardiac or surrounding structures including pneumothorax, lead or device malfunction or dislodgement, radiation exposure, injury to cardiac structures, stroke, myocardial infarction and minimal risk to life were all discussed. The patient considered his treatment options and decided to proceed with the pacemaker implantation. A venogram of the left upper extremity was performed to confirm patency of the left subclavian vein. The patient was prepped and draped in a sterile fashion. An approximately 4-cm incision was made in the left pectoral area after administration of lidocaine/bupivicaine mixture. Using electrocautery and blunt dissection, a pocket was created. Central venous access into the left subclavian vein was obtained using the modified Seldinger technique. After central venous access was obtained, a sheath was placed in the left subclavian vein. A right ventricular lead was advanced into position in the apical septum under fluoroscopic guidance and using a series of curved stylets. The lead was actively fixated. After confirming appropriate function, the sheath was split and removed. The lead was secured to the underlying tissue.  A new sheath was advanced over a second previously placed wire in the left subclavian vein. The atrial lead was advanced to the right atrial appendage and passively fixated under fluoroscopic guidance. After confirming appropriate function, the sheath was split and removed. The lead was secured to the underlying tissue. The pocket was irrigated with a large volume irrigation solution. The leads were then connected to the new pulse generator and placed into the cleaned pocket. Final parameters were obtained and are detailed below. The pocket was closed in three successive layers using 2-0 and 4-0 Vicryl suture material. Steri-Strips and a pressure dressing were applied. The patient was transported to the holding area in stable condition. IMPLANTED MATERIALS:     1. PPM generator. Device name is [employer].  is [employer]. Model number [id number]. Serial number [id number]. Implant date of 10/18/2022 in the left pectoral area. 2. Right atrial lead. Model number 8911-91 cm. Serial number [id number].  is Medtronic. Implant date of 10/18/2022 in the right atrial appendage. 3. Right ventricular lead. Model number 3436-90 cm. Serial number [id number].  is Medtronic. Implant date of 10/18/2022 in the right ventricular septum. BASELINE PARAMETERS:     1. Right atrial lead: Sensing of the P wave was in the range of 1.3 millivolts. Pacing impedance was 418 Ohms. Capture threshold was at 0.5 volts at 0.4 milliseconds. 2. Right ventricular lead: Sensing of the R wave was in the range of 11.3 millivolts. Pacing impedance was 665 Ohms. Capture threshold was at 0.75 volts at 0.4 milliseconds. Programmed Parameters:  Mode: AAIR <--> DDDR, rate 60 to 130  Sensed AV interval 180 msec  Paced AV interval 150 msec    SUMMARY: Successful implantation of a dual-chamber PPM.    RECOMMENDATIONS:   1. Bed rest x 4 hours. 2. left arm in sling for 24 hours. 3. Dressing in place for 24 hours. 4. Pain control.    5. Vital signs per protocol. 6. Portable chest x-ray to rule out pneumothorax. 7. ECG upon arrival on floor and daily. 8. Monitor on telemetry. 9. Avoid use of subcutaneous heparin and LMWH to minimize the risk of pocket hematomas. 10. Follow-up in the electrophysiology clinic in 1 to 2 weeks for wound and device evaluation.

## 2022-10-18 NOTE — PROGRESS NOTES
[facility]     Electrophysiology                                     Progress Note    Admission date:  10/13/2022    Reason for follow up visit: AF    HPI/CC: Elieser Hawley was admitted on 10/13/2022 with shortness of breath. EKG showed rapid atrial fibrillation. She was given IV metoprolol and Cardizem and was noted to have 3 to 5-second pauses in atrial fibrillation. She was also started on amiodarone. On 10/14/2022, she underwent MADISON and unsuccessful cardioversion. Later in the day she developed nausea, dizziness and chest tightness. EKG showed atrial fibrillation with complete heart block. This lasted approximately 15 minutes and then she converted to sinus rhythm. On 10/15/2022, she was noted to be back in atrial fibrillation. She spontaneously converted to sinus rhythm this morning at 0416. Has also been treated for hyponatremia. Rhythm has been sinus/sinus bradycardia. Subjective: She has no complaints. Denies chest pain, palpitations, shortness of breath, and dizziness. Vitals:  Blood pressure (!) 167/83, pulse 62, temperature 98.5 °F (36.9 °C), temperature source Oral, resp. rate 18, height 5' (1.524 m), weight 156 lb 9.6 oz (71 kg), SpO2 97 %.   Temp  Av.2 °F (36.8 °C)  Min: 97.7 °F (36.5 °C)  Max: 99.2 °F (37.3 °C)  Pulse  Av.6  Min: 62  Max: 70  BP  Min: 156/76  Max: 176/71  SpO2  Av.3 %  Min: 96 %  Max: 100 %    24 hour I/O  No intake or output data in the 24 hours ending 10/18/22 1030  Current Facility-Administered Medications   Medication Dose Route Frequency Provider Last Rate Last Admin    sodium chloride flush 0.9 % injection 5-40 mL  5-40 mL IntraVENous 2 times per day Chloe Malmedahl, APRN - CNP   10 mL at 10/18/22 0846    sodium chloride flush 0.9 % injection 5-40 mL  5-40 mL IntraVENous PRN Chloe Malmedahl, APRN - CNP        0.9 % sodium chloride infusion   IntraVENous PRN Chloe Malmedahl, APRN - CNP        hydrALAZINE (APRESOLINE) injection 10 mg  10 mg IntraVENous Q6H PRN IMELDA Orozco - CNP   10 mg at 10/18/22 0030    metoprolol succinate (TOPROL XL) extended release tablet 25 mg  25 mg Oral Daily Terisa Keep, APRN - CNP   25 mg at 10/18/22 0846    dilTIAZem 125 mg in dextrose 5 % 125 mL infusion  2.5-15 mg/hr IntraVENous Continuous Terisa Keep, APRN - CNP   Stopped at 10/17/22 0556    amiodarone (CORDARONE) tablet 100 mg  100 mg Oral Daily Terisa Keep, APRN - CNP   100 mg at 10/18/22 0846    famotidine (PEPCID) tablet 20 mg  20 mg Oral Daily IMELDA Cruz - CNP   20 mg at 10/18/22 0846    sodium chloride flush 0.9 % injection 5-40 mL  5-40 mL IntraVENous 2 times per day IMELDA Cruz - CNP   5 mL at 10/17/22 2022    sodium chloride flush 0.9 % injection 5-40 mL  5-40 mL IntraVENous PRN IMELDA Cruz - CNP        0.9 % sodium chloride infusion   IntraVENous PRN IMELDA Cruz CNP        ondansetron (ZOFRAN-ODT) disintegrating tablet 4 mg  4 mg Oral Q8H PRN IMELDA Cruz - CNP   4 mg at 10/14/22 0049    Or    ondansetron (ZOFRAN) injection 4 mg  4 mg IntraVENous Q6H PRN IMELDA Cruz CNP        polyethylene glycol (GLYCOLAX) packet 17 g  17 g Oral Daily PRN IMELDA Cruz CNP        acetaminophen (TYLENOL) tablet 650 mg  650 mg Oral Q6H PRN IMELDA Cruz - CNP   650 mg at 10/13/22 2220    Or    acetaminophen (TYLENOL) suppository 650 mg  650 mg Rectal Q6H PRN IMELDA Cruz CNP        apixaban Cori Uday) tablet 5 mg  5 mg Oral BID Александр Mcmullen MD   5 mg at 10/18/22 0846       Objective:     Telemetry monitor:     Physical Exam:  Constitutional and general appearance: alert, cooperative, no distress, and appears stated age  [de-identified]: PERRL, no cervical lymphadenopathy. No masses palpable. Normal oral mucosa  Respiratory:  Normal excursion and expansion without use of accessory muscles  Resp auscultation: Normal breath sounds without wheezing, rhonchi, and rales  Cardiovascular:   The apical impulse is not displaced  Heart tones are crisp and normal. regular S1 and S2.  Jugular venous pulsation Normal  The carotid upstroke is normal in amplitude and contour without delay or bruit  Peripheral pulses are symmetrical and full   Abdomen:  No masses or tenderness  Bowel sounds present  Extremities:   No cyanosis or clubbing   No lower extremity edema   Skin: warm and dry  Neurological:  Alert and oriented  Moves all extremities well  No abnormalities of mood, affect, memory, mentation, or behavior are noted    Data  Echo 10/14/2022:    Conclusions      Summary   Irregular rhythm noted throughout the study. The left ventricle is normal in size with mild concentric hypertrophy. Left ventricular systolic function is normal with a 3D calculated EF of 56%. Septal flattening in diastole and systole (\"D-shaped septum\") consistent   with right ventricular volume and pressure overload. Elevated left atrial pressures with a septal E/e' ratio of 30.3. Right ventricular systolic function is reduced. The left atrium appears severely enlarged. The right atrium is mildly enlarged. Mild mitral regurgitation. Tenting and poor coaptation tricuspid valve leaflets with severe functional tricuspid regurgitation. Systolic pulmonary artery pressure (SPAP) is elevated and estimated at 50   mmHg (right atrial pressure 15 mmHg) consistent with moderate pulmonary   hypertension. Degree of pulmonary hypertension may be under-estimated in the setting of severe tricuspid regurgitation. All labs and testing reviewed.   Lab Review     Renal Profile:   Lab Results   Component Value Date/Time    CREATININE 1.6 10/18/2022 04:18 AM    BUN 28 10/18/2022 04:18 AM     10/18/2022 04:18 AM    K 4.1 10/18/2022 04:18 AM    CL 95 10/18/2022 04:18 AM    CO2 23 10/18/2022 04:18 AM     CBC:    Lab Results   Component Value Date/Time    WBC 7.2 10/18/2022 04:18 AM    RBC 3.42 10/18/2022 04:18 AM    HGB 9.4 10/18/2022 04:18 AM    HCT 29.5 10/18/2022 04:18 AM    MCV 86.5 10/18/2022 04:18 AM    RDW 13.9 10/18/2022 04:18 AM     10/18/2022 04:18 AM     BNP:  No results found for: BNP  Fasting Lipid Panel:  No results found for: CHOL, HDL, TRIG  Cardiac Enzymes:  CK/MbTroponin  Lab Results   Component Value Date/Time    TROPONINI 0.01 10/14/2022 03:33 AM     PT/ INR   Lab Results   Component Value Date/Time    INR 1.54 10/14/2022 03:32 AM    INR 1.24 10/12/2022 06:04 AM    INR 0.98 11/23/2021 05:25 PM    PROTIME 18.4 10/14/2022 03:32 AM    PROTIME 15.5 10/12/2022 06:04 AM    PROTIME 11.1 11/23/2021 05:25 PM     PTT No results found for: PTT   Lab Results   Component Value Date/Time    MG 1.90 10/16/2022 04:17 AM      Lab Results   Component Value Date/Time    TSH 3.09 10/11/2022 12:05 PM       Assessment:  Paroxysmal atrial fibrillation: stable    -noted to have frequent pauses on telemetry    -[id number] score 4 (age, gender, HTN)   -TSH normal 10/2022   -s/p unsuccessful DCCV 10/14/2022  Complete heart block: stable, noted on 10/14/2022  Moderate pulmonary hypertension  Severe TR   HTN: controlled   CKD   Hyponatremia   Normocytic anemia     Plan:   1. Continue amiodarone, Eliquis, and Toprol  2. Given frequent pauses in AF and episode of heart block, patient will benefit from permanent pacemaker.   NPO for pacemaker implant this afternoon with Dr. Matt Blackwell (risks, benefits, and alternative therapy discussed)      Medications and plan reviewed with Dr. Sridevi Soler, [address]  [phone]

## 2022-10-19 ENCOUNTER — PROCEDURE VISIT (OUTPATIENT)
Dept: CARDIOLOGY CLINIC | Age: 77
End: 2022-10-19
Payer: MEDICARE

## 2022-10-19 ENCOUNTER — APPOINTMENT (OUTPATIENT)
Dept: GENERAL RADIOLOGY | Age: 77
DRG: 243 | End: 2022-10-19
Attending: INTERNAL MEDICINE
Payer: MEDICARE

## 2022-10-19 VITALS
HEART RATE: 69 BPM | OXYGEN SATURATION: 97 % | WEIGHT: 156.6 LBS | TEMPERATURE: 98.6 F | DIASTOLIC BLOOD PRESSURE: 76 MMHG | BODY MASS INDEX: 30.74 KG/M2 | HEIGHT: 60 IN | SYSTOLIC BLOOD PRESSURE: 126 MMHG | RESPIRATION RATE: 16 BRPM

## 2022-10-19 DIAGNOSIS — Z95.0 PACEMAKER: ICD-10-CM

## 2022-10-19 DIAGNOSIS — I45.5 SINUS PAUSE: ICD-10-CM

## 2022-10-19 DIAGNOSIS — I48.19 PERSISTENT ATRIAL FIBRILLATION (HCC): Primary | ICD-10-CM

## 2022-10-19 DIAGNOSIS — I49.5 SICK SINUS SYNDROME (HCC): ICD-10-CM

## 2022-10-19 LAB
ANION GAP SERPL CALCULATED.3IONS-SCNC: 12 MMOL/L (ref 3–16)
BUN BLDV-MCNC: 23 MG/DL (ref 7–20)
CALCIUM SERPL-MCNC: 9.4 MG/DL (ref 8.3–10.6)
CHLORIDE BLD-SCNC: 97 MMOL/L (ref 99–110)
CO2: 24 MMOL/L (ref 21–32)
CREAT SERPL-MCNC: 1.3 MG/DL (ref 0.6–1.2)
GFR SERPL CREATININE-BSD FRML MDRD: 42 ML/MIN/{1.73_M2}
GLUCOSE BLD-MCNC: 99 MG/DL (ref 70–99)
POTASSIUM REFLEX MAGNESIUM: 4.5 MMOL/L (ref 3.5–5.1)
SODIUM BLD-SCNC: 133 MMOL/L (ref 136–145)

## 2022-10-19 PROCEDURE — 2580000003 HC RX 258: Performed by: ANESTHESIOLOGY

## 2022-10-19 PROCEDURE — 6370000000 HC RX 637 (ALT 250 FOR IP): Performed by: NURSE PRACTITIONER

## 2022-10-19 PROCEDURE — 71046 X-RAY EXAM CHEST 2 VIEWS: CPT

## 2022-10-19 PROCEDURE — 36415 COLL VENOUS BLD VENIPUNCTURE: CPT

## 2022-10-19 PROCEDURE — 2580000003 HC RX 258: Performed by: NURSE PRACTITIONER

## 2022-10-19 PROCEDURE — 93280 PM DEVICE PROGR EVAL DUAL: CPT | Performed by: INTERNAL MEDICINE

## 2022-10-19 PROCEDURE — 6370000000 HC RX 637 (ALT 250 FOR IP)

## 2022-10-19 PROCEDURE — 6370000000 HC RX 637 (ALT 250 FOR IP): Performed by: INTERNAL MEDICINE

## 2022-10-19 PROCEDURE — 80048 BASIC METABOLIC PNL TOTAL CA: CPT

## 2022-10-19 PROCEDURE — 99233 SBSQ HOSP IP/OBS HIGH 50: CPT | Performed by: NURSE PRACTITIONER

## 2022-10-19 RX ORDER — AMLODIPINE BESYLATE 5 MG/1
5 TABLET ORAL DAILY
Status: DISCONTINUED | OUTPATIENT
Start: 2022-10-19 | End: 2022-10-19

## 2022-10-19 RX ORDER — METOPROLOL SUCCINATE 25 MG/1
25 TABLET, EXTENDED RELEASE ORAL DAILY
Qty: 30 TABLET | Refills: 3 | Status: SHIPPED | OUTPATIENT
Start: 2022-10-20

## 2022-10-19 RX ORDER — AMIODARONE HYDROCHLORIDE 100 MG/1
100 TABLET ORAL DAILY
Qty: 30 TABLET | Refills: 1 | Status: SHIPPED | OUTPATIENT
Start: 2022-10-20

## 2022-10-19 RX ORDER — AMLODIPINE BESYLATE 5 MG/1
5 TABLET ORAL DAILY
Qty: 30 TABLET | Refills: 0 | Status: SHIPPED | OUTPATIENT
Start: 2022-10-19

## 2022-10-19 RX ORDER — AMLODIPINE BESYLATE 5 MG/1
10 TABLET ORAL DAILY
Status: DISCONTINUED | OUTPATIENT
Start: 2022-10-20 | End: 2022-10-19 | Stop reason: HOSPADM

## 2022-10-19 RX ADMIN — FAMOTIDINE 20 MG: 20 TABLET, FILM COATED ORAL at 08:51

## 2022-10-19 RX ADMIN — APIXABAN 5 MG: 5 TABLET, FILM COATED ORAL at 08:51

## 2022-10-19 RX ADMIN — Medication 10 ML: at 08:51

## 2022-10-19 RX ADMIN — SODIUM CHLORIDE, PRESERVATIVE FREE 10 ML: 5 INJECTION INTRAVENOUS at 09:11

## 2022-10-19 RX ADMIN — AMIODARONE HYDROCHLORIDE 100 MG: 200 TABLET ORAL at 08:49

## 2022-10-19 RX ADMIN — METOPROLOL SUCCINATE 25 MG: 25 TABLET, EXTENDED RELEASE ORAL at 08:50

## 2022-10-19 ASSESSMENT — PAIN SCALES - GENERAL
PAINLEVEL_OUTOF10: 0
PAINLEVEL_OUTOF10: 0

## 2022-10-19 NOTE — PROGRESS NOTES
Discharge instructions reviewed with patient and family member. Patient and family verbalized understanding. All home medications have been reviewed, questions answered and patient voiced understanding. Given prescriptions, discharge instructions, and appointment times. Picked up Eliquis and metoprolol downstairs.

## 2022-10-19 NOTE — PROGRESS NOTES
Medtronic DC PPM implant 10/18/22. IPG [id number] SHAD CORTES    Enrolled in [facility] and get connected to call pt for monitor assignment.

## 2022-10-19 NOTE — DISCHARGE SUMMARY
Hospital Medicine Discharge Summary    Patient ID: Jose Mejia      Patient's PCP: Jojo Devi MD    Admit Date: 10/13/2022     Discharge Date:   10/19/22     Admitting Provider: Marcella Wood DO     Discharge Provider: Ángela Tucker MD     Discharge Diagnoses: Active Hospital Problems    Diagnosis     Bradycardia [R00.1]      Priority: Medium    Tachyarrhythmia [R00.0]      Priority: Medium    Obesity [E66.9]      Priority: Medium    Persistent atrial fibrillation (HCC) [I48.19]      Priority: Medium    Hypertension [I10]        The patient was seen and examined on day of discharge and this discharge summary is in conjunction with any daily progress note from day of discharge. Hospital Course:     Patient was admitted with tachyarrhythmia complicated by pauses. Evaluated by cardiology and cardiac electrophysiology. Started on amiodarone. Medications were adjusted. Pacemaker insertion was completed. Today following pacemaker insertion, patient was stable. Chest x-ray did not show pneumothorax. Patient was cleared for discharge. Also noted patient had severe hyponatremia in the setting of CKD. Noted patient was on hydrochlorothiazide and nonsteroidal anti-inflammatories. These medications were stopped. Hydrochlorothiazide was added to allergy list to avoid accidental resumption. On the day of discharge patient's sodium was up to 133 and creatinine was between 1.3 and 1.6, lower than patient's previously reported baseline. Nephrology has been following the patient as inpatient. Patient was cleared both by cardiac electrophysiology and nephrology and is being discharged home in stable asymptomatic condition. Patient states she symptomatically feels a lot better and is able to exert herself more than but she could in the few weeks preceding this admission.       Physical Exam Performed:     /76   Pulse 69   Temp 98.6 °F (37 °C) (Oral)   Resp 16   Ht 5' (1.524 m) Wt 156 lb 9.6 oz (71 kg)   SpO2 97%   BMI 30.58 kg/m²       General appearance:  No apparent distress, appears stated age and cooperative. HEENT:  Normal cephalic, atraumatic without obvious deformity. Pupils equal, round, and reactive to light. Extra ocular muscles intact. Conjunctivae/corneas clear. Neck: Supple, with full range of motion. No jugular venous distention. Trachea midline. Respiratory:  Normal respiratory effort. Clear to auscultation, bilaterally without Rales/Wheezes/Rhonchi. Cardiovascular:  Regular rate and rhythm with normal S1/S2 without murmurs, rubs or gallops. Abdomen: Soft, non-tender, non-distended with normal bowel sounds. Musculoskeletal:  No clubbing, cyanosis or edema bilaterally. Full range of motion without deformity. Skin: Skin color, texture, turgor normal.  No rashes or lesions. Neurologic:  Neurovascularly intact without any focal sensory/motor deficits. Cranial nerves: II-XII intact, grossly non-focal.  Psychiatric:  Alert and oriented, thought content appropriate, normal insight  Capillary Refill: Brisk,< 3 seconds   Peripheral Pulses: +2 palpable, equal bilaterally       Labs: For convenience and continuity at follow-up the following most recent labs are provided:      CBC:    Lab Results   Component Value Date/Time    WBC 7.2 10/18/2022 04:18 AM    HGB 9.4 10/18/2022 04:18 AM    HCT 29.5 10/18/2022 04:18 AM     10/18/2022 04:18 AM       Renal:    Lab Results   Component Value Date/Time     10/19/2022 08:29 AM    K 4.5 10/19/2022 08:29 AM    CL 97 10/19/2022 08:29 AM    CO2 24 10/19/2022 08:29 AM    BUN 23 10/19/2022 08:29 AM    CREATININE 1.3 10/19/2022 08:29 AM    CALCIUM 9.4 10/19/2022 08:29 AM    PHOS 3.5 10/14/2022 01:17 PM         Significant Diagnostic Studies    Radiology:   XR CHEST (2 VW)   Final Result   Status post left-sided pacemaker placement without evidence of pneumothorax.                 Consults:     IP CONSULT TO CARDIOLOGY  IP CONSULT TO NEPHROLOGY    Disposition: Home    Condition at Discharge: Stable    Discharge Instructions/Follow-up: PCP, cardiology, nephrology    Code Status:  Full Code     Activity: activity as tolerated    Diet: regular diet      Discharge Medications:     Current Discharge Medication List             Details   amLODIPine (NORVASC) 5 MG tablet Take 1 tablet by mouth daily  Qty: 30 tablet, Refills: 0      amiodarone (PACERONE) 100 MG tablet Take 1 tablet by mouth daily  Qty: 30 tablet, Refills: 1      apixaban (ELIQUIS) 5 MG TABS tablet Take 1 tablet by mouth 2 times daily  Qty: 60 tablet, Refills: 1      metoprolol succinate (TOPROL XL) 25 MG extended release tablet Take 1 tablet by mouth daily  Qty: 30 tablet, Refills: 3                Details   vitamin B-12 (CYANOCOBALAMIN) 100 MCG tablet Take 50 mcg by mouth daily      Cholecalciferol (VITAMIN D) 50 MCG (2000 UT) CAPS capsule Take by mouth      famotidine (PEPCID) 20 MG tablet Take 20 mg by mouth daily             Time Spent on discharge is more than 45 minutes in the examination, evaluation, counseling and review of medications and discharge plan. Signed:    Lizzeth Gore MD   10/19/2022      Thank you Yuliet Morataya MD for the opportunity to be involved in this patient's care. If you have any questions or concerns, please feel free to contact me at [phone].

## 2022-10-19 NOTE — CARE COORDINATION
CASE MANAGEMENT DISCHARGE SUMMARY      Discharge to: Home      IMM given: (date) Today    New Durable Medical Equipment ordered/agency: none    Transportation:    Family/car: On arrival     Confirmed discharge plan with:MD/RN     Patient: yes     Family:  yes   Name:Becca Day Contact number:[phone]       RN, name: Katherine Fernandez

## 2022-10-19 NOTE — PROGRESS NOTES
[facility]     Electrophysiology                                     Progress Note    Admission date:  10/13/2022    Reason for follow up visit: AF    HPI/CC: Felton Wolfe was admitted on 10/13/2022 with shortness of breath. EKG showed rapid atrial fibrillation. She was given IV metoprolol and Cardizem and was noted to have 3 to 5-second pauses in atrial fibrillation. She was also started on amiodarone. On 10/14/2022, she underwent MADISON and unsuccessful cardioversion. Later in the day she developed nausea, dizziness and chest tightness. EKG showed atrial fibrillation with complete heart block. This lasted approximately 15 minutes and then she converted to sinus rhythm. On 10/15/2022, she was noted to be back in atrial fibrillation. On 10/17/2022, she spontaneously converted to sinus rhythm this morning at 0416. On 10/18/2022, she had a a dual chamber pacemaker implanted. Device check and CXR have been reviewed. Has also been treated for hyponatremia. Rhythm has been sinus. Subjective: She has no complaints. Denies chest pain, palpitations, shortness of breath, and dizziness. Vitals:  Blood pressure 126/76, pulse 69, temperature 98.6 °F (37 °C), temperature source Oral, resp. rate 16, height 5' (1.524 m), weight 156 lb 9.6 oz (71 kg), SpO2 97 %.   Temp  Av.2 °F (36.8 °C)  Min: 97 °F (36.1 °C)  Max: 98.7 °F (37.1 °C)  Pulse  Av.6  Min: 63  Max: 70  BP  Min: 99/88  Max: 179/93  SpO2  Av.3 %  Min: 94 %  Max: 98 %    24 hour I/O    Intake/Output Summary (Last 24 hours) at 10/19/2022 1133  Last data filed at 10/19/2022 0430  Gross per 24 hour   Intake 500 ml   Output 555 ml   Net -55 ml     Current Facility-Administered Medications   Medication Dose Route Frequency Provider Last Rate Last Admin    amLODIPine (NORVASC) tablet 5 mg  5 mg Oral Daily Michael Deng MD        sodium chloride flush 0.9 % injection 5-40 mL  5-40 mL IntraVENous 2 times per day Barry Quarles MD   10 mL at 10/19/22 0851    sodium chloride flush 0.9 % injection 5-40 mL  5-40 mL IntraVENous PRN Deborah Mcgurie MD        0.9 % sodium chloride infusion   IntraVENous PRN Deborah Mcguire MD        morphine (PF) injection 2 mg  2 mg IntraVENous Q4H PRN Sasha Humphrey, APRN - CNP   2 mg at 10/18/22 2344    sodium chloride flush 0.9 % injection 5-40 mL  5-40 mL IntraVENous 2 times per day Chloe Mallory, APRN - CNP   10 mL at 10/19/22 0911    sodium chloride flush 0.9 % injection 5-40 mL  5-40 mL IntraVENous PRN Chloe Mallory, APRN - CNP        0.9 % sodium chloride infusion   IntraVENous PRN Chloe Mallory, APRN - CNP        hydrALAZINE (APRESOLINE) injection 10 mg  10 mg IntraVENous Q6H PRN Sasha Humphrey APRN - CNP   10 mg at 10/18/22 1201    metoprolol succinate (TOPROL XL) extended release tablet 25 mg  25 mg Oral Daily Clarkridge Ask, APRN - CNP   25 mg at 10/19/22 0850    amiodarone (CORDARONE) tablet 100 mg  100 mg Oral Daily Clarkridge Ask, APRN - CNP   100 mg at 10/19/22 0849    famotidine (PEPCID) tablet 20 mg  20 mg Oral Daily Marquez Steward, APRN - CNP   20 mg at 10/19/22 0851    sodium chloride flush 0.9 % injection 5-40 mL  5-40 mL IntraVENous 2 times per day Marquez Steward, APRN - CNP   5 mL at 10/18/22 2224    sodium chloride flush 0.9 % injection 5-40 mL  5-40 mL IntraVENous PRN Marquez Steward, APRN - CNP        0.9 % sodium chloride infusion   IntraVENous PRN Marquez Steward, APRN - CNP        ondansetron (ZOFRAN-ODT) disintegrating tablet 4 mg  4 mg Oral Q8H PRN Marquez Steward, APRN - CNP   4 mg at 10/14/22 0049    Or    ondansetron (ZOFRAN) injection 4 mg  4 mg IntraVENous Q6H PRN Marquez Steward, APRN - CNP        polyethylene glycol (GLYCOLAX) packet 17 g  17 g Oral Daily PRN Marquezigor Steward APRN - CNP        acetaminophen (TYLENOL) tablet 650 mg  650 mg Oral Q6H PRN Marquez Steward, APRN - CNP   650 mg at 10/18/22 2056    Or    acetaminophen (TYLENOL) suppository 650 mg  650 mg Rectal Q6H PRN Marquez Steward, APRN - CNP apixaban (ELIQUIS) tablet 5 mg  5 mg Oral BID Sue Mondragon MD   5 mg at 10/19/22 7977       Objective:     Telemetry monitor: SR    Physical Exam:  Constitutional and general appearance: alert, cooperative, no distress, and appears stated age  [de-identified]: PERRL, no cervical lymphadenopathy. No masses palpable. Normal oral mucosa  Respiratory:  Normal excursion and expansion without use of accessory muscles  Resp auscultation: Normal breath sounds without wheezing, rhonchi, and rales  Cardiovascular: The apical impulse is not displaced  Heart tones are crisp and normal. regular S1 and S2.  Jugular venous pulsation Normal  The carotid upstroke is normal in amplitude and contour without delay or bruit  Peripheral pulses are symmetrical and full   Abdomen:  No masses or tenderness  Bowel sounds present  Extremities:   No cyanosis or clubbing   No lower extremity edema   Skin: warm and dry  Neurological:  Alert and oriented  Moves all extremities well  No abnormalities of mood, affect, memory, mentation, or behavior are noted    Data  Echo 10/14/2022:    Conclusions      Summary   Irregular rhythm noted throughout the study. The left ventricle is normal in size with mild concentric hypertrophy. Left ventricular systolic function is normal with a 3D calculated EF of 56%. Septal flattening in diastole and systole (\"D-shaped septum\") consistent   with right ventricular volume and pressure overload. Elevated left atrial pressures with a septal E/e' ratio of 30.3. Right ventricular systolic function is reduced. The left atrium appears severely enlarged. The right atrium is mildly enlarged. Mild mitral regurgitation. Tenting and poor coaptation tricuspid valve leaflets with severe functional tricuspid regurgitation. Systolic pulmonary artery pressure (SPAP) is elevated and estimated at 50   mmHg (right atrial pressure 15 mmHg) consistent with moderate pulmonary   hypertension.  Degree of pulmonary hypertension may be under-estimated in the setting of severe tricuspid regurgitation. All labs and testing reviewed. Lab Review     Renal Profile:   Lab Results   Component Value Date/Time    CREATININE 1.3 10/19/2022 08:29 AM    BUN 23 10/19/2022 08:29 AM     10/19/2022 08:29 AM    K 4.5 10/19/2022 08:29 AM    CL 97 10/19/2022 08:29 AM    CO2 24 10/19/2022 08:29 AM     CBC:    Lab Results   Component Value Date/Time    WBC 7.2 10/18/2022 04:18 AM    RBC 3.42 10/18/2022 04:18 AM    HGB 9.4 10/18/2022 04:18 AM    HCT 29.5 10/18/2022 04:18 AM    MCV 86.5 10/18/2022 04:18 AM    RDW 13.9 10/18/2022 04:18 AM     10/18/2022 04:18 AM     BNP:  No results found for: BNP  Fasting Lipid Panel:  No results found for: CHOL, HDL, TRIG  Cardiac Enzymes:  CK/MbTroponin  Lab Results   Component Value Date/Time    TROPONINI 0.01 10/14/2022 03:33 AM     PT/ INR   Lab Results   Component Value Date/Time    INR 1.54 10/14/2022 03:32 AM    INR 1.24 10/12/2022 06:04 AM    INR 0.98 11/23/2021 05:25 PM    PROTIME 18.4 10/14/2022 03:32 AM    PROTIME 15.5 10/12/2022 06:04 AM    PROTIME 11.1 11/23/2021 05:25 PM     PTT No results found for: PTT   Lab Results   Component Value Date/Time    MG 1.90 10/16/2022 04:17 AM      Lab Results   Component Value Date/Time    TSH 3.09 10/11/2022 12:05 PM       Assessment:  Paroxysmal atrial fibrillation: stable    -noted to have frequent pauses on telemetry    -[id number] score 4 (age, gender, HTN)   -TSH normal 10/2022   -s/p unsuccessful DCCV 10/14/2022  Complete heart block: stable, noted on 10/14/2022   -s/p dual chamber pacemaker 10/18/2022   -device check and CXR reviewed   Moderate pulmonary hypertension  Severe TR   HTN: controlled   CKD   Hyponatremia   Normocytic anemia     Plan:   1. Continue amiodarone, Eliquis, and Toprol  2. Increase Norvasc to 10 mg PO daily for improved BP control   3. Post procedure instructions reviewed  4. Device check in one week   5.  Follow up in office in one month. Office to arrange. Note: Abnormally high atrial thresholds noted on device interrogation this morning. CXR reviewed with Dr. Xin Mcgrath. Lead placement appears stable. Will re-evaluate at one week visit.      Patient was seen outside of global device window for afib    IMELDA Zayas-BANDAR [address]  [phone]

## 2022-10-19 NOTE — DISCHARGE INSTRUCTIONS
FOLLOW-UP APPOINTMENTS    [place] OFFICE - Follow-up appointment on October 26th at 1:30 pm for a device check, [address]. You and your one visitor will need to have your mouth and nose covered with a mask. No children please. [facility],  [facility], [address] [place] [address] [facility], [address] Office #: [phone]. If you are unable to make this appointment, please call to reschedule. [place] OFFICE - Follow-up appointment on December 12th at 1:45 pm with Lisset Ryan MD, electrophysiologist , [facility]. You and your one visitor will need to have your mouth and nose covered with a mask. No children please. [facility],  [facility], [address] [place] [address] [facility], [address]. Office #: [phone]. If you are unable to make this appointment, please call to reschedule. Directions to [facility]  [facility] towards [place]. [address] [facility] exit. Right off exit. Cross over [employer]. Right on [address] Left into hospital. Follow the signs to the emergency room ( turn left toward the Emergency room). Go right at the first stop sign. Just past the Emergency room at the second stop sign turn right and go up the ramp and park on the top level if possible. Go in the glass doors of the [facility] on the top level of the garage [address]. As soon as you get in the door turn left and our office is the one with the glass doors. Pacemaker Care Instructions   Your permanent pacemaker is a sophisticated piece of electronic equipment and both the wound and the device need special care during your recovery period and into the future. Please use these instructions as guide. Wound Care:   Keep the incision site clean and dry. Do not get the incision or bandage wet. You may sponge bathe but DO NOT shower for the first seven days or until after your first follow up office visit. Do not remove the dressing.    DO NOT apply soaps, lotion or powder to the incision site. Wear comfortable clothes that will not rub on the incision site. Avoid bra straps or suspenders. At your one week follow up appointment your bandage will be removed and you will be given further instruction on care of the site at that time. When to contact the office:   Changes in how your incision site is healing including:   Increase in swelling and/or tenderness   Increase in redness at the incision site or surrounding the device   Drainage from the incision  If you experience:   Lightheadedness, dizziness or passing out   Increase in fatigue or shortness of breath   Fever (temperature greater than 100 degrees F)   Chills   Prolonged hiccups or chest discomfort  If you have any questions   Activity:   Do not raise your elbow above shoulder level or reach behind your back for 4-6 weeks after your procedure. DO NOT lift more than 10 pounds with your affected arm for 4-6 weeks after your procedure. Avoid excessive pushing, pulling or twisting. DO NOT drive until instructed it is OK by your provider. Wear a sling only as a reminder to limit the activity of your affected arm. It is important to remember to still use your affected arm to maintain mobility but no excessive movements. No sports activities until approved by your provider. Precautions: You may use common household appliances, if they are in good repair (even microwaves). You should not lean against them while they are on. When using cellular and cordless phones, keep them at least 6 inches away from your device. (Use on the opposite side of your device.)   Do not hold or carry strong magnets. You may NOT perform welding. Airport and security screening devices should be avoided. You will need to show your ID card and ask for a hand search. Always tell your health care professional (including the dentist) that you have a device and show your ID card. ID Card:    You will be provided a temporary ID card at the time of your hospital discharge and a permanent ID card in approximately 8 weeks. It is very important that you care this ID card with you at ALL times. Consider purchasing a medical alert bracelet identifying your device and . Please refer to your pacemaker booklet for more information about your device. Follow Up Care: The pacemaker/ICD DOES NOT replace your current medications. It is important for you to continue your medications as prescribed. You will be given your first follow up appointment approximately one week after your procedure to check your wound and device. You will then have a follow up approximately three months after that appointment and every 3-6 months thereafter. If you are unsure of your follow up appointment [address]

## 2022-10-19 NOTE — PROGRESS NOTES
Nephrology Progress Note   [facility] [employer]. Branch      This patient is a 76year old female whom we are following for hyponatremia and CKD. Subjective: The patient was seen and examined. BP running high. S/P PPM placement on 10/18/22. Family History: Family at bedside and updated  ROS: No nausea or vomiting      Vitals:  BP (!) 158/72   Pulse 64   Temp 98.4 °F (36.9 °C) (Oral)   Resp 18   Ht 5' (1.524 m)   Wt 156 lb 9.6 oz (71 kg)   SpO2 97%   BMI 30.58 kg/m²   I/O last 3 completed shifts: In: 500 [I.V.:500]  Out: 555 [Urine:550; Blood:5]  No intake/output data recorded. Physical Exam  Vitals reviewed. Constitutional:       General: She is not in acute distress. HENT:      Head: Normocephalic and atraumatic. Eyes:      General: No scleral icterus. Conjunctiva/sclera: Conjunctivae normal.   Cardiovascular:      Rate and Rhythm: Normal rate. Heart sounds: No friction rub. Pulmonary:      Effort: Pulmonary effort is normal. No respiratory distress. Abdominal:      General: Bowel sounds are normal. There is no distension. Tenderness: There is no abdominal tenderness. Musculoskeletal:      Right lower leg: No edema. Left lower leg: No edema. Neurological:      Mental Status: She is alert.          Medications:   sodium chloride flush  5-40 mL IntraVENous 2 times per day    sodium chloride flush  5-40 mL IntraVENous 2 times per day    metoprolol succinate  25 mg Oral Daily    amiodarone  100 mg Oral Daily    famotidine  20 mg Oral Daily    sodium chloride flush  5-40 mL IntraVENous 2 times per day    apixaban  5 mg Oral BID         Labs:  Recent Labs     10/18/22  0418   WBC 7.2   HGB 9.4*   HCT 29.5*   MCV 86.5          Recent Labs     10/17/22  0421 10/18/22  0418 10/19/22  0829   * 134* 133*   K 4.2 4.1 4.5   CL 96* 95* 97*   CO2 23 23 24   GLUCOSE 108* 110* 99   BUN 29* 28* 23*   CREATININE 1.3* 1.6* 1.3*   LABGLOM 39* 32* 42*   GFRAA 48*  --   -- Assessment/Plan:     - Hyponatremia - acute in setting of hctz with impaired urinary dilution  Urine sodium of less than 20, osmolality 487                Appropriate increase in sodium considering sodium of 124 from 10/13                Sodium was 137 on November 2021      Improving/stable. Continue fluid restriction 1.5L/day. Encourage protein intake. HCTZ added to allergy list.     - Chronic kidney disease stage 4 - baseline SCr 1.7-1.8, appears to have background Hypertensive Nephrosclerosis, follows with local Nephrologist in [facility]. Stable. - Neurogenic bladder - on ISC     - Hypertension - poorly controlled. Continue Metoprolol. Will add Amlodipine 5mg daily. - Atrial fibrillation - s/p MADISON & unsuccessful cardioversion, required IV amiodarone. On Beta blocker. S/P PPM placement on 10/18/22. Ok to be discharged from renal standpoint when ok with others. Please do not hesitate to contact me at [phone] if with questions. Thank you! Doc Handley MD  [facility] [employer]  10/19/2022

## 2022-10-19 NOTE — PROGRESS NOTES
A inpatient rep check 1 day s/p dual chamber pacemaker implant by Dr. Humberto White. P 1.1mV R 11.4 mV      No arrhythmias recorded. AP 34.9%  0.6%    See Paceart report under the Cardiology tab.

## 2022-10-26 ENCOUNTER — NURSE ONLY (OUTPATIENT)
Dept: CARDIOLOGY CLINIC | Age: 77
End: 2022-10-26
Payer: MEDICARE

## 2022-10-26 DIAGNOSIS — Z95.0 PACEMAKER: ICD-10-CM

## 2022-10-26 DIAGNOSIS — R00.1 BRADYCARDIA: Primary | ICD-10-CM

## 2022-10-26 DIAGNOSIS — I48.19 PERSISTENT ATRIAL FIBRILLATION (HCC): ICD-10-CM

## 2022-10-26 DIAGNOSIS — R00.0 TACHYARRHYTHMIA: ICD-10-CM

## 2022-10-26 DIAGNOSIS — I45.5 SINUS PAUSE: ICD-10-CM

## 2022-10-26 DIAGNOSIS — I49.5 SICK SINUS SYNDROME (HCC): ICD-10-CM

## 2022-10-26 PROCEDURE — 93280 PM DEVICE PROGR EVAL DUAL: CPT | Performed by: INTERNAL MEDICINE

## 2022-10-26 NOTE — PROGRESS NOTES
Patient presents to the device clinic today for a programming evaluation for her pacemaker. Patient has a history of persistent AF, sinus pause, and bradycardia. Takes Toprol XL, amiodarone, and Eliquis. Patient's device was implanted on 10/18 by Dr. Coleen Chapman. Since then, AT/AF burden measures 46.8%. NSVT events recorded appear to show AF w/ RVR. SVT-ST events also recorded. P wave: 2.0 mV  R wave: 11.3 mV    AP 5.2%  0.8%    All sensing and pacing parameters are within normal range. RA Capture Threshold turned off per Dr. Coleen Chapman with RA output lowered keeping 2:1. Per Dr. Coleen Chapman patient is to increase amiodarone to 200 mg daily and f/u with him in 6 weeks. Incision is closed, clean, and dry with all dressings/steri strips removed. Site left open to the air. Incision well approximated. No s/s of infection. Patient education was provided about site care, device functionality, in home monitoring, and any other patient questions and/or concerns were addressed. Aftercare and remote monitoring literature was provided. Patient voices understanding. Patient will follow up in 3 months in office or remotely. Please see interrogation for more detail - Paceart report located under the Cardiology tab.

## 2022-10-26 NOTE — PATIENT INSTRUCTIONS
New Cardiac Device Implant (Pacemaker and/or Defibrillator) Post Op Instructions  Bathe with water indirectly hitting the incision site. Water and soap may run over the incision site. Do not scrub. Pat dry with a clean towel after bathing. Leave incision open to the air; do not apply any dressings, ointments, or bandages to the area. Do not apply lotion, perfume/cologne, or powders to the area until it is completely healed. Any scabbing or skin glue that is noted will fall off within 1-2 weeks after the post op appointment. If any oozing, bleeding, or pus drainage occurs, please call the office immediately at [phone]. Patient has movement restrictions in place until 4 weeks post op (to the day of implant) unless otherwise instructed by physician. Patient may not lift the device side arm above shoulder height. Do not far reach or stretch across body or behind body with effected side. Do not use this arm for pushing, pulling, or lifting body. Do not use cane on the effected side. Patient may not lift anything heavier than a gallon of milk with the associated arm. Appointments to expect going forward:  Post operatively the patient will have had a 1-week post op check, a 1 month follow up with NP/MD, and a 3 month follow up with NP/MD and the device clinic. Remote Monitoring Instructions    Within 2-3 weeks of your device being implanted, you will receive a call from the  of your device. Please answer this call as it is to set up remote monitoring for your device. Once you receive your in-home monitor, please follow the instructions provided to sync the home monitor to your implanted device. Once you have paired your home monitor to your implanted device, keep your monitor plugged in within 6 feet of where you sleep. Your monitor will routinely check in with your device during sleep hours and transmit any urgent events to the [address] [place] [address] Drive for review.      Please do not send additional routine transmissions unless specifically requested by the office staff - the steps to send a manual transmission are the same as when you paired your in-home monitor to your device for the first time. Your device and monitor are wireless and most transmit cellularly, but please periodically check your monitor is still plugged in to the electrical outlet. If you still use the telephone land line to send, please ensure the connection to the phone alva is secure. This will help to ensure successful automatic transmissions in the future. Please be aware that the remote device transmission sites are periodically monitored only during regular business hours during which simultaneous in-office device clinics are being conducted. If your transmission requires attention, we will contact you as soon as possible. Your in-home monitor will do a full report on your device every 3 months (recurring appointments that run parallel to in office checks). You do not need to initiate a transmission or be awake at the appointment time listed - this is solely for office purposes. Our office utilizes the \"no news is good news\" narrative regarding remote monitoring. A Device Specialist or RN will contact you with any critical findings from your remote monitoring. Going forward, if you experience issues with your in-home monitor, please verify that it is plugged in to the wall. If issues persist, please contact the Customer Service numbers provided below, as they can troubleshoot issues that may be happening with the monitor itself. The [employer] works closely with the remote monitoring websites - if we notice there is a disconnection we will contact you to inform you and ask you to contact the  of your device.     [email or web address])  [phone]

## 2022-11-07 RX ORDER — AMIODARONE HYDROCHLORIDE 200 MG/1
200 TABLET ORAL DAILY
Qty: 90 TABLET | Refills: 0 | Status: SHIPPED | OUTPATIENT
Start: 2022-11-07

## 2022-11-07 NOTE — TELEPHONE ENCOUNTER
Attempted to call Jermaine Morataya back. [facility] for her to return call. Please let pt know we are sending a new script to [facility] in [address] [place] [address]. She should avoid picking up the 100mg refill because her insurance may not cover the medication if she picks up both doses too close together. Per KXA:     She should take 200 mg daily until she sees me in clinic next month.  Thank you     Medication pending     Last saw Vanessa Henderson in hospital 10/18/22  Upcoming OV KXA 12/12/22

## 2022-11-07 NOTE — TELEPHONE ENCOUNTER
Pt's daughter trying to get correct dosage order for amiodarone (PACERONE) 100 MG tablet. Ulises Constantino has increased dosage to 200 mg. Now pt only has 1 tablet left and pharmacy will not refill. Please send new order to [address] [place] [address] in [address] [place] [address]. Should pt get 100 mg refilled to cover her till corrected order is sent in. Please advise.

## 2022-11-14 ENCOUNTER — TELEPHONE (OUTPATIENT)
Dept: CARDIOLOGY CLINIC | Age: 77
End: 2022-11-14

## 2022-11-14 DIAGNOSIS — I10 HYPERTENSION, UNSPECIFIED TYPE: Primary | ICD-10-CM

## 2022-11-14 NOTE — TELEPHONE ENCOUNTER
11/14 pts daughter called mhi for med clarification on amlodopine 5 mg, pts daughter wondering if pt needs to continue this medication, if so please send new script to:  [address] [place] [address] - Sari Walton [facility] 15. [address]   Phone:  [phone]  Fax:  [phone]

## 2022-11-17 NOTE — TELEPHONE ENCOUNTER
Per KXA:     Do we know how her BP responded to the amlodipine? Does she check BP at home? What values is she getting? Difficult to answer the question about whether we should continue it or not without more information. Spoke to pt to relay message. V/U     Pt has not been taking her BP at home. Instructed pt to start BP log and call the office back so we can evaluate medication effectiveness.

## 2022-11-17 NOTE — TELEPHONE ENCOUNTER
Pt was started on Amlodipine 5mg by Dr. Yehuda Holder (nephrology) while in hospital 10/13/22-10/19/22. Pt needs a refill if she is to continue taking this medication. Can we manage pt Amlodipine?      Upcoming OV KXA 12/12/22

## 2022-11-22 NOTE — TELEPHONE ENCOUNTER
Yes, those blood pressures are still elevated, so I would recommend she continue her amlodipine.  For future blood pressure management and concerns, I recommend she follow up with her nephrologist.

## 2022-11-23 RX ORDER — AMLODIPINE BESYLATE 5 MG/1
5 TABLET ORAL DAILY
Qty: 90 TABLET | Refills: 3 | Status: SHIPPED | OUTPATIENT
Start: 2022-11-23

## 2022-11-23 NOTE — TELEPHONE ENCOUNTER
I spoke with the patient and relayed NPNR message. She V/U and stated she needs a refill. RX pended for signature.

## 2022-12-12 ENCOUNTER — NURSE ONLY (OUTPATIENT)
Dept: CARDIOLOGY CLINIC | Age: 77
End: 2022-12-12
Payer: MEDICARE

## 2022-12-12 ENCOUNTER — OFFICE VISIT (OUTPATIENT)
Dept: CARDIOLOGY CLINIC | Age: 77
End: 2022-12-12
Payer: MEDICARE

## 2022-12-12 VITALS
BODY MASS INDEX: 28.86 KG/M2 | SYSTOLIC BLOOD PRESSURE: 138 MMHG | DIASTOLIC BLOOD PRESSURE: 64 MMHG | HEART RATE: 70 BPM | WEIGHT: 147 LBS | HEIGHT: 60 IN | OXYGEN SATURATION: 96 %

## 2022-12-12 DIAGNOSIS — I48.19 PERSISTENT ATRIAL FIBRILLATION (HCC): ICD-10-CM

## 2022-12-12 DIAGNOSIS — I49.5 SINUS NODE DYSFUNCTION (HCC): ICD-10-CM

## 2022-12-12 DIAGNOSIS — I49.5 SICK SINUS SYNDROME (HCC): ICD-10-CM

## 2022-12-12 DIAGNOSIS — Z95.0 PACEMAKER: ICD-10-CM

## 2022-12-12 DIAGNOSIS — I47.1 NARROW COMPLEX TACHYCARDIA (HCC): ICD-10-CM

## 2022-12-12 DIAGNOSIS — I45.5 SINUS PAUSE: ICD-10-CM

## 2022-12-12 DIAGNOSIS — R06.09 DOE (DYSPNEA ON EXERTION): ICD-10-CM

## 2022-12-12 DIAGNOSIS — I48.92 ATRIAL FLUTTER, UNSPECIFIED TYPE (HCC): ICD-10-CM

## 2022-12-12 DIAGNOSIS — R00.1 BRADYCARDIA: Primary | ICD-10-CM

## 2022-12-12 DIAGNOSIS — I48.0 PAF (PAROXYSMAL ATRIAL FIBRILLATION) (HCC): Primary | ICD-10-CM

## 2022-12-12 PROCEDURE — 1036F TOBACCO NON-USER: CPT | Performed by: INTERNAL MEDICINE

## 2022-12-12 PROCEDURE — G8417 CALC BMI ABV UP PARAM F/U: HCPCS | Performed by: INTERNAL MEDICINE

## 2022-12-12 PROCEDURE — G8427 DOCREV CUR MEDS BY ELIG CLIN: HCPCS | Performed by: INTERNAL MEDICINE

## 2022-12-12 PROCEDURE — 99214 OFFICE O/P EST MOD 30 MIN: CPT | Performed by: INTERNAL MEDICINE

## 2022-12-12 PROCEDURE — G8400 PT W/DXA NO RESULTS DOC: HCPCS | Performed by: INTERNAL MEDICINE

## 2022-12-12 PROCEDURE — 3074F SYST BP LT 130 MM HG: CPT | Performed by: INTERNAL MEDICINE

## 2022-12-12 PROCEDURE — 3078F DIAST BP <80 MM HG: CPT | Performed by: INTERNAL MEDICINE

## 2022-12-12 PROCEDURE — 93280 PM DEVICE PROGR EVAL DUAL: CPT | Performed by: INTERNAL MEDICINE

## 2022-12-12 PROCEDURE — 1123F ACP DISCUSS/DSCN MKR DOCD: CPT | Performed by: INTERNAL MEDICINE

## 2022-12-12 PROCEDURE — G8484 FLU IMMUNIZE NO ADMIN: HCPCS | Performed by: INTERNAL MEDICINE

## 2022-12-12 PROCEDURE — 1090F PRES/ABSN URINE INCON ASSESS: CPT | Performed by: INTERNAL MEDICINE

## 2022-12-12 RX ORDER — AMIODARONE HYDROCHLORIDE 200 MG/1
200 TABLET ORAL SEE ADMIN INSTRUCTIONS
Qty: 90 TABLET | Refills: 3 | Status: SHIPPED | OUTPATIENT
Start: 2022-12-12

## 2022-12-12 RX ORDER — AMIODARONE HYDROCHLORIDE 200 MG/1
200 TABLET ORAL SEE ADMIN INSTRUCTIONS
Qty: 90 TABLET | Refills: 0 | Status: SHIPPED
Start: 2022-12-12 | End: 2022-12-12 | Stop reason: SDUPTHER

## 2022-12-12 RX ORDER — METOPROLOL SUCCINATE 25 MG/1
25 TABLET, EXTENDED RELEASE ORAL DAILY
Qty: 90 TABLET | Refills: 3 | Status: SHIPPED | OUTPATIENT
Start: 2022-12-12

## 2022-12-12 ASSESSMENT — ENCOUNTER SYMPTOMS
RIGHT EYE: 0
HEMATOCHEZIA: 0
SHORTNESS OF BREATH: 1
WHEEZING: 0
STRIDOR: 0
HEMATEMESIS: 0
LEFT EYE: 0

## 2022-12-12 NOTE — PATIENT INSTRUCTIONS
Plan:     Remote device checks every three months   Take Amiodarone 200 mg Monday through Friday  TSH, CBC and CMP in January or February   Continue taking other current cardiac medications as prescribed   Follow up with me in 3 months

## 2022-12-12 NOTE — PROGRESS NOTES
Patient presents to the device clinic today for a programming evaluation for her pacemaker. Patient has a history of persistent AF, sinus pause, and bradycardia. Takes amiodarone, Eliquis, and Toprol XL. Last device interrogation was on 10/26. Since then, AT/AF burden measures 8.0%. NSVT events recorded. High RA threshold noted - testing shows 3.5V @ 1.0 ms. P wave: 2.1 mV  R wave: >20 mV    AP 29.3%  1.5%    All sensing and pacing parameters are within normal range. Increased RA output to 4.5V@ 1.0ms. Patient will see Dr. Adina Weber today in clinic. Patient education was provided about device functionality, in home monitoring, and any other patient questions and/or concerns were addressed. Patient voices understanding. Patient will follow up in 3 months in office or remotely. Please see interrogation for more detail - Paceart report located under the Cardiology tab.

## 2022-12-12 NOTE — PROGRESS NOTES
Assessment:     1. Atrial fibrillation: patient has paroxysmal atrial fibrillation and also what appears to be typical atrial flutter. Associated symptoms: Dyspnea on exertion and Shortness of breath     History of cardioversion: Had electrical cardioversion in the past (October 2022)  History of AF ablation: No history of atrial fibrillation ablation in the past  History of heart surgery/procedure: yes, dual chamber pacemaker implant (October 2022)    Current use of anti-arrhythmic drugs: Not currently on anti-arrhythmic drugs  Previous use of anti-arrhythmic drugs: Not previously on any anti-arrhythmic drugs    Overall LV function: Normal left ventricular systolic function  Size of left atrium: Severely dilated LA size  Significant cardiac valvular disease: Other severe tricuspid regurgitation    Alcohol consumption: No alcohol consumption  Caffeine consumption: No/minimal caffeine intake  Smoking status: non-smoker  Obstructive sleep apnea: No/low suspicion  Exercise status: Walks regularly, but no formal exercise    I had a detailed discussion with the patient about issues related to atrial fibrillation (including etiology, disease progression patterns, stroke risk and rate control issues). Patient had her questions answered to her satisfaction. Patient has a [id number] score of at least 4 [Age over 75 (2 points), Female gender (1 point), and Hypertension (1 point)]  Longterm anticoagulation is: recommended  Current anticoagulation: Apixaban  Bleeding issues reported: no  Renal function: Abnormal  serum creatinine 1.6  Thyroid function: Normal    Patient initially presented with highly symptomatic atrial flutter. Also had episodes of atrial fibrillation. Given presence of pauses during AF and junctional rhythm after cardioversion, decision made to proceed with pacemaker implant and subsequent initiation of oral amiodarone. She has done well from symptoms-standpoint.  Her AF burden is 8% on pacemaker implant and longest episode was 4 hours. To avoid amiodarone toxicity, will reduce dose to 200 mg 5 times per week (none on weekend). Will repeat CMP and TSH in January (LFT's mildly elevated at baseline). If increased arrhythmia burden, consider RF ablation of atrial flutter. 2. Elevated RA lead threshold since shortly after implant. Current atrial pacing around 30%. Current estimated battery longevity is > 12 years. Will monitor for now and see if we can avoid lead revision. 3. Hypertension: good overall blood pressure control     4. Pulmonary hypertension    5. Dyspnea on exertion: improved with suppression of atrial arrhythmias. Her pulmonary hypertension and TR may also be contributing. Will monitor. Plan:     Remote device checks every three months   Take amiodarone 200 mg Monday through Friday  TSH, CBC and CMP in January or February   Continue taking other current cardiac medications as prescribed   Follow up with me in 3 months     Subjective:       Patient ID: Cristobal Castro is a 76 y.o. female. Chief Complaint:  Chief Complaint   Patient presents with    Follow-Up from [address] [place] [address]    Patient is a pleasant 76 y.o. female who presents for evaluation of atrial fibrillation. The patient has a past medical history of atrial fibrillation, hypertension, and complete heart block (s/p dual chamber pacemaker). She was admitted on 10/13/2022 with shortness of breath. EKG showed rapid atrial fibrillation. She was given IV metoprolol and Cardizem and was noted to have 3 to 5-second pauses in atrial fibrillation. She was also started on amiodarone. On 10/14/2022, she underwent [place] and unsuccessful cardioversion. Later in the day she developed nausea, dizziness and chest tightness. EKG showed atrial fibrillation with complete heart block. This lasted approximately 15 minutes and then she converted to sinus rhythm.   On 10/15/2022, she was noted to be back in atrial fibrillation. On 10/17/2022, she spontaneously converted to sinus rhythm at 0416. On 10/18/2022, she had a dual chamber pacemaker implanted. EP Inpatient Consult (Dr. Joycelyn Aguilar, 10-): The patient is a 77-year-old woman with history of hypertension and bilateral hip replacement, who is admitted for evaluation of recently discovered atrial fibrillation. The patient reports that she has had some shortness of breath with exertion for most of the summer, but has maintained a reasonable functional status. In the evening hours of 10/09/2022, she noticed abrupt worsening of her shortness of breath. She attempted to go to an urgent care facility, but was unable to be seen by a provider related to the waiting. She contacted her primary care physician, who recommended evaluation in the emergency department. She came to the emergency department at [facility] on 10/11/2022. At that time, ECG demonstrated atrial tachycardia with a heart rate of 129 beats per minute. Subsequent ECGs have demonstrated a less organized type of atrial arrhythmia. Her rates have been difficult to control. She received IV diltiazem as well as metoprolol. She has had some pauses in atrial fibrillation, which have ranged from 3 to 5 seconds in duration. It is not clear if these are symptomatic. Her rate has otherwise remained elevated. She has no known history of cardiac arrhythmias. She has no known history of structural heart disease, though does report that she had rheumatic fever as a child. She has not undergone echocardiography in the past. She does report that she saw her primary care physician on 10/06/2022 and was felt to be in normal rhythm at that time. She is currently being treated with p.o. atenolol 50 mg p.o. b.i.d. as well as IV diltiazem at 10 mg per hour. She is receiving subcutaneous Lovenox 70 mg q.24 h. Office Visit ([address], 12/12/2022)  Today she presents for follow up.  She states that she does experience shortness of breath when exerting herself. She states that she is sleeping well at night. Patient denies current edema, chest pain, palpitations, dizziness or syncope. Patient is taking all cardiac medications as prescribed and tolerates them well. She denies any recent issues with bleeding or bruising. She reports minimal to no alcohol and caffeine intake. Review of Systems  Review of Systems   Constitutional: Negative for malaise/fatigue, weight gain and weight loss. HENT:  Negative for nosebleeds and stridor. Eyes:  Negative for vision loss in left eye and vision loss in right eye. Cardiovascular:  Positive for dyspnea on exertion. Negative for chest pain, leg swelling and syncope. Respiratory:  Positive for shortness of breath. Negative for wheezing. Hematologic/Lymphatic: Negative for bleeding problem. Does not bruise/bleed easily. Skin:  Negative for itching and rash. Musculoskeletal:  Negative for joint pain and joint swelling. Gastrointestinal:  Negative for hematemesis and hematochezia. Genitourinary:  Negative for dysuria and hematuria. Neurological:  Negative for dizziness and light-headedness. Psychiatric/Behavioral:  Negative for altered mental status. The patient is not nervous/anxious. Past Medical History:   Diagnosis Date    Arthritis     Closed subchondral insufficiency fracture of femoral condyle ([facility] [address] 08/12/2020    Hypertension     Localized osteoarthrosis not specified whether primary or secondary, pelvic region and thigh 05/08/2015    Neurogenic bladder     caused by a back surgery in 2017 per pt         Social History     Socioeconomic History    Marital status:       Spouse name: Not on file    Number of children: Not on file    Years of education: Not on file    Highest education level: Not on file   Occupational History    Not on file   Tobacco Use    Smoking status: Never    Smokeless tobacco: Never   Substance and Sexual Activity    Alcohol use: No    Drug use: No    Sexual activity: Yes   Other Topics Concern    Not on file   Social History Narrative    Not on file     Social Determinants of Health     Financial Resource Strain: Not on file   Food Insecurity: Not on file   Transportation Needs: Not on file   Physical Activity: Not on file   Stress: Not on file   Social Connections: Not on file   Intimate Partner Violence: Not on file   Housing Stability: Not on file         Family History   Problem Relation Age of Onset    Arthritis Mother     Diabetes Mother     High Blood Pressure Mother     Diabetes Sister     High Blood Pressure Sister     Diabetes Brother     High Blood Pressure Brother          Objective:       /64   Pulse 70   Ht 5' (1.524 m)   Wt 147 lb (66.7 kg)   SpO2 96%   BMI 28.71 kg/m²     Physical Exam  Constitutional:       Appearance: Normal appearance. HENT:      Right Ear: External ear normal.      Left Ear: External ear normal.      Nose: Nose normal. No rhinorrhea. Eyes:      General: No scleral icterus. Conjunctiva/sclera: Conjunctivae normal.   Cardiovascular:      Rate and Rhythm: Normal rate and regular rhythm. Pulmonary:      Effort: Pulmonary effort is normal.      Breath sounds: Normal breath sounds. Abdominal:      General: There is no distension. Tenderness: There is no abdominal tenderness. Musculoskeletal:         General: No swelling or deformity. Cervical back: Normal range of motion and neck supple. Skin:     General: Skin is warm and dry. Neurological:      General: No focal deficit present. Mental Status: She is alert and oriented to person, place, and time. Psychiatric:         Mood and Affect: Mood normal.         Behavior: Behavior normal.         Echocardiogram  (Date: 10/13/2022)  Summary   Irregular rhythm noted throughout the study. The left ventricle is normal in size with mild concentric hypertrophy.    Left ventricular systolic function is normal with a 3D calculated EF of 56%. Septal flattening in diastole and systole (\"D-shaped septum\") consistent   with right ventricular volume and pressure overload. Elevated left atrial pressures with a septal E/e' ratio of 30.3. Right ventricular systolic function is reduced. The left atrium appears severely enlarged. The right atrium is mildly enlarged. Mild mitral regurgitation. Tenting and poor coaptation tricuspid valve leaflets with severe functional   tricuspid regurgitation. Systolic pulmonary artery pressure (SPAP) is elevated and estimated at 50   mmHg (right atrial pressure 15 mmHg) consistent with moderate pulmonary   hypertension. Degree of pulmonary hypertension may be under-estimated in the   setting of severe tricuspid regurgitation. Stress Test   N/A    Current Medications     Current Outpatient Medications   Medication Sig Dispense Refill    amLODIPine (NORVASC) 5 MG tablet Take 1 tablet by mouth daily 90 tablet 3    amiodarone (CORDARONE) 200 MG tablet Take 1 tablet by mouth daily 90 tablet 0    apixaban (ELIQUIS) 5 MG TABS tablet Take 1 tablet by mouth 2 times daily 60 tablet 1    metoprolol succinate (TOPROL XL) 25 MG extended release tablet Take 1 tablet by mouth daily 30 tablet 3    vitamin B-12 (CYANOCOBALAMIN) 100 MCG tablet Take 50 mcg by mouth daily      Cholecalciferol (VITAMIN D) 50 MCG (2000 UT) CAPS capsule Take by mouth      famotidine (PEPCID) 20 MG tablet Take 20 mg by mouth daily       No current facility-administered medications for this visit.            Lab Review     Lab Results   Component Value Date/Time     10/19/2022 08:29 AM    K 4.5 10/19/2022 08:29 AM    CL 97 10/19/2022 08:29 AM    CO2 24 10/19/2022 08:29 AM    BUN 23 10/19/2022 08:29 AM    CREATININE 1.3 10/19/2022 08:29 AM    GLUCOSE 99 10/19/2022 08:29 AM    CALCIUM 9.4 10/19/2022 08:29 AM        Lab Results   Component Value Date    WBC 7.2 10/18/2022    HGB 9.4 (L) 10/18/2022    HCT 29.5 (L) 10/18/2022    MCV 86.5 10/18/2022     10/18/2022       Lab Results   Component Value Date    TSH 3.09 10/11/2022    TSHREFLEX 2.14 10/13/2022       No results found for: BNP    I, Cathleen Myles RN, am scribing for and in the presence of Dr. Andra Cuellar.  12/12/22 2:09 PM   Cathleen Myles RN

## 2023-01-16 ENCOUNTER — HOSPITAL ENCOUNTER (OUTPATIENT)
Age: 78
Discharge: HOME | End: 2023-01-16
Payer: MEDICARE

## 2023-01-16 DIAGNOSIS — I48.19 PERSISTENT ATRIAL FIBRILLATION (HCC): ICD-10-CM

## 2023-01-16 LAB
A/G RATIO: 1.4 (ref 1.1–2.2)
ALBUMIN SERPL-MCNC: 4.2 G/DL (ref 3.4–5)
ALP BLD-CCNC: 90 U/L (ref 40–129)
ALT SERPL-CCNC: 15 U/L (ref 10–40)
ANION GAP SERPL CALCULATED.3IONS-SCNC: 16 MMOL/L (ref 3–16)
AST SERPL-CCNC: 21 U/L (ref 15–37)
BASOPHILS ABSOLUTE: 0.1 K/UL (ref 0–0.2)
BASOPHILS RELATIVE PERCENT: 0.9 %
BILIRUB SERPL-MCNC: 0.4 MG/DL (ref 0–1)
BUN BLDV-MCNC: 21 MG/DL (ref 7–20)
CALCIUM SERPL-MCNC: 9.9 MG/DL (ref 8.3–10.6)
CHLORIDE BLD-SCNC: 100 MMOL/L (ref 99–110)
CO2: 23 MMOL/L (ref 21–32)
CREAT SERPL-MCNC: 1.5 MG/DL (ref 0.6–1.2)
EOSINOPHILS ABSOLUTE: 0.1 K/UL (ref 0–0.6)
EOSINOPHILS RELATIVE PERCENT: 2.4 %
GFR SERPL CREATININE-BSD FRML MDRD: 35 ML/MIN/{1.73_M2}
GLUCOSE BLD-MCNC: 67 MG/DL (ref 70–99)
HCT VFR BLD CALC: 37.8 % (ref 36–48)
HEMOGLOBIN: 12 G/DL (ref 12–16)
LYMPHOCYTES ABSOLUTE: 1.1 K/UL (ref 1–5.1)
LYMPHOCYTES RELATIVE PERCENT: 20.1 %
MCH RBC QN AUTO: 26.7 PG (ref 26–34)
MCHC RBC AUTO-ENTMCNC: 31.8 G/DL (ref 31–36)
MCV RBC AUTO: 84.2 FL (ref 80–100)
MONOCYTES ABSOLUTE: 0.7 K/UL (ref 0–1.3)
MONOCYTES RELATIVE PERCENT: 12.1 %
NEUTROPHILS ABSOLUTE: 3.6 K/UL (ref 1.7–7.7)
NEUTROPHILS RELATIVE PERCENT: 64.5 %
PDW BLD-RTO: 15.4 % (ref 12.4–15.4)
PLATELET # BLD: 274 K/UL (ref 135–450)
PMV BLD AUTO: 7.5 FL (ref 5–10.5)
POTASSIUM SERPL-SCNC: 4.3 MMOL/L (ref 3.5–5.1)
RBC # BLD: 4.49 M/UL (ref 4–5.2)
SODIUM BLD-SCNC: 139 MMOL/L (ref 136–145)
TOTAL PROTEIN: 7.2 G/DL (ref 6.4–8.2)
TSH REFLEX FT4: 2.51 UIU/ML (ref 0.27–4.2)
WBC # BLD: 5.6 K/UL (ref 4–11)

## 2023-01-16 PROCEDURE — 80053 COMPREHEN METABOLIC PANEL: CPT

## 2023-01-16 PROCEDURE — 36415 COLL VENOUS BLD VENIPUNCTURE: CPT

## 2023-01-16 PROCEDURE — 85025 COMPLETE CBC W/AUTO DIFF WBC: CPT

## 2023-01-16 PROCEDURE — 84443 ASSAY THYROID STIM HORMONE: CPT

## 2023-01-17 ENCOUNTER — NURSE ONLY (OUTPATIENT)
Dept: CARDIOLOGY CLINIC | Age: 78
End: 2023-01-17
Payer: MEDICARE

## 2023-01-17 DIAGNOSIS — R00.1 BRADYCARDIA: ICD-10-CM

## 2023-01-17 DIAGNOSIS — I45.5 SINUS PAUSE: Primary | ICD-10-CM

## 2023-01-17 DIAGNOSIS — I49.5 SICK SINUS SYNDROME (HCC): ICD-10-CM

## 2023-01-17 DIAGNOSIS — Z95.0 PACEMAKER: ICD-10-CM

## 2023-01-17 PROCEDURE — 93296 REM INTERROG EVL PM/IDS: CPT | Performed by: INTERNAL MEDICINE

## 2023-01-17 PROCEDURE — 93294 REM INTERROG EVL PM/LDLS PM: CPT | Performed by: INTERNAL MEDICINE

## 2023-01-24 NOTE — PROGRESS NOTES
End of 91-day monitoring period 1/17. Time in AT/AF 0.1 hr/day (0.5%). Current EGM shows prolonged SHABANA. AT/AF egms show SVT. High A. threshold on 16-Jan-2023.-historically high, leads trend stable. Remote transmission received for patient's dual chamber PACEMAKER. Transmission shows normal sensing and pacing function. EP physician will review. See interrogation under the cardiology tab in the [address] [place] [address] [address] field for more details. Will continue to monitor remotely. Hx PAF (amio, eliquis, toprol xl).

## 2023-03-14 ASSESSMENT — ENCOUNTER SYMPTOMS
HEMATEMESIS: 0
SHORTNESS OF BREATH: 1
RIGHT EYE: 0
STRIDOR: 0
WHEEZING: 0
HEMATOCHEZIA: 0
LEFT EYE: 0

## 2023-03-14 NOTE — PROGRESS NOTES
Assessment:     1. Atrial fibrillation: patient has paroxysmal atrial fibrillation and also what appears to be typical atrial flutter. Associated symptoms: Dyspnea on exertion and Shortness of breath     History of cardioversion: Had electrical cardioversion in the past (October 2022)  History of AF ablation: No history of atrial fibrillation ablation in the past  History of heart surgery/procedure: yes, dual chamber pacemaker implant (October 2022)    Current use of anti-arrhythmic drugs: amiodarone  Previous use of anti-arrhythmic drugs: amiodarone    Overall LV function: Normal left ventricular systolic function  Size of left atrium: Severely dilated LA size  Significant cardiac valvular disease: Other severe tricuspid regurgitation    Alcohol consumption: No alcohol consumption  Caffeine consumption: No/minimal caffeine intake  Smoking status: non-smoker  Obstructive sleep apnea: No/low suspicion  Exercise status: Walks regularly, but no formal exercise    I have had discussions with the patient about issues related to atrial fibrillation (including etiology, disease progression patterns, stroke risk and rate control issues). Patient had her questions answered to her satisfaction. Patient has a [id number] score of at least 4 [Age over 75 (2 points), Female gender (1 point), and Hypertension (1 point)]  Longterm anticoagulation is: recommended  Current anticoagulation: Apixaban  Bleeding issues reported: no  Renal function: Abnormal  serum creatinine 1.6  Thyroid function: Normal    Patient initially presented with highly symptomatic atrial flutter. Also had episodes of atrial fibrillation. Given presence of pauses during AF and junctional rhythm after cardioversion, decision made to proceed with pacemaker implant and subsequent initiation of oral amiodarone. She has done well from symptoms-standpoint. Her AF burden was 8% on pacemaker implant and longest episode was 4 hours. Now it is under 1%. To avoid amiodarone toxicity, we reduced dose to 200 mg 5 times per week (none on weekend). Will repeat CMP and TSH periodically. If AF burden still quite low, will plan to reduce amiodarone to 100 mg daily five days per week. If increased arrhythmia burden, consider RF ablation of atrial flutter. 2. Elevated RA lead threshold since shortly after implant. Current atrial pacing around 30%. Current estimated battery longevity is > 12 years. Will monitor for now and see if we can avoid lead revision. 3. Hypertension: good overall blood pressure control     4. Pulmonary hypertension    5. Dyspnea on exertion: improved with suppression of atrial arrhythmias. Her pulmonary hypertension and TR may also be contributing. Will monitor. Plan:     Remote device checks every three months   Continue taking amiodarone 200 mg Monday through Friday  Continue taking other current cardiac medications as prescribed   Follow up with me in 4 - 5 months     Subjective:     Patient ID: Bravo Can is a 76 y.o. female. Chief Complaint:  Chief Complaint   Patient presents with    Follow-up    Device Check    Atrial Fibrillation     HPI    Patient is a pleasant 76 y.o. female who presents for evaluation of atrial fibrillation. The patient has a past medical history of atrial fibrillation, hypertension, and complete heart block (s/p dual chamber pacemaker). She was admitted on 10/13/2022 with shortness of breath. EKG showed rapid atrial fibrillation. She was given IV metoprolol and Cardizem and was noted to have 3 to 5-second pauses in atrial fibrillation. She was also started on amiodarone. On 10/14/2022, she underwent [place] and unsuccessful cardioversion. Later in the day she developed nausea, dizziness and chest tightness. EKG showed atrial fibrillation with complete heart block. This lasted approximately 15 minutes and then she converted to sinus rhythm.   On 10/15/2022, she was noted to be back in atrial fibrillation. On 10/17/2022, she spontaneously converted to sinus rhythm at 0416. On 10/18/2022, she had a dual chamber pacemaker implanted.     EP Inpatient Consult (Dr. Starks, 10/13/2022):  The patient is a 77-year-old woman with history of hypertension and bilateral hip replacement, who is admitted for evaluation of recently discovered atrial fibrillation.  The patient reports that she has had some shortness of breath with exertion for most of the summer, but has maintained a reasonable functional status.  In the evening hours of 10/09/2022, she noticed abrupt worsening of her shortness of breath.  She attempted to go to an urgent care facility, but was unable to be seen by a provider related to the waiting.  She contacted her primary care physician, who recommended evaluation in the emergency department.  She came to the emergency department at [facility] on 10/11/2022.  At that time, ECG demonstrated atrial tachycardia with a heart rate of 129 beats per minute.  Subsequent ECGs have demonstrated a less organized type of atrial arrhythmia.  Her rates have been difficult to control.  She received IV diltiazem as well as metoprolol.  She has had some pauses in atrial fibrillation, which have ranged from 3 to 5 seconds in duration.  It is not clear if these are symptomatic.  Her rate has otherwise remained elevated.  She has no known history of cardiac arrhythmias.  She has no known history of structural heart disease, though does report that she had rheumatic fever as a child.  She has not undergone echocardiography in the past. She does report that she saw her primary care physician on 10/06/2022 and was felt to be in normal rhythm at that time.  She is currently being treated with p.o. atenolol 50 mg p.o. b.i.d. as well as IV diltiazem at 10 mg per hour.  She is receiving subcutaneous Lovenox 70 mg q.24 h.    Office Visit (EP Clinic, 12/12/2022):  Today she presents for follow up. She states  that she does experience shortness of breath when exerting herself. She states that she is sleeping well at night. Patient denies current edema, chest pain, palpitations, dizziness or syncope. Patient is taking all cardiac medications as prescribed and tolerates them well. She denies any recent issues with bleeding or bruising. She reports minimal to no alcohol and caffeine intake. Office Visit ([address], 03/15/2023): She presents today for follow up. She reports she is feeling well. She states that she gets short of breath with exertion. She reports that she was diagnosed with shingles on 02/14/2023 and she is recovering well. She reports that she thought she had the flu and when she looked in the mirror she noted a red rash all over. Patient denies current edema, chest pain, palpitations, dizziness or syncope. Patient is taking all cardiac medications as prescribed and tolerates them well. She denies any recent issues with bleeding or bruising. Review of Systems  Review of Systems   Constitutional: Negative for malaise/fatigue, weight gain and weight loss. HENT:  Negative for nosebleeds and stridor. Eyes:  Negative for vision loss in left eye and vision loss in right eye. Cardiovascular:  Positive for dyspnea on exertion. Negative for chest pain, leg swelling and syncope. Respiratory:  Positive for shortness of breath. Negative for wheezing. Hematologic/Lymphatic: Negative for bleeding problem. Does not bruise/bleed easily. Skin:  Positive for rash (shingles). Negative for itching. Musculoskeletal:  Negative for joint pain and joint swelling. Gastrointestinal:  Negative for hematemesis and hematochezia. Genitourinary:  Negative for dysuria and hematuria. Neurological:  Negative for dizziness and light-headedness. Psychiatric/Behavioral:  Negative for altered mental status. The patient is not nervous/anxious.         Past Medical History:   Diagnosis Date    Arthritis     Closed subchondral insufficiency fracture of femoral condyle ([facility] [address] 08/12/2020    Hypertension     Localized osteoarthrosis not specified whether primary or secondary, pelvic region and thigh 05/08/2015    Neurogenic bladder     caused by a back surgery in 2017 per pt         Social History     Socioeconomic History    Marital status:      Spouse name: Not on file    Number of children: Not on file    Years of education: Not on file    Highest education level: Not on file   Occupational History    Not on file   Tobacco Use    Smoking status: Never    Smokeless tobacco: Never   Substance and Sexual Activity    Alcohol use: No    Drug use: No    Sexual activity: Yes   Other Topics Concern    Not on file   Social History Narrative    Not on file     Social Determinants of Health     Financial Resource Strain: Not on file   Food Insecurity: Not on file   Transportation Needs: Not on file   Physical Activity: Not on file   Stress: Not on file   Social Connections: Not on file   Intimate Partner Violence: Not on file   Housing Stability: Not on file         Family History   Problem Relation Age of Onset    Arthritis Mother     Diabetes Mother     High Blood Pressure Mother     Diabetes Sister     High Blood Pressure Sister     Diabetes Brother     High Blood Pressure Brother          Objective:       /74   Pulse 70   Ht 5' (1.524 m)   Wt 151 lb 12.8 oz (68.9 kg)   SpO2 96%   BMI 29.65 kg/m²     Physical Exam  Constitutional:       Appearance: Normal appearance. HENT:      Right Ear: External ear normal.      Left Ear: External ear normal.      Nose: Nose normal. No rhinorrhea. Eyes:      General: No scleral icterus. Conjunctiva/sclera: Conjunctivae normal.   Cardiovascular:      Rate and Rhythm: Normal rate and regular rhythm. Pulmonary:      Effort: Pulmonary effort is normal.      Breath sounds: Normal breath sounds. Abdominal:      General: There is no distension. Tenderness:  There is no abdominal tenderness. Musculoskeletal:         General: No swelling or deformity. Cervical back: Normal range of motion and neck supple. Skin:     General: Skin is warm and dry. Neurological:      General: No focal deficit present. Mental Status: She is alert and oriented to person, place, and time. Psychiatric:         Mood and Affect: Mood normal.         Behavior: Behavior normal.     Echocardiogram  (Date: 10/13/2022)  Summary   Irregular rhythm noted throughout the study. The left ventricle is normal in size with mild concentric hypertrophy. Left ventricular systolic function is normal with a 3D calculated EF of 56%. Septal flattening in diastole and systole (\"D-shaped septum\") consistent   with right ventricular volume and pressure overload. Elevated left atrial pressures with a septal E/e' ratio of 30.3. Right ventricular systolic function is reduced. The left atrium appears severely enlarged. The right atrium is mildly enlarged. Mild mitral regurgitation. Tenting and poor coaptation tricuspid valve leaflets with severe functional   tricuspid regurgitation. Systolic pulmonary artery pressure (SPAP) is elevated and estimated at 50   mmHg (right atrial pressure 15 mmHg) consistent with moderate pulmonary   hypertension. Degree of pulmonary hypertension may be under-estimated in the   setting of severe tricuspid regurgitation. Stress Test   N/A    Current Medications     Current Outpatient Medications   Medication Sig Dispense Refill    amiodarone (CORDARONE) 200 MG tablet Take 1 tablet by mouth See Admin Instructions Take 1 tablet by mouth Monday through Friday.  90 tablet 3    apixaban (ELIQUIS) 5 MG TABS tablet Take 1 tablet by mouth 2 times daily 180 tablet 3    metoprolol succinate (TOPROL XL) 25 MG extended release tablet Take 1 tablet by mouth daily 90 tablet 3    amLODIPine (NORVASC) 5 MG tablet Take 1 tablet by mouth daily 90 tablet 3    vitamin B-12 (CYANOCOBALAMIN) 100 MCG tablet Take 50 mcg by mouth daily      Cholecalciferol (VITAMIN D) 50 MCG (2000 UT) CAPS capsule Take by mouth      famotidine (PEPCID) 20 MG tablet Take 20 mg by mouth daily       No current facility-administered medications for this visit.            Lab Review     Lab Results   Component Value Date/Time     01/16/2023 11:16 AM    K 4.3 01/16/2023 11:16 AM    K 4.5 10/19/2022 08:29 AM     01/16/2023 11:16 AM    CO2 23 01/16/2023 11:16 AM    BUN 21 01/16/2023 11:16 AM    CREATININE 1.5 01/16/2023 11:16 AM    GLUCOSE 67 01/16/2023 11:16 AM    CALCIUM 9.9 01/16/2023 11:16 AM        Lab Results   Component Value Date    WBC 5.6 01/16/2023    HGB 12.0 01/16/2023    HCT 37.8 01/16/2023    MCV 84.2 01/16/2023     01/16/2023       Lab Results   Component Value Date    TSHFT4 2.51 01/16/2023    TSH 3.09 10/11/2022    TSHREFLEX 2.14 10/13/2022       No results found for: BNP    I, Karol Brunner RN, am scribing for and in the presence of Dr. Matthew Francisco. 03/15/23 1:35 PM   Karol Brunner RN

## 2023-03-15 ENCOUNTER — NURSE ONLY (OUTPATIENT)
Dept: CARDIOLOGY CLINIC | Age: 78
End: 2023-03-15
Payer: MEDICARE

## 2023-03-15 ENCOUNTER — OFFICE VISIT (OUTPATIENT)
Dept: CARDIOLOGY CLINIC | Age: 78
End: 2023-03-15
Payer: MEDICARE

## 2023-03-15 VITALS
DIASTOLIC BLOOD PRESSURE: 74 MMHG | OXYGEN SATURATION: 96 % | WEIGHT: 151.8 LBS | BODY MASS INDEX: 29.8 KG/M2 | HEART RATE: 70 BPM | SYSTOLIC BLOOD PRESSURE: 132 MMHG | HEIGHT: 60 IN

## 2023-03-15 DIAGNOSIS — I48.19 PERSISTENT ATRIAL FIBRILLATION (HCC): Primary | ICD-10-CM

## 2023-03-15 DIAGNOSIS — I45.5 SINUS PAUSE: ICD-10-CM

## 2023-03-15 DIAGNOSIS — I49.5 SICK SINUS SYNDROME (HCC): ICD-10-CM

## 2023-03-15 DIAGNOSIS — Z95.0 PACEMAKER: ICD-10-CM

## 2023-03-15 DIAGNOSIS — R00.0 TACHYARRHYTHMIA: ICD-10-CM

## 2023-03-15 DIAGNOSIS — I48.19 PERSISTENT ATRIAL FIBRILLATION (HCC): ICD-10-CM

## 2023-03-15 DIAGNOSIS — I48.0 PAF (PAROXYSMAL ATRIAL FIBRILLATION) (HCC): Primary | ICD-10-CM

## 2023-03-15 DIAGNOSIS — Z95.0 MRI SAFE CARDIAC PACEMAKER IN SITU: ICD-10-CM

## 2023-03-15 DIAGNOSIS — I49.5 SINUS NODE DYSFUNCTION (HCC): ICD-10-CM

## 2023-03-15 DIAGNOSIS — I10 PRIMARY HYPERTENSION: ICD-10-CM

## 2023-03-15 DIAGNOSIS — R00.1 BRADYCARDIA: Primary | ICD-10-CM

## 2023-03-15 PROCEDURE — 1123F ACP DISCUSS/DSCN MKR DOCD: CPT | Performed by: INTERNAL MEDICINE

## 2023-03-15 PROCEDURE — 93280 PM DEVICE PROGR EVAL DUAL: CPT | Performed by: INTERNAL MEDICINE

## 2023-03-15 PROCEDURE — 99214 OFFICE O/P EST MOD 30 MIN: CPT | Performed by: INTERNAL MEDICINE

## 2023-03-15 PROCEDURE — 3078F DIAST BP <80 MM HG: CPT | Performed by: INTERNAL MEDICINE

## 2023-03-15 PROCEDURE — G8400 PT W/DXA NO RESULTS DOC: HCPCS | Performed by: INTERNAL MEDICINE

## 2023-03-15 PROCEDURE — G8427 DOCREV CUR MEDS BY ELIG CLIN: HCPCS | Performed by: INTERNAL MEDICINE

## 2023-03-15 PROCEDURE — 1036F TOBACCO NON-USER: CPT | Performed by: INTERNAL MEDICINE

## 2023-03-15 PROCEDURE — 3075F SYST BP GE 130 - 139MM HG: CPT | Performed by: INTERNAL MEDICINE

## 2023-03-15 PROCEDURE — G8484 FLU IMMUNIZE NO ADMIN: HCPCS | Performed by: INTERNAL MEDICINE

## 2023-03-15 PROCEDURE — 1090F PRES/ABSN URINE INCON ASSESS: CPT | Performed by: INTERNAL MEDICINE

## 2023-03-15 PROCEDURE — G8417 CALC BMI ABV UP PARAM F/U: HCPCS | Performed by: INTERNAL MEDICINE

## 2023-03-15 NOTE — PROGRESS NOTES
Patient presents to the device clinic today for a programming evaluation for her pacemaker. Patient has a history of persistent AF, bradycardia, and sinus pause. Takes amiodarone, Eliquis, and Toprol XL. Last device interrogation was on 1/17. Since then, AT/AF burden measures 0.4%. High RA threshold noted - LOC today 3.25V@ 1.0ms. P wave: 1.6 mV  R wave: 17.8 mV    AP 40.3%  0.4%    All sensing and pacing parameters are within normal range. No changes need to be made at this time. Patient will see Dr. Oanh Martinez today in clinic. Patient education was provided about device functionality, in home monitoring, and any other patient questions and/or concerns were addressed. Patient voices understanding. Patient will follow up in 3 months in office or remotely. Please see interrogation for more detail - Paceart report located under the Cardiology tab.

## 2023-03-15 NOTE — TELEPHONE ENCOUNTER
Danielle Leon, pt's contact, called asking about the eliquis, copay cards that were given. I informed Danielle Leon that it would be better for medical staff to speak with her at this point. Danielle Leon stated it would be better to have a call back to [phone] early tomorrow 3/16/23.      Danielle Leon stated if a call was made anytime before 530pm she can take the call then too, but tomorrow would be best.

## 2023-03-15 NOTE — PATIENT INSTRUCTIONS
Plan:     Remote device checks every three months   Continue taking amiodarone 200 mg Monday through Friday  Continue taking other current cardiac medications as prescribed   Follow up with me in 4 - 5 months well developed, well nourished , in no acute distress , ambulating without difficulty , normal communication ability

## 2023-03-16 NOTE — TELEPHONE ENCOUNTER
Spoke with Efren Elam. They would like to try Xarelto to see if price is more affordable. KXA script pending.

## 2023-03-20 NOTE — TELEPHONE ENCOUNTER
Pt's daughter sts their pharmacy can not get medication for a least 3 weeks but the cost is the same. What other alternatives do they have.

## 2023-03-20 NOTE — TELEPHONE ENCOUNTER
Would edoxaban or dabigatran be suitable for this patient to see if they are covered? If these medications are not covered, should we consider warfarin?

## 2023-03-21 NOTE — TELEPHONE ENCOUNTER
Pts daughter Armando Eagle called i asking to speak with rnjovita regarding blood thinner. [phone]. Please advise. They checked with pts insurance and the only blood thinner they accept is dobigatren but it is the same price range of $200. Pt took last dose of blood thinner on Sunday (03/19).

## 2023-03-21 NOTE — TELEPHONE ENCOUNTER
Spoke with Norris Kathleen and provided her with the [employer] assistance line. She states that she has previously called them. Patient is currently out of Xarelto and would like a 1 week prescription sent in. RX pended.

## 2023-03-21 NOTE — TELEPHONE ENCOUNTER
Shwetha Torres MD  You 10 hours ago (9:13 PM)     KA  We can try first two and warfarin would be last option    Spoke with Ulices Patel and she will call the patients insurance to see if these medications are covered. She will call us back to let us know.

## 2023-03-27 ENCOUNTER — TELEPHONE (OUTPATIENT)
Dept: CARDIOLOGY CLINIC | Age: 78
End: 2023-03-27

## 2023-03-27 NOTE — TELEPHONE ENCOUNTER
Received fax with pt application for [address] Patient [facility].  Ppw completed, awaiting provider signature

## 2023-03-29 NOTE — TELEPHONE ENCOUNTER
Pt daughter Desimaya Villarreal called requesting status on [employer] Assistance.   Please advise Den Villarreal [phone]

## 2023-03-30 DIAGNOSIS — I48.19 PERSISTENT ATRIAL FIBRILLATION (HCC): ICD-10-CM

## 2023-03-30 NOTE — TELEPHONE ENCOUNTER
Spoke to Treasure to update her on pt's [employer] Eliquis application. V/U     Attempted to call Treasure again to let her know - Xarelto 15mg samples have been set aside at the [facility] office and are ready for .

## 2023-03-31 NOTE — TELEPHONE ENCOUNTER
Spoke to pt daughter Treasure to let her know [employer] application was submitted.  V/U - scanned into media with fax confirmation

## 2023-04-18 ENCOUNTER — NURSE ONLY (OUTPATIENT)
Dept: CARDIOLOGY CLINIC | Age: 78
End: 2023-04-18

## 2023-04-18 DIAGNOSIS — Z95.0 MRI SAFE CARDIAC PACEMAKER IN SITU: ICD-10-CM

## 2023-04-18 DIAGNOSIS — I45.5 SINUS PAUSE: ICD-10-CM

## 2023-04-18 DIAGNOSIS — R00.1 BRADYCARDIA: Primary | ICD-10-CM

## 2023-04-20 NOTE — PROGRESS NOTES
High A. threshold on 16-Jan-2023.-historically high, leads trend stable. Pacing (% of Time Since 15-Mar-2023)   0.2% (MVP On)  AP 82.5%  Time in AT/AF <0.1 hr/day (<0.1%)-SVT vs far field R/T wave over sensing. Remote transmission received for patient's dual chamber PACEMAKER. Transmission shows normal sensing and pacing function. EP physician will review. See interrogation under the cardiology tab in the [address] [place] [address] [address] field for more details. Will continue to monitor remotely. Hx PAF (amio, xarelto, toprol xl).

## 2023-04-25 ENCOUNTER — TELEPHONE (OUTPATIENT)
Dept: CARDIOLOGY CLINIC | Age: 78
End: 2023-04-25

## 2023-04-25 NOTE — TELEPHONE ENCOUNTER
Pts daughter Mary Pel called and stated that pt has taken last dose of Eliquis on 04/25. Do we have any samples for pt? Mary Pel can be reached at [phone]. Last ov 03/15/2023 kxa. Upcoming ov 07/26/2023 kxa.

## 2023-04-25 NOTE — TELEPHONE ENCOUNTER
Spoke to pt daughter to clarify. Pt is currently taking Xarelto 15mg once daily while we wait for a response from [employer] regarding Eliquis assistance. Xarelto 15mg samples set aside at the [facility] office, ready for .  V/U

## 2023-07-18 ENCOUNTER — NURSE ONLY (OUTPATIENT)
Dept: CARDIOLOGY CLINIC | Age: 78
End: 2023-07-18
Payer: MEDICARE

## 2023-07-18 DIAGNOSIS — Z95.0 PACEMAKER: Primary | ICD-10-CM

## 2023-07-18 DIAGNOSIS — R00.1 BRADYCARDIA: ICD-10-CM

## 2023-07-18 PROCEDURE — 93296 REM INTERROG EVL PM/IDS: CPT | Performed by: INTERNAL MEDICINE

## 2023-07-18 PROCEDURE — 93294 REM INTERROG EVL PM/LDLS PM: CPT | Performed by: INTERNAL MEDICINE

## 2023-07-26 NOTE — PROGRESS NOTES
We received remote transmission from patient's monitor at home. Transmission shows stable sensing and pacing function. High A. threshold on 16-Jan-2023.-historically high, leads trend stable. EP physician will review. See interrogation under cardiology tab in the [address] field for more details. Noted AT/SVT. Pt on Toprol.     1.2% (MVP On)  AP 71.4%    End of 91-day monitoring period 7-18-23.

## 2023-07-31 ENCOUNTER — APPOINTMENT (OUTPATIENT)
Dept: CT IMAGING | Age: 78
DRG: 312 | End: 2023-07-31
Payer: MEDICARE

## 2023-07-31 ENCOUNTER — HOSPITAL ENCOUNTER (INPATIENT)
Age: 78
LOS: 3 days | Discharge: HOME | DRG: 312 | End: 2023-08-03
Attending: INTERNAL MEDICINE | Admitting: INTERNAL MEDICINE
Payer: MEDICARE

## 2023-07-31 ENCOUNTER — APPOINTMENT (OUTPATIENT)
Dept: GENERAL RADIOLOGY | Age: 78
DRG: 312 | End: 2023-07-31
Payer: MEDICARE

## 2023-07-31 DIAGNOSIS — I95.1 ORTHOSTASIS: ICD-10-CM

## 2023-07-31 DIAGNOSIS — E87.1 HYPONATREMIA: Primary | ICD-10-CM
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
## 2023-07-31 PROBLEM — N18.32 STAGE 3B CHRONIC KIDNEY DISEASE (HCC): Status: ACTIVE | Noted: 2023-07-31

## 2023-07-31 PROBLEM — I48.0 PAROXYSMAL A-FIB (HCC): Status: ACTIVE | Noted: 2023-07-31

## 2023-07-31 PROBLEM — R29.6 RECURRENT FALLS: Status: ACTIVE | Noted: 2023-07-31

## 2023-07-31 PROBLEM — K21.9 GASTROESOPHAGEAL REFLUX DISEASE WITHOUT ESOPHAGITIS: Status: ACTIVE | Noted: 2023-07-31

## 2023-07-31 PROBLEM — R77.8 ELEVATED TROPONIN: Status: ACTIVE | Noted: 2023-07-31

## 2023-07-31 PROBLEM — R74.01 TRANSAMINITIS: Status: ACTIVE | Noted: 2023-07-31

## 2023-07-31 PROBLEM — R79.89 ELEVATED TROPONIN: Status: ACTIVE | Noted: 2023-07-31

## 2023-07-31 LAB
ALBUMIN SERPL-MCNC: 4.1 G/DL (ref 3.4–5)
ALBUMIN/GLOB SERPL: 1.4 {RATIO} (ref 1.1–2.2)
ALP SERPL-CCNC: 90 U/L (ref 40–129)
ALT SERPL-CCNC: 73 U/L (ref 10–40)
ANION GAP SERPL CALCULATED.3IONS-SCNC: 14 MMOL/L (ref 3–16)
ANION GAP SERPL CALCULATED.3IONS-SCNC: 16 MMOL/L (ref 3–16)
ANION GAP SERPL CALCULATED.3IONS-SCNC: 18 MMOL/L (ref 3–16)
AST SERPL-CCNC: 109 U/L (ref 15–37)
BASOPHILS # BLD: 0 K/UL (ref 0–0.2)
BASOPHILS NFR BLD: 0 %
BILIRUB SERPL-MCNC: 1.3 MG/DL (ref 0–1)
BILIRUB UR QL STRIP.AUTO: ABNORMAL
BUN SERPL-MCNC: 16 MG/DL (ref 7–20)
BUN SERPL-MCNC: 17 MG/DL (ref 7–20)
BUN SERPL-MCNC: 19 MG/DL (ref 7–20)
CALCIUM SERPL-MCNC: 8.4 MG/DL (ref 8.3–10.6)
CALCIUM SERPL-MCNC: 8.7 MG/DL (ref 8.3–10.6)
CALCIUM SERPL-MCNC: 9.3 MG/DL (ref 8.3–10.6)
CHLORIDE SERPL-SCNC: 84 MMOL/L (ref 99–110)
CHLORIDE SERPL-SCNC: 89 MMOL/L (ref 99–110)
CHLORIDE SERPL-SCNC: 90 MMOL/L (ref 99–110)
CLARITY UR: CLEAR
CO2 SERPL-SCNC: 18 MMOL/L (ref 21–32)
CO2 SERPL-SCNC: 21 MMOL/L (ref 21–32)
CO2 SERPL-SCNC: 21 MMOL/L (ref 21–32)
COLOR UR: YELLOW
CREAT SERPL-MCNC: 1.1 MG/DL (ref 0.6–1.2)
CREAT SERPL-MCNC: 1.2 MG/DL (ref 0.6–1.2)
CREAT SERPL-MCNC: 1.3 MG/DL (ref 0.6–1.2)
DEPRECATED RDW RBC AUTO: 14.9 % (ref 12.4–15.4)
EKG ATRIAL RATE: 78 BPM
EKG DIAGNOSIS: NORMAL
EKG Q-T INTERVAL: 444 MS
EKG QRS DURATION: 110 MS
EKG QTC CALCULATION (BAZETT): 502 MS
EKG R AXIS: -20 DEGREES
EKG T AXIS: 84 DEGREES
EKG VENTRICULAR RATE: 77 BPM
EOSINOPHIL # BLD: 0.1 K/UL (ref 0–0.6)
EOSINOPHIL NFR BLD: 1 %
EPI CELLS #/AREA URNS HPF: ABNORMAL /HPF (ref 0–5)
GFR SERPLBLD CREATININE-BSD FMLA CKD-EPI: 41 ML/MIN/{1.73_M2}
GFR SERPLBLD CREATININE-BSD FMLA CKD-EPI: 46 ML/MIN/{1.73_M2}
GFR SERPLBLD CREATININE-BSD FMLA CKD-EPI: 51 ML/MIN/{1.73_M2}
GLUCOSE SERPL-MCNC: 129 MG/DL (ref 70–99)
GLUCOSE SERPL-MCNC: 140 MG/DL (ref 70–99)
GLUCOSE SERPL-MCNC: 95 MG/DL (ref 70–99)
GLUCOSE UR STRIP.AUTO-MCNC: NEGATIVE MG/DL
HCT VFR BLD AUTO: 28.5 % (ref 36–48)
HGB BLD-MCNC: 9.7 G/DL (ref 12–16)
HGB UR QL STRIP.AUTO: ABNORMAL
HYALINE CASTS #/AREA URNS LPF: ABNORMAL /LPF (ref 0–2)
KETONES UR STRIP.AUTO-MCNC: ABNORMAL MG/DL
LEUKOCYTE ESTERASE UR QL STRIP.AUTO: NEGATIVE
LYMPHOCYTES # BLD: 1 K/UL (ref 1–5.1)
LYMPHOCYTES NFR BLD: 10 %
MCH RBC QN AUTO: 27.4 PG (ref 26–34)
MCHC RBC AUTO-ENTMCNC: 33.9 G/DL (ref 31–36)
MCV RBC AUTO: 80.7 FL (ref 80–100)
MONOCYTES # BLD: 0.6 K/UL (ref 0–1.3)
MONOCYTES NFR BLD: 6 %
MUCOUS THREADS #/AREA URNS LPF: ABNORMAL /LPF
NEUTROPHILS # BLD: 8.4 K/UL (ref 1.7–7.7)
NEUTROPHILS NFR BLD: 82 %
NEUTS BAND NFR BLD MANUAL: 1 % (ref 0–7)
NITRITE UR QL STRIP.AUTO: NEGATIVE
PH UR STRIP.AUTO: 6 [PH] (ref 5–8)
PLATELET # BLD AUTO: 376 K/UL (ref 135–450)
PLATELET BLD QL SMEAR: ADEQUATE
PMV BLD AUTO: 7.7 FL (ref 5–10.5)
POLYCHROMASIA BLD QL SMEAR: ABNORMAL
POTASSIUM SERPL-SCNC: 3.6 MMOL/L (ref 3.5–5.1)
POTASSIUM SERPL-SCNC: 3.9 MMOL/L (ref 3.5–5.1)
POTASSIUM SERPL-SCNC: 4.3 MMOL/L (ref 3.5–5.1)
PROT SERPL-MCNC: 7.1 G/DL (ref 6.4–8.2)
PROT UR STRIP.AUTO-MCNC: ABNORMAL MG/DL
RBC # BLD AUTO: 3.53 M/UL (ref 4–5.2)
RBC #/AREA URNS HPF: ABNORMAL /HPF (ref 0–4)
RENAL EPI CELLS #/AREA UR COMP ASSIST: ABNORMAL /HPF (ref 0–1)
SLIDE REVIEW: ABNORMAL
SODIUM SERPL-SCNC: 121 MMOL/L (ref 136–145)
SODIUM SERPL-SCNC: 125 MMOL/L (ref 136–145)
SODIUM SERPL-SCNC: 125 MMOL/L (ref 136–145)
SP GR UR STRIP.AUTO: 1.01 (ref 1–1.03)
TROPONIN, HIGH SENSITIVITY: 21 NG/L (ref 0–14)
TROPONIN, HIGH SENSITIVITY: 22 NG/L (ref 0–14)
UA COMPLETE W REFLEX CULTURE PNL UR: ABNORMAL
UA DIPSTICK W REFLEX MICRO PNL UR: YES
URN SPEC COLLECT METH UR: ABNORMAL
UROBILINOGEN UR STRIP-ACNC: 1 E.U./DL
WBC # BLD AUTO: 10.1 K/UL (ref 4–11)
WBC #/AREA URNS HPF: ABNORMAL /HPF (ref 0–5)

## 2023-07-31 PROCEDURE — 93010 ELECTROCARDIOGRAM REPORT: CPT | Performed by: INTERNAL MEDICINE

## 2023-07-31 PROCEDURE — 72125 CT NECK SPINE W/O DYE: CPT

## 2023-07-31 PROCEDURE — 2580000003 HC RX 258: Performed by: NURSE PRACTITIONER

## 2023-07-31 PROCEDURE — 85025 COMPLETE CBC W/AUTO DIFF WBC: CPT

## 2023-07-31 PROCEDURE — 6370000000 HC RX 637 (ALT 250 FOR IP)

## 2023-07-31 PROCEDURE — 70450 CT HEAD/BRAIN W/O DYE: CPT

## 2023-07-31 PROCEDURE — 36415 COLL VENOUS BLD VENIPUNCTURE: CPT

## 2023-07-31 PROCEDURE — 81001 URINALYSIS AUTO W/SCOPE: CPT

## 2023-07-31 PROCEDURE — 99285 EMERGENCY DEPT VISIT HI MDM: CPT

## 2023-07-31 PROCEDURE — 73090 X-RAY EXAM OF FOREARM: CPT

## 2023-07-31 PROCEDURE — 93005 ELECTROCARDIOGRAM TRACING: CPT | Performed by: NURSE PRACTITIONER

## 2023-07-31 PROCEDURE — 2580000003 HC RX 258

## 2023-07-31 PROCEDURE — 99223 1ST HOSP IP/OBS HIGH 75: CPT

## 2023-07-31 PROCEDURE — 2060000000 HC ICU INTERMEDIATE R&B

## 2023-07-31 PROCEDURE — 80053 COMPREHEN METABOLIC PANEL: CPT

## 2023-07-31 PROCEDURE — 84484 ASSAY OF TROPONIN QUANT: CPT

## 2023-07-31 RX ORDER — ONDANSETRON 4 MG/1
4 TABLET, ORALLY DISINTEGRATING ORAL EVERY 8 HOURS PRN
Status: DISCONTINUED | OUTPATIENT
Start: 2023-07-31 | End: 2023-08-03 | Stop reason: HOSPADM

## 2023-07-31 RX ORDER — METOPROLOL SUCCINATE 25 MG/1
25 TABLET, EXTENDED RELEASE ORAL DAILY
Status: DISCONTINUED | OUTPATIENT
Start: 2023-07-31 | End: 2023-08-02

## 2023-07-31 RX ORDER — SODIUM CHLORIDE 9 MG/ML
INJECTION, SOLUTION INTRAVENOUS CONTINUOUS
Status: CANCELLED | OUTPATIENT
Start: 2023-07-31

## 2023-07-31 RX ORDER — PROCHLORPERAZINE EDISYLATE 5 MG/ML
10 INJECTION INTRAMUSCULAR; INTRAVENOUS EVERY 6 HOURS PRN
Status: DISCONTINUED | OUTPATIENT
Start: 2023-07-31 | End: 2023-08-03 | Stop reason: HOSPADM

## 2023-07-31 RX ORDER — SODIUM CHLORIDE 0.9 % (FLUSH) 0.9 %
5-40 SYRINGE (ML) INJECTION EVERY 12 HOURS SCHEDULED
Status: DISCONTINUED | OUTPATIENT
Start: 2023-07-31 | End: 2023-08-03 | Stop reason: HOSPADM

## 2023-07-31 RX ORDER — SODIUM CHLORIDE 9 MG/ML
INJECTION, SOLUTION INTRAVENOUS PRN
Status: DISCONTINUED | OUTPATIENT
Start: 2023-07-31 | End: 2023-08-03 | Stop reason: HOSPADM

## 2023-07-31 RX ORDER — POTASSIUM CHLORIDE 7.45 MG/ML
10 INJECTION INTRAVENOUS PRN
Status: DISCONTINUED | OUTPATIENT
Start: 2023-07-31 | End: 2023-08-03 | Stop reason: HOSPADM

## 2023-07-31 RX ORDER — ONDANSETRON 2 MG/ML
4 INJECTION INTRAMUSCULAR; INTRAVENOUS EVERY 6 HOURS PRN
Status: DISCONTINUED | OUTPATIENT
Start: 2023-07-31 | End: 2023-07-31

## 2023-07-31 RX ORDER — UBIDECARENONE 75 MG
50 CAPSULE ORAL DAILY
Status: DISCONTINUED | OUTPATIENT
Start: 2023-08-01 | End: 2023-08-03 | Stop reason: HOSPADM

## 2023-07-31 RX ORDER — FAMOTIDINE 20 MG/1
20 TABLET, FILM COATED ORAL DAILY
Status: DISCONTINUED | OUTPATIENT
Start: 2023-07-31 | End: 2023-08-03 | Stop reason: HOSPADM

## 2023-07-31 RX ORDER — ACETAMINOPHEN 325 MG/1
650 TABLET ORAL EVERY 6 HOURS PRN
Status: DISCONTINUED | OUTPATIENT
Start: 2023-07-31 | End: 2023-08-03 | Stop reason: HOSPADM

## 2023-07-31 RX ORDER — ACETAMINOPHEN 650 MG/1
650 SUPPOSITORY RECTAL EVERY 6 HOURS PRN
Status: DISCONTINUED | OUTPATIENT
Start: 2023-07-31 | End: 2023-08-03 | Stop reason: HOSPADM

## 2023-07-31 RX ORDER — SODIUM CHLORIDE 0.9 % (FLUSH) 0.9 %
10 SYRINGE (ML) INJECTION PRN
Status: DISCONTINUED | OUTPATIENT
Start: 2023-07-31 | End: 2023-08-03 | Stop reason: HOSPADM

## 2023-07-31 RX ORDER — POTASSIUM CHLORIDE 750 MG/1
40 TABLET, EXTENDED RELEASE ORAL PRN
Status: DISCONTINUED | OUTPATIENT
Start: 2023-07-31 | End: 2023-08-03 | Stop reason: HOSPADM

## 2023-07-31 RX ORDER — POLYETHYLENE GLYCOL 3350 17 G/17G
17 POWDER, FOR SOLUTION ORAL DAILY PRN
Status: DISCONTINUED | OUTPATIENT
Start: 2023-07-31 | End: 2023-08-03 | Stop reason: HOSPADM

## 2023-07-31 RX ORDER — AMIODARONE HYDROCHLORIDE 200 MG/1
200 TABLET ORAL
Status: DISCONTINUED | OUTPATIENT
Start: 2023-08-01 | End: 2023-08-03 | Stop reason: HOSPADM

## 2023-07-31 RX ORDER — SODIUM CHLORIDE 9 MG/ML
1000 INJECTION, SOLUTION INTRAVENOUS CONTINUOUS
Status: DISCONTINUED | OUTPATIENT
Start: 2023-07-31 | End: 2023-07-31

## 2023-07-31 RX ORDER — 0.9 % SODIUM CHLORIDE 0.9 %
1000 INTRAVENOUS SOLUTION INTRAVENOUS ONCE
Status: COMPLETED | OUTPATIENT
Start: 2023-07-31 | End: 2023-07-31

## 2023-07-31 RX ORDER — SODIUM CHLORIDE, SODIUM LACTATE, POTASSIUM CHLORIDE, CALCIUM CHLORIDE 600; 310; 30; 20 MG/100ML; MG/100ML; MG/100ML; MG/100ML
INJECTION, SOLUTION INTRAVENOUS CONTINUOUS
Status: DISCONTINUED | OUTPATIENT
Start: 2023-07-31 | End: 2023-08-02

## 2023-07-31 RX ORDER — SODIUM CHLORIDE, SODIUM LACTATE, POTASSIUM CHLORIDE, CALCIUM CHLORIDE 600; 310; 30; 20 MG/100ML; MG/100ML; MG/100ML; MG/100ML
1000 INJECTION, SOLUTION INTRAVENOUS CONTINUOUS
Status: DISCONTINUED | OUTPATIENT
Start: 2023-07-31 | End: 2023-07-31 | Stop reason: HOSPADM

## 2023-07-31 RX ORDER — AMLODIPINE BESYLATE 5 MG/1
5 TABLET ORAL DAILY
Status: DISCONTINUED | OUTPATIENT
Start: 2023-07-31 | End: 2023-08-02

## 2023-07-31 RX ORDER — MAGNESIUM SULFATE 1 G/100ML
1000 INJECTION INTRAVENOUS PRN
Status: DISCONTINUED | OUTPATIENT
Start: 2023-07-31 | End: 2023-08-03 | Stop reason: HOSPADM

## 2023-07-31 RX ADMIN — FAMOTIDINE 20 MG: 20 TABLET, FILM COATED ORAL at 21:06

## 2023-07-31 RX ADMIN — SODIUM CHLORIDE, POTASSIUM CHLORIDE, SODIUM LACTATE AND CALCIUM CHLORIDE: 600; 310; 30; 20 INJECTION, SOLUTION INTRAVENOUS at 19:43

## 2023-07-31 RX ADMIN — SODIUM CHLORIDE 1000 ML: 9 INJECTION, SOLUTION INTRAVENOUS at 17:28

## 2023-07-31 RX ADMIN — SODIUM CHLORIDE 1000 ML: 9 INJECTION, SOLUTION INTRAVENOUS at 17:29

## 2023-07-31 ASSESSMENT — ENCOUNTER SYMPTOMS
BACK PAIN: 0
EYE REDNESS: 0
COLOR CHANGE: 0
SHORTNESS OF BREATH: 0
EYE DISCHARGE: 0
FACIAL SWELLING: 0
NAUSEA: 0
COUGH: 0
VOMITING: 0
APNEA: 0
ABDOMINAL PAIN: 0
CHOKING: 0

## 2023-07-31 ASSESSMENT — PAIN - FUNCTIONAL ASSESSMENT: PAIN_FUNCTIONAL_ASSESSMENT: NONE - DENIES PAIN

## 2023-07-31 ASSESSMENT — PAIN SCALES - GENERAL: PAINLEVEL_OUTOF10: 0

## 2023-07-31 NOTE — ED NOTES
Report to Kika Beckman RN, who assumes care when pt arrives on PCU. Kika Beckman RN to transport.       Bipin Banks RN  07/31/23 0507

## 2023-07-31 NOTE — ED PROVIDER NOTES
have a history of orthostatic hypotension and the daughter actually brings in a record of her blood pressures from Saturday Sunday and Monday that do confirm orthostatic vitals, if done properly. The daughter is a nurse's aid. Rechecked here in the ER for orthostatic Vitals and were  lying : 127/68, sitting 82/73, standing 85/60     CBC is negative for leukocytosis, hemoglobin hematocrit are 9.7 and 28.5. CMP sodium 121, chloride 84, glucose 140, creatinine 1.3, bilirubin 1.3, ALT 73,   Corrected sodium with glucose is still only 122. I gave 1L ns bolus/2 hrs. Last echo appears to be 10/2022 EF 79% without diastolic dysfunction , but severe tricuspid regurgitation. Initial troponin elevated at 22, she is denying any chest pain. UA is negative for nitrites or leukocytes but has a trace of blood and ketones. Small amount of bilirubin in her urine and a trace of protein    On exam, neurologically, she is A&Ox4 and has a slight drift of her LUE, but otherwise no obvious neuro deficits. CT head confirmed no head bleed. May benefit from neurology consult and possibly MRI if they see fit. I recommend patient be admitted for hyponatremia, recent falls, orthostasis and possible placement on discharge based on the fact that she lives independently at home. Family lives nearby. She has had home PT/OT in the past.     Consult placed to nephrology. Changed IVF to LR @ 75/hr. Recheck BMP now to see if sodium slowly improving after IVF. Order was placed for BMP recheck but not sure if this was done prior to leaving the ER and being admitted and moved upstairs. Spoke to Hospitalist who agrees to admit. Disposition Considerations (tests considered but not done, Admit vs D/C, Shared Decision Making, Pt Expectation of Test or Tx.): admission       I am the Primary Clinician of Record. FINAL IMPRESSION      1. Hyponatremia    2. Fall, initial encounter    3.  Orthostasis          DISPOSITION/PLAN DISPOSITION        PATIENT REFERRED TO:  No follow-up provider specified.     DISCHARGE MEDICATIONS:  New Prescriptions    No medications on file       DISCONTINUED MEDICATIONS:  Discontinued Medications    RIVAROXABAN (XARELTO) 15 MG TABS TABLET    Take 1 tablet by mouth Daily with supper              (Please note that portions of this note were completed with a voice recognition program.  Efforts were made to edit the dictations but occasionally words are mis-transcribed.)    IMELDA Christianson CNP (electronically signed)     IMELDA Christianson CNP  07/31/23 [id number] RUST, APRN - CNP  07/31/23 1519

## 2023-07-31 NOTE — H&P
Hospital Medicine History & Physical      PCP: Harini Lazo MD    Date of Admission: 7/31/2023    Date of Service: Pt seen/examined on 07/31/23     Chief Complaint:    Chief Complaint   Patient presents with    Fall     Pt fell on Thursday. Fell backward and hit her head on the concrete floor. Pt is on elequis. Pt has large hematoma and bruising to back of head and neck. Left arm bruised and swollen. History Of Present Illness: The patient is a 78 y.o. female with PMH of afib, HTN, and arthritis who presented to [facility] ED with complaint of fall on Thursday. Pt states that she fell in her concrete garage Thursday after turning around. She states that she did hit her head but didn't lose consciousness. She states she also fell last Monday after getting up from a sitting to standing position. She states that she has bruising to her head, posterior neck, LLE, and LUE. Daughter states that pt has not been eating or drinking well since last Wednesday. Pt states that she just does not have the appetite. Pt also states that she felt like she had a cold so she went to see her PCP on Thursday, her CXR was negative so the PCP gave her levaquin and tessalon pearls. Pt states that she only took one dose of the levaquin because \"it tore her stomach up\". She states her URI sx have improved significantly since then. She states that she lives at home and does not ambulate with the assistance of a cane or walker. Daughter states pt had positive orthostats at home last night and this morning as well. Pt states she is anticoagulated on eliquis for afib but denies taking any antiplatelets such as aspirin or plavix.     Past Medical History:        Diagnosis Date    Arthritis     Atrial fibrillation ([address])     Closed subchondral insufficiency fracture of femoral condyle ([address]) 08/12/2020    Hypertension     Localized osteoarthrosis not specified whether primary or secondary, pelvic region and thigh 05/08/2015    Neurogenic bladder     caused by a back surgery in 2017 per pt       Past Surgical History:        Procedure Laterality Date    CATARACT REMOVAL WITH IMPLANT  01/14/2011    LEFT EYE    EYE SURGERY  12/14/2010    cataract rt eye    HYSTERECTOMY (CERVIX STATUS UNKNOWN)      JOINT REPLACEMENT  1997    left hip    OTHER SURGICAL HISTORY Right     RIGHT TOTAL KNEE REPLACEMENT     PACEMAKER INSERTION      TOTAL KNEE ARTHROPLASTY Right 12/06/2021    RIGHT TOTAL KNEE REPLACEMENT performed by Mervin Aviles MD at [facility] OR       Medications Prior to Admission:    Prior to Admission medications    Medication Sig Start Date End Date Taking? Authorizing Provider   apixaban (ELIQUIS) 5 MG TABS tablet Take 2 tablets by mouth 2 times daily   Yes Historical Provider, MD   amiodarone (CORDARONE) 200 MG tablet Take 1 tablet by mouth See Admin Instructions Take 1 tablet by mouth Monday through Friday. 12/12/22   Joao Najera MD   metoprolol succinate (TOPROL XL) 25 MG extended release tablet Take 1 tablet by mouth daily 12/12/22   Joao Najera MD   amLODIPine (NORVASC) 5 MG tablet Take 1 tablet by mouth daily 11/23/22   IMELDA Browne - CNP   vitamin B-12 (CYANOCOBALAMIN) 100 MCG tablet Take 0.5 tablets by mouth daily    Historical Provider, MD   Cholecalciferol (VITAMIN D) 50 MCG (2000 UT) CAPS capsule Take by mouth    Historical Provider, MD   famotidine (PEPCID) 20 MG tablet Take 1 tablet by mouth daily    Historical Provider, MD       Allergies:  Prednisone and Hydrochlorothiazide    Social History:      TOBACCO:   reports that she has never smoked. She has never used smokeless tobacco.  ETOH:   reports no history of alcohol use.       Family History:   Positive as follows:        Problem Relation Age of Onset    Arthritis Mother     Diabetes Mother     High Blood Pressure Mother     Diabetes Sister     High Blood Pressure Sister     Diabetes Brother     High Blood Pressure Brother        REVIEW OF SYSTEMS:     Constitutional:

## 2023-07-31 NOTE — PROGRESS NOTES
Patient brought to room 317 from ER with daughter Feliciano Celis in tow. Patient resting with call light in easy reach, orders released, standing weight recorded.

## 2023-07-31 NOTE — PROGRESS NOTES
Bedside report and transfer of care given to [facility], RN. Pt currently resting in bed with the call light within reach. Pt denies any other care needs at this time. Pt stable at this time.

## 2023-08-01 LAB
ANION GAP SERPL CALCULATED.3IONS-SCNC: 11 MMOL/L (ref 3–16)
ANION GAP SERPL CALCULATED.3IONS-SCNC: 12 MMOL/L (ref 3–16)
ANION GAP SERPL CALCULATED.3IONS-SCNC: 13 MMOL/L (ref 3–16)
BASOPHILS # BLD: 0 K/UL (ref 0–0.2)
BASOPHILS NFR BLD: 0.2 %
BUN SERPL-MCNC: 13 MG/DL (ref 7–20)
BUN SERPL-MCNC: 14 MG/DL (ref 7–20)
BUN SERPL-MCNC: 14 MG/DL (ref 7–20)
CALCIUM SERPL-MCNC: 8.3 MG/DL (ref 8.3–10.6)
CALCIUM SERPL-MCNC: 8.5 MG/DL (ref 8.3–10.6)
CALCIUM SERPL-MCNC: 8.7 MG/DL (ref 8.3–10.6)
CHLORIDE SERPL-SCNC: 91 MMOL/L (ref 99–110)
CHLORIDE SERPL-SCNC: 92 MMOL/L (ref 99–110)
CHLORIDE SERPL-SCNC: 95 MMOL/L (ref 99–110)
CO2 SERPL-SCNC: 20 MMOL/L (ref 21–32)
CO2 SERPL-SCNC: 21 MMOL/L (ref 21–32)
CO2 SERPL-SCNC: 22 MMOL/L (ref 21–32)
CREAT SERPL-MCNC: 1.1 MG/DL (ref 0.6–1.2)
CREAT SERPL-MCNC: 1.2 MG/DL (ref 0.6–1.2)
CREAT SERPL-MCNC: 1.3 MG/DL (ref 0.6–1.2)
DEPRECATED RDW RBC AUTO: 14.7 % (ref 12.4–15.4)
EOSINOPHIL # BLD: 0.1 K/UL (ref 0–0.6)
EOSINOPHIL NFR BLD: 0.7 %
FERRITIN SERPL IA-MCNC: 333.1 NG/ML (ref 15–150)
GFR SERPLBLD CREATININE-BSD FMLA CKD-EPI: 41 ML/MIN/{1.73_M2}
GFR SERPLBLD CREATININE-BSD FMLA CKD-EPI: 46 ML/MIN/{1.73_M2}
GFR SERPLBLD CREATININE-BSD FMLA CKD-EPI: 51 ML/MIN/{1.73_M2}
GLUCOSE SERPL-MCNC: 101 MG/DL (ref 70–99)
GLUCOSE SERPL-MCNC: 98 MG/DL (ref 70–99)
GLUCOSE SERPL-MCNC: 99 MG/DL (ref 70–99)
HCT VFR BLD AUTO: 23.3 % (ref 36–48)
HGB BLD-MCNC: 8.1 G/DL (ref 12–16)
IRON SATN MFR SERPL: 19 % (ref 15–50)
IRON SERPL-MCNC: 47 UG/DL (ref 37–145)
LYMPHOCYTES # BLD: 0.8 K/UL (ref 1–5.1)
LYMPHOCYTES NFR BLD: 10.1 %
MCH RBC QN AUTO: 27.6 PG (ref 26–34)
MCHC RBC AUTO-ENTMCNC: 34.6 G/DL (ref 31–36)
MCV RBC AUTO: 79.8 FL (ref 80–100)
MONOCYTES # BLD: 0.8 K/UL (ref 0–1.3)
MONOCYTES NFR BLD: 11.1 %
NEUTROPHILS # BLD: 6 K/UL (ref 1.7–7.7)
NEUTROPHILS NFR BLD: 77.9 %
OSMOLALITY UR: 419 MOSM/KG (ref 390–1070)
PLATELET # BLD AUTO: 302 K/UL (ref 135–450)
PMV BLD AUTO: 6.9 FL (ref 5–10.5)
POTASSIUM SERPL-SCNC: 3.6 MMOL/L (ref 3.5–5.1)
POTASSIUM SERPL-SCNC: 3.7 MMOL/L (ref 3.5–5.1)
POTASSIUM SERPL-SCNC: 3.8 MMOL/L (ref 3.5–5.1)
RBC # BLD AUTO: 2.92 M/UL (ref 4–5.2)
SODIUM SERPL-SCNC: 124 MMOL/L (ref 136–145)
SODIUM SERPL-SCNC: 126 MMOL/L (ref 136–145)
SODIUM SERPL-SCNC: 127 MMOL/L (ref 136–145)
SODIUM UR-SCNC: 50 MMOL/L
TIBC SERPL-MCNC: 247 UG/DL (ref 260–445)
TSH SERPL DL<=0.005 MIU/L-ACNC: 0.76 UIU/ML (ref 0.27–4.2)
URATE SERPL-MCNC: 5.2 MG/DL (ref 2.6–6)
WBC # BLD AUTO: 7.7 K/UL (ref 4–11)

## 2023-08-01 PROCEDURE — 97116 GAIT TRAINING THERAPY: CPT

## 2023-08-01 PROCEDURE — 84443 ASSAY THYROID STIM HORMONE: CPT

## 2023-08-01 PROCEDURE — 82728 ASSAY OF FERRITIN: CPT

## 2023-08-01 PROCEDURE — 84300 ASSAY OF URINE SODIUM: CPT

## 2023-08-01 PROCEDURE — 85025 COMPLETE CBC W/AUTO DIFF WBC: CPT

## 2023-08-01 PROCEDURE — 80048 BASIC METABOLIC PNL TOTAL CA: CPT

## 2023-08-01 PROCEDURE — 83540 ASSAY OF IRON: CPT

## 2023-08-01 PROCEDURE — 2580000003 HC RX 258: Performed by: INTERNAL MEDICINE

## 2023-08-01 PROCEDURE — 83935 ASSAY OF URINE OSMOLALITY: CPT

## 2023-08-01 PROCEDURE — 97162 PT EVAL MOD COMPLEX 30 MIN: CPT

## 2023-08-01 PROCEDURE — 36415 COLL VENOUS BLD VENIPUNCTURE: CPT

## 2023-08-01 PROCEDURE — 97530 THERAPEUTIC ACTIVITIES: CPT

## 2023-08-01 PROCEDURE — 2060000000 HC ICU INTERMEDIATE R&B

## 2023-08-01 PROCEDURE — 2580000003 HC RX 258

## 2023-08-01 PROCEDURE — 83550 IRON BINDING TEST: CPT

## 2023-08-01 PROCEDURE — 6370000000 HC RX 637 (ALT 250 FOR IP)

## 2023-08-01 PROCEDURE — 84550 ASSAY OF BLOOD/URIC ACID: CPT

## 2023-08-01 RX ORDER — SODIUM CHLORIDE, SODIUM LACTATE, POTASSIUM CHLORIDE, AND CALCIUM CHLORIDE .6; .31; .03; .02 G/100ML; G/100ML; G/100ML; G/100ML
500 INJECTION, SOLUTION INTRAVENOUS ONCE
Status: COMPLETED | OUTPATIENT
Start: 2023-08-01 | End: 2023-08-01

## 2023-08-01 RX ADMIN — FAMOTIDINE 20 MG: 20 TABLET, FILM COATED ORAL at 09:09

## 2023-08-01 RX ADMIN — SODIUM CHLORIDE, POTASSIUM CHLORIDE, SODIUM LACTATE AND CALCIUM CHLORIDE: 600; 310; 30; 20 INJECTION, SOLUTION INTRAVENOUS at 23:11

## 2023-08-01 RX ADMIN — Medication 10 ML: at 21:14

## 2023-08-01 RX ADMIN — SODIUM CHLORIDE, POTASSIUM CHLORIDE, SODIUM LACTATE AND CALCIUM CHLORIDE: 600; 310; 30; 20 INJECTION, SOLUTION INTRAVENOUS at 14:50

## 2023-08-01 RX ADMIN — SODIUM CHLORIDE, POTASSIUM CHLORIDE, SODIUM LACTATE AND CALCIUM CHLORIDE: 600; 310; 30; 20 INJECTION, SOLUTION INTRAVENOUS at 05:04

## 2023-08-01 RX ADMIN — SODIUM CHLORIDE, POTASSIUM CHLORIDE, SODIUM LACTATE AND CALCIUM CHLORIDE 500 ML: 600; 310; 30; 20 INJECTION, SOLUTION INTRAVENOUS at 13:23

## 2023-08-01 RX ADMIN — AMIODARONE HYDROCHLORIDE 200 MG: 200 TABLET ORAL at 09:09

## 2023-08-01 RX ADMIN — Medication 50 MCG: at 09:09

## 2023-08-01 NOTE — PROGRESS NOTES
Support:  NA  Assistive Device:  N/A  Pattern:   N/A  Comments: N/A     Therapeutic Exercises Initiated  deferred secondary to treatment focus on functional mobility  Supine:  N/A    Seated:  N/A    Standing:  N/A    Activity Tolerance   During therapy session noted pt with fatigue  dizziness/lightheadedness    Pt Position BP (mmHg) HR (bpm) SpO2 (%) on RA  Comments   Supine at rest 123/63 68     Seated at /64      Standing 90/61 80     End of session         Positioning Needs   Pt up in chair, no alarm needed, positioned in proper neutral alignment and pressure relief provided. Other Activities  None. Patient/Family Education   Pt educated on role of inpatient PT, POC, importance of continued activity, DC recommendations, safety awareness, transfer techniques, energy conservation, pacing activity, and calling for assist with mobility. Assessment  Pt seen today for physical therapy Evaluation & Treatment. Pt demonstrated decreased Activity tolerance, Balance, ROM, Safety, and Strength as well as decreased independence with Ambulation, Bed Mobility , and Transfers. Patient demo improved stability with SPC ambulation in room with better confidence in gait as well as decreased staggering when performing turns in either direction. Patient will continue to benefit from skilled PT services in order to improve her activity tolerance, functional strength and independence before being discharged. Recommending Home 24 hr supervision and outpatient PT upon discharge as patient functioning below baseline level    Goals : To be met in 3 visits:  1). Independent with LE Ex x 10 reps  2). Sit to/from stand: Supervision  3). Bed to chair: Supervision    To be met in 6 visits:  1). Supine to/from sit: Independent  2). Sit to/from stand: Modified Independent  3). Bed to chair: Modified Independent  4).   Gait: Ambulate 150 ft.  with Supervision and use of gait belt and LRAD (least restrictive assistive device)  5). Tolerate B LE exercises 3 sets of 10-15 reps  6). Ascend/descend 4 steps with Supervision with use of hand rail unilateral and No AD    Rehabilitation Potential: Good  Strengths for achieving goals include:   Pt motivated, PLOF, Family Support, and Pt cooperative   Barriers to achieving goals include:    No Barriers    Plan    To be seen 3-5 x / week  while in acute care setting for therapeutic exercises, bed mobility, transfers, progressive gait training, balance training, and family/patient education. Signature: John Leal SPT   Co-Signature: Starla Schmid, PT, DPT     If patient discharges from this facility prior to next visit, this note will serve as the Discharge Summary.

## 2023-08-01 NOTE — PROGRESS NOTES
[facility])   Nephrology Note  [phone] / [phone] / [phone]   www. [employer]    - Patient: Jovany Ardon (MRN: [id number]). - Nephrology Consults was called for: Hyponatremia.   - Patient's chart was reviewed. Full consult note to follow. - Initial Recommendations:    - Disposition: inpatient admission.   - Trend labs. - Discussed with ER team.    - IVF adjusted     Thank you for consulting [facility]. Full Consult to follow. Please call with any questions or concerns. Jermain Pinedo MD  [facility])  [phone]  www. [email or web address]  7/31/2023, 8:59 PM

## 2023-08-01 NOTE — PROGRESS NOTES
Occupational Therapy  Orders received, chart reviewed. Attempted to see patient this pm. Patient and family member in darkened room and report patient needs to rest at this time, state she did not sleep well last night and is currently nauseated after eating lunch. Patient reports eating makes her feel \"woozy\" which appears to be a description of her unsettled stomach. Reports RN already administered nausea meds. Will reattempt as patient status allows.   Lindsey Costello, OTR/L [id number]

## 2023-08-01 NOTE — PROGRESS NOTES
Handoff report given to [facility]. Patient is in stable condition and has no needs at this time. Call light is in reach and bed is in the lowest position. Care is transferred at this time.

## 2023-08-01 NOTE — PROGRESS NOTES
4 Eyes Skin Assessment     NAME:  Nohemi Moseley  YOB: 1945  MEDICAL RECORD NUMBER:  [id number]    The patient is being assessed for  Admission    I agree that at least one RN has performed a thorough Head to Toe Skin Assessment on the patient. ALL assessment sites listed below have been assessed. LLE, LUE, and head/neck purple bruising. Areas assessed by both nurses:    Head, Face, Ears, Shoulders, Back, Chest, Arms, Elbows, Hands, Sacrum. Buttock, Coccyx, Ischium, Legs. Feet and Heels, and Under Medical Devices         Does the Patient have a Wound?  No noted wound(s)       José Miguel Prevention initiated by RN: Yes  Wound Care Orders initiated by RN: No    Pressure Injury (Stage 3,4, Unstageable, DTI, NWPT, and Complex wounds) if present, place Wound referral order by RN under : No    New Ostomies, if present place, Ostomy referral order under : No     Nurse 1 eSignature: Electronically signed by Bhaskar Quinteros RN on 8/1/23 at 6:40 AM EDT    **SHARE this note so that the co-signing nurse can place an eSignature**    Nurse 2 eSignature: Electronically signed by Donald Simms RN on 8/1/23 at 6:43 AM EDT

## 2023-08-01 NOTE — ACP (ADVANCE CARE PLANNING)
Advance Care Planning     General Advance Care Planning (ACP) Conversation    Date of Conversation: 7/31/2023  Conducted with: Patient with Decision Making Capacity    Healthcare Decision Maker:    Primary Decision Maker: Becca Brewer - [phone]  Click here to complete Healthcare Decision Makers including selection of the Healthcare Decision Maker Relationship (ie \"Primary\"). Today we documented Decision Maker(s) consistent with Legal Next of Kin hierarchy.     Content/Action Overview:  DECLINED ACP Conversation - will revisit periodically  Reviewed DNR/DNI and patient elects Full Code (Attempt Resuscitation)        Length of Voluntary ACP Conversation in minutes:  <16 minutes (Non-Billable)    Cris Rasheed RN

## 2023-08-01 NOTE — PROGRESS NOTES
08/01/23  0458   WBC 10.1 7.7   HGB 9.7* 8.1*   HCT 28.5* 23.3*    302     Recent Labs     08/01/23  0458 08/01/23  1100 08/01/23  1710   * 124* 127*   K 3.6 3.8 3.7   CL 91* 92* 95*   CO2 22 20* 21   BUN 14 13 14   CREATININE 1.1 1.3* 1.2   CALCIUM 8.5 8.7 8.3     Recent Labs     07/31/23  1325   *   ALT 73*   BILITOT 1.3*   ALKPHOS 90     No results for input(s): INR in the last 72 hours. Recent Labs     07/31/23  1325 07/31/23  2300   TROPHS 22* 21*       Urinalysis:      Lab Results   Component Value Date/Time    NITRU Negative 07/31/2023 02:44 PM    WBCUA 0-2 07/31/2023 02:44 PM    RBCUA 0-2 07/31/2023 02:44 PM    BLOODU TRACE-INTACT 07/31/2023 02:44 PM    SPECGRAV 1.015 07/31/2023 02:44 PM    GLUCOSEU Negative 07/31/2023 02:44 PM       Radiology:  CT CERVICAL SPINE WO CONTRAST   Final Result   No acute abnormality of the cervical spine. CT HEAD WO CONTRAST   Final Result   No acute intracranial abnormality. XR RADIUS ULNA LEFT (2 VIEWS)   Final Result   No fracture or dislocation             IP CONSULT TO NEPHROLOGY    Assessment/Plan:    Active Hospital Problems    Diagnosis     Hyponatremia [E87.1]     Orthostatic hypotension [I95.1]     Stage 3b chronic kidney disease ([address]) [N18.32]     Elevated troponin [R77.8]     Recurrent falls [R29.6]     Transaminitis [R74.01]     Paroxysmal A-fib (HCC) [I48.0]     Gastroesophageal reflux disease without esophagitis [K21.9]     Fall [W19. XXXA]     Hypertension [I10]        Hyponatremia  - hx of hyponatremia during last admission (Na 118) 10/2022 -> thought to be due to CKD, HCTZ, and NSAIDs  - Na 121 on admission-->126-->124  - given 1L bolus in ED  - BMP q6h  - IVF LR 75/hr  - seizure precautions  - nephro consulted     Orthostatic hypotension  Recurrent falls  - most recent falls were Monday and Thursday of last week, pt has hematomas to left upper arm, posterior neck, left upper leg, and head  - last echo on 10/2022 showed EF of 78% w/o diastolic dysfunction, but severe tricuspid regurgitation  - positive orthostats in ED: lying 127/68, sitting 82/73, standing 85/60  - CT cervical spine and CT head were both negative   - xray left radius and ulna showed no fx or dislocation   - fall precautions   - PT/OT     Elevated troponin  - 22->repeat ordered  - EKG as above  - no CP     Transaminitis   - remote hx of hepatitis  - no abd pain  - monitor LFTs     CKD stage 3b  - appears stable, baseline Cr~1.3-1.5  - monitor BMP    Anemia  - hb dropped from 9.7-->8. 1  - check iron studies  - suspect 2/2 dilution, recheck in am     Paroxysmal afib  Secondary hypercoagulable state  - s/p pacemaker 10/2022  - continue amiodarone  - holding eliquis 2/2 hematomas     HTN  - holding Norvasc and Toprol XL 2/2 orthostatic hypotension  - monitor BP     GERD  - continue Pepcid      Diet: ADULT DIET;  Regular  ADULT ORAL NUTRITION SUPPLEMENT; Breakfast, Lunch, Dinner; Standard High Calorie/High Protein Oral Supplement  Code Status: Full Code  PT/OT Eval Status: ordered    Felipe Menendez MD

## 2023-08-01 NOTE — PROGRESS NOTES
Vital signs recorded, and admission data obtained. Pt is alert and oriented x 4. Following commands. Respirations are easy, even, and unlabored. Pt has bruising to the head, posterior neck, and LLE. LUE is bruised w/ +1 pitting edema. Call light within reach. Bed in lowest position. Bed alarm on.

## 2023-08-01 NOTE — PLAN OF CARE
Problem: Discharge Planning  Goal: Discharge to home or other facility with appropriate resources  Outcome: Progressing  Flowsheets (Taken 7/31/2023 2047)  Discharge to home or other facility with appropriate resources: Identify barriers to discharge with patient and caregiver     Problem: Safety - Adult  Goal: Free from fall injury  Outcome: Progressing     Problem: Skin/Tissue Integrity - Adult  Goal: Skin integrity remains intact  Outcome: Progressing     Problem: Genitourinary - Adult  Goal: Absence of urinary retention  Outcome: Progressing

## 2023-08-01 NOTE — CARE COORDINATION
Case Management Assessment  Initial Evaluation    Date/Time of Evaluation: 8/1/2023 10:00 AM  Assessment Completed by: Elijah Oneill RN    If patient is discharged prior to next notation, then this note serves as note for discharge by case management. Patient Name: Terry Quinones                   YOB: 1945  Diagnosis: Hyponatremia [E87.1]  Orthostasis [I95.1]  Fall, initial encounter [W19. XXXA]                   Date / Time: 7/31/2023  1:02 PM    Patient Admission Status: Inpatient   Readmission Risk (Low < 19, Mod (19-27), High > 27): Readmission Risk Score: 16.1    Current PCP: Jose Yuen MD  PCP verified by CM? Yes ([facility])    Chart Reviewed: Yes      History Provided by: Patient  Patient Orientation: Alert and Oriented    Patient Cognition: Alert    Hospitalization in the last 30 days (Readmission):  No    If yes, Readmission Assessment in CM Navigator will be completed. Advance Directives:      Code Status: Full Code   Patient's Primary Decision Maker is: Legal Next of Kin    Primary Decision Maker: Becca Brewer - [place] - [phone]    Discharge Planning:    Patient lives with: Alone Type of Home: House  Primary Care Giver: Self  Patient Support Systems include: Children   Current Financial resources: Medicare  Current community resources: None  Current services prior to admission: Durable Medical Equipment            Current DME: Bedside Commode, Shower Chair, Cane, Walker (brackets)            Type of Home Care services:  None    ADLS  Prior functional level: Independent in ADLs/IADLs  Current functional level: Independent in ADLs/IADLs    PT AM-PAC:   /24  OT AM-PAC:   /24    Family can provide assistance at DC: Yes  Would you like Case Management to discuss the discharge plan with any other family members/significant others, and if so, who?  No  Plans to Return to Present Housing: Yes  Other Identified Issues/Barriers to RETURNING to current housing: none  Potential Assistance needed

## 2023-08-02 LAB
ANION GAP SERPL CALCULATED.3IONS-SCNC: 11 MMOL/L (ref 3–16)
ANION GAP SERPL CALCULATED.3IONS-SCNC: 12 MMOL/L (ref 3–16)
ANION GAP SERPL CALCULATED.3IONS-SCNC: 9 MMOL/L (ref 3–16)
BASOPHILS # BLD: 0 K/UL (ref 0–0.2)
BASOPHILS NFR BLD: 0.3 %
BUN SERPL-MCNC: 12 MG/DL (ref 7–20)
BUN SERPL-MCNC: 13 MG/DL (ref 7–20)
BUN SERPL-MCNC: 15 MG/DL (ref 7–20)
CALCIUM SERPL-MCNC: 8.5 MG/DL (ref 8.3–10.6)
CALCIUM SERPL-MCNC: 8.5 MG/DL (ref 8.3–10.6)
CALCIUM SERPL-MCNC: 9 MG/DL (ref 8.3–10.6)
CHLORIDE SERPL-SCNC: 93 MMOL/L (ref 99–110)
CHLORIDE SERPL-SCNC: 94 MMOL/L (ref 99–110)
CHLORIDE SERPL-SCNC: 95 MMOL/L (ref 99–110)
CO2 SERPL-SCNC: 22 MMOL/L (ref 21–32)
CO2 SERPL-SCNC: 24 MMOL/L (ref 21–32)
CO2 SERPL-SCNC: 25 MMOL/L (ref 21–32)
CREAT SERPL-MCNC: 1.1 MG/DL (ref 0.6–1.2)
CREAT SERPL-MCNC: 1.1 MG/DL (ref 0.6–1.2)
CREAT SERPL-MCNC: 1.2 MG/DL (ref 0.6–1.2)
DEPRECATED RDW RBC AUTO: 15.5 % (ref 12.4–15.4)
EOSINOPHIL # BLD: 0.1 K/UL (ref 0–0.6)
EOSINOPHIL NFR BLD: 1.1 %
GFR SERPLBLD CREATININE-BSD FMLA CKD-EPI: 46 ML/MIN/{1.73_M2}
GFR SERPLBLD CREATININE-BSD FMLA CKD-EPI: 51 ML/MIN/{1.73_M2}
GFR SERPLBLD CREATININE-BSD FMLA CKD-EPI: 51 ML/MIN/{1.73_M2}
GLUCOSE SERPL-MCNC: 104 MG/DL (ref 70–99)
GLUCOSE SERPL-MCNC: 107 MG/DL (ref 70–99)
GLUCOSE SERPL-MCNC: 99 MG/DL (ref 70–99)
HCT VFR BLD AUTO: 24.4 % (ref 36–48)
HGB BLD-MCNC: 8.3 G/DL (ref 12–16)
LYMPHOCYTES # BLD: 1 K/UL (ref 1–5.1)
LYMPHOCYTES NFR BLD: 13.1 %
MAGNESIUM SERPL-MCNC: 1.8 MG/DL (ref 1.8–2.4)
MCH RBC QN AUTO: 29.2 PG (ref 26–34)
MCHC RBC AUTO-ENTMCNC: 33.9 G/DL (ref 31–36)
MCV RBC AUTO: 86 FL (ref 80–100)
MONOCYTES # BLD: 1 K/UL (ref 0–1.3)
MONOCYTES NFR BLD: 14.2 %
NEUTROPHILS # BLD: 5.2 K/UL (ref 1.7–7.7)
NEUTROPHILS NFR BLD: 71.3 %
PLATELET # BLD AUTO: 317 K/UL (ref 135–450)
PMV BLD AUTO: 6.9 FL (ref 5–10.5)
POTASSIUM SERPL-SCNC: 3.5 MMOL/L (ref 3.5–5.1)
POTASSIUM SERPL-SCNC: 3.8 MMOL/L (ref 3.5–5.1)
POTASSIUM SERPL-SCNC: 3.8 MMOL/L (ref 3.5–5.1)
RBC # BLD AUTO: 2.84 M/UL (ref 4–5.2)
SODIUM SERPL-SCNC: 128 MMOL/L (ref 136–145)
SODIUM SERPL-SCNC: 128 MMOL/L (ref 136–145)
SODIUM SERPL-SCNC: 129 MMOL/L (ref 136–145)
WBC # BLD AUTO: 7.3 K/UL (ref 4–11)

## 2023-08-02 PROCEDURE — 2580000003 HC RX 258: Performed by: INTERNAL MEDICINE

## 2023-08-02 PROCEDURE — 6370000000 HC RX 637 (ALT 250 FOR IP): Performed by: INTERNAL MEDICINE

## 2023-08-02 PROCEDURE — 83735 ASSAY OF MAGNESIUM: CPT

## 2023-08-02 PROCEDURE — 2580000003 HC RX 258

## 2023-08-02 PROCEDURE — 85025 COMPLETE CBC W/AUTO DIFF WBC: CPT

## 2023-08-02 PROCEDURE — 36415 COLL VENOUS BLD VENIPUNCTURE: CPT

## 2023-08-02 PROCEDURE — 2060000000 HC ICU INTERMEDIATE R&B

## 2023-08-02 PROCEDURE — 80048 BASIC METABOLIC PNL TOTAL CA: CPT

## 2023-08-02 PROCEDURE — 6370000000 HC RX 637 (ALT 250 FOR IP)

## 2023-08-02 RX ORDER — METOPROLOL SUCCINATE 25 MG/1
25 TABLET, EXTENDED RELEASE ORAL DAILY
Status: DISCONTINUED | OUTPATIENT
Start: 2023-08-02 | End: 2023-08-03 | Stop reason: HOSPADM

## 2023-08-02 RX ORDER — AMLODIPINE BESYLATE 5 MG/1
5 TABLET ORAL NIGHTLY
Status: DISCONTINUED | OUTPATIENT
Start: 2023-08-02 | End: 2023-08-02

## 2023-08-02 RX ORDER — AMLODIPINE BESYLATE 5 MG/1
5 TABLET ORAL DAILY
Status: DISCONTINUED | OUTPATIENT
Start: 2023-08-03 | End: 2023-08-03 | Stop reason: HOSPADM

## 2023-08-02 RX ORDER — SODIUM CHLORIDE, SODIUM LACTATE, POTASSIUM CHLORIDE, AND CALCIUM CHLORIDE .6; .31; .03; .02 G/100ML; G/100ML; G/100ML; G/100ML
500 INJECTION, SOLUTION INTRAVENOUS ONCE
Status: COMPLETED | OUTPATIENT
Start: 2023-08-02 | End: 2023-08-02

## 2023-08-02 RX ORDER — POTASSIUM CHLORIDE 750 MG/1
40 TABLET, EXTENDED RELEASE ORAL ONCE
Status: COMPLETED | OUTPATIENT
Start: 2023-08-02 | End: 2023-08-02

## 2023-08-02 RX ADMIN — METOPROLOL SUCCINATE 25 MG: 25 TABLET, EXTENDED RELEASE ORAL at 12:16

## 2023-08-02 RX ADMIN — APIXABAN 5 MG: 5 TABLET, FILM COATED ORAL at 13:46

## 2023-08-02 RX ADMIN — APIXABAN 5 MG: 5 TABLET, FILM COATED ORAL at 21:15

## 2023-08-02 RX ADMIN — FAMOTIDINE 20 MG: 20 TABLET, FILM COATED ORAL at 08:19

## 2023-08-02 RX ADMIN — AMIODARONE HYDROCHLORIDE 200 MG: 200 TABLET ORAL at 08:19

## 2023-08-02 RX ADMIN — SODIUM CHLORIDE, POTASSIUM CHLORIDE, SODIUM LACTATE AND CALCIUM CHLORIDE: 600; 310; 30; 20 INJECTION, SOLUTION INTRAVENOUS at 06:16

## 2023-08-02 RX ADMIN — SODIUM CHLORIDE, POTASSIUM CHLORIDE, SODIUM LACTATE AND CALCIUM CHLORIDE 500 ML: 600; 310; 30; 20 INJECTION, SOLUTION INTRAVENOUS at 12:11

## 2023-08-02 RX ADMIN — POTASSIUM CHLORIDE 40 MEQ: 750 TABLET, EXTENDED RELEASE ORAL at 09:29

## 2023-08-02 RX ADMIN — Medication 50 MCG: at 08:19

## 2023-08-02 RX ADMIN — Medication 10 ML: at 21:16

## 2023-08-02 RX ADMIN — ACETAMINOPHEN 650 MG: 325 TABLET ORAL at 21:29

## 2023-08-02 ASSESSMENT — PAIN SCALES - GENERAL
PAINLEVEL_OUTOF10: 0
PAINLEVEL_OUTOF10: 0
PAINLEVEL_OUTOF10: 7
PAINLEVEL_OUTOF10: 0

## 2023-08-02 ASSESSMENT — PAIN DESCRIPTION - LOCATION: LOCATION: HEAD

## 2023-08-02 NOTE — CARE COORDINATION
INTERDISCIPLINARY PLAN OF CARE CONFERENCE    Date/Time: 8/2/2023 1:42 PM  Completed by: Uma Wright RN, Case Management      Patient Name:  Ralph Boston  YOB: 1945  Admitting Diagnosis: Hyponatremia [E87.1]  Orthostasis [I95.1]  Fall, initial encounter [W19. XXXA]     Admit Date/Time:  7/31/2023  1:02 PM    Chart reviewed. Interdisciplinary team contacted or reviewed plan related to patient progress and discharge plans. Disciplines included Case Management, Nursing, and Dietitian. Current Status:ip  PT/OT recommendation for discharge plan of care: unknown    Expected D/C Disposition:  Home  Confirmed plan with patient and/or family Yes confirmed with: (name) charline    Met with:pt    Discharge Plan Comments: met with pt at bedside. Pt states she is tired today and just wants to sleep. Pt states when she is DC'd, she wants to go home. She is unsure if she wants home care.  Will follow    Home O2 in place on admit: No  Pt informed of need to bring portable home O2 tank on day of discharge for nursing to connect prior to leaving:  Not Indicated  Verbalized agreement/Understanding:  Not Indicated

## 2023-08-02 NOTE — PROGRESS NOTES
Bedside report and transfer of care given to [place], [place]. Pt currently resting in bed with the call light within reach. Pt denies any other care needs at this time. Pt stable at this time.     Nuria Dwyer RN

## 2023-08-02 NOTE — PROGRESS NOTES
Shift report given to [facility] at bedside. Patient care handed off in stable condition at this time.  Pati hCapman RN

## 2023-08-02 NOTE — PROGRESS NOTES
showed EF of 00% w/o diastolic dysfunction, but severe tricuspid regurgitation  - positive orthostats in ED: lying 127/68, sitting 82/73, standing 85/60  - CT cervical spine and CT head were both negative   - xray left radius and ulna showed no fx or dislocation   - fall precautions   - PT/OT  - recheck orthostatics     Elevated troponin  - 22->repeat ordered  - EKG as above  - no CP     Transaminitis   - remote hx of hepatitis  - no abd pain  - monitor LFTs     CKD stage 3b  - appears stable, baseline Cr~1.3-1.5  - monitor BMP     Anemia  - hb dropped from 9.7-->8. 1  - check iron studies  - suspect 2/2 dilution, recheck in am     Paroxysmal afib  Secondary hypercoagulable state  - s/p pacemaker 10/2022  - continue amiodarone  - holding eliquis 2/2 hematomas     HTN  - holding Norvasc and Toprol XL 2/2 orthostatic hypotension  - monitor BP     GERD  - continue Pepcid       Diet: ADULT DIET;  Regular  ADULT ORAL NUTRITION SUPPLEMENT; Breakfast, Lunch, Dinner; Standard High Calorie/High Protein Oral Supplement  Code Status: Full Code  PT/OT Eval Status: ordered    Dispo - cont caremonika dc in am      Mary Colindres MD

## 2023-08-02 NOTE — PROGRESS NOTES
Occupational Therapy  Attempted evaluation this afternoon. Patient resting in bed at this time. Patient declined participation in out of bed activity. Patient reports using the bathroom multiple this date. OT will follow up later today or tomorrow as appropriate.        Keren OTR/L #[id number]

## 2023-08-02 NOTE — PROGRESS NOTES
Shift assessment complete, morning medications given, patient resting with no complaints of pain, patient questioning possible discharge today, this RN discussed normal sodium levels and protocols involved in meeting discharge requirements, daughter at bedside reinforced need for patient to stabilize prior to discharge, patient acknowledged understanding, will continue to monitor. Vandana Arriaga RN

## 2023-08-02 NOTE — PROGRESS NOTES
Orthostatics performed for patient as 500 ml bolus has completed, will continue to monitor progress.  Liset Adamson RN

## 2023-08-03 VITALS
DIASTOLIC BLOOD PRESSURE: 70 MMHG | HEIGHT: 60 IN | OXYGEN SATURATION: 98 % | RESPIRATION RATE: 16 BRPM | TEMPERATURE: 96.5 F | WEIGHT: 154.1 LBS | HEART RATE: 80 BPM | BODY MASS INDEX: 30.25 KG/M2 | SYSTOLIC BLOOD PRESSURE: 133 MMHG

## 2023-08-03 LAB
ALBUMIN SERPL-MCNC: 3.4 G/DL (ref 3.4–5)
ANION GAP SERPL CALCULATED.3IONS-SCNC: 10 MMOL/L (ref 3–16)
BASOPHILS # BLD: 0 K/UL (ref 0–0.2)
BASOPHILS NFR BLD: 0 %
BUN SERPL-MCNC: 10 MG/DL (ref 7–20)
CALCIUM SERPL-MCNC: 8.9 MG/DL (ref 8.3–10.6)
CHLORIDE SERPL-SCNC: 96 MMOL/L (ref 99–110)
CO2 SERPL-SCNC: 26 MMOL/L (ref 21–32)
CREAT SERPL-MCNC: 1.1 MG/DL (ref 0.6–1.2)
DEPRECATED RDW RBC AUTO: 15.3 % (ref 12.4–15.4)
EOSINOPHIL # BLD: 0 K/UL (ref 0–0.6)
EOSINOPHIL NFR BLD: 0 %
GFR SERPLBLD CREATININE-BSD FMLA CKD-EPI: 51 ML/MIN/{1.73_M2}
GLUCOSE SERPL-MCNC: 105 MG/DL (ref 70–99)
HCT VFR BLD AUTO: 24.1 % (ref 36–48)
HGB BLD-MCNC: 8 G/DL (ref 12–16)
HYPOCHROMIA BLD QL SMEAR: ABNORMAL
LYMPHOCYTES # BLD: 0.4 K/UL (ref 1–5.1)
LYMPHOCYTES NFR BLD: 7 %
MAGNESIUM SERPL-MCNC: 1.7 MG/DL (ref 1.8–2.4)
MCH RBC QN AUTO: 27.2 PG (ref 26–34)
MCHC RBC AUTO-ENTMCNC: 33.4 G/DL (ref 31–36)
MCV RBC AUTO: 81.4 FL (ref 80–100)
MONOCYTES # BLD: 0.8 K/UL (ref 0–1.3)
MONOCYTES NFR BLD: 15 %
NEUTROPHILS # BLD: 4.4 K/UL (ref 1.7–7.7)
NEUTROPHILS NFR BLD: 77 %
NEUTS BAND NFR BLD MANUAL: 1 % (ref 0–7)
PHOSPHATE SERPL-MCNC: 2.5 MG/DL (ref 2.5–4.9)
PLATELET # BLD AUTO: 335 K/UL (ref 135–450)
PLATELET BLD QL SMEAR: ADEQUATE
PMV BLD AUTO: 6.5 FL (ref 5–10.5)
POLYCHROMASIA BLD QL SMEAR: ABNORMAL
POTASSIUM SERPL-SCNC: 3.7 MMOL/L (ref 3.5–5.1)
RBC # BLD AUTO: 2.95 M/UL (ref 4–5.2)
SLIDE REVIEW: ABNORMAL
SODIUM SERPL-SCNC: 132 MMOL/L (ref 136–145)
WBC # BLD AUTO: 5.6 K/UL (ref 4–11)

## 2023-08-03 PROCEDURE — 6370000000 HC RX 637 (ALT 250 FOR IP): Performed by: SURGERY

## 2023-08-03 PROCEDURE — 36415 COLL VENOUS BLD VENIPUNCTURE: CPT

## 2023-08-03 PROCEDURE — 2580000003 HC RX 258

## 2023-08-03 PROCEDURE — 6360000002 HC RX W HCPCS: Performed by: INTERNAL MEDICINE

## 2023-08-03 PROCEDURE — 6370000000 HC RX 637 (ALT 250 FOR IP): Performed by: INTERNAL MEDICINE

## 2023-08-03 PROCEDURE — 80069 RENAL FUNCTION PANEL: CPT

## 2023-08-03 PROCEDURE — 85025 COMPLETE CBC W/AUTO DIFF WBC: CPT

## 2023-08-03 PROCEDURE — 6370000000 HC RX 637 (ALT 250 FOR IP)

## 2023-08-03 PROCEDURE — 83735 ASSAY OF MAGNESIUM: CPT

## 2023-08-03 RX ORDER — MAGNESIUM SULFATE IN WATER 40 MG/ML
2000 INJECTION, SOLUTION INTRAVENOUS ONCE
Status: COMPLETED | OUTPATIENT
Start: 2023-08-03 | End: 2023-08-03

## 2023-08-03 RX ADMIN — METOPROLOL SUCCINATE 25 MG: 25 TABLET, EXTENDED RELEASE ORAL at 08:36

## 2023-08-03 RX ADMIN — AMLODIPINE BESYLATE 5 MG: 5 TABLET ORAL at 10:21

## 2023-08-03 RX ADMIN — FAMOTIDINE 20 MG: 20 TABLET, FILM COATED ORAL at 08:35

## 2023-08-03 RX ADMIN — MAGNESIUM SULFATE HEPTAHYDRATE 2000 MG: 40 INJECTION, SOLUTION INTRAVENOUS at 09:26

## 2023-08-03 RX ADMIN — Medication 10 ML: at 08:37

## 2023-08-03 RX ADMIN — Medication 50 MCG: at 08:35

## 2023-08-03 RX ADMIN — AMIODARONE HYDROCHLORIDE 200 MG: 200 TABLET ORAL at 08:35

## 2023-08-03 RX ADMIN — APIXABAN 5 MG: 5 TABLET, FILM COATED ORAL at 08:36

## 2023-08-03 NOTE — FLOWSHEET NOTE
08/02/23 2102   Vital Signs   Orthostatic B/P and Pulse? Yes   Blood Pressure Lying 141/68   Pulse Lying 83 PER MINUTE   Blood Pressure Sitting 134/74   Pulse Sitting 82 PER MINUTE   Blood Pressure Standing 105/58   Pulse Standing 89 PER MINUTE     Vitals and assessment completed. Orthostatics measured, no significant changes from previous measurements. Patient denies lightheadedness or feeling dizzy, she denies visual disturbances or loss of balance. She is complaining of 8/10 pain from a headache. PRN tylenol given. Other medications given per MAR. No s/s of distress, family at bedside. No further needs at this time.      Leona Denton RN

## 2023-08-03 NOTE — PROGRESS NOTES
Bedside report and transfer of care given to [place], [place]. Pt currently resting in bed with the call light within reach. Pt denies any other care needs at this time. Pt stable at this time.     Adri Arias RN

## 2023-08-03 NOTE — PROGRESS NOTES
Occupational Therapy  Held per RN this am due to orthostatic BPs, will continue to attempt as patient status allows.   Nabil Lang, OTR/L [id number]

## 2023-08-03 NOTE — PROGRESS NOTES
Patient educated on discharge instructions as well as new medications use, dosage, administration and possible side effects. Patient verified knowledge. IV removed without difficulty and dry dressing in place. Telemetry monitor removed and returned to [facility]. Pt left facility in stable condition to Home with all of their personal belongings. Dany Pete RN

## 2023-08-03 NOTE — PROGRESS NOTES
Spoke at length with Dr. Frankey Haddock concerning patient's POC, recommendations were to educate patient on use of support stockings as well as mindful movements for those with orthostatic hypotension. Called central supply for size medium stockings for patient. Nephrology will check off on patient and is OK with discharge.  Adonis Kumari RN

## 2023-08-03 NOTE — PROGRESS NOTES
Shift assessment complete, morning medications given, amlodipine not given by this RN until physician sees patient, patient resting with no complaints of pain, patient continues to be orthostatic upon standing at bedside but denies dizziness or lightheadedness upon standing, no IV fluids this morning and sodium has rebounded to 132, family questioning discharge for today, will continue to monitor.  Lucie Will RN

## 2023-08-23 ENCOUNTER — OFFICE VISIT (OUTPATIENT)
Dept: CARDIOLOGY CLINIC | Age: 78
End: 2023-08-23
Payer: MEDICARE

## 2023-08-23 ENCOUNTER — NURSE ONLY (OUTPATIENT)
Dept: CARDIOLOGY CLINIC | Age: 78
End: 2023-08-23
Payer: MEDICARE

## 2023-08-23 VITALS
OXYGEN SATURATION: 95 % | WEIGHT: 148.6 LBS | HEART RATE: 72 BPM | DIASTOLIC BLOOD PRESSURE: 80 MMHG | HEIGHT: 60 IN | BODY MASS INDEX: 29.17 KG/M2 | SYSTOLIC BLOOD PRESSURE: 150 MMHG

## 2023-08-23 DIAGNOSIS — I47.1 NARROW COMPLEX TACHYCARDIA (HCC): ICD-10-CM

## 2023-08-23 DIAGNOSIS — I48.92 ATRIAL FLUTTER, UNSPECIFIED TYPE (HCC): ICD-10-CM

## 2023-08-23 DIAGNOSIS — Z95.0 MRI SAFE CARDIAC PACEMAKER IN SITU: ICD-10-CM

## 2023-08-23 DIAGNOSIS — I48.19 PERSISTENT ATRIAL FIBRILLATION (HCC): ICD-10-CM

## 2023-08-23 DIAGNOSIS — Z79.899 ON AMIODARONE THERAPY: ICD-10-CM

## 2023-08-23 DIAGNOSIS — Z95.0 MRI SAFE CARDIAC PACEMAKER IN SITU: Primary | ICD-10-CM

## 2023-08-23 DIAGNOSIS — I45.5 SINUS PAUSE: ICD-10-CM

## 2023-08-23 DIAGNOSIS — R06.09 DOE (DYSPNEA ON EXERTION): ICD-10-CM

## 2023-08-23 DIAGNOSIS — I48.0 PAF (PAROXYSMAL ATRIAL FIBRILLATION) (HCC): Primary | ICD-10-CM

## 2023-08-23 DIAGNOSIS — R00.0 TACHYARRHYTHMIA: ICD-10-CM

## 2023-08-23 PROCEDURE — 93280 PM DEVICE PROGR EVAL DUAL: CPT | Performed by: INTERNAL MEDICINE

## 2023-08-23 PROCEDURE — 1111F DSCHRG MED/CURRENT MED MERGE: CPT | Performed by: INTERNAL MEDICINE

## 2023-08-23 PROCEDURE — 3079F DIAST BP 80-89 MM HG: CPT | Performed by: INTERNAL MEDICINE

## 2023-08-23 PROCEDURE — G8400 PT W/DXA NO RESULTS DOC: HCPCS | Performed by: INTERNAL MEDICINE

## 2023-08-23 PROCEDURE — 3077F SYST BP >= 140 MM HG: CPT | Performed by: INTERNAL MEDICINE

## 2023-08-23 PROCEDURE — 1036F TOBACCO NON-USER: CPT | Performed by: INTERNAL MEDICINE

## 2023-08-23 PROCEDURE — G8417 CALC BMI ABV UP PARAM F/U: HCPCS | Performed by: INTERNAL MEDICINE

## 2023-08-23 PROCEDURE — G8427 DOCREV CUR MEDS BY ELIG CLIN: HCPCS | Performed by: INTERNAL MEDICINE

## 2023-08-23 PROCEDURE — 1090F PRES/ABSN URINE INCON ASSESS: CPT | Performed by: INTERNAL MEDICINE

## 2023-08-23 PROCEDURE — 99214 OFFICE O/P EST MOD 30 MIN: CPT | Performed by: INTERNAL MEDICINE

## 2023-08-23 PROCEDURE — 1123F ACP DISCUSS/DSCN MKR DOCD: CPT | Performed by: INTERNAL MEDICINE

## 2023-08-23 RX ORDER — AMIODARONE HYDROCHLORIDE 200 MG/1
100 TABLET ORAL SEE ADMIN INSTRUCTIONS
Qty: 60 TABLET | Refills: 3 | Status: SHIPPED | OUTPATIENT
Start: 2023-08-23

## 2023-08-23 ASSESSMENT — ENCOUNTER SYMPTOMS
RIGHT EYE: 0
LEFT EYE: 0
HEMATEMESIS: 0
SHORTNESS OF BREATH: 1
HEMATOCHEZIA: 0
WHEEZING: 0
STRIDOR: 0

## 2023-08-23 NOTE — PROGRESS NOTES
Assessment:     1. Atrial fibrillation: patient has paroxysmal atrial fibrillation and also what appears to be typical atrial flutter. Associated symptoms: Dyspnea on exertion and Shortness of breath     History of cardioversion: Had electrical cardioversion in the past (October 2022)  History of AF ablation: No history of atrial fibrillation ablation in the past  History of heart surgery/procedure: yes, dual chamber pacemaker implant (October 2022)    Current use of anti-arrhythmic drugs: amiodarone  Previous use of anti-arrhythmic drugs: amiodarone    Overall LV function: Normal left ventricular systolic function  Size of left atrium: Severely dilated LA size  Significant cardiac valvular disease: Other severe tricuspid regurgitation    Alcohol consumption: No alcohol consumption  Caffeine consumption: No/minimal caffeine intake  Smoking status: non-smoker  Obstructive sleep apnea: No/low suspicion  Exercise status: Walks regularly, but no formal exercise    I have had discussions with the patient about issues related to atrial fibrillation (including etiology, disease progression patterns, stroke risk and rate control issues). Patient had her questions answered to her satisfaction. Patient has a [id number] score of at least 4 [Age over 75 (2 points), Female gender (1 point), and Hypertension (1 point)]  Longterm anticoagulation is: recommended  Current anticoagulation: Apixaban  Bleeding issues reported: no  Renal function: Abnormal  serum creatinine 1.6  Thyroid function: Normal    Patient initially presented with highly symptomatic atrial flutter. Also had episodes of atrial fibrillation. Given presence of pauses during AF and junctional rhythm after cardioversion, decision made to proceed with pacemaker implant and subsequent initiation of oral amiodarone. She has done well from symptoms-standpoint. Her AF burden was 8% on pacemaker implant and longest episode was 4 hours.  Now it is 0.22%

## 2023-08-23 NOTE — PATIENT INSTRUCTIONS
Plan:     Remote device checks every 3 months. Discussed risks and benefits of amiodarone. Take amiodarone (100 mg)  Monday -Thursday. Do not take Friday- Sunday. Can consider Watchman device if falls are recurrent. Think about this and let us know how you would like to proceed. Blood work in 3 months- CMP, CBC, TSH. Follow up in 4 months.

## 2023-08-30 PROBLEM — R79.89 ELEVATED TROPONIN: Status: RESOLVED | Noted: 2023-07-31 | Resolved: 2023-08-30
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
## 2023-10-02 ENCOUNTER — HOSPITAL ENCOUNTER (OUTPATIENT)
Age: 78
Discharge: HOME | End: 2023-10-02
Payer: MEDICARE

## 2023-10-02 DIAGNOSIS — Z79.899 ON AMIODARONE THERAPY: ICD-10-CM

## 2023-10-02 LAB
ALBUMIN SERPL-MCNC: 4.3 G/DL (ref 3.4–5)
ALBUMIN/GLOB SERPL: 1.5 {RATIO} (ref 1.1–2.2)
ALP SERPL-CCNC: 98 U/L (ref 40–129)
ALT SERPL-CCNC: 12 U/L (ref 10–40)
ANION GAP SERPL CALCULATED.3IONS-SCNC: 15 MMOL/L (ref 3–16)
AST SERPL-CCNC: 17 U/L (ref 15–37)
BASOPHILS # BLD: 0.1 K/UL (ref 0–0.2)
BASOPHILS NFR BLD: 1 %
BILIRUB SERPL-MCNC: 0.3 MG/DL (ref 0–1)
BUN SERPL-MCNC: 27 MG/DL (ref 7–20)
CALCIUM SERPL-MCNC: 9 MG/DL (ref 8.3–10.6)
CHLORIDE SERPL-SCNC: 101 MMOL/L (ref 99–110)
CO2 SERPL-SCNC: 23 MMOL/L (ref 21–32)
CREAT SERPL-MCNC: 1.9 MG/DL (ref 0.6–1.2)
DEPRECATED RDW RBC AUTO: 16.3 % (ref 12.4–15.4)
EOSINOPHIL # BLD: 0.2 K/UL (ref 0–0.6)
EOSINOPHIL NFR BLD: 2.5 %
GFR SERPLBLD CREATININE-BSD FMLA CKD-EPI: 26 ML/MIN/{1.73_M2}
GLUCOSE SERPL-MCNC: 99 MG/DL (ref 70–99)
HCT VFR BLD AUTO: 32.5 % (ref 36–48)
HGB BLD-MCNC: 10.2 G/DL (ref 12–16)
LYMPHOCYTES # BLD: 0.9 K/UL (ref 1–5.1)
LYMPHOCYTES NFR BLD: 12.7 %
MCH RBC QN AUTO: 26.3 PG (ref 26–34)
MCHC RBC AUTO-ENTMCNC: 31.6 G/DL (ref 31–36)
MCV RBC AUTO: 83.4 FL (ref 80–100)
MONOCYTES # BLD: 0.7 K/UL (ref 0–1.3)
MONOCYTES NFR BLD: 10.4 %
NEUTROPHILS # BLD: 5 K/UL (ref 1.7–7.7)
NEUTROPHILS NFR BLD: 73.4 %
PLATELET # BLD AUTO: 358 K/UL (ref 135–450)
PMV BLD AUTO: 7.5 FL (ref 5–10.5)
POTASSIUM SERPL-SCNC: 4.1 MMOL/L (ref 3.5–5.1)
PROT SERPL-MCNC: 7.1 G/DL (ref 6.4–8.2)
RBC # BLD AUTO: 3.89 M/UL (ref 4–5.2)
SODIUM SERPL-SCNC: 139 MMOL/L (ref 136–145)
TSH SERPL DL<=0.005 MIU/L-ACNC: 3.88 UIU/ML (ref 0.27–4.2)
WBC # BLD AUTO: 6.8 K/UL (ref 4–11)

## 2023-10-02 PROCEDURE — 36415 COLL VENOUS BLD VENIPUNCTURE: CPT

## 2023-10-02 PROCEDURE — 85025 COMPLETE CBC W/AUTO DIFF WBC: CPT

## 2023-10-02 PROCEDURE — 84443 ASSAY THYROID STIM HORMONE: CPT

## 2023-10-02 PROCEDURE — 80053 COMPREHEN METABOLIC PANEL: CPT

## 2023-10-03 ENCOUNTER — TELEPHONE (OUTPATIENT)
Dept: CARDIOLOGY CLINIC | Age: 78
End: 2023-10-03

## 2023-10-03 NOTE — TELEPHONE ENCOUNTER
----- Message from Vannessa Shannon MD sent at 10/2/2023  9:02 PM EDT -----  Please let patient know that (on the labs we recently ordered) the renal function appears worse than previous few months. Patient is followed by nephology. Please ask her to get in touch with nephrology for further management advice.      ----- Message -----  From: Padmini Covarrubias Incoming Lab Results From [facility] (Michi Gomez)  Sent: 10/2/2023   4:06 PM EDT  To: Vannessa Shannon MD

## 2023-10-05 NOTE — TELEPHONE ENCOUNTER
3rd attempt- LVM for pt to return call for results. Created letter to mail home for pt to return call.

## 2023-10-06 ENCOUNTER — HOSPITAL ENCOUNTER (INPATIENT)
Age: 78
LOS: 3 days | Discharge: HOME | DRG: 291 | End: 2023-10-09
Attending: EMERGENCY MEDICINE | Admitting: INTERNAL MEDICINE
Payer: MEDICARE

## 2023-10-06 ENCOUNTER — APPOINTMENT (OUTPATIENT)
Dept: GENERAL RADIOLOGY | Age: 78
DRG: 291 | End: 2023-10-06
Payer: MEDICARE

## 2023-10-06 ENCOUNTER — TELEPHONE (OUTPATIENT)
Dept: CARDIOLOGY CLINIC | Age: 78
End: 2023-10-06

## 2023-10-06 ENCOUNTER — APPOINTMENT (OUTPATIENT)
Dept: CT IMAGING | Age: 78
DRG: 291 | End: 2023-10-06
Payer: MEDICARE

## 2023-10-06 DIAGNOSIS — I48.91 ATRIAL FIBRILLATION, UNSPECIFIED TYPE (HCC): ICD-10-CM

## 2023-10-06 DIAGNOSIS — R94.31 QT PROLONGATION: ICD-10-CM

## 2023-10-06 DIAGNOSIS — I50.9 CONGESTIVE HEART FAILURE, UNSPECIFIED HF CHRONICITY, UNSPECIFIED HEART FAILURE TYPE (HCC): Primary | ICD-10-CM

## 2023-10-06 PROBLEM — N18.31 STAGE 3A CHRONIC KIDNEY DISEASE (HCC): Status: ACTIVE | Noted: 2023-10-06

## 2023-10-06 PROBLEM — J96.01 ACUTE RESPIRATORY FAILURE WITH HYPOXEMIA (HCC): Status: ACTIVE | Noted: 2023-10-06

## 2023-10-06 LAB
ALBUMIN SERPL-MCNC: 4.8 G/DL (ref 3.4–5)
ALBUMIN/GLOB SERPL: 1.5 {RATIO} (ref 1.1–2.2)
ALP SERPL-CCNC: 110 U/L (ref 40–129)
ALT SERPL-CCNC: 17 U/L (ref 10–40)
ANION GAP SERPL CALCULATED.3IONS-SCNC: 12 MMOL/L (ref 3–16)
ANION GAP SERPL CALCULATED.3IONS-SCNC: 15 MMOL/L (ref 3–16)
AST SERPL-CCNC: 19 U/L (ref 15–37)
BASE EXCESS BLDV CALC-SCNC: -1.6 MMOL/L (ref -3–3)
BASOPHILS # BLD: 0 K/UL (ref 0–0.2)
BASOPHILS NFR BLD: 0.6 %
BILIRUB SERPL-MCNC: 1.1 MG/DL (ref 0–1)
BUN SERPL-MCNC: 20 MG/DL (ref 7–20)
BUN SERPL-MCNC: 22 MG/DL (ref 7–20)
CALCIUM SERPL-MCNC: 9.1 MG/DL (ref 8.3–10.6)
CALCIUM SERPL-MCNC: 9.5 MG/DL (ref 8.3–10.6)
CHLORIDE SERPL-SCNC: 96 MMOL/L (ref 99–110)
CHLORIDE SERPL-SCNC: 97 MMOL/L (ref 99–110)
CO2 BLDV-SCNC: 25 MMOL/L
CO2 SERPL-SCNC: 22 MMOL/L (ref 21–32)
CO2 SERPL-SCNC: 23 MMOL/L (ref 21–32)
COHGB MFR BLDV: 1.4 % (ref 0–1.5)
CREAT SERPL-MCNC: 1.2 MG/DL (ref 0.6–1.2)
CREAT SERPL-MCNC: 1.5 MG/DL (ref 0.6–1.2)
DEPRECATED RDW RBC AUTO: 16.2 % (ref 12.4–15.4)
EKG ATRIAL RATE: 123 BPM
EKG ATRIAL RATE: 76 BPM
EKG DIAGNOSIS: NORMAL
EKG DIAGNOSIS: NORMAL
EKG P AXIS: 118 DEGREES
EKG P AXIS: 20 DEGREES
EKG P-R INTERVAL: 168 MS
EKG P-R INTERVAL: 272 MS
EKG Q-T INTERVAL: 368 MS
EKG Q-T INTERVAL: 442 MS
EKG QRS DURATION: 94 MS
EKG QRS DURATION: 98 MS
EKG QTC CALCULATION (BAZETT): 497 MS
EKG QTC CALCULATION (BAZETT): 526 MS
EKG R AXIS: -15 DEGREES
EKG R AXIS: -6 DEGREES
EKG T AXIS: 57 DEGREES
EKG T AXIS: 66 DEGREES
EKG VENTRICULAR RATE: 123 BPM
EKG VENTRICULAR RATE: 76 BPM
EOSINOPHIL # BLD: 0.1 K/UL (ref 0–0.6)
EOSINOPHIL NFR BLD: 0.8 %
FLUAV RNA RESP QL NAA+PROBE: NOT DETECTED
FLUBV RNA RESP QL NAA+PROBE: NOT DETECTED
GFR SERPLBLD CREATININE-BSD FMLA CKD-EPI: 35 ML/MIN/{1.73_M2}
GFR SERPLBLD CREATININE-BSD FMLA CKD-EPI: 46 ML/MIN/{1.73_M2}
GLUCOSE SERPL-MCNC: 120 MG/DL (ref 70–99)
GLUCOSE SERPL-MCNC: 89 MG/DL (ref 70–99)
HCO3 BLDV-SCNC: 23.6 MMOL/L (ref 23–29)
HCT VFR BLD AUTO: 30.9 % (ref 36–48)
HGB BLD-MCNC: 10 G/DL (ref 12–16)
LACTATE BLDV-SCNC: 1.3 MMOL/L (ref 0.4–1.9)
LYMPHOCYTES # BLD: 0.5 K/UL (ref 1–5.1)
LYMPHOCYTES NFR BLD: 6.4 %
MAGNESIUM SERPL-MCNC: 1.9 MG/DL (ref 1.8–2.4)
MCH RBC QN AUTO: 26.2 PG (ref 26–34)
MCHC RBC AUTO-ENTMCNC: 32.4 G/DL (ref 31–36)
MCV RBC AUTO: 81 FL (ref 80–100)
METHGB MFR BLDV: 0.3 %
MONOCYTES # BLD: 0.8 K/UL (ref 0–1.3)
MONOCYTES NFR BLD: 8.8 %
NEUTROPHILS # BLD: 7.1 K/UL (ref 1.7–7.7)
NEUTROPHILS NFR BLD: 83.4 %
NT-PROBNP SERPL-MCNC: 3599 PG/ML (ref 0–449)
O2 CT VFR BLDV CALC: 11 VOL %
O2 THERAPY: ABNORMAL
PCO2 BLDV: 42 MMHG (ref 40–50)
PH BLDV: 7.37 [PH] (ref 7.35–7.45)
PLATELET # BLD AUTO: 288 K/UL (ref 135–450)
PMV BLD AUTO: 7 FL (ref 5–10.5)
PO2 BLDV: 40.3 MMHG (ref 25–40)
POTASSIUM SERPL-SCNC: 4.2 MMOL/L (ref 3.5–5.1)
POTASSIUM SERPL-SCNC: 4.3 MMOL/L (ref 3.5–5.1)
PROT SERPL-MCNC: 7.9 G/DL (ref 6.4–8.2)
RBC # BLD AUTO: 3.82 M/UL (ref 4–5.2)
SAO2 % BLDV: 74 %
SARS-COV-2 RNA RESP QL NAA+PROBE: NOT DETECTED
SODIUM SERPL-SCNC: 131 MMOL/L (ref 136–145)
SODIUM SERPL-SCNC: 134 MMOL/L (ref 136–145)
TROPONIN, HIGH SENSITIVITY: 17 NG/L (ref 0–14)
TROPONIN, HIGH SENSITIVITY: 19 NG/L (ref 0–14)
WBC # BLD AUTO: 8.5 K/UL (ref 4–11)

## 2023-10-06 PROCEDURE — 94761 N-INVAS EAR/PLS OXIMETRY MLT: CPT

## 2023-10-06 PROCEDURE — 6370000000 HC RX 637 (ALT 250 FOR IP)

## 2023-10-06 PROCEDURE — 2700000000 HC OXYGEN THERAPY PER DAY

## 2023-10-06 PROCEDURE — 82803 BLOOD GASES ANY COMBINATION: CPT

## 2023-10-06 PROCEDURE — 71045 X-RAY EXAM CHEST 1 VIEW: CPT

## 2023-10-06 PROCEDURE — 80053 COMPREHEN METABOLIC PANEL: CPT

## 2023-10-06 PROCEDURE — 83735 ASSAY OF MAGNESIUM: CPT

## 2023-10-06 PROCEDURE — 93010 ELECTROCARDIOGRAM REPORT: CPT | Performed by: INTERNAL MEDICINE

## 2023-10-06 PROCEDURE — 2060000000 HC ICU INTERMEDIATE R&B

## 2023-10-06 PROCEDURE — 84484 ASSAY OF TROPONIN QUANT: CPT

## 2023-10-06 PROCEDURE — 99285 EMERGENCY DEPT VISIT HI MDM: CPT

## 2023-10-06 PROCEDURE — 71260 CT THORAX DX C+: CPT

## 2023-10-06 PROCEDURE — 6360000004 HC RX CONTRAST MEDICATION: Performed by: EMERGENCY MEDICINE

## 2023-10-06 PROCEDURE — 2580000003 HC RX 258

## 2023-10-06 PROCEDURE — 6370000000 HC RX 637 (ALT 250 FOR IP): Performed by: EMERGENCY MEDICINE

## 2023-10-06 PROCEDURE — 36415 COLL VENOUS BLD VENIPUNCTURE: CPT

## 2023-10-06 PROCEDURE — 94640 AIRWAY INHALATION TREATMENT: CPT

## 2023-10-06 PROCEDURE — 99223 1ST HOSP IP/OBS HIGH 75: CPT

## 2023-10-06 PROCEDURE — 83605 ASSAY OF LACTIC ACID: CPT

## 2023-10-06 PROCEDURE — 96374 THER/PROPH/DIAG INJ IV PUSH: CPT

## 2023-10-06 PROCEDURE — 87636 SARSCOV2 & INF A&B AMP PRB: CPT

## 2023-10-06 PROCEDURE — 85025 COMPLETE CBC W/AUTO DIFF WBC: CPT

## 2023-10-06 PROCEDURE — 93005 ELECTROCARDIOGRAM TRACING: CPT | Performed by: EMERGENCY MEDICINE

## 2023-10-06 PROCEDURE — 83880 ASSAY OF NATRIURETIC PEPTIDE: CPT

## 2023-10-06 PROCEDURE — 6360000002 HC RX W HCPCS: Performed by: EMERGENCY MEDICINE

## 2023-10-06 RX ORDER — POTASSIUM CHLORIDE 7.45 MG/ML
10 INJECTION INTRAVENOUS PRN
Status: DISCONTINUED | OUTPATIENT
Start: 2023-10-06 | End: 2023-10-09 | Stop reason: HOSPADM

## 2023-10-06 RX ORDER — ONDANSETRON 4 MG/1
4 TABLET, ORALLY DISINTEGRATING ORAL EVERY 8 HOURS PRN
Status: DISCONTINUED | OUTPATIENT
Start: 2023-10-06 | End: 2023-10-09 | Stop reason: HOSPADM

## 2023-10-06 RX ORDER — SODIUM CHLORIDE 0.9 % (FLUSH) 0.9 %
5-40 SYRINGE (ML) INJECTION EVERY 12 HOURS SCHEDULED
Status: DISCONTINUED | OUTPATIENT
Start: 2023-10-06 | End: 2023-10-09 | Stop reason: HOSPADM

## 2023-10-06 RX ORDER — MAGNESIUM SULFATE 1 G/100ML
1000 INJECTION INTRAVENOUS PRN
Status: DISCONTINUED | OUTPATIENT
Start: 2023-10-06 | End: 2023-10-09 | Stop reason: HOSPADM

## 2023-10-06 RX ORDER — UBIDECARENONE 75 MG
50 CAPSULE ORAL DAILY
Status: DISCONTINUED | OUTPATIENT
Start: 2023-10-06 | End: 2023-10-09 | Stop reason: HOSPADM

## 2023-10-06 RX ORDER — POLYETHYLENE GLYCOL 3350 17 G/17G
17 POWDER, FOR SOLUTION ORAL DAILY PRN
Status: DISCONTINUED | OUTPATIENT
Start: 2023-10-06 | End: 2023-10-09 | Stop reason: HOSPADM

## 2023-10-06 RX ORDER — FAMOTIDINE 20 MG/1
20 TABLET, FILM COATED ORAL DAILY
Status: DISCONTINUED | OUTPATIENT
Start: 2023-10-06 | End: 2023-10-09 | Stop reason: HOSPADM

## 2023-10-06 RX ORDER — ONDANSETRON 2 MG/ML
4 INJECTION INTRAMUSCULAR; INTRAVENOUS EVERY 6 HOURS PRN
Status: DISCONTINUED | OUTPATIENT
Start: 2023-10-06 | End: 2023-10-09 | Stop reason: HOSPADM

## 2023-10-06 RX ORDER — SODIUM CHLORIDE 0.9 % (FLUSH) 0.9 %
10 SYRINGE (ML) INJECTION PRN
Status: DISCONTINUED | OUTPATIENT
Start: 2023-10-06 | End: 2023-10-09 | Stop reason: HOSPADM

## 2023-10-06 RX ORDER — AMIODARONE HYDROCHLORIDE 200 MG/1
100 TABLET ORAL
Status: DISCONTINUED | OUTPATIENT
Start: 2023-10-09 | End: 2023-10-09 | Stop reason: HOSPADM

## 2023-10-06 RX ORDER — METOPROLOL SUCCINATE 25 MG/1
25 TABLET, EXTENDED RELEASE ORAL ONCE
Status: COMPLETED | OUTPATIENT
Start: 2023-10-06 | End: 2023-10-06

## 2023-10-06 RX ORDER — METOPROLOL SUCCINATE 25 MG/1
25 TABLET, EXTENDED RELEASE ORAL DAILY
Status: DISCONTINUED | OUTPATIENT
Start: 2023-10-06 | End: 2023-10-09 | Stop reason: HOSPADM

## 2023-10-06 RX ORDER — ACETAMINOPHEN 650 MG/1
650 SUPPOSITORY RECTAL EVERY 6 HOURS PRN
Status: DISCONTINUED | OUTPATIENT
Start: 2023-10-06 | End: 2023-10-09 | Stop reason: HOSPADM

## 2023-10-06 RX ORDER — FUROSEMIDE 10 MG/ML
40 INJECTION INTRAMUSCULAR; INTRAVENOUS DAILY
Status: DISCONTINUED | OUTPATIENT
Start: 2023-10-07 | End: 2023-10-08

## 2023-10-06 RX ORDER — FUROSEMIDE 10 MG/ML
40 INJECTION INTRAMUSCULAR; INTRAVENOUS ONCE
Status: COMPLETED | OUTPATIENT
Start: 2023-10-06 | End: 2023-10-06

## 2023-10-06 RX ORDER — POTASSIUM CHLORIDE 20 MEQ/1
40 TABLET, EXTENDED RELEASE ORAL PRN
Status: DISCONTINUED | OUTPATIENT
Start: 2023-10-06 | End: 2023-10-09 | Stop reason: HOSPADM

## 2023-10-06 RX ORDER — IPRATROPIUM BROMIDE AND ALBUTEROL SULFATE 2.5; .5 MG/3ML; MG/3ML
1 SOLUTION RESPIRATORY (INHALATION)
Status: DISCONTINUED | OUTPATIENT
Start: 2023-10-06 | End: 2023-10-09 | Stop reason: HOSPADM

## 2023-10-06 RX ORDER — SODIUM CHLORIDE 9 MG/ML
INJECTION, SOLUTION INTRAVENOUS PRN
Status: DISCONTINUED | OUTPATIENT
Start: 2023-10-06 | End: 2023-10-09 | Stop reason: HOSPADM

## 2023-10-06 RX ORDER — AMIODARONE HYDROCHLORIDE 200 MG/1
100 TABLET ORAL SEE ADMIN INSTRUCTIONS
Status: DISCONTINUED | OUTPATIENT
Start: 2023-10-06 | End: 2023-10-06

## 2023-10-06 RX ORDER — AMLODIPINE BESYLATE 5 MG/1
5 TABLET ORAL DAILY
Status: DISCONTINUED | OUTPATIENT
Start: 2023-10-06 | End: 2023-10-09 | Stop reason: HOSPADM

## 2023-10-06 RX ORDER — FUROSEMIDE 10 MG/ML
40 INJECTION INTRAMUSCULAR; INTRAVENOUS 2 TIMES DAILY
Status: CANCELLED | OUTPATIENT
Start: 2023-10-06

## 2023-10-06 RX ORDER — ACETAMINOPHEN 325 MG/1
650 TABLET ORAL EVERY 6 HOURS PRN
Status: DISCONTINUED | OUTPATIENT
Start: 2023-10-06 | End: 2023-10-09 | Stop reason: HOSPADM

## 2023-10-06 RX ORDER — IPRATROPIUM BROMIDE AND ALBUTEROL SULFATE 2.5; .5 MG/3ML; MG/3ML
1 SOLUTION RESPIRATORY (INHALATION) EVERY 4 HOURS PRN
Status: DISCONTINUED | OUTPATIENT
Start: 2023-10-06 | End: 2023-10-09 | Stop reason: HOSPADM

## 2023-10-06 RX ADMIN — IPRATROPIUM BROMIDE AND ALBUTEROL SULFATE 1 DOSE: .5; 2.5 SOLUTION RESPIRATORY (INHALATION) at 20:11

## 2023-10-06 RX ADMIN — IPRATROPIUM BROMIDE AND ALBUTEROL SULFATE 1 DOSE: .5; 2.5 SOLUTION RESPIRATORY (INHALATION) at 10:15

## 2023-10-06 RX ADMIN — IOPAMIDOL 75 ML: 755 INJECTION, SOLUTION INTRAVENOUS at 11:21

## 2023-10-06 RX ADMIN — METOPROLOL SUCCINATE 25 MG: 25 TABLET, EXTENDED RELEASE ORAL at 12:37

## 2023-10-06 RX ADMIN — APIXABAN 5 MG: 5 TABLET, FILM COATED ORAL at 21:42

## 2023-10-06 RX ADMIN — Medication 10 ML: at 21:42

## 2023-10-06 RX ADMIN — FAMOTIDINE 20 MG: 20 TABLET ORAL at 17:32

## 2023-10-06 RX ADMIN — IPRATROPIUM BROMIDE AND ALBUTEROL SULFATE 1 DOSE: .5; 2.5 SOLUTION RESPIRATORY (INHALATION) at 15:42

## 2023-10-06 RX ADMIN — FUROSEMIDE 40 MG: 10 INJECTION, SOLUTION INTRAMUSCULAR; INTRAVENOUS at 13:37

## 2023-10-06 ASSESSMENT — LIFESTYLE VARIABLES
HOW OFTEN DO YOU HAVE A DRINK CONTAINING ALCOHOL: NEVER
HOW MANY STANDARD DRINKS CONTAINING ALCOHOL DO YOU HAVE ON A TYPICAL DAY: PATIENT DOES NOT DRINK

## 2023-10-06 ASSESSMENT — PAIN - FUNCTIONAL ASSESSMENT: PAIN_FUNCTIONAL_ASSESSMENT: NONE - DENIES PAIN

## 2023-10-06 NOTE — TELEPHONE ENCOUNTER
I spoke with Otis Guzman, the nurse caring for the patient, and she stated that she had the information that she needed to interrogate the device.

## 2023-10-06 NOTE — TELEPHONE ENCOUNTER
Vero Kirkland, pt's contact, called stating they were informed by [facility] ED to contact our office to retrieve pt's device information. I informed sachi that our EP RN will contact [facility] ED to speak with staff regarding pt.

## 2023-10-06 NOTE — ACP (ADVANCE CARE PLANNING)
Advance Care Planning     General Advance Care Planning (ACP) Conversation    Date of Conversation: 10/6/2023  Conducted with: Patient with Decision Making Capacity    Healthcare Decision Maker:    Primary Decision Maker: Becca Brewer - [place] - [phone]  Click here to complete Healthcare Decision Makers including selection of the Healthcare Decision Maker Relationship (ie \"Primary\"). Today we documented Decision Maker(s) consistent with Legal Next of Kin hierarchy.     Content/Action Overview:  DECLINED ACP Conversation - will revisit periodically  Reviewed DNR/DNI and patient elects Full Code (Attempt Resuscitation)        Length of Voluntary ACP Conversation in minutes:  <16 minutes (Non-Billable)    Gisella Anderson

## 2023-10-06 NOTE — CARE COORDINATION
Case Management Assessment  Initial Evaluation    Date/Time of Evaluation: 10/6/2023 4:15 PM  Assessment Completed by: Ro Anderson    If patient is discharged prior to next notation, then this note serves as note for discharge by case management. Patient Name: Sanaz George                   YOB: 1945  Diagnosis: QT prolongation [R94.31]  Atrial fibrillation, unspecified type ([address]) [I48.91]  Acute congestive heart failure, unspecified heart failure type ([address]) [I50.9]  Congestive heart failure, unspecified HF chronicity, unspecified heart failure type ([address]) [I50.9]                   Date / Time: 10/6/2023  9:29 AM    Patient Admission Status: Inpatient   Readmission Risk (Low < 19, Mod (19-27), High > 27): Readmission Risk Score: 18.1    Current PCP: Siri Mcdonald MD  PCP verified by CM? Yes Walker Campos)    Chart Reviewed: Yes      History Provided by: Patient  Patient Orientation: Alert and Oriented    Patient Cognition: Alert    Hospitalization in the last 30 days (Readmission):  No    If yes, Readmission Assessment in CM Navigator will be completed. Advance Directives:      Code Status: Full Code   Patient's Primary Decision Maker is: Legal Next of Kin    Primary Decision Maker: Becca Brewer - [place] - [phone]    Discharge Planning:    Patient lives with: Alone Type of Home: House  Primary Care Giver: Self  Patient Support Systems include: Children   Current Financial resources: Medicare  Current community resources: None  Current services prior to admission: None            Current DME:              Type of Home Care services:  None    ADLS  Prior functional level: Independent in ADLs/IADLs  Current functional level: Independent in ADLs/IADLs    PT AM-PAC:   /24  OT AM-PAC:   /24    Family can provide assistance at DC: Yes  Would you like Case Management to discuss the discharge plan with any other family members/significant others, and if so, who?  No  Plans to Return to Present Housing:

## 2023-10-06 NOTE — ED NOTES
Ambulated pt with pulse ox, went down to 83. Back on 2L NC stating at 95.  Provider notified      Selene Porter RN  10/06/23 [phone]

## 2023-10-06 NOTE — H&P
Hospital Medicine History & Physical      PCP: Saul Britt MD    Date of Admission: 10/6/2023    Date of Service: Pt seen/examined on 10/06/23     Chief Complaint:    Chief Complaint   Patient presents with    Shortness of Breath     SOB and cough started yesterday. Cough worse at night. Hx A. Fib. History Of Present Illness: The patient is a 78 y.o. female with PMH of PAF, HTN, hyponatremia, CKD, and GERD who presented to [facility] ED with complaint of SOB. Pt states she has had SOB and cough since Wednesday. She states that initially this was only on exertion but it progressed to SOB at rest. She states that this is worse when lying flat and that around 3 AM last night it was especially hard to breathe. She denies any palpitations, CP, fevers, chills, or dizziness. Past Medical History:        Diagnosis Date    Arthritis     Atrial fibrillation ([address])     Closed subchondral insufficiency fracture of femoral condyle ([address]) 08/12/2020    Hypertension     Localized osteoarthrosis not specified whether primary or secondary, pelvic region and thigh 05/08/2015    Neurogenic bladder     caused by a back surgery in 2017 per pt       Past Surgical History:        Procedure Laterality Date    CATARACT REMOVAL WITH IMPLANT  01/14/2011    LEFT EYE    EYE SURGERY  12/14/2010    cataract rt eye    HYSTERECTOMY (CERVIX STATUS UNKNOWN)      [address]    left hip    OTHER SURGICAL HISTORY Right     RIGHT TOTAL KNEE REPLACEMENT     PACEMAKER INSERTION      October 2022    TOTAL KNEE ARTHROPLASTY Right 12/06/2021    RIGHT TOTAL KNEE REPLACEMENT performed by Jacklyn Padilla MD at [address] [place] [address]       Medications Prior to Admission:    Prior to Admission medications    Medication Sig Start Date End Date Taking?  Authorizing Provider   apixaban (ELIQUIS) 5 MG TABS tablet Take 1 tablet by mouth 2 times daily 8/23/23   Hong Noe MD   amiodarone (CORDARONE) 200 MG tablet Take 0.5 tablets by mouth See Admin

## 2023-10-07 PROBLEM — I50.31 ACUTE DIASTOLIC CHF (CONGESTIVE HEART FAILURE) (HCC): Status: ACTIVE | Noted: 2023-10-07

## 2023-10-07 PROBLEM — I48.91 ATRIAL FIBRILLATION (HCC): Status: ACTIVE | Noted: 2023-07-31

## 2023-10-07 LAB
ANION GAP SERPL CALCULATED.3IONS-SCNC: 11 MMOL/L (ref 3–16)
BASOPHILS # BLD: 0 K/UL (ref 0–0.2)
BASOPHILS NFR BLD: 0.7 %
BUN SERPL-MCNC: 19 MG/DL (ref 7–20)
CALCIUM SERPL-MCNC: 9.2 MG/DL (ref 8.3–10.6)
CHLORIDE SERPL-SCNC: 99 MMOL/L (ref 99–110)
CO2 SERPL-SCNC: 26 MMOL/L (ref 21–32)
CREAT SERPL-MCNC: 1.4 MG/DL (ref 0.6–1.2)
DEPRECATED RDW RBC AUTO: 16.2 % (ref 12.4–15.4)
EOSINOPHIL # BLD: 0.2 K/UL (ref 0–0.6)
EOSINOPHIL NFR BLD: 3.6 %
GFR SERPLBLD CREATININE-BSD FMLA CKD-EPI: 38 ML/MIN/{1.73_M2}
GLUCOSE SERPL-MCNC: 81 MG/DL (ref 70–99)
HCT VFR BLD AUTO: 28.6 % (ref 36–48)
HGB BLD-MCNC: 9.3 G/DL (ref 12–16)
LYMPHOCYTES # BLD: 0.6 K/UL (ref 1–5.1)
LYMPHOCYTES NFR BLD: 12.5 %
MCH RBC QN AUTO: 26.7 PG (ref 26–34)
MCHC RBC AUTO-ENTMCNC: 32.7 G/DL (ref 31–36)
MCV RBC AUTO: 81.6 FL (ref 80–100)
MONOCYTES # BLD: 0.5 K/UL (ref 0–1.3)
MONOCYTES NFR BLD: 11.4 %
NEUTROPHILS # BLD: 3.3 K/UL (ref 1.7–7.7)
NEUTROPHILS NFR BLD: 71.8 %
PLATELET # BLD AUTO: 268 K/UL (ref 135–450)
PMV BLD AUTO: 6.9 FL (ref 5–10.5)
POTASSIUM SERPL-SCNC: 3.8 MMOL/L (ref 3.5–5.1)
RBC # BLD AUTO: 3.5 M/UL (ref 4–5.2)
SODIUM SERPL-SCNC: 136 MMOL/L (ref 136–145)
WBC # BLD AUTO: 4.6 K/UL (ref 4–11)

## 2023-10-07 PROCEDURE — 94640 AIRWAY INHALATION TREATMENT: CPT

## 2023-10-07 PROCEDURE — 2060000000 HC ICU INTERMEDIATE R&B

## 2023-10-07 PROCEDURE — 6370000000 HC RX 637 (ALT 250 FOR IP)

## 2023-10-07 PROCEDURE — 99233 SBSQ HOSP IP/OBS HIGH 50: CPT | Performed by: INTERNAL MEDICINE

## 2023-10-07 PROCEDURE — 94761 N-INVAS EAR/PLS OXIMETRY MLT: CPT

## 2023-10-07 PROCEDURE — 6360000002 HC RX W HCPCS

## 2023-10-07 PROCEDURE — 99223 1ST HOSP IP/OBS HIGH 75: CPT | Performed by: INTERNAL MEDICINE

## 2023-10-07 PROCEDURE — 36415 COLL VENOUS BLD VENIPUNCTURE: CPT

## 2023-10-07 PROCEDURE — 93306 TTE W/DOPPLER COMPLETE: CPT

## 2023-10-07 PROCEDURE — 85025 COMPLETE CBC W/AUTO DIFF WBC: CPT

## 2023-10-07 PROCEDURE — 2700000000 HC OXYGEN THERAPY PER DAY

## 2023-10-07 PROCEDURE — 2580000003 HC RX 258

## 2023-10-07 PROCEDURE — 80048 BASIC METABOLIC PNL TOTAL CA: CPT

## 2023-10-07 RX ADMIN — METOPROLOL SUCCINATE 25 MG: 25 TABLET, EXTENDED RELEASE ORAL at 09:42

## 2023-10-07 RX ADMIN — IPRATROPIUM BROMIDE AND ALBUTEROL SULFATE 1 DOSE: .5; 2.5 SOLUTION RESPIRATORY (INHALATION) at 20:20

## 2023-10-07 RX ADMIN — AMLODIPINE BESYLATE 5 MG: 5 TABLET ORAL at 09:42

## 2023-10-07 RX ADMIN — IPRATROPIUM BROMIDE AND ALBUTEROL SULFATE 1 DOSE: .5; 2.5 SOLUTION RESPIRATORY (INHALATION) at 07:29

## 2023-10-07 RX ADMIN — IPRATROPIUM BROMIDE AND ALBUTEROL SULFATE 1 DOSE: .5; 2.5 SOLUTION RESPIRATORY (INHALATION) at 15:15

## 2023-10-07 RX ADMIN — APIXABAN 5 MG: 5 TABLET, FILM COATED ORAL at 19:56

## 2023-10-07 RX ADMIN — FAMOTIDINE 20 MG: 20 TABLET ORAL at 09:42

## 2023-10-07 RX ADMIN — Medication 50 MCG: at 09:45

## 2023-10-07 RX ADMIN — Medication 10 ML: at 19:56

## 2023-10-07 RX ADMIN — IPRATROPIUM BROMIDE AND ALBUTEROL SULFATE 1 DOSE: .5; 2.5 SOLUTION RESPIRATORY (INHALATION) at 11:02

## 2023-10-07 RX ADMIN — APIXABAN 5 MG: 5 TABLET, FILM COATED ORAL at 09:42

## 2023-10-07 RX ADMIN — Medication 10 ML: at 09:45

## 2023-10-07 RX ADMIN — FUROSEMIDE 40 MG: 10 INJECTION, SOLUTION INTRAMUSCULAR; INTRAVENOUS at 09:41

## 2023-10-08 LAB
ANION GAP SERPL CALCULATED.3IONS-SCNC: 12 MMOL/L (ref 3–16)
BASOPHILS # BLD: 0 K/UL (ref 0–0.2)
BASOPHILS NFR BLD: 0.8 %
BUN SERPL-MCNC: 25 MG/DL (ref 7–20)
CALCIUM SERPL-MCNC: 8.9 MG/DL (ref 8.3–10.6)
CHLORIDE SERPL-SCNC: 97 MMOL/L (ref 99–110)
CO2 SERPL-SCNC: 25 MMOL/L (ref 21–32)
CREAT SERPL-MCNC: 1.6 MG/DL (ref 0.6–1.2)
DEPRECATED RDW RBC AUTO: 16.2 % (ref 12.4–15.4)
EOSINOPHIL # BLD: 0.2 K/UL (ref 0–0.6)
EOSINOPHIL NFR BLD: 4.4 %
GFR SERPLBLD CREATININE-BSD FMLA CKD-EPI: 32 ML/MIN/{1.73_M2}
GLUCOSE SERPL-MCNC: 96 MG/DL (ref 70–99)
HCT VFR BLD AUTO: 29.5 % (ref 36–48)
HGB BLD-MCNC: 9.7 G/DL (ref 12–16)
LYMPHOCYTES # BLD: 0.8 K/UL (ref 1–5.1)
LYMPHOCYTES NFR BLD: 16 %
MAGNESIUM SERPL-MCNC: 2.3 MG/DL (ref 1.8–2.4)
MCH RBC QN AUTO: 26.6 PG (ref 26–34)
MCHC RBC AUTO-ENTMCNC: 32.9 G/DL (ref 31–36)
MCV RBC AUTO: 80.8 FL (ref 80–100)
MONOCYTES # BLD: 0.6 K/UL (ref 0–1.3)
MONOCYTES NFR BLD: 11.6 %
NEUTROPHILS # BLD: 3.4 K/UL (ref 1.7–7.7)
NEUTROPHILS NFR BLD: 67.2 %
PLATELET # BLD AUTO: 289 K/UL (ref 135–450)
PMV BLD AUTO: 7.2 FL (ref 5–10.5)
POTASSIUM SERPL-SCNC: 3.5 MMOL/L (ref 3.5–5.1)
RBC # BLD AUTO: 3.65 M/UL (ref 4–5.2)
SODIUM SERPL-SCNC: 134 MMOL/L (ref 136–145)
WBC # BLD AUTO: 5.1 K/UL (ref 4–11)

## 2023-10-08 PROCEDURE — 94761 N-INVAS EAR/PLS OXIMETRY MLT: CPT

## 2023-10-08 PROCEDURE — 6360000002 HC RX W HCPCS

## 2023-10-08 PROCEDURE — 36415 COLL VENOUS BLD VENIPUNCTURE: CPT

## 2023-10-08 PROCEDURE — 99233 SBSQ HOSP IP/OBS HIGH 50: CPT | Performed by: INTERNAL MEDICINE

## 2023-10-08 PROCEDURE — 6370000000 HC RX 637 (ALT 250 FOR IP)

## 2023-10-08 PROCEDURE — 80048 BASIC METABOLIC PNL TOTAL CA: CPT

## 2023-10-08 PROCEDURE — 85025 COMPLETE CBC W/AUTO DIFF WBC: CPT

## 2023-10-08 PROCEDURE — 94640 AIRWAY INHALATION TREATMENT: CPT

## 2023-10-08 PROCEDURE — 2580000003 HC RX 258

## 2023-10-08 PROCEDURE — 83735 ASSAY OF MAGNESIUM: CPT

## 2023-10-08 PROCEDURE — 2700000000 HC OXYGEN THERAPY PER DAY

## 2023-10-08 PROCEDURE — 2060000000 HC ICU INTERMEDIATE R&B

## 2023-10-08 RX ORDER — TORSEMIDE 20 MG/1
20 TABLET ORAL DAILY
Status: DISCONTINUED | OUTPATIENT
Start: 2023-10-09 | End: 2023-10-09 | Stop reason: HOSPADM

## 2023-10-08 RX ADMIN — AMLODIPINE BESYLATE 5 MG: 5 TABLET ORAL at 08:38

## 2023-10-08 RX ADMIN — FAMOTIDINE 20 MG: 20 TABLET ORAL at 08:38

## 2023-10-08 RX ADMIN — IPRATROPIUM BROMIDE AND ALBUTEROL SULFATE 1 DOSE: .5; 2.5 SOLUTION RESPIRATORY (INHALATION) at 20:18

## 2023-10-08 RX ADMIN — FUROSEMIDE 40 MG: 10 INJECTION, SOLUTION INTRAMUSCULAR; INTRAVENOUS at 08:38

## 2023-10-08 RX ADMIN — APIXABAN 5 MG: 5 TABLET, FILM COATED ORAL at 08:40

## 2023-10-08 RX ADMIN — IPRATROPIUM BROMIDE AND ALBUTEROL SULFATE 1 DOSE: .5; 2.5 SOLUTION RESPIRATORY (INHALATION) at 11:14

## 2023-10-08 RX ADMIN — Medication 50 MCG: at 08:38

## 2023-10-08 RX ADMIN — Medication 10 ML: at 08:38

## 2023-10-08 RX ADMIN — APIXABAN 2.5 MG: 5 TABLET, FILM COATED ORAL at 20:24

## 2023-10-08 RX ADMIN — METOPROLOL SUCCINATE 25 MG: 25 TABLET, EXTENDED RELEASE ORAL at 08:37

## 2023-10-08 RX ADMIN — IPRATROPIUM BROMIDE AND ALBUTEROL SULFATE 1 DOSE: .5; 2.5 SOLUTION RESPIRATORY (INHALATION) at 07:35

## 2023-10-08 RX ADMIN — Medication 10 ML: at 20:24

## 2023-10-08 RX ADMIN — IPRATROPIUM BROMIDE AND ALBUTEROL SULFATE 1 DOSE: .5; 2.5 SOLUTION RESPIRATORY (INHALATION) at 14:59

## 2023-10-08 NOTE — CONSULTS
[address] [facility] [address], [address]                                  CONSULTATION    PATIENT NAME: Germain Denise                     :        1945  MED REC NO:   [id number]                          ROOM:         ACCOUNT NO:   [de-identified]                           ADMIT DATE: 10/06/2023  PROVIDER:     Mecca Fournier MD    CONSULT DATE:  10/07/2023    REASON FOR CONSULTATION:  Shortness of breath. HISTORY OF PRESENT ILLNESS:  The patient is an 78-year-old woman with a  history of hypertension, paroxysmal atrial tachycardia, and sick sinus  syndrome, who was admitted with progressive shortness of breath and  orthopnea. She estimates that these symptoms had been present for a few  days and had worsened at the time of admission. Evaluation demonstrates  an elevated proBNP level at 3599. Chest radiography is normal.  ECG at  the time of admission demonstrates atrial tachycardia, which has since  resolved. The most recent pacemaker interrogation from 2023  demonstrates an atrial tachycardia burden of 3%. Her atrial tachycardia  is relatively slow with atrial cycle lengths of 350 to 400 milliseconds. The patient has received diuretics since admission with only minimal  diuresis, but has already reported some improvement in her symptoms. Her most recent echo from 10/13/2022 demonstrates preserved LV function  with ejection fraction of 56%, but evidence for diastolic dysfunction,  and elevated right ventricular volume and pressure overload. Pulmonary  hypertension was noted with an estimated pulmonary artery pressure of 50  mmHg. PAST MEDICAL HISTORY:  1. Paroxysmal atrial tachycardia. 2.  Sick sinus syndrome, status post pacemaker implantation. 3.  Hypertension.     MEDICATIONS:  At the time of admission include:  Amiodarone 100 mg p.o.  daily, amlodipine 5 mg p.o. daily, apixaban 5 mg p.o. b.i.d., Pepcid

## 2023-10-09 VITALS
HEIGHT: 60 IN | DIASTOLIC BLOOD PRESSURE: 77 MMHG | RESPIRATION RATE: 17 BRPM | OXYGEN SATURATION: 98 % | WEIGHT: 144.2 LBS | TEMPERATURE: 97.3 F | SYSTOLIC BLOOD PRESSURE: 149 MMHG | HEART RATE: 75 BPM | BODY MASS INDEX: 28.31 KG/M2

## 2023-10-09 LAB
ANION GAP SERPL CALCULATED.3IONS-SCNC: 11 MMOL/L (ref 3–16)
BASOPHILS # BLD: 0 K/UL (ref 0–0.2)
BASOPHILS NFR BLD: 0.8 %
BUN SERPL-MCNC: 28 MG/DL (ref 7–20)
CALCIUM SERPL-MCNC: 8.9 MG/DL (ref 8.3–10.6)
CHLORIDE SERPL-SCNC: 103 MMOL/L (ref 99–110)
CO2 SERPL-SCNC: 25 MMOL/L (ref 21–32)
CREAT SERPL-MCNC: 1.6 MG/DL (ref 0.6–1.2)
DEPRECATED RDW RBC AUTO: 16.4 % (ref 12.4–15.4)
EOSINOPHIL # BLD: 0.2 K/UL (ref 0–0.6)
EOSINOPHIL NFR BLD: 3.9 %
GFR SERPLBLD CREATININE-BSD FMLA CKD-EPI: 32 ML/MIN/{1.73_M2}
GLUCOSE SERPL-MCNC: 94 MG/DL (ref 70–99)
HCT VFR BLD AUTO: 29 % (ref 36–48)
HGB BLD-MCNC: 9.4 G/DL (ref 12–16)
LYMPHOCYTES # BLD: 0.7 K/UL (ref 1–5.1)
LYMPHOCYTES NFR BLD: 14.1 %
MAGNESIUM SERPL-MCNC: 2.2 MG/DL (ref 1.8–2.4)
MCH RBC QN AUTO: 26.8 PG (ref 26–34)
MCHC RBC AUTO-ENTMCNC: 32.5 G/DL (ref 31–36)
MCV RBC AUTO: 82.3 FL (ref 80–100)
MONOCYTES # BLD: 0.6 K/UL (ref 0–1.3)
MONOCYTES NFR BLD: 11.9 %
NEUTROPHILS # BLD: 3.4 K/UL (ref 1.7–7.7)
NEUTROPHILS NFR BLD: 69.3 %
PLATELET # BLD AUTO: 299 K/UL (ref 135–450)
PMV BLD AUTO: 7 FL (ref 5–10.5)
POTASSIUM SERPL-SCNC: 3.5 MMOL/L (ref 3.5–5.1)
RBC # BLD AUTO: 3.52 M/UL (ref 4–5.2)
SODIUM SERPL-SCNC: 139 MMOL/L (ref 136–145)
WBC # BLD AUTO: 4.9 K/UL (ref 4–11)

## 2023-10-09 PROCEDURE — 85025 COMPLETE CBC W/AUTO DIFF WBC: CPT

## 2023-10-09 PROCEDURE — 6370000000 HC RX 637 (ALT 250 FOR IP)

## 2023-10-09 PROCEDURE — 99232 SBSQ HOSP IP/OBS MODERATE 35: CPT | Performed by: NURSE PRACTITIONER

## 2023-10-09 PROCEDURE — 36415 COLL VENOUS BLD VENIPUNCTURE: CPT

## 2023-10-09 PROCEDURE — 80048 BASIC METABOLIC PNL TOTAL CA: CPT

## 2023-10-09 PROCEDURE — 6370000000 HC RX 637 (ALT 250 FOR IP): Performed by: INTERNAL MEDICINE

## 2023-10-09 PROCEDURE — 83735 ASSAY OF MAGNESIUM: CPT

## 2023-10-09 PROCEDURE — 94761 N-INVAS EAR/PLS OXIMETRY MLT: CPT

## 2023-10-09 PROCEDURE — 94640 AIRWAY INHALATION TREATMENT: CPT

## 2023-10-09 RX ORDER — TORSEMIDE 20 MG/1
20 TABLET ORAL DAILY
Qty: 30 TABLET | Refills: 3 | Status: SHIPPED | OUTPATIENT
Start: 2023-10-10

## 2023-10-09 RX ADMIN — METOPROLOL SUCCINATE 25 MG: 25 TABLET, EXTENDED RELEASE ORAL at 10:04

## 2023-10-09 RX ADMIN — AMLODIPINE BESYLATE 5 MG: 5 TABLET ORAL at 10:05

## 2023-10-09 RX ADMIN — AMIODARONE HYDROCHLORIDE 100 MG: 200 TABLET ORAL at 10:04

## 2023-10-09 RX ADMIN — FAMOTIDINE 20 MG: 20 TABLET ORAL at 10:04

## 2023-10-09 RX ADMIN — APIXABAN 2.5 MG: 5 TABLET, FILM COATED ORAL at 10:04

## 2023-10-09 RX ADMIN — IPRATROPIUM BROMIDE AND ALBUTEROL SULFATE 1 DOSE: .5; 2.5 SOLUTION RESPIRATORY (INHALATION) at 10:56

## 2023-10-09 RX ADMIN — TORSEMIDE 20 MG: 20 TABLET ORAL at 10:04

## 2023-10-09 RX ADMIN — IPRATROPIUM BROMIDE AND ALBUTEROL SULFATE 1 DOSE: .5; 2.5 SOLUTION RESPIRATORY (INHALATION) at 07:25

## 2023-10-09 ASSESSMENT — ENCOUNTER SYMPTOMS
GASTROINTESTINAL NEGATIVE: 1
RESPIRATORY NEGATIVE: 1

## 2023-10-09 NOTE — CARE COORDINATION
DISCHARGE ORDER  Date/Time 10/9/2023 1:56 PM  Completed by: Marlo Dwyer RN, Case Management    Patient Name: Macho Benton      : 1945  Admitting Diagnosis: QT prolongation [R94.31]  Atrial fibrillation, unspecified type ([address]) [I48.91]  Acute congestive heart failure, unspecified heart failure type ([address]) [I50.9]  Congestive heart failure, unspecified HF chronicity, unspecified heart failure type ([address]) [I50.9]      Admit order Date and Status:ip 10/6/23  (verify MD's last order for status of admission)      Noted discharge order. If applicable PT/OT recommendation at Discharge: na  DME recommendation by PT/OT:na  Confirmed discharge plan  (): Yes  with whom___edna____________  If pt confirmed DC plan does family need to be contacted by CM No     Discharge Plan: met with pt and dtr at bedside. Pt is agreeable to DC home today. Pt denies any DME needs at this time. Pt declines HHC. All questions answered to pt's satisfaction    Date of Last IMM Given: 10/9/23    Reviewed chart. Role of discharge planner explained and patient verbalized understanding. Discharge order is noted. Has Home O2 in place on admit:  No  Informed of need to bring portable home O2 tank on day of discharge for nursing to connect prior to leaving:   Not Indicated  Verbalized agreement/Understanding:   Not Indicated  Pt is being d/c'd to [facility]oe today. Pt's O2 sats are 98% on ra. Discharge timeout done with rn, cm, pt. All discharge needs and concerns addressed.

## 2023-10-09 NOTE — PLAN OF CARE
Acute resp failure 2/2 CHF AE  - repeat echo ordered  - 40 lasix BID    Switching between sinus tach and afib RVR in ED?  - consider cards cs  - HR improved with home toprol    Admit PCU    Konrad Pearosn PA-C  10/06/23
HEART FAILURE CARE PLAN:    Comorbidities Reviewed: Yes   Patient has a past medical history of Arthritis, Atrial fibrillation ([address]), Closed subchondral insufficiency fracture of femoral condyle ([address]), Hypertension, Localized osteoarthrosis not specified whether primary or secondary, pelvic region and thigh, and Neurogenic bladder. ECHOCARDIOGRAM Reviewed: Yes   Patient's Ejection Fraction (EF) is greater than 40%    Weights Reviewed: Yes   Admission weight: 147 lb (66.7 kg)   Wt Readings from Last 3 Encounters:   10/06/23 148 lb 6 oz (67.3 kg)   08/23/23 148 lb 9.6 oz (67.4 kg)   08/03/23 154 lb 1.6 oz (69.9 kg)     Intake & Output Reviewed: Yes   No intake or output data in the 24 hours ending 10/06/23 1635  Medications Reviewed: Yes   SCHEDULED HOSPITAL MEDICATIONS:   ipratropium 0.5 mg-albuterol 2.5 mg  1 Dose Inhalation Q4H WA RT    amLODIPine  5 mg Oral Daily    apixaban  5 mg Oral BID    famotidine  20 mg Oral Daily    metoprolol succinate  25 mg Oral Daily    vitamin B-12  50 mcg Oral Daily    sodium chloride flush  5-40 mL IntraVENous 2 times per day    [START ON 10/7/2023] furosemide  40 mg IntraVENous Daily    [START ON 10/9/2023] amiodarone  100 mg Oral Once per day on Mon Tue Wed Thu     ACE/ARB/ARNI is REQUIRED for EF </= 42% SYSTOLIC FAILURE:   ACE[de-identified] None  ARB[de-identified] None  ARNI[de-identified] None    Evidenced-Based Beta Blocker is REQUIRED for EF </= 74% SYSTOLIC FAILURE:   [de-identified] Metoprolol SUCCinate- Toprol XL    Diuretics:  [de-identified] None    Diet Reviewed: Yes   ADULT DIET; Regular; 2000 ml    Goal of Care Reviewed: Yes   Patient and/or Family's stated Goal of Care this Admission: reduce shortness of breath, increase activity tolerance, better understand heart failure and disease management, be more comfortable, and reduce lower extremity edema prior to discharge.         Problem: Discharge Planning  Goal: Discharge to home or other facility with appropriate resources  10/6/2023 1634 by Ange Pettit RN  Outcome:
HEART FAILURE CARE PLAN:    Comorbidities Reviewed: Yes   Patient has a past medical history of Arthritis, Atrial fibrillation ([address]), Closed subchondral insufficiency fracture of femoral condyle ([address]), Hypertension, Localized osteoarthrosis not specified whether primary or secondary, pelvic region and thigh, and Neurogenic bladder. ECHOCARDIOGRAM Reviewed: Yes   Patient's Ejection Fraction (EF) is greater than 40%    Weights Reviewed: Yes   Admission weight: 147 lb (66.7 kg)   Wt Readings from Last 3 Encounters:   10/07/23 145 lb (65.8 kg)   08/23/23 148 lb 9.6 oz (67.4 kg)   08/03/23 154 lb 1.6 oz (69.9 kg)     Intake & Output Reviewed: Yes     Intake/Output Summary (Last 24 hours) at 10/7/2023 0429  Last data filed at 10/7/2023 0426  Gross per 24 hour   Intake 360 ml   Output 1051 ml   Net -691 ml     Medications Reviewed: No   SCHEDULED HOSPITAL MEDICATIONS:   ipratropium 0.5 mg-albuterol 2.5 mg  1 Dose Inhalation Q4H WA RT    amLODIPine  5 mg Oral Daily    apixaban  5 mg Oral BID    famotidine  20 mg Oral Daily    metoprolol succinate  25 mg Oral Daily    vitamin B-12  50 mcg Oral Daily    sodium chloride flush  5-40 mL IntraVENous 2 times per day    furosemide  40 mg IntraVENous Daily    [START ON 10/9/2023] amiodarone  100 mg Oral Once per day on Mon Tue Wed Thu     ACE/ARB/ARNI is REQUIRED for EF </= 71% SYSTOLIC FAILURE:   ACE[de-identified] None  ARB[de-identified] None  ARNI[de-identified] None    Evidenced-Based Beta Blocker is REQUIRED for EF </= 93% SYSTOLIC FAILURE:   [de-identified] Metoprolol SUCCinate- Toprol XL    Diuretics:  [de-identified] None    Diet Reviewed: Yes   ADULT DIET; Regular; 2000 ml    Goal of Care Reviewed: Yes   Patient and/or Family's stated Goal of Care this Admission:   , reduce shortness of breath, increase activity tolerance, better understand heart failure and disease management, be more comfortable, and reduce lower extremity edema prior to discharge.
HEART FAILURE CARE PLAN:    Comorbidities Reviewed: Yes   Patient has a past medical history of Arthritis, Atrial fibrillation ([address]), Closed subchondral insufficiency fracture of femoral condyle ([address]), Hypertension, Localized osteoarthrosis not specified whether primary or secondary, pelvic region and thigh, and Neurogenic bladder. ECHOCARDIOGRAM Reviewed: Yes   Patient's Ejection Fraction (EF) is greater than 40%    Weights Reviewed: Yes   Admission weight: 147 lb (66.7 kg)   Wt Readings from Last 3 Encounters:   10/08/23 143 lb 4.8 oz (65 kg)   08/23/23 148 lb 9.6 oz (67.4 kg)   08/03/23 154 lb 1.6 oz (69.9 kg)     Intake & Output Reviewed: Yes     Intake/Output Summary (Last 24 hours) at 10/8/2023 0425  Last data filed at 10/8/2023 0411  Gross per 24 hour   Intake 710 ml   Output 2251 ml   Net -1541 ml     Medications Reviewed: Yes   SCHEDULED HOSPITAL MEDICATIONS:   ipratropium 0.5 mg-albuterol 2.5 mg  1 Dose Inhalation Q4H WA RT    amLODIPine  5 mg Oral Daily    apixaban  5 mg Oral BID    famotidine  20 mg Oral Daily    metoprolol succinate  25 mg Oral Daily    vitamin B-12  50 mcg Oral Daily    sodium chloride flush  5-40 mL IntraVENous 2 times per day    furosemide  40 mg IntraVENous Daily    [START ON 10/9/2023] amiodarone  100 mg Oral Once per day on Mon Tue Wed Thu     ACE/ARB/ARNI is REQUIRED for EF </= 20% SYSTOLIC FAILURE:   ACE[de-identified] None  ARB[de-identified] None  ARNI[de-identified] None    Evidenced-Based Beta Blocker is REQUIRED for EF </= 11% SYSTOLIC FAILURE:   [de-identified] Metoprolol SUCCinate- Toprol XL    Diuretics:  [de-identified] Furosemide    Diet Reviewed: Yes   ADULT DIET; Regular; 2000 ml    Goal of Care Reviewed: Yes   Patient and/or Family's stated Goal of Care this Admission: reduce shortness of breath, increase activity tolerance, better understand heart failure and disease management, be more comfortable, and reduce lower extremity edema prior to discharge.
HEART FAILURE CARE PLAN:    Comorbidities Reviewed: Yes   Patient has a past medical history of Arthritis, Atrial fibrillation ([address]), Closed subchondral insufficiency fracture of femoral condyle ([address]), Hypertension, Localized osteoarthrosis not specified whether primary or secondary, pelvic region and thigh, and Neurogenic bladder. ECHOCARDIOGRAM Reviewed: Yes   Patient's Ejection Fraction (EF) is greater than 40%    Weights Reviewed: Yes   Admission weight: 147 lb (66.7 kg)   Wt Readings from Last 3 Encounters:   10/09/23 144 lb 3.2 oz (65.4 kg)   08/23/23 148 lb 9.6 oz (67.4 kg)   08/03/23 154 lb 1.6 oz (69.9 kg)     Intake & Output Reviewed: Yes     Intake/Output Summary (Last 24 hours) at 10/9/2023 4485  Last data filed at 10/8/2023 2232  Gross per 24 hour   Intake 190 ml   Output 1100 ml   Net -910 ml     Medications Reviewed: Yes   SCHEDULED HOSPITAL MEDICATIONS:   torsemide  20 mg Oral Daily    apixaban  2.5 mg Oral BID    ipratropium 0.5 mg-albuterol 2.5 mg  1 Dose Inhalation Q4H WA RT    amLODIPine  5 mg Oral Daily    famotidine  20 mg Oral Daily    metoprolol succinate  25 mg Oral Daily    vitamin B-12  50 mcg Oral Daily    sodium chloride flush  5-40 mL IntraVENous 2 times per day    amiodarone  100 mg Oral Once per day on Mon Tue Wed Thu     ACE/ARB/ARNI is REQUIRED for EF </= 73% SYSTOLIC FAILURE:   ACE[de-identified] None  ARB[de-identified] None  ARNI[de-identified] None    Evidenced-Based Beta Blocker is REQUIRED for EF </= 35% SYSTOLIC FAILURE:   [de-identified] Metoprolol SUCCinate- Toprol XL    Diuretics:  [de-identified] Torsemide    Diet Reviewed: Yes   ADULT DIET; Regular; 2000 ml    Goal of Care Reviewed: Yes   Patient and/or Family's stated Goal of Care this Admission: reduce shortness of breath, increase activity tolerance, better understand heart failure and disease management, be more comfortable, and reduce lower extremity edema prior to discharge.
Problem: Discharge Planning  Goal: Discharge to home or other facility with appropriate resources  Outcome: Adequate for Discharge     Problem: Safety - Adult  Goal: Free from fall injury  Outcome: Adequate for Discharge     Problem: Chronic Conditions and Co-morbidities  Goal: Patient's chronic conditions and co-morbidity symptoms are monitored and maintained or improved  Outcome: Adequate for Discharge     Problem: Respiratory - Adult  Goal: Achieves optimal ventilation and oxygenation  Outcome: Adequate for Discharge     Problem: Skin/Tissue Integrity - Adult  Goal: Skin integrity remains intact  Outcome: Adequate for Discharge     Problem: Infection - Adult  Goal: Absence of infection at discharge  Outcome: Adequate for Discharge     Problem: Metabolic/Fluid and Electrolytes - Adult  Goal: Electrolytes maintained within normal limits  Outcome: Adequate for Discharge     Problem: Pain  Goal: Verbalizes/displays adequate comfort level or baseline comfort level  Outcome: Adequate for Discharge     Problem: Skin/Tissue Integrity  Goal: Absence of new skin breakdown  Description: 1. Monitor for areas of redness and/or skin breakdown  2. Assess vascular access sites hourly  3. Every 4-6 hours minimum:  Change oxygen saturation probe site  4. Every 4-6 hours:  If on nasal continuous positive airway pressure, respiratory therapy assess nares and determine need for appliance change or resting period.   Outcome: Adequate for Discharge     Problem: Cardiovascular - Adult  Goal: Maintains optimal cardiac output and hemodynamic stability  Outcome: Adequate for Discharge  Goal: Absence of cardiac dysrhythmias or at baseline  Outcome: Adequate for Discharge
Problem: Skin/Tissue Integrity  Goal: Absence of new skin breakdown  Description: 1. Monitor for areas of redness and/or skin breakdown  2. Assess vascular access sites hourly  3. Every 4-6 hours minimum:  Change oxygen saturation probe site  4. Every 4-6 hours:  If on nasal continuous positive airway pressure, respiratory therapy assess nares and determine need for appliance change or resting period.   Outcome: Progressing     Problem: Pain  Goal: Verbalizes/displays adequate comfort level or baseline comfort level  Outcome: Progressing  Flowsheets (Taken 10/8/2023 2027)  Verbalizes/displays adequate comfort level or baseline comfort level: Encourage patient to monitor pain and request assistance     Problem: Cardiovascular - Adult  Goal: Absence of cardiac dysrhythmias or at baseline  Outcome: Progressing  Flowsheets (Taken 10/8/2023 2027)  Absence of cardiac dysrhythmias or at baseline: Monitor cardiac rate and rhythm     Problem: Safety - Adult  Goal: Free from fall injury  Outcome: Progressing  Flowsheets (Taken 10/8/2023 2027)  Free From Fall Injury: Instruct family/caregiver on patient safety     Problem: Discharge Planning  Goal: Discharge to home or other facility with appropriate resources  Outcome: Progressing  Flowsheets (Taken 10/8/2023 2027)  Discharge to home or other facility with appropriate resources: Identify barriers to discharge with patient and caregiver
results   Administer medications as ordered

## 2023-10-10 ENCOUNTER — FOLLOWUP TELEPHONE ENCOUNTER (OUTPATIENT)
Dept: ADMINISTRATIVE | Age: 78
End: 2023-10-10

## 2023-10-10 NOTE — TELEPHONE ENCOUNTER
Heart Failure Follow-up Call:     1st Attempt on Home number at [phone]; No Answer- Left HIPAA compliant voicemail with Non-Urgent Heart Failure Resource Line number for call back. 2nd Attempt on Mobile number at [phone]; No Answer- Left HIPAA compliant voicemail with Non-Urgent Heart Failure Resource Line number for call back.     Electronically signed by CLIFF Luciano, RN  on 10/10/2023 at 3:17 PM

## 2023-10-12 ENCOUNTER — FOLLOWUP TELEPHONE ENCOUNTER (OUTPATIENT)
Dept: ADMINISTRATIVE | Age: 78
End: 2023-10-12

## 2023-10-12 NOTE — TELEPHONE ENCOUNTER
Heart Failure Follow-up Call:     3rd Attempt; No Answer- Left HIPAA compliant voicemail with Non-Urgent Heart Failure Resource Line number for call back.     Electronically signed by Bertram Tatum MSN, RN  on 10/12/2023 at 3:07 PM

## 2023-10-27 PROCEDURE — 93294 REM INTERROG EVL PM/LDLS PM: CPT | Performed by: INTERNAL MEDICINE

## 2023-10-27 PROCEDURE — 93296 REM INTERROG EVL PM/IDS: CPT | Performed by: INTERNAL MEDICINE

## 2023-10-31 LAB
ALBUMIN SERPL-MCNC: 4.6 G/DL
BASOPHILS ABSOLUTE: 0 /ΜL
BASOPHILS RELATIVE PERCENT: 1 %
BUN / CREAT RATIO: 14
BUN BLDV-MCNC: 24 MG/DL
CALCIUM SERPL-MCNC: 9.3 MG/DL
CHLORIDE BLD-SCNC: 99 MMOL/L
CO2: 22 MMOL/L
CREAT SERPL-MCNC: 1.7 MG/DL
EOSINOPHILS ABSOLUTE: 0.2 /ΜL
EOSINOPHILS RELATIVE PERCENT: 3 %
GFR SERPL CREATININE-BSD FRML MDRD: 30 ML/MIN/{1.73_M2}
GLUCOSE: 98
HCT VFR BLD CALC: 33.3 % (ref 36–46)
HEMOGLOBIN: 10.6 G/DL (ref 12–16)
LYMPHOCYTES ABSOLUTE: 1 /ΜL
LYMPHOCYTES RELATIVE PERCENT: 17 %
MCH RBC QN AUTO: 25.7 PG
MCHC RBC AUTO-ENTMCNC: 31.8 G/DL
MCV RBC AUTO: 81 FL
MONOCYTES ABSOLUTE: 0.7 /ΜL
MONOCYTES RELATIVE PERCENT: 12 %
NEUTROPHILS ABSOLUTE: 4 /ΜL
NEUTROPHILS RELATIVE PERCENT: 67 %
PDW BLD-RTO: 13.9 %
PHOSPHORUS: 3.8 MG/DL
PLATELET # BLD: 347 K/ΜL
PMV BLD AUTO: ABNORMAL FL
POTASSIUM SERPL-SCNC: 4.3 MMOL/L
RBC # BLD: 4.12 10^6/ΜL
SODIUM BLD-SCNC: 138 MMOL/L
WBC # BLD: 5.9 10^3/ML

## 2023-11-07 PROBLEM — I25.10 CORONARY ARTERY CALCIFICATION SEEN ON CAT SCAN: Status: ACTIVE | Noted: 2023-11-07

## 2023-11-07 PROBLEM — K44.9 HIATAL HERNIA: Status: ACTIVE | Noted: 2023-11-07

## 2023-11-07 PROBLEM — I51.7 CARDIOMEGALY: Status: ACTIVE | Noted: 2023-11-07

## 2023-11-07 NOTE — PROGRESS NOTES
Moderate calcification of the posterior leaflet of the mitral valve with   limited mobility. Moderate to severe mitral regurgitation. The left atrium is moderately to severely dilated. Pacer / ICD wire is visualized in the right ventricle. Moderate-to-severe tricuspid regurgitation. Systolic pulmonary artery pressure (SPAP) estimated at 64 mmHg (right atrial   pressure 3 mmHg), consistent with severe pulmonary hypertension. CTA Chest 10/6/23:  Mediastinum: The heart is enlarged. Moderate atherosclerotic calcification of a normal caliber aorta. Densely calcified mediastinal lymph nodes representing granulomatous change. No adenopathy. No acute finding of the thyroid or esophagus. There is a small hiatal hernia. Cardiac Device Check 9/18/23:      Impression and Plan:      Chronic HFpEF, restrictive filling pattern  PAF/AFl  SSS s/p PPM   Moderate-Severe MR  Moderate- Severe TR  LVH  HTN, controlled     Plan  Patient is feeling better since discharge from hospital one month ago. She still has shortness of breath with exertion. Given her risk factors for CAD, we need to rule out ischemia with broderick MPI. Additionally, given her restrictive filling pattern, LVH, HF we need to rule out amyloidosis- serum labs first. If negative, will need to order PYP scan. After this initial workup, we need to likely pursue a MADISON to further characterize and assess severity of what appears to be moderate-severe MR/TR. Discussed testing for Amyloidosis. Amyloidosis is a rare disease characterized by a buildup of abnormal amyloid deposits in the body. Amyloid deposits can build up in the heart, brain, kidneys, spleen and other parts of the body. Ordered Immunofixation, Free Light Chains and SPEP  Recommend a stress test- Lexiscan Myoview to evaluate SOB. Patient is not able to walk on a treadmill due to  increased SOB. Cardiac medications reviewed including indications and pertinent side effects.

## 2023-11-08 ENCOUNTER — OFFICE VISIT (OUTPATIENT)
Dept: CARDIOLOGY CLINIC | Age: 78
End: 2023-11-08

## 2023-11-08 VITALS
HEIGHT: 60 IN | HEART RATE: 70 BPM | OXYGEN SATURATION: 97 % | WEIGHT: 148.8 LBS | SYSTOLIC BLOOD PRESSURE: 119 MMHG | BODY MASS INDEX: 29.21 KG/M2 | DIASTOLIC BLOOD PRESSURE: 75 MMHG

## 2023-11-08 DIAGNOSIS — R06.02 SHORTNESS OF BREATH: ICD-10-CM

## 2023-11-08 DIAGNOSIS — I48.91 ATRIAL FIBRILLATION, UNSPECIFIED TYPE (HCC): Primary | ICD-10-CM

## 2023-11-08 DIAGNOSIS — N18.4 STAGE 4 CHRONIC KIDNEY DISEASE (HCC): ICD-10-CM

## 2023-11-08 DIAGNOSIS — I25.10 CORONARY ARTERY CALCIFICATION SEEN ON CAT SCAN: ICD-10-CM

## 2023-11-08 DIAGNOSIS — K44.9 HIATAL HERNIA: ICD-10-CM

## 2023-11-08 DIAGNOSIS — I51.7 LVH (LEFT VENTRICULAR HYPERTROPHY): ICD-10-CM

## 2023-11-08 DIAGNOSIS — I51.7 CARDIOMEGALY: ICD-10-CM

## 2023-11-08 DIAGNOSIS — K21.9 GASTROESOPHAGEAL REFLUX DISEASE WITHOUT ESOPHAGITIS: ICD-10-CM

## 2023-11-08 DIAGNOSIS — E66.9 OBESITY, UNSPECIFIED CLASSIFICATION, UNSPECIFIED OBESITY TYPE, UNSPECIFIED WHETHER SERIOUS COMORBIDITY PRESENT: ICD-10-CM

## 2023-11-08 NOTE — PATIENT INSTRUCTIONS
Plan  Discussed testing for Amyloidosis. Amyloidosis is a rare disease characterized by a buildup of abnormal amyloid deposits in the body. Amyloid deposits can build up in the heart, brain, kidneys, spleen and other parts of the body. Ordered Immunofixation, Free Lite Chains and SPEP  Recommend a stress test- Lexiscan Myoview to evaluate SOB. Patient is not able to walk on a treadmill due to  increased SOB. Cardiac medications reviewed including indications and pertinent side effects. Medication list updated at this visit. Continue to monitor your weight daily. If you have an increase in weight of 3 lbs or more from your dry weight over 144 lbs, take an additional Torsemide (Demadex) 20 mg. Discussed having a MADISON. Follow up after testing       Your provider has ordered testing for further evaluation. An order/prescription has been included in your paper work. To schedule outpatient testing, contact Central Scheduling by [facility] ([phone]).

## 2023-11-21 ENCOUNTER — TELEPHONE (OUTPATIENT)
Dept: CARDIOLOGY CLINIC | Age: 78
End: 2023-11-21

## 2023-11-21 DIAGNOSIS — I10 HYPERTENSION, UNSPECIFIED TYPE: ICD-10-CM

## 2023-11-21 DIAGNOSIS — I48.19 PERSISTENT ATRIAL FIBRILLATION (HCC): ICD-10-CM

## 2023-11-21 RX ORDER — AMLODIPINE BESYLATE 5 MG/1
5 TABLET ORAL DAILY
Qty: 90 TABLET | Refills: 3 | Status: CANCELLED | OUTPATIENT
Start: 2023-11-21

## 2023-11-21 RX ORDER — AMIODARONE HYDROCHLORIDE 200 MG/1
100 TABLET ORAL SEE ADMIN INSTRUCTIONS
Qty: 30 TABLET | Refills: 3 | Status: SHIPPED | OUTPATIENT
Start: 2023-11-21 | End: 2023-11-21 | Stop reason: SDUPTHER

## 2023-11-21 RX ORDER — AMIODARONE HYDROCHLORIDE 200 MG/1
100 TABLET ORAL SEE ADMIN INSTRUCTIONS
Qty: 30 TABLET | Refills: 3 | Status: SHIPPED | OUTPATIENT
Start: 2023-11-21 | End: 2024-02-19

## 2023-11-21 NOTE — TELEPHONE ENCOUNTER
Pt daughter states she was told by [facility] pharmacy that refills for amiodarone have  and new refills need to be sent in. Pt has 3 days of medication left. Daughter would like a call when medication is sent in. Please advise.     Last OV- 23 KXA  Next OV- 23 KXA

## 2023-11-21 NOTE — TELEPHONE ENCOUNTER
I spoke with the pharmacy and clarified correct dosage per KXA notes. I attempted to call the patient to relay she should be taking 100 mg Monday through Thursday. LMOV.

## 2023-11-21 NOTE — TELEPHONE ENCOUNTER
Yara Castro from pts pharmacy stated that they have 2 different directions with the Amiodarone for 0.5 tabs and 1 tab. Please advise on correct dosing.

## 2023-11-21 NOTE — TELEPHONE ENCOUNTER
Preeti from [facility] OP registration states pt is coming in tomorrow for a nuclear stress test and an order needs to be placed for it. Please advise.

## 2023-11-22 ENCOUNTER — HOSPITAL ENCOUNTER (OUTPATIENT)
Dept: NON INVASIVE DIAGNOSTICS | Age: 78
Discharge: HOME | End: 2023-11-22
Payer: MEDICARE

## 2023-11-22 ENCOUNTER — HOSPITAL ENCOUNTER (OUTPATIENT)
Age: 78
Discharge: HOME | End: 2023-11-22
Payer: MEDICARE

## 2023-11-22 ENCOUNTER — HOSPITAL ENCOUNTER (OUTPATIENT)
Dept: NUCLEAR MEDICINE | Age: 78
Discharge: HOME | End: 2023-11-22
Payer: MEDICARE

## 2023-11-22 DIAGNOSIS — R06.02 SHORTNESS OF BREATH: ICD-10-CM

## 2023-11-22 DIAGNOSIS — I51.7 LVH (LEFT VENTRICULAR HYPERTROPHY): ICD-10-CM

## 2023-11-22 DIAGNOSIS — I10 HYPERTENSION, UNSPECIFIED TYPE: Primary | ICD-10-CM

## 2023-11-22 PROCEDURE — 78452 HT MUSCLE IMAGE SPECT MULT: CPT

## 2023-11-22 PROCEDURE — 84165 PROTEIN E-PHORESIS SERUM: CPT

## 2023-11-22 PROCEDURE — A9502 TC99M TETROFOSMIN: HCPCS | Performed by: STUDENT IN AN ORGANIZED HEALTH CARE EDUCATION/TRAINING PROGRAM

## 2023-11-22 PROCEDURE — 84155 ASSAY OF PROTEIN SERUM: CPT

## 2023-11-22 PROCEDURE — 36415 COLL VENOUS BLD VENIPUNCTURE: CPT

## 2023-11-22 PROCEDURE — 3430000000 HC RX DIAGNOSTIC RADIOPHARMACEUTICAL: Performed by: STUDENT IN AN ORGANIZED HEALTH CARE EDUCATION/TRAINING PROGRAM

## 2023-11-22 PROCEDURE — 83883 ASSAY NEPHELOMETRY NOT SPEC: CPT

## 2023-11-22 PROCEDURE — 6360000002 HC RX W HCPCS: Performed by: STUDENT IN AN ORGANIZED HEALTH CARE EDUCATION/TRAINING PROGRAM

## 2023-11-22 PROCEDURE — 93017 CV STRESS TEST TRACING ONLY: CPT

## 2023-11-22 RX ORDER — AMLODIPINE BESYLATE 5 MG/1
5 TABLET ORAL DAILY
Qty: 90 TABLET | Refills: 3 | Status: SHIPPED | OUTPATIENT
Start: 2023-11-22

## 2023-11-22 RX ORDER — REGADENOSON 0.08 MG/ML
0.4 INJECTION, SOLUTION INTRAVENOUS
Status: COMPLETED | OUTPATIENT
Start: 2023-11-22 | End: 2023-11-22

## 2023-11-22 RX ADMIN — TETROFOSMIN 33.7 MILLICURIE: 1.38 INJECTION, POWDER, LYOPHILIZED, FOR SOLUTION INTRAVENOUS at 10:55

## 2023-11-22 RX ADMIN — REGADENOSON 0.4 MG: 0.08 INJECTION, SOLUTION INTRAVENOUS at 10:55

## 2023-11-22 RX ADMIN — TETROFOSMIN 11 MILLICURIE: 1.38 INJECTION, POWDER, LYOPHILIZED, FOR SOLUTION INTRAVENOUS at 09:45

## 2023-11-22 NOTE — TELEPHONE ENCOUNTER
Refill  amLODIPine (NORVASC) 5 MG tablet [   [address], [facility] - [address] [facility] [phone]

## 2023-11-22 NOTE — PROGRESS NOTES
Pt completed stress portion of cardiac stress test.   Patient complained of shortness of breath after lexiscan, resolved in recovery. Pt is discharged to nuclear department for final scan. Nuclear tech will remove PIV. Discharge instructions given to pt. Pt verbalizes understanding to discharge instructions.

## 2023-11-24 DIAGNOSIS — I99.8 ISCHEMIA: Primary | ICD-10-CM

## 2023-11-24 LAB
ALBUMIN SERPL ELPH-MCNC: 3.6 G/DL (ref 3.1–4.9)
ALPHA1 GLOB SERPL ELPH-MCNC: 0.3 G/DL (ref 0.2–0.4)
ALPHA2 GLOB SERPL ELPH-MCNC: 0.8 G/DL (ref 0.4–1.1)
B-GLOBULIN SERPL ELPH-MCNC: 1.2 G/DL (ref 0.9–1.6)
GAMMA GLOB SERPL ELPH-MCNC: 1 G/DL (ref 0.6–1.8)
KAPPA LC FREE SER-MCNC: 36.24 MG/L (ref 3.3–19.4)
KAPPA LC FREE/LAMBDA FREE SER: 1.48 {RATIO} (ref 0.26–1.65)
LAMBDA LC FREE SERPL-MCNC: 24.43 MG/L (ref 5.71–26.3)
PROT SERPL-MCNC: 6.9 G/DL (ref 6.4–8.2)
RPT COMMENT: ABNORMAL
SPE/IFE INTERPRETATION: NORMAL

## 2023-11-24 RX ORDER — ROSUVASTATIN CALCIUM 10 MG/1
10 TABLET, COATED ORAL DAILY
Qty: 90 TABLET | Refills: 3 | Status: SHIPPED | OUTPATIENT
Start: 2023-11-24

## 2023-11-24 NOTE — TELEPHONE ENCOUNTER
Spoke with the patient and relayed results message from AMP. Patient VU and would like to start the Crestor as recommended. Follow up appointment made for 02/01/23 with AMP. Medication sent to provider for sign off.

## 2023-11-24 NOTE — TELEPHONE ENCOUNTER
----- Message from Tennille Cuellar, DO sent at 11/22/2023  5:44 PM EST -----  Please call patient and let her know that there is a small sized, mild severity apical-lateral completely reversible defect consistent with a very small area of blockage. Recommendation is medical management. No urgency for heart cath unless symptoms continue or worsen. We should start a statin, if okay with patient. 10mg crestor daily. Thanks.    AMP

## 2023-12-05 ASSESSMENT — ENCOUNTER SYMPTOMS
HEMATEMESIS: 0
STRIDOR: 0
HEMATOCHEZIA: 0
LEFT EYE: 0
RIGHT EYE: 0
WHEEZING: 0

## 2023-12-06 ENCOUNTER — OFFICE VISIT (OUTPATIENT)
Dept: CARDIOLOGY CLINIC | Age: 78
End: 2023-12-06
Payer: MEDICARE

## 2023-12-06 ENCOUNTER — NURSE ONLY (OUTPATIENT)
Dept: CARDIOLOGY CLINIC | Age: 78
End: 2023-12-06
Payer: MEDICARE

## 2023-12-06 VITALS
BODY MASS INDEX: 28.47 KG/M2 | OXYGEN SATURATION: 96 % | WEIGHT: 145 LBS | HEART RATE: 74 BPM | DIASTOLIC BLOOD PRESSURE: 62 MMHG | HEIGHT: 60 IN | SYSTOLIC BLOOD PRESSURE: 126 MMHG

## 2023-12-06 DIAGNOSIS — Z95.0 MRI SAFE CARDIAC PACEMAKER IN SITU: Primary | ICD-10-CM

## 2023-12-06 DIAGNOSIS — Z79.899 ENCOUNTER FOR MONITORING AMIODARONE THERAPY: ICD-10-CM

## 2023-12-06 DIAGNOSIS — I10 ESSENTIAL HYPERTENSION: ICD-10-CM

## 2023-12-06 DIAGNOSIS — I49.5 SINUS NODE DYSFUNCTION (HCC): ICD-10-CM

## 2023-12-06 DIAGNOSIS — I48.0 PAF (PAROXYSMAL ATRIAL FIBRILLATION) (HCC): Primary | ICD-10-CM

## 2023-12-06 DIAGNOSIS — Z51.81 ENCOUNTER FOR MONITORING AMIODARONE THERAPY: ICD-10-CM

## 2023-12-06 DIAGNOSIS — R06.09 DOE (DYSPNEA ON EXERTION): ICD-10-CM

## 2023-12-06 DIAGNOSIS — I45.5 SINUS PAUSE: ICD-10-CM

## 2023-12-06 PROCEDURE — G8400 PT W/DXA NO RESULTS DOC: HCPCS | Performed by: INTERNAL MEDICINE

## 2023-12-06 PROCEDURE — 1123F ACP DISCUSS/DSCN MKR DOCD: CPT | Performed by: INTERNAL MEDICINE

## 2023-12-06 PROCEDURE — 1036F TOBACCO NON-USER: CPT | Performed by: INTERNAL MEDICINE

## 2023-12-06 PROCEDURE — 3074F SYST BP LT 130 MM HG: CPT | Performed by: INTERNAL MEDICINE

## 2023-12-06 PROCEDURE — G8427 DOCREV CUR MEDS BY ELIG CLIN: HCPCS | Performed by: INTERNAL MEDICINE

## 2023-12-06 PROCEDURE — G8484 FLU IMMUNIZE NO ADMIN: HCPCS | Performed by: INTERNAL MEDICINE

## 2023-12-06 PROCEDURE — G8417 CALC BMI ABV UP PARAM F/U: HCPCS | Performed by: INTERNAL MEDICINE

## 2023-12-06 PROCEDURE — 1090F PRES/ABSN URINE INCON ASSESS: CPT | Performed by: INTERNAL MEDICINE

## 2023-12-06 PROCEDURE — 93280 PM DEVICE PROGR EVAL DUAL: CPT | Performed by: INTERNAL MEDICINE

## 2023-12-06 PROCEDURE — 3078F DIAST BP <80 MM HG: CPT | Performed by: INTERNAL MEDICINE

## 2023-12-06 PROCEDURE — 99214 OFFICE O/P EST MOD 30 MIN: CPT | Performed by: INTERNAL MEDICINE

## 2023-12-06 NOTE — PATIENT INSTRUCTIONS
Plan:     Remote device checks every 3 months. Continue taking current cardiac medications as prescribed. Follow up in 6 months.

## 2023-12-12 ENCOUNTER — TELEPHONE (OUTPATIENT)
Dept: CARDIOLOGY CLINIC | Age: 78
End: 2023-12-12

## 2023-12-12 DIAGNOSIS — I51.7 LEFT VENTRICULAR HYPERTROPHY: Primary | ICD-10-CM

## 2023-12-12 NOTE — TELEPHONE ENCOUNTER
----- Message from Regina Jason DO sent at 12/11/2023  1:19 PM EST -----  Call patient and let her know that the amyloidosis blood work is unremarkable for amyloid. We should proceed with pyrophosphate scan which is a special scan of her chest which looks for amyloid deposits in her heart. Thanks.  AMP

## 2023-12-12 NOTE — TELEPHONE ENCOUNTER
Attempted to reach the patient to relay results message from AMP.  left with office number for patient to return office call.

## 2023-12-13 NOTE — TELEPHONE ENCOUNTER
Spoke with the patient and relayed message from AMP. Patient NASIM. Testing placed and number for central scheduling provided.

## 2024-01-25 NOTE — PROGRESS NOTES
CARDIOLOGY Follow Up      Patient Name: Rima Wyatt  Primary Care physician: Azar Hubbard MD    Reason for Referral/Chief Complaint: Rima Wyatt is a 78 y.o. patient who is returning to cardiology clinic today for three month follow up.     Chief Complaint   Patient presents with    Follow-up    Atrial Fibrillation    Hypertension    Congestive Heart Failure      History of Present Illness:     Rima Wyatt is a 78 y.o. female with a medical history notable for PAF s/p pacemaker 10/2022 (on Eliquis), HTN, Hyponatremia, CKD, GERD, CHF.    LOV 11/8/23.  She was having intermittent SOB.  She notices coughing more at night.  Her dry weight in office is between 146 lbs-148 lbs. At home she is between 142 lbs-144 lbs.     Today, she comes with her daughter Becca. Patient states she is \"not as short of breath.\" She states she will still have SOB but she can now walk to the mail box and back without difficulty. She has been monitoring her blood pressures at home and brought a log (scanned in media tab).     The patient denies chest pain, shortness of breath at rest. Denies palpitations, dizziness, near-syncope or verenice syncope. Denies paroxysmal nocturnal dyspnea, orthopnea, bendopnea, increasing lower extremity edema or weight gain.  The patient endorses highest level of activity as daily activity.      Past Medical History:   has a past medical history of Arthritis, Atrial fibrillation (HCC), Closed subchondral insufficiency fracture of femoral condyle (HCC), Hypertension, Localized osteoarthrosis not specified whether primary or secondary, pelvic region and thigh, and Neurogenic bladder.    Surgical History:   has a past surgical history that includes Hysterectomy; joint replacement (1997); eye surgery (12/14/2010); Cataract removal with implant (01/14/2011); other surgical history (Right); Total knee arthroplasty (Right, 12/06/2021); and Pacemaker insertion. Programmed Parameters:  Mode: AAIR <--> DDDR,

## 2024-01-26 PROCEDURE — 93294 REM INTERROG EVL PM/LDLS PM: CPT | Performed by: INTERNAL MEDICINE

## 2024-01-26 PROCEDURE — 93296 REM INTERROG EVL PM/IDS: CPT | Performed by: INTERNAL MEDICINE

## 2024-02-01 ENCOUNTER — OFFICE VISIT (OUTPATIENT)
Dept: CARDIOLOGY CLINIC | Age: 79
End: 2024-02-01
Payer: MEDICARE

## 2024-02-01 VITALS
HEART RATE: 69 BPM | BODY MASS INDEX: 29.02 KG/M2 | HEIGHT: 60 IN | SYSTOLIC BLOOD PRESSURE: 124 MMHG | WEIGHT: 147.8 LBS | OXYGEN SATURATION: 98 % | DIASTOLIC BLOOD PRESSURE: 60 MMHG

## 2024-02-01 DIAGNOSIS — I51.7 CARDIOMEGALY: ICD-10-CM

## 2024-02-01 DIAGNOSIS — I50.9 ACUTE CONGESTIVE HEART FAILURE, UNSPECIFIED HEART FAILURE TYPE (HCC): Primary | ICD-10-CM

## 2024-02-01 DIAGNOSIS — I25.10 CORONARY ARTERY CALCIFICATION SEEN ON CAT SCAN: ICD-10-CM

## 2024-02-01 PROCEDURE — G8484 FLU IMMUNIZE NO ADMIN: HCPCS | Performed by: STUDENT IN AN ORGANIZED HEALTH CARE EDUCATION/TRAINING PROGRAM

## 2024-02-01 PROCEDURE — G8400 PT W/DXA NO RESULTS DOC: HCPCS | Performed by: STUDENT IN AN ORGANIZED HEALTH CARE EDUCATION/TRAINING PROGRAM

## 2024-02-01 PROCEDURE — 99213 OFFICE O/P EST LOW 20 MIN: CPT | Performed by: STUDENT IN AN ORGANIZED HEALTH CARE EDUCATION/TRAINING PROGRAM

## 2024-02-01 PROCEDURE — G8417 CALC BMI ABV UP PARAM F/U: HCPCS | Performed by: STUDENT IN AN ORGANIZED HEALTH CARE EDUCATION/TRAINING PROGRAM

## 2024-02-01 PROCEDURE — 3078F DIAST BP <80 MM HG: CPT | Performed by: STUDENT IN AN ORGANIZED HEALTH CARE EDUCATION/TRAINING PROGRAM

## 2024-02-01 PROCEDURE — 1123F ACP DISCUSS/DSCN MKR DOCD: CPT | Performed by: STUDENT IN AN ORGANIZED HEALTH CARE EDUCATION/TRAINING PROGRAM

## 2024-02-01 PROCEDURE — G8427 DOCREV CUR MEDS BY ELIG CLIN: HCPCS | Performed by: STUDENT IN AN ORGANIZED HEALTH CARE EDUCATION/TRAINING PROGRAM

## 2024-02-01 PROCEDURE — 3074F SYST BP LT 130 MM HG: CPT | Performed by: STUDENT IN AN ORGANIZED HEALTH CARE EDUCATION/TRAINING PROGRAM

## 2024-02-01 PROCEDURE — 1090F PRES/ABSN URINE INCON ASSESS: CPT | Performed by: STUDENT IN AN ORGANIZED HEALTH CARE EDUCATION/TRAINING PROGRAM

## 2024-02-01 PROCEDURE — 1036F TOBACCO NON-USER: CPT | Performed by: STUDENT IN AN ORGANIZED HEALTH CARE EDUCATION/TRAINING PROGRAM

## 2024-02-01 RX ORDER — TORSEMIDE 20 MG/1
20 TABLET ORAL DAILY
Qty: 30 TABLET | Refills: 3 | Status: SHIPPED | OUTPATIENT
Start: 2024-02-01

## 2024-02-01 RX ORDER — FERROUS SULFATE 325(65) MG
325 TABLET ORAL
COMMUNITY

## 2024-02-01 RX ORDER — M-VIT,TX,IRON,MINS/CALC/FOLIC 27MG-0.4MG
1 TABLET ORAL DAILY
COMMUNITY

## 2024-02-01 NOTE — PATIENT INSTRUCTIONS
Plan  Take your Amlodipine in the evening.   Continue to take your Metoprolol during the day.   Monitor your blood pressure at home twice a day, morning and night. Recommend a monitor for your bicep. Keep a log and call our office next week. Goal 130/80 or less.   Cardiac medications reviewed including indications and pertinent side effects.  Recommend an echocardiogram in April, which is an ultrasound of your heart to evaluate heart function, structures and valves.    Ordered fasting lipid panel. We will call Azar Hubbard MD to get most recent labs from December.   We will call you with the results.    Follow up with me in 6 months      Your provider has ordered testing for further evaluation.  An order/prescription has been included in your paper work.   To schedule outpatient testing, contact Central Scheduling by calling [address] [place] [address] ([phone]).

## 2024-02-14 DIAGNOSIS — I10 HYPERTENSION, UNSPECIFIED TYPE: ICD-10-CM

## 2024-02-14 DIAGNOSIS — I48.19 PERSISTENT ATRIAL FIBRILLATION (HCC): ICD-10-CM

## 2024-02-15 RX ORDER — AMLODIPINE BESYLATE 5 MG/1
5 TABLET ORAL DAILY
Qty: 90 TABLET | Refills: 3 | OUTPATIENT
Start: 2024-02-15

## 2024-02-15 RX ORDER — METOPROLOL SUCCINATE 25 MG/1
25 TABLET, EXTENDED RELEASE ORAL DAILY
Qty: 90 TABLET | Refills: 2 | Status: SHIPPED | OUTPATIENT
Start: 2024-02-15

## 2024-03-20 ENCOUNTER — OFFICE VISIT (OUTPATIENT)
Dept: FAMILY MEDICINE CLINIC | Age: 79
End: 2024-03-20
Payer: MEDICARE

## 2024-03-20 VITALS
WEIGHT: 150 LBS | HEART RATE: 98 BPM | SYSTOLIC BLOOD PRESSURE: 104 MMHG | OXYGEN SATURATION: 98 % | DIASTOLIC BLOOD PRESSURE: 60 MMHG | BODY MASS INDEX: 29.29 KG/M2

## 2024-03-20 DIAGNOSIS — I10 PRIMARY HYPERTENSION: ICD-10-CM

## 2024-03-20 DIAGNOSIS — Z76.89 ENCOUNTER TO ESTABLISH CARE: Primary | ICD-10-CM

## 2024-03-20 DIAGNOSIS — N18.4 STAGE 4 CHRONIC KIDNEY DISEASE (HCC): ICD-10-CM

## 2024-03-20 DIAGNOSIS — E78.2 MIXED HYPERLIPIDEMIA: ICD-10-CM

## 2024-03-20 DIAGNOSIS — K21.9 GASTROESOPHAGEAL REFLUX DISEASE WITHOUT ESOPHAGITIS: ICD-10-CM

## 2024-03-20 DIAGNOSIS — I48.19 PERSISTENT ATRIAL FIBRILLATION (HCC): ICD-10-CM

## 2024-03-20 PROCEDURE — 3074F SYST BP LT 130 MM HG: CPT

## 2024-03-20 PROCEDURE — G8400 PT W/DXA NO RESULTS DOC: HCPCS

## 2024-03-20 PROCEDURE — 1090F PRES/ABSN URINE INCON ASSESS: CPT

## 2024-03-20 PROCEDURE — 3078F DIAST BP <80 MM HG: CPT

## 2024-03-20 PROCEDURE — G8484 FLU IMMUNIZE NO ADMIN: HCPCS

## 2024-03-20 PROCEDURE — 1036F TOBACCO NON-USER: CPT

## 2024-03-20 PROCEDURE — 99204 OFFICE O/P NEW MOD 45 MIN: CPT

## 2024-03-20 PROCEDURE — 1123F ACP DISCUSS/DSCN MKR DOCD: CPT

## 2024-03-20 PROCEDURE — G2211 COMPLEX E/M VISIT ADD ON: HCPCS

## 2024-03-20 PROCEDURE — G8417 CALC BMI ABV UP PARAM F/U: HCPCS

## 2024-03-20 PROCEDURE — G8427 DOCREV CUR MEDS BY ELIG CLIN: HCPCS

## 2024-03-20 SDOH — ECONOMIC STABILITY: INCOME INSECURITY: HOW HARD IS IT FOR YOU TO PAY FOR THE VERY BASICS LIKE FOOD, HOUSING, MEDICAL CARE, AND HEATING?: SOMEWHAT HARD

## 2024-03-20 SDOH — ECONOMIC STABILITY: FOOD INSECURITY: WITHIN THE PAST 12 MONTHS, THE FOOD YOU BOUGHT JUST DIDN'T LAST AND YOU DIDN'T HAVE MONEY TO GET MORE.: NEVER TRUE

## 2024-03-20 SDOH — ECONOMIC STABILITY: FOOD INSECURITY: WITHIN THE PAST 12 MONTHS, YOU WORRIED THAT YOUR FOOD WOULD RUN OUT BEFORE YOU GOT MONEY TO BUY MORE.: NEVER TRUE

## 2024-03-20 SDOH — ECONOMIC STABILITY: HOUSING INSECURITY
IN THE LAST 12 MONTHS, WAS THERE A TIME WHEN YOU DID NOT HAVE A STEADY PLACE TO SLEEP OR SLEPT IN A SHELTER (INCLUDING NOW)?: NO

## 2024-03-20 ASSESSMENT — ENCOUNTER SYMPTOMS
RESPIRATORY NEGATIVE: 1
GASTROINTESTINAL NEGATIVE: 1

## 2024-03-20 ASSESSMENT — PATIENT HEALTH QUESTIONNAIRE - PHQ9
SUM OF ALL RESPONSES TO PHQ QUESTIONS 1-9: 0
2. FEELING DOWN, DEPRESSED OR HOPELESS: NOT AT ALL
SUM OF ALL RESPONSES TO PHQ QUESTIONS 1-9: 0
1. LITTLE INTEREST OR PLEASURE IN DOING THINGS: NOT AT ALL
SUM OF ALL RESPONSES TO PHQ9 QUESTIONS 1 & 2: 0

## 2024-03-20 NOTE — PROGRESS NOTES
care  Patient seen in office today to establish care.  Will try to obtain records from prior PCP office.  Not due for repeat labs today.  Patient reports having labs recently when seen by prior PCP.  Avoid duplicate bills by ordering labs today.  Will wait fax results.    2. Persistent atrial fibrillation (HCC)  Continue following cardiology as planned.  No new symptoms at this time.  Regular rate and rhythm today in office    3. Stage 4 chronic kidney disease (HCC)  Patient not currently following nephrology at this time.  Given stage IV kidney disease I explained to the patient she would best be treated to have a nephrologist in her corner for chronic management of her kidney disease.  Will monitor routine labs periodically.  Referral provided today, see below.  - Salvador Cassidy MD, Nephrology, [facility]    4. Primary hypertension  BP stable at this time at 104/60.  Continue following cardiology as planned.  No changes to medication regimen at this time.    5. Mixed hyperlipidemia  Continue on current treatment plan at this time.    6. Gastroesophageal reflux disease without esophagitis  Controlled on current treatment plan at this time.    This document was prepared by a combination of typing and transcription through a voice recognition software.  45 min spent on pt care.   Dhaval Gutierrez, APRN - CNP

## 2024-04-15 ENCOUNTER — HOSPITAL ENCOUNTER (OUTPATIENT)
Dept: NON INVASIVE DIAGNOSTICS | Age: 79
Discharge: HOME | End: 2024-04-15
Payer: MEDICARE

## 2024-04-15 DIAGNOSIS — I50.9 ACUTE CONGESTIVE HEART FAILURE, UNSPECIFIED HEART FAILURE TYPE (HCC): ICD-10-CM

## 2024-04-15 DIAGNOSIS — I51.7 CARDIOMEGALY: ICD-10-CM

## 2024-04-15 PROCEDURE — 93306 TTE W/DOPPLER COMPLETE: CPT

## 2024-04-16 ENCOUNTER — TELEPHONE (OUTPATIENT)
Dept: CARDIOLOGY CLINIC | Age: 79
End: 2024-04-16

## 2024-04-16 NOTE — TELEPHONE ENCOUNTER
----- Message from Enrique Plaza, DO sent at 4/16/2024  8:38 AM EDT -----  Please call patient and let her know that her echo showed normal heart function. Her mitral regurgitation actually looks somewhat improved compared to her last echo, in the moderate range. No MADISON at this time. I encourage her to call the office if her symptoms worsen,shortness of breath etc. At last visit she was feeling better and by the echo that would make sense. Thanks.

## 2024-05-20 DIAGNOSIS — I50.9 ACUTE CONGESTIVE HEART FAILURE, UNSPECIFIED HEART FAILURE TYPE (HCC): ICD-10-CM

## 2024-05-20 DIAGNOSIS — I51.7 CARDIOMEGALY: ICD-10-CM

## 2024-05-20 RX ORDER — TORSEMIDE 20 MG/1
20 TABLET ORAL DAILY
Qty: 30 TABLET | Refills: 3 | Status: SHIPPED | OUTPATIENT
Start: 2024-05-20

## 2024-06-04 ASSESSMENT — ENCOUNTER SYMPTOMS
STRIDOR: 0
HEMATEMESIS: 0
WHEEZING: 0
HEMATOCHEZIA: 0
LEFT EYE: 0
RIGHT EYE: 0

## 2024-06-04 NOTE — PROGRESS NOTES
pulmonary artery pressure (SPAP) is estimated at 48 mmHg c/w mild   pulmonary hypertension (Right atrial pressure of 3 mmHg).    Echocardiogram  (Date: 10/07/2023)  Summary   Left ventricular systolic function is hyperdynamic with ejection fraction   estimated at 60-65 %. Mild hypertrophy of the remaining left ventricular   wall segments. Left ventricle size is normal.   Grade III diastolic dysfunction with elevated filling pressure.      Moderate calcification of the posterior leaflet of the mitral valve with   limited mobility.      Moderate to severe mitral regurgitation.      The left atrium is moderately to severely dilated.      Pacer / ICD wire is visualized in the right ventricle.      Moderate-to-severe tricuspid regurgitation.    Echocardiogram  (Date: 10/13/2022)  Summary   Irregular rhythm noted throughout the study.   The left ventricle is normal in size with mild concentric hypertrophy.   Left ventricular systolic function is normal with a 3D calculated EF of 56%.   Septal flattening in diastole and systole (\"D-shaped septum\") consistent   with right ventricular volume and pressure overload.   Elevated left atrial pressures with a septal E/e' ratio of 30.3.   Right ventricular systolic function is reduced.   The left atrium appears severely enlarged.   The right atrium is mildly enlarged.   Mild mitral regurgitation.   Tenting and poor coaptation tricuspid valve leaflets with severe functional   tricuspid regurgitation.   Systolic pulmonary artery pressure (SPAP) is elevated and estimated at 50   mmHg (right atrial pressure 15 mmHg) consistent with moderate pulmonary   hypertension. Degree of pulmonary hypertension may be under-estimated in the   setting of severe tricuspid regurgitation.    Stress Test (Date:11/22/2023)   Summary   Small sized, mild severity apical-lateral completely reversible defect   consistent with ischemia in the territory of the distal LAD and/or LCx.   Hyperdynamic LV systolic

## 2024-06-05 ENCOUNTER — OFFICE VISIT (OUTPATIENT)
Dept: CARDIOLOGY CLINIC | Age: 79
End: 2024-06-05
Payer: MEDICARE

## 2024-06-05 ENCOUNTER — NURSE ONLY (OUTPATIENT)
Dept: CARDIOLOGY CLINIC | Age: 79
End: 2024-06-05
Payer: MEDICARE

## 2024-06-05 ENCOUNTER — HOSPITAL ENCOUNTER (OUTPATIENT)
Age: 79
Discharge: HOME | End: 2024-06-05
Payer: MEDICARE

## 2024-06-05 VITALS
HEART RATE: 77 BPM | DIASTOLIC BLOOD PRESSURE: 62 MMHG | BODY MASS INDEX: 29.61 KG/M2 | SYSTOLIC BLOOD PRESSURE: 132 MMHG | WEIGHT: 150.8 LBS | OXYGEN SATURATION: 98 % | HEIGHT: 60 IN

## 2024-06-05 DIAGNOSIS — Z79.899 ENCOUNTER FOR MONITORING AMIODARONE THERAPY: ICD-10-CM

## 2024-06-05 DIAGNOSIS — I48.19 PERSISTENT ATRIAL FIBRILLATION (HCC): ICD-10-CM

## 2024-06-05 DIAGNOSIS — I48.19 PERSISTENT ATRIAL FIBRILLATION (HCC): Primary | ICD-10-CM

## 2024-06-05 DIAGNOSIS — R00.1 BRADYCARDIA: ICD-10-CM

## 2024-06-05 DIAGNOSIS — I49.5 SINUS NODE DYSFUNCTION (HCC): ICD-10-CM

## 2024-06-05 DIAGNOSIS — Z51.81 ENCOUNTER FOR MONITORING AMIODARONE THERAPY: ICD-10-CM

## 2024-06-05 DIAGNOSIS — I10 PRIMARY HYPERTENSION: ICD-10-CM

## 2024-06-05 DIAGNOSIS — Z95.0 MRI SAFE CARDIAC PACEMAKER IN SITU: Primary | ICD-10-CM

## 2024-06-05 DIAGNOSIS — Z95.0 MRI SAFE CARDIAC PACEMAKER IN SITU: ICD-10-CM

## 2024-06-05 PROCEDURE — 1123F ACP DISCUSS/DSCN MKR DOCD: CPT | Performed by: INTERNAL MEDICINE

## 2024-06-05 PROCEDURE — 84443 ASSAY THYROID STIM HORMONE: CPT

## 2024-06-05 PROCEDURE — 93280 PM DEVICE PROGR EVAL DUAL: CPT | Performed by: INTERNAL MEDICINE

## 2024-06-05 PROCEDURE — 80053 COMPREHEN METABOLIC PANEL: CPT

## 2024-06-05 PROCEDURE — 3075F SYST BP GE 130 - 139MM HG: CPT | Performed by: INTERNAL MEDICINE

## 2024-06-05 PROCEDURE — G8427 DOCREV CUR MEDS BY ELIG CLIN: HCPCS | Performed by: INTERNAL MEDICINE

## 2024-06-05 PROCEDURE — 99214 OFFICE O/P EST MOD 30 MIN: CPT | Performed by: INTERNAL MEDICINE

## 2024-06-05 PROCEDURE — 36415 COLL VENOUS BLD VENIPUNCTURE: CPT

## 2024-06-05 PROCEDURE — G8417 CALC BMI ABV UP PARAM F/U: HCPCS | Performed by: INTERNAL MEDICINE

## 2024-06-05 PROCEDURE — 3078F DIAST BP <80 MM HG: CPT | Performed by: INTERNAL MEDICINE

## 2024-06-05 PROCEDURE — G8400 PT W/DXA NO RESULTS DOC: HCPCS | Performed by: INTERNAL MEDICINE

## 2024-06-05 PROCEDURE — 1036F TOBACCO NON-USER: CPT | Performed by: INTERNAL MEDICINE

## 2024-06-05 PROCEDURE — 85025 COMPLETE CBC W/AUTO DIFF WBC: CPT

## 2024-06-05 PROCEDURE — 1090F PRES/ABSN URINE INCON ASSESS: CPT | Performed by: INTERNAL MEDICINE

## 2024-06-05 NOTE — PATIENT INSTRUCTIONS
Plan:     Labs: TSH, CBC, and CMP.   Remote device checks every 3 months.   Continue taking current cardiac medications as prescribed.   Follow up with me in 6 months.

## 2024-06-06 LAB
ALBUMIN SERPL-MCNC: 4.1 G/DL (ref 3.4–5)
ALBUMIN/GLOB SERPL: 1.5 {RATIO} (ref 1.1–2.2)
ALP SERPL-CCNC: 79 U/L (ref 40–129)
ALT SERPL-CCNC: 14 U/L (ref 10–40)
ANION GAP SERPL CALCULATED.3IONS-SCNC: 13 MMOL/L (ref 3–16)
AST SERPL-CCNC: 19 U/L (ref 15–37)
BASOPHILS # BLD: 0 K/UL (ref 0–0.2)
BASOPHILS NFR BLD: 0.7 %
BILIRUB SERPL-MCNC: 0.4 MG/DL (ref 0–1)
BUN SERPL-MCNC: 24 MG/DL (ref 7–20)
CALCIUM SERPL-MCNC: 9.1 MG/DL (ref 8.3–10.6)
CHLORIDE SERPL-SCNC: 102 MMOL/L (ref 99–110)
CO2 SERPL-SCNC: 23 MMOL/L (ref 21–32)
CREAT SERPL-MCNC: 1.8 MG/DL (ref 0.6–1.2)
DEPRECATED RDW RBC AUTO: 13.6 % (ref 12.4–15.4)
EOSINOPHIL # BLD: 0.3 K/UL (ref 0–0.6)
EOSINOPHIL NFR BLD: 4 %
GFR SERPLBLD CREATININE-BSD FMLA CKD-EPI: 28 ML/MIN/{1.73_M2}
GLUCOSE SERPL-MCNC: 118 MG/DL (ref 70–99)
HCT VFR BLD AUTO: 34.8 % (ref 36–48)
HGB BLD-MCNC: 11.4 G/DL (ref 12–16)
LYMPHOCYTES # BLD: 1.1 K/UL (ref 1–5.1)
LYMPHOCYTES NFR BLD: 14.9 %
MCH RBC QN AUTO: 28.6 PG (ref 26–34)
MCHC RBC AUTO-ENTMCNC: 32.6 G/DL (ref 31–36)
MCV RBC AUTO: 87.6 FL (ref 80–100)
MONOCYTES # BLD: 0.5 K/UL (ref 0–1.3)
MONOCYTES NFR BLD: 6.7 %
NEUTROPHILS # BLD: 5.5 K/UL (ref 1.7–7.7)
NEUTROPHILS NFR BLD: 73.7 %
PLATELET # BLD AUTO: 279 K/UL (ref 135–450)
PMV BLD AUTO: 8.1 FL (ref 5–10.5)
POTASSIUM SERPL-SCNC: 4.2 MMOL/L (ref 3.5–5.1)
PROT SERPL-MCNC: 6.8 G/DL (ref 6.4–8.2)
RBC # BLD AUTO: 3.97 M/UL (ref 4–5.2)
SODIUM SERPL-SCNC: 138 MMOL/L (ref 136–145)
TSH SERPL DL<=0.005 MIU/L-ACNC: 3.02 UIU/ML (ref 0.27–4.2)
WBC # BLD AUTO: 7.4 K/UL (ref 4–11)

## 2024-06-07 ENCOUNTER — TELEPHONE (OUTPATIENT)
Dept: CARDIOLOGY CLINIC | Age: 79
End: 2024-06-07

## 2024-06-07 NOTE — TELEPHONE ENCOUNTER
----- Message from Ana Maria Coyne MD sent at 6/6/2024  1:55 PM EDT -----  Regarding: labs  Please let patient know that the labs we ordered for her were stable overall. No change in management. Kidney function may be a bit worse than before and she should follow up with primary care physician about that.     ----- Message -----  From: [facility] Incoming Lab Results From Soft (Epic Adt)  Sent: 6/6/2024   7:27 AM EDT  To: Ana Maria Coyne MD

## 2024-06-07 NOTE — TELEPHONE ENCOUNTER
Spoke and relayed message to pt v/u. Pt stated she is unable to afford eliquis, pt is having trouble with insurance. I told pt I would see about getting samples or if there is alternative to eliquis until pt gets insurance figured out. KXA pls advise.

## 2024-06-07 NOTE — TELEPHONE ENCOUNTER
Ana Maria Coyne MD  [facility] Ep22 minutes ago (9:43 AM)     She can inquire about cost of Xarelto 15 my once daily. And also check on Pradaxa 150 my BID    If all fails, then refer to Coumadin clinic    Thank you       LV for patient.

## 2024-06-07 NOTE — TELEPHONE ENCOUNTER
Pt returned call. Kxa message given. V/u. Pt said that she received a call from insurance that she has met the requirements. Pt is going to check with pharmacy about the eliquis and will call the office with what route she would like to try.

## 2024-07-17 ENCOUNTER — OFFICE VISIT (OUTPATIENT)
Dept: FAMILY MEDICINE CLINIC | Age: 79
End: 2024-07-17
Payer: MEDICARE

## 2024-07-17 VITALS
OXYGEN SATURATION: 91 % | DIASTOLIC BLOOD PRESSURE: 82 MMHG | HEART RATE: 90 BPM | HEIGHT: 60 IN | BODY MASS INDEX: 29.45 KG/M2 | SYSTOLIC BLOOD PRESSURE: 122 MMHG | WEIGHT: 150 LBS

## 2024-07-17 DIAGNOSIS — Z00.00 INITIAL MEDICARE ANNUAL WELLNESS VISIT: Primary | ICD-10-CM

## 2024-07-17 DIAGNOSIS — I48.19 PERSISTENT ATRIAL FIBRILLATION (HCC): ICD-10-CM

## 2024-07-17 DIAGNOSIS — E78.2 MIXED HYPERLIPIDEMIA: ICD-10-CM

## 2024-07-17 DIAGNOSIS — N18.4 STAGE 4 CHRONIC KIDNEY DISEASE (HCC): ICD-10-CM

## 2024-07-17 DIAGNOSIS — I10 PRIMARY HYPERTENSION: ICD-10-CM

## 2024-07-17 LAB
BASOPHILS # BLD: 0 K/UL (ref 0–0.2)
BASOPHILS NFR BLD: 0.4 %
DEPRECATED RDW RBC AUTO: 13.3 % (ref 12.4–15.4)
EOSINOPHIL # BLD: 0.2 K/UL (ref 0–0.6)
EOSINOPHIL NFR BLD: 1.9 %
HCT VFR BLD AUTO: 37.3 % (ref 36–48)
HGB BLD-MCNC: 12.6 G/DL (ref 12–16)
LYMPHOCYTES # BLD: 1.1 K/UL (ref 1–5.1)
LYMPHOCYTES NFR BLD: 13.2 %
MCH RBC QN AUTO: 29.6 PG (ref 26–34)
MCHC RBC AUTO-ENTMCNC: 33.7 G/DL (ref 31–36)
MCV RBC AUTO: 88 FL (ref 80–100)
MONOCYTES # BLD: 0.7 K/UL (ref 0–1.3)
MONOCYTES NFR BLD: 8.7 %
NEUTROPHILS # BLD: 6.1 K/UL (ref 1.7–7.7)
NEUTROPHILS NFR BLD: 75.8 %
PLATELET # BLD AUTO: 275 K/UL (ref 135–450)
PMV BLD AUTO: 8.2 FL (ref 5–10.5)
RBC # BLD AUTO: 4.24 M/UL (ref 4–5.2)
WBC # BLD AUTO: 8.1 K/UL (ref 4–11)

## 2024-07-17 PROCEDURE — 36415 COLL VENOUS BLD VENIPUNCTURE: CPT

## 2024-07-17 PROCEDURE — 1090F PRES/ABSN URINE INCON ASSESS: CPT

## 2024-07-17 PROCEDURE — G8400 PT W/DXA NO RESULTS DOC: HCPCS

## 2024-07-17 PROCEDURE — G0438 PPPS, INITIAL VISIT: HCPCS

## 2024-07-17 PROCEDURE — 99214 OFFICE O/P EST MOD 30 MIN: CPT

## 2024-07-17 PROCEDURE — 3079F DIAST BP 80-89 MM HG: CPT

## 2024-07-17 PROCEDURE — 1036F TOBACCO NON-USER: CPT

## 2024-07-17 PROCEDURE — 3074F SYST BP LT 130 MM HG: CPT

## 2024-07-17 PROCEDURE — 99497 ADVNCD CARE PLAN 30 MIN: CPT

## 2024-07-17 PROCEDURE — G8427 DOCREV CUR MEDS BY ELIG CLIN: HCPCS

## 2024-07-17 PROCEDURE — G8417 CALC BMI ABV UP PARAM F/U: HCPCS

## 2024-07-17 PROCEDURE — 1123F ACP DISCUSS/DSCN MKR DOCD: CPT

## 2024-07-17 ASSESSMENT — PATIENT HEALTH QUESTIONNAIRE - PHQ9
SUM OF ALL RESPONSES TO PHQ9 QUESTIONS 1 & 2: 0
SUM OF ALL RESPONSES TO PHQ QUESTIONS 1-9: 0
1. LITTLE INTEREST OR PLEASURE IN DOING THINGS: NOT AT ALL
2. FEELING DOWN, DEPRESSED OR HOPELESS: NOT AT ALL

## 2024-07-17 ASSESSMENT — LIFESTYLE VARIABLES: HOW OFTEN DO YOU HAVE A DRINK CONTAINING ALCOHOL: NEVER

## 2024-07-17 NOTE — PROGRESS NOTES
Medicare Annual Wellness Visit    Rima Wyatt is here for Medicare AWV and Hypertension    Assessment & Plan   Initial Medicare annual wellness visit  Primary hypertension  BP stable at this time.  For the time being continue on current treatment plan.  Continue following cardiology as advised.  Repeat labs today.  -     CBC with Auto Differential  -     LIPID PANEL  Mixed hyperlipidemia  Repeat labs today.  For the time being continue on current treatment plan.  -     LIPID PANEL  Persistent atrial fibrillation (HCC)  Repeat labs today.  Regular rate and rhythm today in office.  -     CBC with Auto Differential  Stage 4 chronic kidney disease (HCC)  Most recent kidney function had GFR in the 40s.  Will continue to monitor with periodic blood work.  Please sure staying well-hydrated at all times for kidney support.    Recommendations for Preventive Services Due: see orders and patient instructions/AVS.  Recommended screening schedule for the next 5-10 years is provided to the patient in written form: see Patient Instructions/AVS.     No follow-ups on file.     Subjective   The following acute and/or chronic problems were also addressed today:  Seen today for Medicare annual nurse visit/routine office visit.  Care gaps addressed today.    Hypertension/A-fib: Taking amlodipine 5 mg daily, metoprolol 25 mg daily, torsemide 20 mg daily, Eliquis 2.5 mg twice daily.    Hyperlipidemia: Taking Crestor 10 mg daily.    Lab Results   Component Value Date    WBC 7.4 06/05/2024    HGB 11.4 (L) 06/05/2024    HCT 34.8 (L) 06/05/2024    MCV 87.6 06/05/2024     06/05/2024         Lab Results   Component Value Date     06/05/2024    K 4.2 06/05/2024     06/05/2024    CO2 23 06/05/2024    BUN 24 (H) 06/05/2024    CREATININE 1.8 (H) 06/05/2024    GLUCOSE 118 (H) 06/05/2024    CALCIUM 9.1 06/05/2024    BILITOT 0.4 06/05/2024    ALKPHOS 79 06/05/2024    AST 19 06/05/2024    ALT 14 06/05/2024    LABGLOM 28 (A)

## 2024-07-18 LAB
CHOLEST SERPL-MCNC: 136 MG/DL (ref 0–199)
HDLC SERPL-MCNC: 65 MG/DL (ref 40–60)
LDLC SERPL CALC-MCNC: 48 MG/DL
TRIGL SERPL-MCNC: 113 MG/DL (ref 0–150)
VLDLC SERPL CALC-MCNC: 23 MG/DL

## 2024-07-26 PROCEDURE — 93294 REM INTERROG EVL PM/LDLS PM: CPT | Performed by: INTERNAL MEDICINE

## 2024-07-26 PROCEDURE — 93296 REM INTERROG EVL PM/IDS: CPT | Performed by: INTERNAL MEDICINE

## 2024-08-02 NOTE — PROGRESS NOTES
CARDIOLOGY OFFICE NOTE      Patient Name: Rima Wyatt  Primary Care physician: Christopher Gutierrez, IMELDA - CNP    Reason for Referral/Chief Complaint: Rima Wyatt is a 79 y.o. patient who presents today for a cardiology follow up.     Chief Complaint   Patient presents with    Follow-up    Atrial Fibrillation    Hypertension    Other     Bradycardia  Coronary artery calcification seen on CAT scan  cardiomegaly      History of Present Illness:   Rima Wyatt is a 79 y.o. female with a medical history notable for PAF s/p pacemaker 10/2022 (on Eliquis), HTN, Hyponatremia, CKD, GERD, CHF.  OV 11/8/23 She was having intermittent SOB.  She notices coughing more at night.  Her dry weight in office is between 146 lbs-148 lbs. At home she is between 142 lbs-144 lbs.   OV 2/1/24, Patient stated she is \"not as short of breath.\" She stated she will still have SOB but she can now walk to the mail box and back without difficulty.     In the interim, she had a echo 4/15/24 that demonstrated EF of 55%, moderate cLVH, grade II DD, moderate mitral annular calcification, left atrium severely dilated, moderate MR, mild-moderate TR.     Today, she is doing well. She reports she has a cough and read that a \"leaky valve can cause this\". She reports the cough is not all the time, notices it more after she drinks green tea. She reports she can do daily activities to an extend until her back pain starts up. The patient denies chest pain, dyspnea on exertion, shortness of breath at rest. Denies palpitations, dizziness, near-syncope or verenice syncope. Denies paroxysmal nocturnal dyspnea, orthopnea, bendopnea, increasing lower extremity edema or weight gain.      Past Medical History:   has a past medical history of Acute congestive heart failure, unspecified heart failure type (HCC), Acute diastolic CHF (congestive heart failure) ([facility]), Arthritis, Atrial fibrillation (HCC), Closed subchondral insufficiency fracture of femoral condyle

## 2024-08-05 DIAGNOSIS — I10 HYPERTENSION, UNSPECIFIED TYPE: ICD-10-CM

## 2024-08-06 RX ORDER — AMLODIPINE BESYLATE 5 MG/1
5 TABLET ORAL DAILY
Qty: 90 TABLET | Refills: 3 | Status: SHIPPED | OUTPATIENT
Start: 2024-08-06

## 2024-08-07 ENCOUNTER — OFFICE VISIT (OUTPATIENT)
Dept: FAMILY MEDICINE CLINIC | Age: 79
End: 2024-08-07

## 2024-08-07 VITALS
BODY MASS INDEX: 28.07 KG/M2 | HEIGHT: 60 IN | SYSTOLIC BLOOD PRESSURE: 130 MMHG | DIASTOLIC BLOOD PRESSURE: 64 MMHG | WEIGHT: 143 LBS

## 2024-08-07 DIAGNOSIS — Z95.0 MRI SAFE CARDIAC PACEMAKER IN SITU: ICD-10-CM

## 2024-08-07 DIAGNOSIS — K21.9 GASTROESOPHAGEAL REFLUX DISEASE WITHOUT ESOPHAGITIS: ICD-10-CM

## 2024-08-07 DIAGNOSIS — I50.32 CHRONIC DIASTOLIC (CONGESTIVE) HEART FAILURE (HCC): ICD-10-CM

## 2024-08-07 DIAGNOSIS — I25.10 CORONARY ARTERY CALCIFICATION SEEN ON CAT SCAN: ICD-10-CM

## 2024-08-07 DIAGNOSIS — I48.19 PERSISTENT ATRIAL FIBRILLATION (HCC): ICD-10-CM

## 2024-08-07 DIAGNOSIS — N31.9 NEUROGENIC BLADDER: ICD-10-CM

## 2024-08-07 DIAGNOSIS — M51.36 DDD (DEGENERATIVE DISC DISEASE), LUMBAR: ICD-10-CM

## 2024-08-07 DIAGNOSIS — Z76.89 ENCOUNTER TO ESTABLISH CARE: Primary | ICD-10-CM

## 2024-08-07 DIAGNOSIS — I07.1 TRICUSPID VALVE INSUFFICIENCY, UNSPECIFIED ETIOLOGY: ICD-10-CM

## 2024-08-07 DIAGNOSIS — N18.4 CKD (CHRONIC KIDNEY DISEASE) STAGE 4, GFR 15-29 ML/MIN (HCC): ICD-10-CM

## 2024-08-07 DIAGNOSIS — E55.9 VITAMIN D DEFICIENCY: ICD-10-CM

## 2024-08-07 DIAGNOSIS — I10 HYPERTENSION, UNSPECIFIED TYPE: ICD-10-CM

## 2024-08-07 DIAGNOSIS — D63.1 ANEMIA OF CHRONIC KIDNEY FAILURE, STAGE 4 (SEVERE) (HCC): ICD-10-CM

## 2024-08-07 DIAGNOSIS — N18.4 ANEMIA OF CHRONIC KIDNEY FAILURE, STAGE 4 (SEVERE) (HCC): ICD-10-CM

## 2024-08-07 PROBLEM — I50.9 ACUTE CONGESTIVE HEART FAILURE, UNSPECIFIED HEART FAILURE TYPE (HCC): Status: RESOLVED | Noted: 2023-10-06 | Resolved: 2024-08-07

## 2024-08-07 PROBLEM — I50.31 ACUTE DIASTOLIC CHF (CONGESTIVE HEART FAILURE) (HCC): Status: RESOLVED | Noted: 2023-10-07 | Resolved: 2024-08-07

## 2024-08-07 RX ORDER — FAMOTIDINE 20 MG/1
20 TABLET, FILM COATED ORAL DAILY
Qty: 60 TABLET | Refills: 3 | Status: SHIPPED | OUTPATIENT
Start: 2024-08-07

## 2024-08-07 ASSESSMENT — ENCOUNTER SYMPTOMS
BACK PAIN: 1
EYE PAIN: 0
CONSTIPATION: 0
DIARRHEA: 0
ABDOMINAL PAIN: 0
CHEST TIGHTNESS: 0
COUGH: 0
ABDOMINAL DISTENTION: 0
SHORTNESS OF BREATH: 0
COLOR CHANGE: 0
SORE THROAT: 0
EYE DISCHARGE: 0

## 2024-08-07 NOTE — PROGRESS NOTES
[facility]  Establish care visit   2024    Rima Wyatt (:  1945) is a 79 y.o. female, here to establish care.    Chief Complaint   Patient presents with    New Patient     Former pcp was Dhaval Gutierrez         ASSESSMENT/ PLAN  1. Encounter to establish care  -Patient here to establish care    2. Coronary artery calcification seen on CAT scan  -Asymptomatic at this time.  Continue to follow cardiology.  Cardiology workup as below    3. Chronic diastolic (congestive) heart failure (HCC)  -Grade 2 diastolic dysfunction noted on echocardiogram.  Likely secondary to moderate tricuspid valve regurgitation.  Continue torsemide    4. Persistent atrial fibrillation (HCC)  -Appears to be more paroxysmal atrial fibrillation rather than persistent.  Feels somewhat irregular but last EKG did show normal sinus rhythm    5. MRI safe cardiac pacemaker -[employer]tronic DC   -Pacemaker in place secondary to A-fib    6. Hypertension, unspecified type  -Stable on amlodipine, torsemide and metoprolol    7. CKD (chronic kidney disease) stage 4, GFR 15-29 ml/min ([facility])  -Following with nephrology.  Last GFR 28.  Currently on torsemide at the direction of nephrology.    8. Neurogenic bladder  -Self caths secondary to neurogenic bladder from back surgery.  -Since 2017    9. DDD (degenerative disc disease), lumbar  -Failed back surgeries x 2.  Ongoing chronic back pain  -Tylenol as needed.  Avoid NSAIDs    10. Gastroesophageal reflux disease without esophagitis  -Stable on Pepcid.  Continue  - famotidine (PEPCID) 20 MG tablet; Take 1 tablet by mouth daily  Dispense: 60 tablet; Refill: 3    11. Vitamin D deficiency  -On vitamin D supplementation.  Continue    12. Anemia of chronic kidney failure, stage 4 (severe) ([facility])  -Anemia appears stable at this time with hemoglobin of 12.0.           I have personally spent 55 minutes reviewing chart, patient education, clinical care with patient and education to patient and

## 2024-08-20 ENCOUNTER — OFFICE VISIT (OUTPATIENT)
Dept: CARDIOLOGY CLINIC | Age: 79
End: 2024-08-20
Payer: MEDICARE

## 2024-08-20 VITALS
SYSTOLIC BLOOD PRESSURE: 120 MMHG | HEIGHT: 60 IN | WEIGHT: 150.2 LBS | BODY MASS INDEX: 29.49 KG/M2 | DIASTOLIC BLOOD PRESSURE: 60 MMHG | OXYGEN SATURATION: 99 % | HEART RATE: 66 BPM

## 2024-08-20 DIAGNOSIS — I25.10 CORONARY ARTERY CALCIFICATION SEEN ON CAT SCAN: ICD-10-CM

## 2024-08-20 DIAGNOSIS — I48.19 PERSISTENT ATRIAL FIBRILLATION (HCC): ICD-10-CM

## 2024-08-20 DIAGNOSIS — I10 PRIMARY HYPERTENSION: Primary | ICD-10-CM

## 2024-08-20 DIAGNOSIS — I50.9 ACUTE CONGESTIVE HEART FAILURE, UNSPECIFIED HEART FAILURE TYPE (HCC): ICD-10-CM

## 2024-08-20 DIAGNOSIS — I51.7 CARDIOMEGALY: ICD-10-CM

## 2024-08-20 DIAGNOSIS — I47.19 NARROW COMPLEX TACHYCARDIA (HCC): ICD-10-CM

## 2024-08-20 PROCEDURE — 3074F SYST BP LT 130 MM HG: CPT | Performed by: STUDENT IN AN ORGANIZED HEALTH CARE EDUCATION/TRAINING PROGRAM

## 2024-08-20 PROCEDURE — 1123F ACP DISCUSS/DSCN MKR DOCD: CPT | Performed by: STUDENT IN AN ORGANIZED HEALTH CARE EDUCATION/TRAINING PROGRAM

## 2024-08-20 PROCEDURE — G8417 CALC BMI ABV UP PARAM F/U: HCPCS | Performed by: STUDENT IN AN ORGANIZED HEALTH CARE EDUCATION/TRAINING PROGRAM

## 2024-08-20 PROCEDURE — 1090F PRES/ABSN URINE INCON ASSESS: CPT | Performed by: STUDENT IN AN ORGANIZED HEALTH CARE EDUCATION/TRAINING PROGRAM

## 2024-08-20 PROCEDURE — G8427 DOCREV CUR MEDS BY ELIG CLIN: HCPCS | Performed by: STUDENT IN AN ORGANIZED HEALTH CARE EDUCATION/TRAINING PROGRAM

## 2024-08-20 PROCEDURE — G8400 PT W/DXA NO RESULTS DOC: HCPCS | Performed by: STUDENT IN AN ORGANIZED HEALTH CARE EDUCATION/TRAINING PROGRAM

## 2024-08-20 PROCEDURE — 3078F DIAST BP <80 MM HG: CPT | Performed by: STUDENT IN AN ORGANIZED HEALTH CARE EDUCATION/TRAINING PROGRAM

## 2024-08-20 PROCEDURE — 99214 OFFICE O/P EST MOD 30 MIN: CPT | Performed by: STUDENT IN AN ORGANIZED HEALTH CARE EDUCATION/TRAINING PROGRAM

## 2024-08-20 PROCEDURE — 1036F TOBACCO NON-USER: CPT | Performed by: STUDENT IN AN ORGANIZED HEALTH CARE EDUCATION/TRAINING PROGRAM

## 2024-08-20 NOTE — PATIENT INSTRUCTIONS
Continue taking  rosuvastain 10mg daily, metoprolol 25mg daily, torsemide 20mg daily, eliquis 2.5mg twice a day, amlodipine 5mg daily, amiodarone 200mg daily.   Start taking aspirin 81mg daily. Patient verbalizes understanding of the need for treatment and education provided at today's visit. Additional education materials will be provided in the AVS. If you notice  any evidence of hematemesis, hemoptysis, melena, hematochezia or hematuria please call the office.

## 2024-09-02 DIAGNOSIS — I50.9 ACUTE CONGESTIVE HEART FAILURE, UNSPECIFIED HEART FAILURE TYPE (HCC): ICD-10-CM

## 2024-09-02 DIAGNOSIS — I51.7 CARDIOMEGALY: ICD-10-CM

## 2024-09-03 RX ORDER — TORSEMIDE 20 MG/1
20 TABLET ORAL DAILY
Qty: 90 TABLET | Refills: 3 | Status: SHIPPED | OUTPATIENT
Start: 2024-09-03

## 2024-10-31 ENCOUNTER — OFFICE VISIT (OUTPATIENT)
Dept: FAMILY MEDICINE CLINIC | Age: 79
End: 2024-10-31

## 2024-10-31 ENCOUNTER — HOSPITAL ENCOUNTER (OUTPATIENT)
Dept: GENERAL RADIOLOGY | Age: 79
Discharge: HOME | End: 2024-10-31
Payer: MEDICARE

## 2024-10-31 ENCOUNTER — HOSPITAL ENCOUNTER (OUTPATIENT)
Age: 79
Discharge: HOME | End: 2024-10-31
Payer: MEDICARE

## 2024-10-31 VITALS
WEIGHT: 156 LBS | OXYGEN SATURATION: 98 % | SYSTOLIC BLOOD PRESSURE: 136 MMHG | DIASTOLIC BLOOD PRESSURE: 88 MMHG | BODY MASS INDEX: 30.47 KG/M2 | HEART RATE: 83 BPM

## 2024-10-31 DIAGNOSIS — R06.2 WHEEZING: ICD-10-CM

## 2024-10-31 DIAGNOSIS — R68.83 CHILLS: ICD-10-CM

## 2024-10-31 DIAGNOSIS — R06.09 DOE (DYSPNEA ON EXERTION): Primary | ICD-10-CM

## 2024-10-31 DIAGNOSIS — R06.09 DOE (DYSPNEA ON EXERTION): ICD-10-CM

## 2024-10-31 DIAGNOSIS — R06.02 SHORTNESS OF BREATH: ICD-10-CM

## 2024-10-31 LAB
ALBUMIN SERPL-MCNC: 4.6 G/DL (ref 3.4–5)
ALBUMIN/GLOB SERPL: 1.6 {RATIO} (ref 1.1–2.2)
ALP SERPL-CCNC: 95 U/L (ref 40–129)
ALT SERPL-CCNC: 15 U/L (ref 10–40)
ANION GAP SERPL CALCULATED.3IONS-SCNC: 12 MMOL/L (ref 3–16)
AST SERPL-CCNC: 22 U/L (ref 15–37)
BASOPHILS # BLD: 0 K/UL (ref 0–0.2)
BASOPHILS NFR BLD: 0.7 %
BILIRUB SERPL-MCNC: 0.6 MG/DL (ref 0–1)
BUN SERPL-MCNC: 19 MG/DL (ref 7–20)
CALCIUM SERPL-MCNC: 9.8 MG/DL (ref 8.3–10.6)
CHLORIDE SERPL-SCNC: 101 MMOL/L (ref 99–110)
CO2 SERPL-SCNC: 29 MMOL/L (ref 21–32)
CREAT SERPL-MCNC: 1.5 MG/DL (ref 0.6–1.2)
DEPRECATED RDW RBC AUTO: 14 % (ref 12.4–15.4)
EOSINOPHIL # BLD: 0.2 K/UL (ref 0–0.6)
EOSINOPHIL NFR BLD: 3.9 %
GFR SERPLBLD CREATININE-BSD FMLA CKD-EPI: 35 ML/MIN/{1.73_M2}
GLUCOSE SERPL-MCNC: 115 MG/DL (ref 70–99)
HCT VFR BLD AUTO: 36.9 % (ref 36–48)
HGB BLD-MCNC: 11.7 G/DL (ref 12–16)
LYMPHOCYTES # BLD: 0.9 K/UL (ref 1–5.1)
LYMPHOCYTES NFR BLD: 14.4 %
MCH RBC QN AUTO: 28.2 PG (ref 26–34)
MCHC RBC AUTO-ENTMCNC: 31.6 G/DL (ref 31–36)
MCV RBC AUTO: 89.2 FL (ref 80–100)
MONOCYTES # BLD: 0.5 K/UL (ref 0–1.3)
MONOCYTES NFR BLD: 8.5 %
NEUTROPHILS # BLD: 4.6 K/UL (ref 1.7–7.7)
NEUTROPHILS NFR BLD: 72.5 %
PLATELET # BLD AUTO: 293 K/UL (ref 135–450)
PMV BLD AUTO: 6.8 FL (ref 5–10.5)
POTASSIUM SERPL-SCNC: 3.7 MMOL/L (ref 3.5–5.1)
PROT SERPL-MCNC: 7.5 G/DL (ref 6.4–8.2)
RBC # BLD AUTO: 4.14 M/UL (ref 4–5.2)
SODIUM SERPL-SCNC: 142 MMOL/L (ref 136–145)
WBC # BLD AUTO: 6.3 K/UL (ref 4–11)

## 2024-10-31 PROCEDURE — 80053 COMPREHEN METABOLIC PANEL: CPT

## 2024-10-31 PROCEDURE — 71046 X-RAY EXAM CHEST 2 VIEWS: CPT

## 2024-10-31 PROCEDURE — 85025 COMPLETE CBC W/AUTO DIFF WBC: CPT

## 2024-10-31 PROCEDURE — 36415 COLL VENOUS BLD VENIPUNCTURE: CPT

## 2024-10-31 RX ORDER — AZITHROMYCIN 250 MG/1
TABLET, FILM COATED ORAL
Qty: 6 TABLET | Refills: 0 | Status: SHIPPED | OUTPATIENT
Start: 2024-10-31 | End: 2024-11-10

## 2024-10-31 RX ORDER — METHYLPREDNISOLONE 4 MG/1
TABLET ORAL
Qty: 1 KIT | Refills: 0 | Status: SHIPPED | OUTPATIENT
Start: 2024-10-31 | End: 2024-11-06

## 2024-10-31 ASSESSMENT — ENCOUNTER SYMPTOMS
GASTROINTESTINAL NEGATIVE: 1
WHEEZING: 1
CHEST TIGHTNESS: 0
SHORTNESS OF BREATH: 1
COUGH: 1

## 2024-10-31 NOTE — PROGRESS NOTES
[facility]  10/31/2024    Rima Wyatt (:  1945) is a 79 y.o. female, here for evaluation of the following medical concerns:    Chief Complaint   Patient presents with    Cough     Wheezing  Sx started saturday        ASSESSMENT/ PLAN  1. TITUS (dyspnea on exertion)  -Differential diagnosis includes exacerbation of diastolic heart failure or acute bronchitis.  Chest x-ray to rule out any pneumonia.  -Medrol Dosepak and Z-Rakesh  -Discussed when to be seen in the emergency department either tomorrow or the weekend should she worsen.  Monitor oxygen saturations at home.  Current oxygen saturation at home is greater than 94%  - methylPREDNISolone (MEDROL DOSEPACK) 4 MG tablet; Take by mouth.  Dispense: 1 kit; Refill: 0  - azithromycin (ZITHROMAX) 250 MG tablet; 500mg on day 1 followed by 250mg on days 2 - 5  Dispense: 6 tablet; Refill: 0  - XR CHEST (2 VW); Future  - CBC with Auto Differential; Future  - Comprehensive Metabolic Panel; Future    2. Shortness of breath  -See #1  - methylPREDNISolone (MEDROL DOSEPACK) 4 MG tablet; Take by mouth.  Dispense: 1 kit; Refill: 0  - azithromycin (ZITHROMAX) 250 MG tablet; 500mg on day 1 followed by 250mg on days 2 - 5  Dispense: 6 tablet; Refill: 0  - XR CHEST (2 VW); Future  - CBC with Auto Differential; Future  - Comprehensive Metabolic Panel; Future    3. Wheezing  -See #1  - XR CHEST (2 VW); Future  - CBC with Auto Differential; Future  - Comprehensive Metabolic Panel; Future    4. Chills  -See         No follow-ups on file.    HPI  Patient seen in office today for chief complaint of shortness of breath, wheezing, dyspnea with past 5 days.  She does have an extensive heart history with diastolic heart failure.  Denies fever but does report chills and fatigue.  Denies any chest pain.  She does report cough that is more of a barking cough.    ROS  Review of Systems   Constitutional:  Positive for chills and fatigue. Negative for diaphoresis and fever.

## 2024-11-02 DIAGNOSIS — I99.8 ISCHEMIA: ICD-10-CM

## 2024-11-04 RX ORDER — ROSUVASTATIN CALCIUM 10 MG/1
10 TABLET, COATED ORAL DAILY
Qty: 90 TABLET | Refills: 0 | Status: SHIPPED | OUTPATIENT
Start: 2024-11-04

## 2024-11-04 NOTE — TELEPHONE ENCOUNTER
Last Office Visit: 8/20/24 Provider: AMP  Is provider OOT? No    Next Office Visit: 2/17/25 Provider: AMP      LAST LABS:   Liver:   Lab Results   Component Value Date    ALT 15 10/31/2024    AST 22 10/31/2024    ALKPHOS 95 10/31/2024    BILITOT 0.6 10/31/2024      Lipid:   Lab Results   Component Value Date    CHOL 136 07/17/2024    TRIG 113 07/17/2024    HDL 65 (H) 07/17/2024    LDL 48 07/17/2024    VLDL 23 07/17/2024         Is encounter provider correct?   Yes  Does refill dosage match last filled?   Yes  Changes to script from Tele Encounters or LOV Plan?   no  Adjust amount or refills to reflect last and upcoming OV?  no    Requested Prescriptions     Pending Prescriptions Disp Refills    rosuvastatin (CRESTOR) 10 MG tablet [Pharmacy Med Name: ROSUVASTATIN 10MG TAB] 90 tablet 0     Sig: Take 1 tablet by mouth once daily       
H/O dilation and curettage

## 2024-11-06 DIAGNOSIS — I48.19 PERSISTENT ATRIAL FIBRILLATION (HCC): ICD-10-CM

## 2024-11-06 DIAGNOSIS — I10 HYPERTENSION, UNSPECIFIED TYPE: ICD-10-CM

## 2024-11-07 RX ORDER — AMLODIPINE BESYLATE 5 MG/1
5 TABLET ORAL DAILY
Qty: 90 TABLET | Refills: 2 | Status: SHIPPED | OUTPATIENT
Start: 2024-11-07

## 2024-11-07 RX ORDER — METOPROLOL SUCCINATE 25 MG/1
25 TABLET, EXTENDED RELEASE ORAL DAILY
Qty: 90 TABLET | Refills: 2 | Status: SHIPPED | OUTPATIENT
Start: 2024-11-07

## 2024-11-19 ENCOUNTER — OFFICE VISIT (OUTPATIENT)
Dept: FAMILY MEDICINE CLINIC | Age: 79
End: 2024-11-19

## 2024-11-19 VITALS — WEIGHT: 153 LBS | OXYGEN SATURATION: 95 % | BODY MASS INDEX: 29.88 KG/M2 | HEART RATE: 88 BPM

## 2024-11-19 DIAGNOSIS — J40 BRONCHITIS: ICD-10-CM

## 2024-11-19 DIAGNOSIS — N18.4 CKD (CHRONIC KIDNEY DISEASE) STAGE 4, GFR 15-29 ML/MIN (HCC): ICD-10-CM

## 2024-11-19 DIAGNOSIS — I10 HYPERTENSION, UNSPECIFIED TYPE: ICD-10-CM

## 2024-11-19 DIAGNOSIS — R05.1 ACUTE COUGH: ICD-10-CM

## 2024-11-19 DIAGNOSIS — I48.19 PERSISTENT ATRIAL FIBRILLATION (HCC): Primary | ICD-10-CM

## 2024-11-19 RX ORDER — BENZONATATE 100 MG/1
100 CAPSULE ORAL 3 TIMES DAILY PRN
Qty: 30 CAPSULE | Refills: 0 | Status: SHIPPED | OUTPATIENT
Start: 2024-11-19 | End: 2024-11-29

## 2024-11-19 RX ORDER — CETIRIZINE HYDROCHLORIDE 10 MG/1
10 TABLET ORAL DAILY
Qty: 30 TABLET | Refills: 0 | Status: SHIPPED | OUTPATIENT
Start: 2024-11-19

## 2024-11-19 RX ORDER — DEXTROMETHORPHAN HYDROBROMIDE AND PROMETHAZINE HYDROCHLORIDE 15; 6.25 MG/5ML; MG/5ML
5 SYRUP ORAL 4 TIMES DAILY PRN
Qty: 120 ML | Refills: 0 | Status: SHIPPED | OUTPATIENT
Start: 2024-11-19 | End: 2024-11-26

## 2024-11-19 ASSESSMENT — ENCOUNTER SYMPTOMS
CONSTIPATION: 0
SORE THROAT: 0
CHEST TIGHTNESS: 0
ABDOMINAL PAIN: 0
ABDOMINAL DISTENTION: 0
DIARRHEA: 0
BACK PAIN: 1
COLOR CHANGE: 0
EYE DISCHARGE: 0
COUGH: 1
WHEEZING: 0
EYE PAIN: 0
SHORTNESS OF BREATH: 0

## 2024-11-19 NOTE — PROGRESS NOTES
[facility]    2024    Rima Wyatt (:  1945) is a 79 y.o. female, here for follow-up    Chief Complaint   Patient presents with    Cough     Worse at night         ASSESSMENT/ PLAN  1. Persistent atrial fibrillation (HCC)  -Stable on Eliquis and amiodarone.  No issues with bleeding at this time.  Hemoglobin was 11.7 on last check  - Handicap Placard MIS; by Does not apply route Duration - 5 years  Dispense: 1 each; Refill: 0    2. Hypertension, unspecified type  -Stable on amlodipine, metoprolol and torsemide    3. CKD (chronic kidney disease) stage 4, GFR 15-29 ml/min (HCC)  -Still self cathing  -Kidney function stable.  Last GFR 35-  4. Bronchitis  -Completed Z-Rakesh and Medrol Dosepak.  Likely secondary to environmental allergies.  Add Zyrtec  -Tessalon Perles and Phenergan DM for cough  - cetirizine (ZYRTEC) 10 MG tablet; Take 1 tablet by mouth daily  Dispense: 30 tablet; Refill: 0  - promethazine-dextromethorphan (PROMETHAZINE-DM) 6.25-15 MG/5ML syrup; Take 5 mLs by mouth 4 times daily as needed for Cough  Dispense: 120 mL; Refill: 0  - benzonatate (TESSALON) 100 MG capsule; Take 1 capsule by mouth 3 times daily as needed for Cough  Dispense: 30 capsule; Refill: 0    5. Acute cough  -See #4  - cetirizine (ZYRTEC) 10 MG tablet; Take 1 tablet by mouth daily  Dispense: 30 tablet; Refill: 0  - promethazine-dextromethorphan (PROMETHAZINE-DM) 6.25-15 MG/5ML syrup; Take 5 mLs by mouth 4 times daily as needed for Cough  Dispense: 120 mL; Refill: 0  - benzonatate (TESSALON) 100 MG capsule; Take 1 capsule by mouth 3 times daily as needed for Cough  Dispense: 30 capsule; Refill: 0          Imaging:    MRI LUMBAR SPINE 2015:      HISTORY: 70-year-old with back pain and suspected spinal stenosis with bilateral lower   extremity radiculopathy down to the level of the knees left greater than right.     TECHNICAL FACTORS: Long- and short-axis fat- and water-weighted images were performed.

## 2024-12-03 ENCOUNTER — IMMUNIZATION (OUTPATIENT)
Dept: FAMILY MEDICINE CLINIC | Age: 79
End: 2024-12-03
Payer: MEDICARE

## 2024-12-03 DIAGNOSIS — Z23 NEED FOR INFLUENZA VACCINATION: Primary | ICD-10-CM

## 2024-12-03 PROCEDURE — 90653 IIV ADJUVANT VACCINE IM: CPT

## 2024-12-03 PROCEDURE — G0008 ADMIN INFLUENZA VIRUS VAC: HCPCS

## 2024-12-04 ENCOUNTER — APPOINTMENT (OUTPATIENT)
Dept: GENERAL RADIOLOGY | Age: 79
DRG: 871 | End: 2024-12-04
Payer: MEDICARE

## 2024-12-04 ENCOUNTER — HOSPITAL ENCOUNTER (INPATIENT)
Age: 79
LOS: 4 days | Discharge: HOME | DRG: 871 | End: 2024-12-09
Attending: STUDENT IN AN ORGANIZED HEALTH CARE EDUCATION/TRAINING PROGRAM | Admitting: INTERNAL MEDICINE
Payer: MEDICARE

## 2024-12-04 ENCOUNTER — APPOINTMENT (OUTPATIENT)
Dept: CT IMAGING | Age: 79
DRG: 871 | End: 2024-12-04
Payer: MEDICARE

## 2024-12-04 DIAGNOSIS — R91.1 PULMONARY NODULE: ICD-10-CM

## 2024-12-04 DIAGNOSIS — J96.01 ACUTE RESPIRATORY FAILURE WITH HYPOXIA: Primary | ICD-10-CM

## 2024-12-04 DIAGNOSIS — I50.9 ACUTE ON CHRONIC CONGESTIVE HEART FAILURE, UNSPECIFIED HEART FAILURE TYPE (HCC): ICD-10-CM

## 2024-12-04 DIAGNOSIS — I50.9 ACUTE CONGESTIVE HEART FAILURE, UNSPECIFIED HEART FAILURE TYPE (HCC): ICD-10-CM

## 2024-12-04 DIAGNOSIS — J18.9 COMMUNITY ACQUIRED PNEUMONIA, UNSPECIFIED LATERALITY: ICD-10-CM

## 2024-12-04 DIAGNOSIS — I51.7 CARDIOMEGALY: ICD-10-CM

## 2024-12-04 LAB
ALBUMIN SERPL-MCNC: 4.3 G/DL (ref 3.4–5)
ALBUMIN/GLOB SERPL: 1.6 {RATIO} (ref 1.1–2.2)
ALP SERPL-CCNC: 92 U/L (ref 40–129)
ALT SERPL-CCNC: 14 U/L (ref 10–40)
ANION GAP SERPL CALCULATED.3IONS-SCNC: 17 MMOL/L (ref 3–16)
AST SERPL-CCNC: 24 U/L (ref 15–37)
BASE EXCESS BLDV CALC-SCNC: -1.2 MMOL/L (ref -3–3)
BASOPHILS # BLD: 0.1 K/UL (ref 0–0.2)
BASOPHILS NFR BLD: 0.6 %
BILIRUB SERPL-MCNC: 1 MG/DL (ref 0–1)
BUN SERPL-MCNC: 14 MG/DL (ref 7–20)
CALCIUM SERPL-MCNC: 8.8 MG/DL (ref 8.3–10.6)
CHLORIDE SERPL-SCNC: 97 MMOL/L (ref 99–110)
CO2 BLDV-SCNC: 24 MMOL/L
CO2 SERPL-SCNC: 23 MMOL/L (ref 21–32)
COHGB MFR BLDV: 2.4 % (ref 0–1.5)
CREAT SERPL-MCNC: 1.2 MG/DL (ref 0.6–1.2)
D-DIMER QUANTITATIVE: 1.1 UG/ML FEU (ref 0–0.6)
DEPRECATED RDW RBC AUTO: 13.8 % (ref 12.4–15.4)
EOSINOPHIL # BLD: 0 K/UL (ref 0–0.6)
EOSINOPHIL NFR BLD: 0 %
FLUAV RNA RESP QL NAA+PROBE: NOT DETECTED
FLUBV RNA RESP QL NAA+PROBE: NOT DETECTED
GFR SERPLBLD CREATININE-BSD FMLA CKD-EPI: 45 ML/MIN/{1.73_M2}
GLUCOSE SERPL-MCNC: 123 MG/DL (ref 70–99)
HCO3 BLDV-SCNC: 22.6 MMOL/L (ref 23–29)
HCT VFR BLD AUTO: 33.5 % (ref 36–48)
HGB BLD-MCNC: 11 G/DL (ref 12–16)
LACTATE BLDV-SCNC: 0.9 MMOL/L (ref 0.4–1.9)
LACTATE BLDV-SCNC: 2 MMOL/L (ref 0.4–1.9)
LYMPHOCYTES # BLD: 0.5 K/UL (ref 1–5.1)
LYMPHOCYTES NFR BLD: 3.3 %
MAGNESIUM SERPL-MCNC: 1.82 MG/DL (ref 1.8–2.4)
MCH RBC QN AUTO: 28.4 PG (ref 26–34)
MCHC RBC AUTO-ENTMCNC: 32.9 G/DL (ref 31–36)
MCV RBC AUTO: 86.5 FL (ref 80–100)
METHGB MFR BLDV: 0.3 %
MONOCYTES # BLD: 1 K/UL (ref 0–1.3)
MONOCYTES NFR BLD: 6.3 %
NEUTROPHILS # BLD: 14.4 K/UL (ref 1.7–7.7)
NEUTROPHILS NFR BLD: 89.8 %
NT-PROBNP SERPL-MCNC: 6092 PG/ML (ref 0–449)
O2 THERAPY: ABNORMAL
PCO2 BLDV: 35.2 MMHG (ref 40–50)
PH BLDV: 7.43 [PH] (ref 7.35–7.45)
PLATELET # BLD AUTO: 296 K/UL (ref 135–450)
PMV BLD AUTO: 7.5 FL (ref 5–10.5)
PO2 BLDV: 53.5 MMHG (ref 25–40)
POTASSIUM SERPL-SCNC: 3.2 MMOL/L (ref 3.5–5.1)
PROT SERPL-MCNC: 7 G/DL (ref 6.4–8.2)
RBC # BLD AUTO: 3.88 M/UL (ref 4–5.2)
SAO2 % BLDV: 89 %
SARS-COV-2 RNA RESP QL NAA+PROBE: NOT DETECTED
SODIUM SERPL-SCNC: 137 MMOL/L (ref 136–145)
TROPONIN, HIGH SENSITIVITY: 23 NG/L (ref 0–14)
TROPONIN, HIGH SENSITIVITY: 24 NG/L (ref 0–14)
WBC # BLD AUTO: 16.1 K/UL (ref 4–11)

## 2024-12-04 PROCEDURE — 96368 THER/DIAG CONCURRENT INF: CPT

## 2024-12-04 PROCEDURE — 87040 BLOOD CULTURE FOR BACTERIA: CPT

## 2024-12-04 PROCEDURE — 71260 CT THORAX DX C+: CPT

## 2024-12-04 PROCEDURE — 87636 SARSCOV2 & INF A&B AMP PRB: CPT

## 2024-12-04 PROCEDURE — 6360000002 HC RX W HCPCS: Performed by: NURSE PRACTITIONER

## 2024-12-04 PROCEDURE — 6360000004 HC RX CONTRAST MEDICATION: Performed by: STUDENT IN AN ORGANIZED HEALTH CARE EDUCATION/TRAINING PROGRAM

## 2024-12-04 PROCEDURE — 6370000000 HC RX 637 (ALT 250 FOR IP): Performed by: NURSE PRACTITIONER

## 2024-12-04 PROCEDURE — 83735 ASSAY OF MAGNESIUM: CPT

## 2024-12-04 PROCEDURE — 85025 COMPLETE CBC W/AUTO DIFF WBC: CPT

## 2024-12-04 PROCEDURE — 96365 THER/PROPH/DIAG IV INF INIT: CPT

## 2024-12-04 PROCEDURE — 80053 COMPREHEN METABOLIC PANEL: CPT

## 2024-12-04 PROCEDURE — 36415 COLL VENOUS BLD VENIPUNCTURE: CPT

## 2024-12-04 PROCEDURE — 84145 PROCALCITONIN (PCT): CPT

## 2024-12-04 PROCEDURE — 71046 X-RAY EXAM CHEST 2 VIEWS: CPT

## 2024-12-04 PROCEDURE — 99285 EMERGENCY DEPT VISIT HI MDM: CPT

## 2024-12-04 PROCEDURE — 82803 BLOOD GASES ANY COMBINATION: CPT

## 2024-12-04 PROCEDURE — 83880 ASSAY OF NATRIURETIC PEPTIDE: CPT

## 2024-12-04 PROCEDURE — 93005 ELECTROCARDIOGRAM TRACING: CPT | Performed by: STUDENT IN AN ORGANIZED HEALTH CARE EDUCATION/TRAINING PROGRAM

## 2024-12-04 PROCEDURE — 85379 FIBRIN DEGRADATION QUANT: CPT

## 2024-12-04 PROCEDURE — 83605 ASSAY OF LACTIC ACID: CPT

## 2024-12-04 PROCEDURE — 84484 ASSAY OF TROPONIN QUANT: CPT

## 2024-12-04 PROCEDURE — 2580000003 HC RX 258: Performed by: NURSE PRACTITIONER

## 2024-12-04 RX ORDER — POTASSIUM CHLORIDE 7.45 MG/ML
10 INJECTION INTRAVENOUS ONCE
Status: COMPLETED | OUTPATIENT
Start: 2024-12-04 | End: 2024-12-04

## 2024-12-04 RX ORDER — IOPAMIDOL 755 MG/ML
75 INJECTION, SOLUTION INTRAVASCULAR
Status: COMPLETED | OUTPATIENT
Start: 2024-12-04 | End: 2024-12-04

## 2024-12-04 RX ORDER — AMIODARONE HYDROCHLORIDE 100 MG/1
25 TABLET ORAL DAILY
Status: ON HOLD | COMMUNITY
End: 2024-12-09 | Stop reason: HOSPADM

## 2024-12-04 RX ORDER — IPRATROPIUM BROMIDE AND ALBUTEROL SULFATE 2.5; .5 MG/3ML; MG/3ML
1 SOLUTION RESPIRATORY (INHALATION) ONCE
Status: COMPLETED | OUTPATIENT
Start: 2024-12-04 | End: 2024-12-04

## 2024-12-04 RX ADMIN — IOPAMIDOL 75 ML: 755 INJECTION, SOLUTION INTRAVENOUS at 23:46

## 2024-12-04 RX ADMIN — AZITHROMYCIN MONOHYDRATE 500 MG: 500 INJECTION, POWDER, LYOPHILIZED, FOR SOLUTION INTRAVENOUS at 22:56

## 2024-12-04 RX ADMIN — POTASSIUM CHLORIDE 10 MEQ: 7.46 INJECTION, SOLUTION INTRAVENOUS at 21:54

## 2024-12-04 RX ADMIN — IPRATROPIUM BROMIDE AND ALBUTEROL SULFATE 1 DOSE: 2.5; .5 SOLUTION RESPIRATORY (INHALATION) at 22:56

## 2024-12-04 RX ADMIN — SODIUM CHLORIDE 1000 MG: 900 INJECTION INTRAVENOUS at 22:55

## 2024-12-04 ASSESSMENT — LIFESTYLE VARIABLES
HOW MANY STANDARD DRINKS CONTAINING ALCOHOL DO YOU HAVE ON A TYPICAL DAY: PATIENT DOES NOT DRINK
HOW OFTEN DO YOU HAVE A DRINK CONTAINING ALCOHOL: NEVER

## 2024-12-04 ASSESSMENT — PAIN SCALES - GENERAL: PAINLEVEL_OUTOF10: 0

## 2024-12-04 ASSESSMENT — PAIN - FUNCTIONAL ASSESSMENT: PAIN_FUNCTIONAL_ASSESSMENT: 0-10

## 2024-12-05 PROBLEM — J18.9 PNEUMONIA DUE TO INFECTIOUS ORGANISM, UNSPECIFIED LATERALITY, UNSPECIFIED PART OF LUNG: Status: ACTIVE | Noted: 2024-12-05

## 2024-12-05 LAB
EKG ATRIAL RATE: 96 BPM
EKG DIAGNOSIS: NORMAL
EKG P AXIS: 68 DEGREES
EKG P-R INTERVAL: 204 MS
EKG Q-T INTERVAL: 382 MS
EKG QRS DURATION: 100 MS
EKG QTC CALCULATION (BAZETT): 482 MS
EKG R AXIS: 11 DEGREES
EKG T AXIS: 112 DEGREES
EKG VENTRICULAR RATE: 96 BPM
PROCALCITONIN SERPL IA-MCNC: 0.25 NG/ML (ref 0–0.15)

## 2024-12-05 PROCEDURE — 96366 THER/PROPH/DIAG IV INF ADDON: CPT

## 2024-12-05 PROCEDURE — 94761 N-INVAS EAR/PLS OXIMETRY MLT: CPT

## 2024-12-05 PROCEDURE — 87449 NOS EACH ORGANISM AG IA: CPT

## 2024-12-05 PROCEDURE — 2580000003 HC RX 258: Performed by: INTERNAL MEDICINE

## 2024-12-05 PROCEDURE — 6360000002 HC RX W HCPCS

## 2024-12-05 PROCEDURE — 6360000002 HC RX W HCPCS: Performed by: NURSE PRACTITIONER

## 2024-12-05 PROCEDURE — 93010 ELECTROCARDIOGRAM REPORT: CPT | Performed by: INTERNAL MEDICINE

## 2024-12-05 PROCEDURE — 2700000000 HC OXYGEN THERAPY PER DAY

## 2024-12-05 PROCEDURE — 6370000000 HC RX 637 (ALT 250 FOR IP): Performed by: INTERNAL MEDICINE

## 2024-12-05 PROCEDURE — 99223 1ST HOSP IP/OBS HIGH 75: CPT | Performed by: INTERNAL MEDICINE

## 2024-12-05 PROCEDURE — 96375 TX/PRO/DX INJ NEW DRUG ADDON: CPT

## 2024-12-05 PROCEDURE — 2580000003 HC RX 258

## 2024-12-05 PROCEDURE — 2060000000 HC ICU INTERMEDIATE R&B

## 2024-12-05 RX ORDER — ONDANSETRON 4 MG/1
4 TABLET, ORALLY DISINTEGRATING ORAL EVERY 8 HOURS PRN
Status: DISCONTINUED | OUTPATIENT
Start: 2024-12-05 | End: 2024-12-09 | Stop reason: HOSPADM

## 2024-12-05 RX ORDER — ACETAMINOPHEN 325 MG/1
650 TABLET ORAL EVERY 6 HOURS PRN
Status: DISCONTINUED | OUTPATIENT
Start: 2024-12-05 | End: 2024-12-09 | Stop reason: HOSPADM

## 2024-12-05 RX ORDER — METOPROLOL SUCCINATE 25 MG/1
25 TABLET, EXTENDED RELEASE ORAL DAILY
Status: DISCONTINUED | OUTPATIENT
Start: 2024-12-05 | End: 2024-12-09 | Stop reason: HOSPADM

## 2024-12-05 RX ORDER — CETIRIZINE HYDROCHLORIDE 10 MG/1
5 TABLET ORAL DAILY
Status: DISCONTINUED | OUTPATIENT
Start: 2024-12-05 | End: 2024-12-09 | Stop reason: HOSPADM

## 2024-12-05 RX ORDER — FUROSEMIDE 10 MG/ML
40 INJECTION INTRAMUSCULAR; INTRAVENOUS ONCE
Status: COMPLETED | OUTPATIENT
Start: 2024-12-05 | End: 2024-12-05

## 2024-12-05 RX ORDER — ONDANSETRON 2 MG/ML
4 INJECTION INTRAMUSCULAR; INTRAVENOUS EVERY 6 HOURS PRN
Status: DISCONTINUED | OUTPATIENT
Start: 2024-12-05 | End: 2024-12-09 | Stop reason: HOSPADM

## 2024-12-05 RX ORDER — AMLODIPINE BESYLATE 5 MG/1
5 TABLET ORAL DAILY
Status: DISCONTINUED | OUTPATIENT
Start: 2024-12-05 | End: 2024-12-09 | Stop reason: HOSPADM

## 2024-12-05 RX ORDER — ACETAMINOPHEN 650 MG/1
650 SUPPOSITORY RECTAL EVERY 6 HOURS PRN
Status: DISCONTINUED | OUTPATIENT
Start: 2024-12-05 | End: 2024-12-09 | Stop reason: HOSPADM

## 2024-12-05 RX ORDER — GUAIFENESIN/DEXTROMETHORPHAN 100-10MG/5
5 SYRUP ORAL EVERY 4 HOURS PRN
Status: DISCONTINUED | OUTPATIENT
Start: 2024-12-05 | End: 2024-12-09 | Stop reason: HOSPADM

## 2024-12-05 RX ORDER — SODIUM CHLORIDE 0.9 % (FLUSH) 0.9 %
5-40 SYRINGE (ML) INJECTION EVERY 12 HOURS SCHEDULED
Status: DISCONTINUED | OUTPATIENT
Start: 2024-12-05 | End: 2024-12-09 | Stop reason: HOSPADM

## 2024-12-05 RX ORDER — SODIUM CHLORIDE 9 MG/ML
INJECTION, SOLUTION INTRAVENOUS PRN
Status: DISCONTINUED | OUTPATIENT
Start: 2024-12-05 | End: 2024-12-09 | Stop reason: HOSPADM

## 2024-12-05 RX ORDER — M-VIT,TX,IRON,MINS/CALC/FOLIC 27MG-0.4MG
1 TABLET ORAL DAILY
Status: DISCONTINUED | OUTPATIENT
Start: 2024-12-05 | End: 2024-12-09 | Stop reason: HOSPADM

## 2024-12-05 RX ORDER — FERROUS SULFATE 325(65) MG
325 TABLET ORAL
Status: DISCONTINUED | OUTPATIENT
Start: 2024-12-05 | End: 2024-12-09 | Stop reason: HOSPADM

## 2024-12-05 RX ORDER — ROSUVASTATIN CALCIUM 10 MG/1
10 TABLET, COATED ORAL DAILY
Status: DISCONTINUED | OUTPATIENT
Start: 2024-12-05 | End: 2024-12-09 | Stop reason: HOSPADM

## 2024-12-05 RX ORDER — SODIUM CHLORIDE 0.9 % (FLUSH) 0.9 %
5-40 SYRINGE (ML) INJECTION PRN
Status: DISCONTINUED | OUTPATIENT
Start: 2024-12-05 | End: 2024-12-09 | Stop reason: HOSPADM

## 2024-12-05 RX ORDER — AMIODARONE HYDROCHLORIDE 200 MG/1
100 TABLET ORAL DAILY
Status: DISCONTINUED | OUTPATIENT
Start: 2024-12-05 | End: 2024-12-09 | Stop reason: HOSPADM

## 2024-12-05 RX ORDER — POLYETHYLENE GLYCOL 3350 17 G/17G
17 POWDER, FOR SOLUTION ORAL DAILY PRN
Status: DISCONTINUED | OUTPATIENT
Start: 2024-12-05 | End: 2024-12-09 | Stop reason: HOSPADM

## 2024-12-05 RX ORDER — UBIDECARENONE 75 MG
50 CAPSULE ORAL DAILY
Status: DISCONTINUED | OUTPATIENT
Start: 2024-12-05 | End: 2024-12-09 | Stop reason: HOSPADM

## 2024-12-05 RX ORDER — FAMOTIDINE 20 MG/1
20 TABLET, FILM COATED ORAL DAILY
Status: DISCONTINUED | OUTPATIENT
Start: 2024-12-05 | End: 2024-12-09 | Stop reason: HOSPADM

## 2024-12-05 RX ORDER — TORSEMIDE 20 MG/1
20 TABLET ORAL DAILY
Status: DISCONTINUED | OUTPATIENT
Start: 2024-12-05 | End: 2024-12-09 | Stop reason: HOSPADM

## 2024-12-05 RX ADMIN — AMIODARONE HYDROCHLORIDE 100 MG: 200 TABLET ORAL at 09:07

## 2024-12-05 RX ADMIN — APIXABAN 2.5 MG: 5 TABLET, FILM COATED ORAL at 09:06

## 2024-12-05 RX ADMIN — SODIUM CHLORIDE 1000 MG: 900 INJECTION INTRAVENOUS at 21:36

## 2024-12-05 RX ADMIN — Medication 1 TABLET: at 09:07

## 2024-12-05 RX ADMIN — APIXABAN 2.5 MG: 5 TABLET, FILM COATED ORAL at 19:56

## 2024-12-05 RX ADMIN — METOPROLOL SUCCINATE 25 MG: 25 TABLET, EXTENDED RELEASE ORAL at 09:06

## 2024-12-05 RX ADMIN — FERROUS SULFATE TAB 325 MG (65 MG ELEMENTAL FE) 325 MG: 325 (65 FE) TAB at 09:06

## 2024-12-05 RX ADMIN — AZITHROMYCIN MONOHYDRATE 500 MG: 500 INJECTION, POWDER, LYOPHILIZED, FOR SOLUTION INTRAVENOUS at 22:26

## 2024-12-05 RX ADMIN — ROSUVASTATIN CALCIUM 10 MG: 10 TABLET, FILM COATED ORAL at 09:06

## 2024-12-05 RX ADMIN — FAMOTIDINE 20 MG: 20 TABLET ORAL at 09:07

## 2024-12-05 RX ADMIN — Medication 10 ML: at 09:07

## 2024-12-05 RX ADMIN — Medication 50 MCG: at 09:06

## 2024-12-05 RX ADMIN — FUROSEMIDE 40 MG: 10 INJECTION, SOLUTION INTRAMUSCULAR; INTRAVENOUS at 01:59

## 2024-12-05 RX ADMIN — Medication 10 ML: at 19:56

## 2024-12-05 RX ADMIN — AMLODIPINE BESYLATE 5 MG: 5 TABLET ORAL at 09:07

## 2024-12-05 RX ADMIN — TORSEMIDE 20 MG: 20 TABLET ORAL at 09:05

## 2024-12-05 NOTE — PROGRESS NOTES
Brief Progress Note    Date: 12/05/24    Subjective: Patient was evaluated by nocttristenist early this AM. Current plan of care below. I have reviewed vitals, labs and orders and have briefly seen the patient.   Patient feeling much better, reports she is ready to go home. Still requiring O2 at 3.5L.    Objective:  Vitals:    12/05/24 0345 12/05/24 0400 12/05/24 0432 12/05/24 0845   BP: (!) 143/59 133/63 (!) 149/70 123/62   Pulse: 80 79 93 92   Resp:   18 18   Temp:   98.3 °F (36.8 °C) 98.6 °F (37 °C)   TempSrc:   Oral Oral   SpO2:  91% 91% 93%   Weight:   70.4 kg (155 lb 1.6 oz)    Height:   1.524 m (5')        Physical Exam:  Gen: No distress. Alert. Chronically ill-appearing.  Eyes: PERRL. No sclera icterus. No conjunctival injection.   ENT: No discharge. Pharynx clear.   Neck: No JVD.  Trachea midline.  Resp: On 3.5L O2 via NC. Intermittent dry cough. Bibasilar crackles with scattered rales. No accessory muscle use.   CV: Regular rate. Regular rhythm.+ murmur.  No rub. No edema.   Capillary Refill: Brisk,< 3 seconds   Peripheral Pulses: +2 palpable, equal bilaterally   GI: Non-tender. Non-distended.  Normal bowel sounds.  Skin: Warm and dry. No nodule on exposed extremities. No rash on exposed extremities.   M/S: No cyanosis. No joint deformity. No clubbing.   Neuro: Awake. Grossly nonfocal      Labs:  CBC:   Recent Labs     12/04/24 2049   WBC 16.1*   HGB 11.0*   HCT 33.5*   MCV 86.5        BMP:   Recent Labs     12/04/24 2049      K 3.2*   CL 97*   CO2 23   BUN 14   CREATININE 1.2     LIVER PROFILE:   Recent Labs     12/04/24 2049   AST 24   ALT 14   BILITOT 1.0   ALKPHOS 92     PT/INR: No results for input(s): \"PROTIME\", \"INR\" in the last 72 hours.  APTT: No results for input(s): \"APTT\" in the last 72 hours.  UA:No results for input(s): \"NITRITE\", \"COLORU\", \"PHUR\", \"LABCAST\", \"WBCUA\", \"RBCUA\", \"MUCUS\", \"TRICHOMONAS\", \"YEAST\", \"BACTERIA\", \"CLARITYU\", \"SPECGRAV\", \"LEUKOCYTESUR\", \"UROBILINOGEN\",  \"BILIRUBINUR\", \"BLOODU\", \"GLUCOSEU\", \"AMORPHOUS\" in the last 72 hours.    Invalid input(s): \"KETONESU\"       Assessment & Plan:  Acute hypoxic respiratory failure.  Pneumonia.  -currently requiring 4L O2, none at baseline.  -CT chest showing patchy GGO in LLL and lingula, likely alveolar edema or pneumonia.  -procal elevated at 0.25.  -sputum culture, strep/legionella urine - pending.  -received IV azithromycin and ceftriaxone in ED, continue.  -continuous pulse ox monitoring to maintain SPO2 92% or greater, wean as tolerated.  -pulm consulted.    Chronic diastolic CHF.  -last echo 4/2024 EF 55%, grade II DD, mild to moderate tricuspid regurgitation.   -CT showing mild cardiomegaly with small b/l pleural effusions and interstitial edema.  -BNP 6092.  -s/p 40mg IV lasix in ED.  -does not appear overtly fluid overloaded, will continue home Demadex.  -I/Os, daily weights.     Sepsis criteria.  -likely in the setting of pneumonia.  -WBC 16.1, +RR, initial lactic 2.0, repeat 0.9.  -blood cultures pending.  -ABX per above.    Hypokalemia.  -K 3.2.  -PRN replacement protocol.    Incidental lung nodule.  -CT showing 6mm nodule in left apex. New from 10/6/2023.  -outpatient follow up for surveillance.     Persistent atrial fibrillation.  -continue anticoagulation with home Eliquis.  -continue home amiodarone and metoprolol.    Hypertension.  -continue home metoprolol, amlodipine.     Hyperlipidemia.   -continue statin.     CKD stage IV.  -Cr 1.2, baseline 1.1-1.2.  -avoid nephrotoxic agents.  -renally dose medications as able.    CAD.  -abnormal Lupe MPI 11/2023.  -continue statin and beta blocker.    SSS.  -s/p pacemaker 10/2022.    GERD.  -continue Pepcid.      DVT Prophylaxis: Eliquis.  Diet: ADULT DIET; Regular   Code Status: Full Code     Verónica Hope PA-C   12/5/2024  9:54 AM

## 2024-12-05 NOTE — ED PROVIDER NOTES
[facility] PCU TELEMETRY  EMERGENCY DEPARTMENT ENCOUNTER        Pt Name: Rima Wyatt  MRN: [id number]  Birthdate 1945  Date of evaluation: 12/4/2024  Provider: IMELDA Baeza CNP  PCP: Christopher Gutierrez APRN - CNP  Note Started: 8:50 PM EST 12/4/24       I have seen and evaluated this patient with my supervising physician Anabell Forbes DO.      CHIEF COMPLAINT       Chief Complaint   Patient presents with    Shortness of Breath     Pt. Reports SOB since last night. 79% in triage on RA       HISTORY OF PRESENT ILLNESS: 1 or more Elements     History From: Patient     Limitations to history : None    Social Determinants Significantly Affecting Health : None    Chief Complaint: Shortness of breath     Rima Wyatt is a 79 y.o. female who presents to the emergency department today with symptoms of shortness of breath.  Patient states that she started with symptoms of shortness of breath and cough over the last couple days.  She does not have supplemental oxygen at home and they had concerns as her oxygen saturation was approximately 80% at home.  Patient arrived via private vehicle to the emergency department.  Patient has concerns for pneumonia.    Nursing Notes were all reviewed and agreed with or any disagreements were addressed in the HPI.    REVIEW OF SYSTEMS :      Review of Systems    Positives and Pertinent negatives as per HPI.     SURGICAL HISTORY     Past Surgical History:   Procedure Laterality Date    BACK SURGERY      CATARACT REMOVAL WITH IMPLANT  01/14/2011    LEFT EYE    EYE SURGERY  12/14/2010    cataract rt eye    HYSTERECTOMY (CERVIX STATUS UNKNOWN)      HYSTERECTOMY, TOTAL ABDOMINAL (CERVIX REMOVED)      JOINT REPLACEMENT Bilateral 1997    left and right hip    OTHER SURGICAL HISTORY Right     RIGHT TOTAL KNEE REPLACEMENT     PACEMAKER INSERTION      October 2022    TOTAL KNEE ARTHROPLASTY Right 12/06/2021    RIGHT TOTAL KNEE REPLACEMENT performed by Wilbert Ball MD at [facility] OR  Hydrochlorothiazide    FAMILYHISTORY       Family History   Problem Relation Age of Onset    Arthritis Mother     Diabetes Mother     High Blood Pressure Mother     Diabetes Sister     High Blood Pressure Sister     Diabetes Brother     High Blood Pressure Brother     Diabetes Sister         SOCIAL HISTORY       Social History     Tobacco Use    Smoking status: Never    Smokeless tobacco: Never   Vaping Use    Vaping status: Never Used   Substance Use Topics    Alcohol use: No    Drug use: No       SCREENINGS   NIH Stroke Scale  NIH Stroke Scale Assessed: No      Weld Coma Scale  Eye Opening: Spontaneous  Best Verbal Response: Oriented  Best Motor Response: Obeys commands  Weld Coma Scale Score: 15              CIWA Assessment  BP: 132/60  Pulse: 99             PHYSICAL EXAM  1 or more Elements     ED Triage Vitals [12/04/24 2033]   BP Systolic BP Percentile Diastolic BP Percentile Temp Temp Source Pulse Respirations SpO2   (!) 160/126 -- -- 99.2 °F (37.3 °C) Oral 97 30 (!) 79 %      Height Weight - Scale         1.524 m (5') 68 kg (150 lb)             Physical Exam  Vitals and nursing note reviewed.   Constitutional:       Appearance: Normal appearance. She is not toxic-appearing or diaphoretic.   HENT:      Head: Normocephalic and atraumatic.      Nose: Nose normal.   Eyes:      General:         Right eye: No discharge.         Left eye: No discharge.   Cardiovascular:      Rate and Rhythm: Normal rate and regular rhythm.      Heart sounds: No murmur heard.  Pulmonary:      Effort: Tachypnea and respiratory distress present.      Breath sounds: Examination of the right-lower field reveals decreased breath sounds. Examination of the left-lower field reveals decreased breath sounds. Decreased breath sounds and rhonchi present.   Chest:      Chest wall: No tenderness or edema.   Abdominal:      Palpations: Abdomen is soft.      Tenderness: There is no abdominal tenderness.   Musculoskeletal:         General:

## 2024-12-05 NOTE — ED NOTES
Pt states she has to straight cath herself due to complications of previous back surgery in 2016. Straight cath performed (200mL out).

## 2024-12-05 NOTE — ED NOTES
Rima Wyatt is a 79 y.o. female admitted for  Active Problems:    * No active hospital problems. *  Resolved Problems:    * No resolved hospital problems. *  .   Patient Home via family with   Chief Complaint   Patient presents with    Shortness of Breath     Pt. Reports SOB since last night. 79% in triage on RA   .  Patient is alert and Person, Place, Time, and Situation  Patient's baseline mobility: Baseline Mobility: Independent   Code Status: Prior   Cardiac Rhythm:    O2 Flow Rate (L/min): 4 L/min  Is patient on baseline Oxygen: no how many Liters:   Abnormal Assessment Findings: New onset O2 requirement     Isolation: None      NIH Score:    C-SSRS: Risk of Suicide: No Risk  Bedside swallow:        Active LDA's:   Peripheral IV 12/04/24 Right Antecubital (Active)       Peripheral IV 12/04/24 Left Antecubital (Active)   Site Assessment Clean, dry & intact 12/04/24 2106   Line Status Blood return noted;Flushed;Specimen collected 12/04/24 2106   Line Care Connections checked and tightened 12/04/24 2106   Phlebitis Assessment No symptoms 12/04/24 2106   Infiltration Assessment 0 12/04/24 2106   Alcohol Cap Used No 12/04/24 2106   Dressing Status Clean, dry & intact;New dressing applied 12/04/24 2106   Dressing Type Transparent 12/04/24 2106   Dressing Intervention New 12/04/24 2106     Patient admitted with a tobias: no If the tobias is chronic was it exchanged:No  Reason for tobias:           Patient admitted with Central Line:  . PICC line placement confirmed: YES OR NO:[id number]}   Reason for Central line:   Was central line Inserted from an outside facility:        Family/Caregiver Present no Any Concerns: no   Restraints no  Sitter no         Vitals: MEWS Score: 3    Vitals:    12/04/24 2323 12/05/24 0008 12/05/24 0035 12/05/24 0103   BP: 138/89 (!) 142/61 (!) 130/58 133/67   Pulse: 81 83 80 77   Resp:       Temp:       TempSrc:       SpO2: 90% 93% 93% 96%   Weight:       Height:           Last documented pain  ED PT Care Timeline found under the Summary Tab, ED Encounter Summary, Timeline Reports, ED Patient Care Timeline.     Recommendation    Pending orders/Uncompleted orders to hand off:   See orders     Additional Comments: NA  If any further questions, please call Sending RN at Anabell in the ED

## 2024-12-05 NOTE — ED NOTES
Called lab about lactic being in process still. Lab states that is was in the fridge and that they are running it now

## 2024-12-05 NOTE — ED PROVIDER NOTES
Emergency Department Attending Provider Note  Location: [facility] ED      Rima Wyatt was evaluated in the Emergency Department. Although initial history and physical exam information was obtained by IMELDA Baeza (who also dictated a record of this visit), I personally saw the patient and made/approved the management plan and take responsibility for the patient management.     Patient seen and evaluated.  Relevant records reviewed. Chronic medical conditions reviewed.    HPI: Patient is a 79-year-old female who presented to the ED for evaluation of shortness of breath.  Patient has no significant pulmonary PMH and is not on home oxygen.  Per report patient's home oxygen saturation was 80% on room air.  Symptoms have been going on for about 3 to 4 days with cough and congestion, concern for pneumonia.     Physical Exam:   Constitutional: Alert, awake, appears unwell but nontoxic and in no acute distress.  Heart: Regular rate    Lungs: Tachypnea, however no conversational dyspnea at time of my exam.  Neurologic: Mental status: Alert and oriented to person, place and time.  Attention, recent recall and speech normal.  Psychiatric: Cooperative, appropriate thought content and judgement.       MDM: In brief this is a 79-year-old female who presented for cough, shortness of breath with hypoxia at home on room air.  SpO2 improved with supplemental oxygen via nasal cannula and DuoNeb.  Clinical concern for pneumonia as well as volume overload. D-dimer elevated so a CT PE was obtained with no PE noted, did note concern for both fluid overload and pneumonia as well as a nodule in the left apex.  Patient received Lasix, antibiotics and will be admitted for further management.         I independently interpreted the following diagnostic test and studies EKG which show findings as below    ED Course as of 12/05/24 2217   Wed Dec 04, 2024   2041 EKG 12 Lead  Twelve-lead EKG interpreted without the  benefit of Cardiologic assessment shows normal sinus rhythm 96 bpm.  Normal OH, QRS, QT corrected.  Nonspecific ST-T changes no acute ST elevation no old EKG available for comparison [BG]      ED Course User Index  [BG] Calvin Mccoy MD         DISPOSITION Admitted 12/05/2024 02:31:15 AM               FINAL IMPRESSION      1. Acute respiratory failure with hypoxia    2. Community acquired pneumonia, unspecified laterality    3. Acute on chronic congestive heart failure, unspecified heart failure type (HCC)    4. Pulmonary nodule           This chart was generated in part by using Dragon Dictation system and may contain errors related to that system including errors in grammar, punctuation, and spelling, as well as words and phrases that may be inappropriate. If there are any questions or concerns please feel free to contact the dictating provider for clarification.                    Meir Truong MD  12/05/24 1458

## 2024-12-05 NOTE — ED NOTES
Bedside commode set-up per pt's request. Pt verbalized understanding that she is to call nurse before getting up to use commode for stand-by-assist.

## 2024-12-05 NOTE — PROGRESS NOTES
Patient admitted to room 314 from ED. Patient oriented to room, call light, bed rails, phone, lights and bathroom. Patient instructed about the schedule of the day including: vital sign frequency, lab draws, possible tests, frequency of MD and staff rounds, daily weights, I &O's and prescribed diet.  Telemetry box in place, patient aware of placement and reason. Bed locked, in lowest position, side rails up 2/4, call light within reach.        Recliner Assessment  Patient is able to demonstrate the ability to move from a reclining position to an upright position within the recliner.       4 Eyes Skin Assessment     NAME:  Riam Wyatt  YOB: 1945  MEDICAL RECORD NUMBER:  [id number]    The patient is being assessed for  Admission    I agree that at least one RN has performed a thorough Head to Toe Skin Assessment on the patient. ALL assessment sites listed below have been assessed.      Areas assessed by both nurses:    Head, Face, Ears, Shoulders, Back, Chest, Arms, Elbows, Hands, Sacrum. Buttock, Coccyx, Ischium, and Legs. Feet and Heels        Does the Patient have a Wound? No noted wound(s)       José Miguel Prevention initiated by RN: No  Wound Care Orders initiated by RN: No    Pressure Injury (Stage 3,4, Unstageable, DTI, NWPT, and Complex wounds) if present, place Wound referral order by RN under : No    New Ostomies, if present place, Ostomy referral order under : No     Nurse 1 eSignature: Electronically signed by Dede Beckford RN on 12/5/24 at 5:42 AM EST    **SHARE this note so that the co-signing nurse can place an eSignature**    Nurse 2 eSignature: Electronically signed by Ba Syed RN on 12/5/24 at 5:43 AM EST     Unique Flap 2 Text: Given the location of the defect and the proximity to the lower eyelid a laterally and inferiorly-based rotation flap was deemed most appropriate to minimize the possibility of ectropion.  Using a sterile surgical marker, a rotation flap was drawn from the superolateral aspect of the defect laterally and superiorly, with the Burow's triangle placed within a prominent crow's foot line.  The area thus outlined was incised with a #15 scalpel blade and elevated just above the orbicularis oculi muscle.  The skin margins were undermined in the subcutaneous plane to an appropriate distance in all directions utilizing iris scissors.  The SCD at the rotation point inferiorly was marked and excised.

## 2024-12-05 NOTE — CONSULTS
[facility]                                 Pulmonary/Critical care  Consult /Progress Note :                                                                  CC :SOB   Patient is being seen at the request of Dr Ordonez  for a consultation for hypoxia     HISTORY OF PRESENT ILLNESS:   Patient is a 79-year-old female who never smoked in the past, she stated for the last few weeks she has been having worsening shortness of breath and cough with throat congestion and cold symptoms    She presented to the hospital with worsening shortness of breath and she received Lasix which made her feel better and she was placed on 4 L and in the weaning process    He also has some dyspnea on exertion with no leg swelling    She has mild cough as well with no hemoptysis      PAST MEDICAL HISTORY:  Past Medical History:   Diagnosis Date    Acute congestive heart failure, unspecified heart failure type ([facility]) 10/06/2023    Acute diastolic CHF (congestive heart failure) ([facility]) 10/07/2023    Arthritis     Atrial fibrillation ([facility])     Closed subchondral insufficiency fracture of femoral condyle ([facility]) 08/12/2020    Hypertension     Localized osteoarthrosis not specified whether primary or secondary, pelvic region and thigh 05/08/2015    Neurogenic bladder     caused by a back surgery in 2017 per pt     PAST SURGICAL HISTORY:  Past Surgical History:   Procedure Laterality Date    BACK SURGERY      CATARACT REMOVAL WITH IMPLANT  01/14/2011    LEFT EYE    EYE SURGERY  12/14/2010    cataract rt eye    HYSTERECTOMY (CERVIX STATUS UNKNOWN)      HYSTERECTOMY, TOTAL ABDOMINAL (CERVIX REMOVED)      JOINT REPLACEMENT Bilateral 1997    left and right hip    OTHER SURGICAL HISTORY Right     RIGHT TOTAL KNEE REPLACEMENT     PACEMAKER INSERTION      October 2022    TOTAL KNEE ARTHROPLASTY Right 12/06/2021    RIGHT TOTAL KNEE REPLACEMENT performed by Wilbert Ball,  MD at [facility] [place]       FAMILY HISTORY:  family history includes Arthritis in her mother; Diabetes in her brother, mother, sister, and sister; High Blood Pressure in her brother, mother, and sister.    SOCIAL HISTORY:   reports that she has never smoked. She has never used smokeless tobacco.    Scheduled Meds:   amiodarone  100 mg Oral Daily    amLODIPine  5 mg Oral Daily    apixaban  2.5 mg Oral BID    cetirizine  5 mg Oral Daily    famotidine  20 mg Oral Daily    metoprolol succinate  25 mg Oral Daily    ferrous sulfate  325 mg Oral Daily with breakfast    rosuvastatin  10 mg Oral Daily    torsemide  20 mg Oral Daily    vitamin B-12  50 mcg Oral Daily    therapeutic multivitamin-minerals  1 tablet Oral Daily    sodium chloride flush  5-40 mL IntraVENous 2 times per day    azithromycin  500 mg IntraVENous Q24H    cefTRIAXone (ROCEPHIN) IV  1,000 mg IntraVENous Q24H     Continuous Infusions:   sodium chloride       PRN Meds:  sodium chloride flush, sodium chloride, ondansetron **OR** ondansetron, polyethylene glycol, acetaminophen **OR** acetaminophen, guaiFENesin-dextromethorphan    ALLERGIES:  Patient is allergic to prednisone and hydrochlorothiazide.    REVIEW OF SYSTEMS:  Constitutional: Negative for fever  HENT: Negative for sore throat  Eyes: Negative for redness   Respiratory: dyspnea, cough  Cardiovascular: Negative for chest pain  Gastrointestinal: Negative for vomiting, diarrhea   Genitourinary: Negative for hematuria   Musculoskeletal: Negative for arthralgias   Skin: Negative for rash  Neurological: Negative for syncope  Hematological: Negative for adenopathy  Psychiatric/Behavorial: Negative for anxiety    PHYSICAL EXAM:  Vitals:    12/05/24 0845   BP: 123/62   Pulse: 92   Resp: 18   Temp: 98.6 °F (37 °C)   SpO2: 93%     Gen: No distress.   Eyes: PERRL. No sclera icterus. No conjunctival injection.   ENT: No discharge. Pharynx clear.   Neck: Trachea midline. No obvious mass.    Resp: rales  CV: Regular

## 2024-12-05 NOTE — H&P
no history of drug use.    Family History:      Reviewed in detail positive as follows:        Problem Relation Age of Onset    Arthritis Mother     Diabetes Mother     High Blood Pressure Mother     Diabetes Sister     High Blood Pressure Sister     Diabetes Brother     High Blood Pressure Brother     Diabetes Sister        REVIEW OF SYSTEMS:   Pertinent positives as noted in the HPI. All other systems reviewed and negative.    PHYSICAL EXAM PERFORMED:    BP (!) 149/70   Pulse 93   Temp 98.3 °F (36.8 °C) (Oral)   Resp 18   Ht 1.524 m (5')   Wt 70.4 kg (155 lb 1.6 oz)   SpO2 91%   BMI 30.29 kg/m²     General appearance:  Awake, alert, no apparent distress  HEENT:  Normocephalic, atraumatic   Neck: Supple, with full range of motion. No JVD. Trachea midline.  Respiratory:  Clear to auscultation bilaterally   Cardiovascular:  Regular rate and rhythm   Abdomen: Soft, NT, ND, without rebound or guarding. Normal bowel sounds.  Extremities:  No clubbing, cyanosis, or edema bilaterally.    Skin: No rashes or lesions. Warm/dry.  Neurologic:   Alert and oriented x 3. Normal speech.      Labs:   CBC   Recent Labs     12/04/24 2049   WBC 16.1*   HGB 11.0*   HCT 33.5*           RENAL  Recent Labs     12/04/24 2049      K 3.2*   CL 97*   CO2 23   BUN 14   CREATININE 1.2       LFTS  Recent Labs     12/04/24 2049   AST 24   ALT 14   BILITOT 1.0   ALKPHOS 92       COAG  No results for input(s): \"INR\" in the last 72 hours.    CARDIAC ENZYMES  No results for input(s): \"TROPONINI\" in the last 72 hours.    LIPIDS  Cholesterol, Total   Date/Time Value Ref Range Status   07/17/2024 01:35  0 - 199 mg/dL Final     Triglycerides   Date/Time Value Ref Range Status   07/17/2024 01:35  0 - 150 mg/dL Final     HDL   Date/Time Value Ref Range Status   07/17/2024 01:35 PM 65 (H) 40 - 60 mg/dL Final     Comment:     An HDL cholesterol less than 40 mg/dL is low and  constitutes a coronary heart disease risk

## 2024-12-05 NOTE — FLOWSHEET NOTE
12/05/24 0845   Vital Signs   Temp 98.6 °F (37 °C)   Temp Source Oral   Pulse 92   Heart Rate Source Monitor   Respirations 18   /62   MAP (Calculated) 82   BP Location Left upper arm   BP Method Automatic   Patient Position High fowlers   Pain Assessment   Pain Assessment None - Denies Pain   Opioid-Induced Sedation   POSS Score 1   Oxygen Therapy   SpO2 93 %   O2 Device Nasal cannula   O2 Flow Rate (L/min) 3.5 L/min     Assessment complete and morning medications administered. Patient is alert and oriented.    Bedside Mobility Assessment Tool (BMAT):     Assessment Level 1- Sit and Shake    1. From a semi-reclined position, ask patient to sit up and rotate to a seated position at the side of the bed. Can use the bedrail.    2. Ask patient to reach out and grab your hand and shake making sure patient reaches across his/her midline.   Pass- Patient is able to come to a seated position, maintain core strength. Maintains seated balance while reaching across midline. Move on to Assessment Level 2.     Assessment Level 2- Stretch and Point   1. With patient in seated position at the side of the bed, have patient place both feet on the floor (or stool) with knees no higher than hips.    2. Ask patient to stretch one leg and straighten the knee, then bend the ankle/flex and point the toes. If appropriate, repeat with the other leg.   Pass- Patient is able to demonstrate appropriate quad strength on intended weight bearing limb(s). Move onto Assessment Level 3.     Assessment Level 3- Stand   1. Ask patient to elevate off the bed or chair (seated to standing) using an assistive device (cane, bedrail).    2. Patient should be able to raise buttocks off be and hold for a count of five. May repeat once.   Pass- Patient maintains standing stability for at least 5 seconds, proceed to assessment level 4.    Assessment Level 4- Walk   1. Ask patient to march in place at bedside.    2. Then ask patient to advance step and

## 2024-12-05 NOTE — PROGRESS NOTES
Pt daughter made aware of elevated carboxyhemoglobin. Daughter states pt is not exposed to second hand smoke and is not a smoker. Daughter states pt heats with oil. Advised daughter to call fire department to check residence for carbon monoxide. Dede Beckford RN

## 2024-12-05 NOTE — CARE COORDINATION
Case Management Assessment  Initial Evaluation    Date/Time of Evaluation: 12/5/2024 10:33 AM  Assessment Completed by: Leena Lowry    If patient is discharged prior to next notation, then this note serves as note for discharge by case management.    Patient Name: Rima Wyatt                   YOB: 1945  Diagnosis: Pulmonary nodule [R91.1]  Acute respiratory failure with hypoxia [J96.01]  Pneumonia due to infectious organism, unspecified laterality, unspecified part of lung [J18.9]  Community acquired pneumonia, unspecified laterality [J18.9]  Acute on chronic congestive heart failure, unspecified heart failure type (HCC) [I50.9]                   Date / Time: 12/4/2024  8:30 PM    Patient Admission Status: Inpatient   Readmission Risk (Low < 19, Mod (19-27), High > 27): Readmission Risk Score: 11.2    Current PCP: Christopher Gutierrez APRN - CNP  PCP verified by CM? Yes    Chart Reviewed: Yes      History Provided by: Patient, Medical Record  Patient Orientation: Alert and Oriented    Patient Cognition: Alert    Hospitalization in the last 30 days (Readmission):  No    If yes, Readmission Assessment in CM Navigator will be completed.    Advance Directives:      Code Status: Full Code   Patient's Primary Decision Maker is: Legal Next of Kin    Primary Decision Maker: Becca Brewer - Child - [phone]    Discharge Planning:    Patient lives with: Spouse/Significant Other Type of Home: House  Primary Care Giver: Self  Patient Support Systems include: Children, Family Members   Current Financial resources: Medicare  Current community resources: None  Current services prior to admission: None            Current DME:              Type of Home Care services:  None    ADLS  Prior functional level: Independent in ADLs/IADLs  Current functional level: Independent in ADLs/IADLs    PT AM-PAC:   /24  OT AM-PAC:   /24    Family can provide assistance at DC: No  Would you like Case Management to discuss

## 2024-12-06 LAB
ALBUMIN SERPL-MCNC: 3.2 G/DL (ref 3.4–5)
ALBUMIN/GLOB SERPL: 1.4 {RATIO} (ref 1.1–2.2)
ALP SERPL-CCNC: 69 U/L (ref 40–129)
ALT SERPL-CCNC: 14 U/L (ref 10–40)
ANION GAP SERPL CALCULATED.3IONS-SCNC: 16 MMOL/L (ref 3–16)
AST SERPL-CCNC: 25 U/L (ref 15–37)
BASOPHILS # BLD: 0 K/UL (ref 0–0.2)
BASOPHILS NFR BLD: 0.2 %
BILIRUB SERPL-MCNC: 0.7 MG/DL (ref 0–1)
BUN SERPL-MCNC: 20 MG/DL (ref 7–20)
CALCIUM SERPL-MCNC: 8 MG/DL (ref 8.3–10.6)
CHLORIDE SERPL-SCNC: 101 MMOL/L (ref 99–110)
CO2 SERPL-SCNC: 22 MMOL/L (ref 21–32)
CREAT SERPL-MCNC: 1.4 MG/DL (ref 0.6–1.2)
DEPRECATED RDW RBC AUTO: 14.1 % (ref 12.4–15.4)
EOSINOPHIL # BLD: 0 K/UL (ref 0–0.6)
EOSINOPHIL NFR BLD: 0.4 %
GFR SERPLBLD CREATININE-BSD FMLA CKD-EPI: 38 ML/MIN/{1.73_M2}
GLUCOSE SERPL-MCNC: 109 MG/DL (ref 70–99)
HCT VFR BLD AUTO: 27.1 % (ref 36–48)
HGB BLD-MCNC: 8.9 G/DL (ref 12–16)
LEGIONELLA AG UR QL: NORMAL
LYMPHOCYTES # BLD: 0.6 K/UL (ref 1–5.1)
LYMPHOCYTES NFR BLD: 7 %
MAGNESIUM SERPL-MCNC: 1.94 MG/DL (ref 1.8–2.4)
MCH RBC QN AUTO: 29.2 PG (ref 26–34)
MCHC RBC AUTO-ENTMCNC: 33 G/DL (ref 31–36)
MCV RBC AUTO: 88.4 FL (ref 80–100)
MONOCYTES # BLD: 0.8 K/UL (ref 0–1.3)
MONOCYTES NFR BLD: 8.9 %
NEUTROPHILS # BLD: 7.4 K/UL (ref 1.7–7.7)
NEUTROPHILS NFR BLD: 83.5 %
PLATELET # BLD AUTO: 231 K/UL (ref 135–450)
PMV BLD AUTO: 7.5 FL (ref 5–10.5)
POTASSIUM SERPL-SCNC: 3.1 MMOL/L (ref 3.5–5.1)
PROT SERPL-MCNC: 5.5 G/DL (ref 6.4–8.2)
RBC # BLD AUTO: 3.07 M/UL (ref 4–5.2)
S PNEUM AG UR QL: NORMAL
SODIUM SERPL-SCNC: 139 MMOL/L (ref 136–145)
WBC # BLD AUTO: 8.9 K/UL (ref 4–11)

## 2024-12-06 PROCEDURE — 6360000002 HC RX W HCPCS

## 2024-12-06 PROCEDURE — 80053 COMPREHEN METABOLIC PANEL: CPT

## 2024-12-06 PROCEDURE — 2060000000 HC ICU INTERMEDIATE R&B

## 2024-12-06 PROCEDURE — 83735 ASSAY OF MAGNESIUM: CPT

## 2024-12-06 PROCEDURE — 2580000003 HC RX 258

## 2024-12-06 PROCEDURE — 94761 N-INVAS EAR/PLS OXIMETRY MLT: CPT

## 2024-12-06 PROCEDURE — 6370000000 HC RX 637 (ALT 250 FOR IP): Performed by: INTERNAL MEDICINE

## 2024-12-06 PROCEDURE — 99232 SBSQ HOSP IP/OBS MODERATE 35: CPT | Performed by: INTERNAL MEDICINE

## 2024-12-06 PROCEDURE — 36415 COLL VENOUS BLD VENIPUNCTURE: CPT

## 2024-12-06 PROCEDURE — 85025 COMPLETE CBC W/AUTO DIFF WBC: CPT

## 2024-12-06 PROCEDURE — 2580000003 HC RX 258: Performed by: INTERNAL MEDICINE

## 2024-12-06 RX ORDER — POTASSIUM CHLORIDE 750 MG/1
40 TABLET, EXTENDED RELEASE ORAL DAILY
Status: DISCONTINUED | OUTPATIENT
Start: 2024-12-06 | End: 2024-12-09 | Stop reason: HOSPADM

## 2024-12-06 RX ADMIN — AMLODIPINE BESYLATE 5 MG: 5 TABLET ORAL at 08:01

## 2024-12-06 RX ADMIN — APIXABAN 2.5 MG: 5 TABLET, FILM COATED ORAL at 08:01

## 2024-12-06 RX ADMIN — TORSEMIDE 20 MG: 20 TABLET ORAL at 08:02

## 2024-12-06 RX ADMIN — Medication 10 ML: at 08:02

## 2024-12-06 RX ADMIN — Medication 50 MCG: at 08:00

## 2024-12-06 RX ADMIN — Medication 10 ML: at 19:40

## 2024-12-06 RX ADMIN — FAMOTIDINE 20 MG: 20 TABLET ORAL at 08:01

## 2024-12-06 RX ADMIN — POTASSIUM CHLORIDE 40 MEQ: 750 TABLET, EXTENDED RELEASE ORAL at 10:15

## 2024-12-06 RX ADMIN — APIXABAN 2.5 MG: 5 TABLET, FILM COATED ORAL at 19:40

## 2024-12-06 RX ADMIN — FERROUS SULFATE TAB 325 MG (65 MG ELEMENTAL FE) 325 MG: 325 (65 FE) TAB at 08:01

## 2024-12-06 RX ADMIN — ROSUVASTATIN CALCIUM 10 MG: 10 TABLET, FILM COATED ORAL at 08:01

## 2024-12-06 RX ADMIN — METOPROLOL SUCCINATE 25 MG: 25 TABLET, EXTENDED RELEASE ORAL at 08:01

## 2024-12-06 RX ADMIN — AZITHROMYCIN MONOHYDRATE 500 MG: 500 INJECTION, POWDER, LYOPHILIZED, FOR SOLUTION INTRAVENOUS at 22:18

## 2024-12-06 RX ADMIN — SODIUM CHLORIDE 1000 MG: 900 INJECTION INTRAVENOUS at 21:48

## 2024-12-06 RX ADMIN — Medication 1 TABLET: at 08:02

## 2024-12-06 RX ADMIN — CETIRIZINE HYDROCHLORIDE 5 MG: 10 TABLET ORAL at 08:01

## 2024-12-06 RX ADMIN — AMIODARONE HYDROCHLORIDE 100 MG: 200 TABLET ORAL at 08:02

## 2024-12-06 ASSESSMENT — PAIN SCALES - GENERAL
PAINLEVEL_OUTOF10: 0

## 2024-12-06 NOTE — FLOWSHEET NOTE
12/05/24 2325   Vital Signs   Temp 98 °F (36.7 °C)   Temp Source Oral   Pulse 78   Heart Rate Source Monitor   Respirations 16   BP (!) 142/72   MAP (Calculated) 95   BP Location Left upper arm   BP Method Automatic   Patient Position Semi fowlers   Oxygen Therapy   SpO2 93 %   O2 Device None (Room air)     Pt reassessment complete.  No changes noted.  Denies needs.  Call light within reach.

## 2024-12-06 NOTE — PROGRESS NOTES
Shift assessment complete, morning medications given, patient resting with no complaints of pain, weaned patient to 2L at this time, remains 92%, will continue to monitor. Indira Hamilton RN

## 2024-12-06 NOTE — FLOWSHEET NOTE
12/06/24 1152   Vital Signs   Temp 98.3 °F (36.8 °C)   Temp Source Oral   Pulse 80   Heart Rate Source Monitor   Respirations 18   /61   MAP (Calculated) 85   BP Location Left upper arm   BP Method Automatic   Patient Position High fowlers;Turns self   Pain Assessment   Pain Assessment 0-10   Pain Level 0   Oxygen Therapy   SpO2 90 %   Pulse Oximeter Device Mode Continuous   O2 Device Nasal cannula   O2 Flow Rate (L/min) 1 L/min     Patient weaned to 1L O2 at this time, patient hopeful to discharge in a.m.  Indira Hamilton RN

## 2024-12-06 NOTE — FLOWSHEET NOTE
12/05/24 1949   Vital Signs   Temp 98.4 °F (36.9 °C)   Temp Source Oral   Pulse 79   Heart Rate Source Monitor   Respirations 16   /77   MAP (Calculated) 91   BP Location Left upper arm   BP Method Automatic   Patient Position Semi fowlers     Pt assessment complete.  Pt lying quietly in bed.  Lung sounds diminished.  Pt on 02 3liters via nasal canula.  Pt NSR per monitor with HR of 79.  Nightly medications given.  Denies needs at this time.  Call light within reach.

## 2024-12-06 NOTE — PROGRESS NOTES
\"BACTERIA\", \"CLARITYU\", \"SPECGRAV\", \"LEUKOCYTESUR\", \"UROBILINOGEN\", \"BILIRUBINUR\", \"BLOODU\", \"GLUCOSEU\", \"AMORPHOUS\" in the last 72 hours.    Invalid input(s): \"KETONESU\"       Assessment & Plan:  Acute hypoxic respiratory failure.  Pneumonia.  -was  requiring 4L O2-> weaned to 3L- , none at baseline. Cont to wean O2 as tolerated   -CT chest showing patchy GGO in LLL and lingula, likely alveolar edema or pneumonia.  -procal elevated at 0.25.  -sputum culture, strep/legionella urine - pending.  -received IV azithromycin and ceftriaxone in ED, continue.  -continuous pulse ox monitoring to maintain SPO2 92% or greater, wean as tolerated.  -pulm consulted.    Chronic diastolic CHF.  -last echo 4/2024 EF 55%, grade II DD, mild to moderate tricuspid regurgitation.   -CT showing mild cardiomegaly with small b/l pleural effusions and interstitial edema.  -BNP 6092.  -s/p 40mg IV lasix in ED.  -does not appear overtly fluid overloaded, will continue home Demadex.  -I/Os, daily weights.     Sepsis criteria.  -likely in the setting of pneumonia.  -WBC 16.1, +RR, initial lactic 2.0, repeat 0.9.  -blood cultures pending.  -ABX per above.    Hypokalemia.  -K 3.2.  -PRN replacement protocol.    Incidental lung nodule.  -CT showing 6mm nodule in left apex. New from 10/6/2023.  -outpatient follow up for surveillance.     Persistent atrial fibrillation.  -continue anticoagulation with home Eliquis.  -continue home amiodarone and metoprolol.    Hypertension.  -continue home metoprolol, amlodipine.     Hyperlipidemia.   -continue statin.     CKD stage IV.  -Cr 1.2, baseline 1.1-1.2.  -avoid nephrotoxic agents.  -renally dose medications as able.    CAD.  -abnormal Lupe MPI 11/2023.  -continue statin and beta blocker.    SSS.  -s/p pacemaker 10/2022.    GERD.  -continue Pepcid.      DVT Prophylaxis: Eliquis.  Diet: ADULT DIET; Regular   Code Status: Full Code     Wean oxygen as tolerated; ambulate .   DC planning for home once O2 sats  are stabilized      Win Ordonez MD   12/6/2024  10:57 AM

## 2024-12-07 LAB
ANION GAP SERPL CALCULATED.3IONS-SCNC: 12 MMOL/L (ref 3–16)
BASOPHILS # BLD: 0 K/UL (ref 0–0.2)
BASOPHILS NFR BLD: 0.4 %
BUN SERPL-MCNC: 20 MG/DL (ref 7–20)
CALCIUM SERPL-MCNC: 8.7 MG/DL (ref 8.3–10.6)
CHLORIDE SERPL-SCNC: 103 MMOL/L (ref 99–110)
CO2 SERPL-SCNC: 24 MMOL/L (ref 21–32)
CREAT SERPL-MCNC: 1.3 MG/DL (ref 0.6–1.2)
DEPRECATED RDW RBC AUTO: 14.2 % (ref 12.4–15.4)
EOSINOPHIL # BLD: 0.1 K/UL (ref 0–0.6)
EOSINOPHIL NFR BLD: 1 %
GFR SERPLBLD CREATININE-BSD FMLA CKD-EPI: 41 ML/MIN/{1.73_M2}
GLUCOSE SERPL-MCNC: 106 MG/DL (ref 70–99)
HCT VFR BLD AUTO: 27.4 % (ref 36–48)
HGB BLD-MCNC: 9 G/DL (ref 12–16)
LYMPHOCYTES # BLD: 0.5 K/UL (ref 1–5.1)
LYMPHOCYTES NFR BLD: 7.2 %
MCH RBC QN AUTO: 28.9 PG (ref 26–34)
MCHC RBC AUTO-ENTMCNC: 33 G/DL (ref 31–36)
MCV RBC AUTO: 87.7 FL (ref 80–100)
MONOCYTES # BLD: 0.6 K/UL (ref 0–1.3)
MONOCYTES NFR BLD: 9.1 %
NEUTROPHILS # BLD: 5.5 K/UL (ref 1.7–7.7)
NEUTROPHILS NFR BLD: 82.3 %
PLATELET # BLD AUTO: 269 K/UL (ref 135–450)
PMV BLD AUTO: 7.2 FL (ref 5–10.5)
POTASSIUM SERPL-SCNC: 3.7 MMOL/L (ref 3.5–5.1)
RBC # BLD AUTO: 3.12 M/UL (ref 4–5.2)
SODIUM SERPL-SCNC: 139 MMOL/L (ref 136–145)
WBC # BLD AUTO: 6.7 K/UL (ref 4–11)

## 2024-12-07 PROCEDURE — 80048 BASIC METABOLIC PNL TOTAL CA: CPT

## 2024-12-07 PROCEDURE — 85025 COMPLETE CBC W/AUTO DIFF WBC: CPT

## 2024-12-07 PROCEDURE — 6360000002 HC RX W HCPCS

## 2024-12-07 PROCEDURE — 6370000000 HC RX 637 (ALT 250 FOR IP): Performed by: INTERNAL MEDICINE

## 2024-12-07 PROCEDURE — 2060000000 HC ICU INTERMEDIATE R&B

## 2024-12-07 PROCEDURE — 87449 NOS EACH ORGANISM AG IA: CPT

## 2024-12-07 PROCEDURE — 6360000002 HC RX W HCPCS: Performed by: INTERNAL MEDICINE

## 2024-12-07 PROCEDURE — 2580000003 HC RX 258: Performed by: INTERNAL MEDICINE

## 2024-12-07 PROCEDURE — 94761 N-INVAS EAR/PLS OXIMETRY MLT: CPT

## 2024-12-07 PROCEDURE — 99233 SBSQ HOSP IP/OBS HIGH 50: CPT | Performed by: INTERNAL MEDICINE

## 2024-12-07 PROCEDURE — 36415 COLL VENOUS BLD VENIPUNCTURE: CPT

## 2024-12-07 PROCEDURE — 2700000000 HC OXYGEN THERAPY PER DAY

## 2024-12-07 PROCEDURE — 87324 CLOSTRIDIUM AG IA: CPT

## 2024-12-07 PROCEDURE — 51702 INSERT TEMP BLADDER CATH: CPT

## 2024-12-07 PROCEDURE — 2580000003 HC RX 258

## 2024-12-07 RX ORDER — FUROSEMIDE 10 MG/ML
40 INJECTION INTRAMUSCULAR; INTRAVENOUS 2 TIMES DAILY
Status: DISCONTINUED | OUTPATIENT
Start: 2024-12-07 | End: 2024-12-07

## 2024-12-07 RX ORDER — LOPERAMIDE HYDROCHLORIDE 2 MG/1
2 CAPSULE ORAL 4 TIMES DAILY PRN
Status: DISCONTINUED | OUTPATIENT
Start: 2024-12-07 | End: 2024-12-09 | Stop reason: HOSPADM

## 2024-12-07 RX ORDER — FUROSEMIDE 10 MG/ML
40 INJECTION INTRAMUSCULAR; INTRAVENOUS DAILY
Status: DISCONTINUED | OUTPATIENT
Start: 2024-12-07 | End: 2024-12-08

## 2024-12-07 RX ORDER — POTASSIUM CHLORIDE 1500 MG/1
20 TABLET, EXTENDED RELEASE ORAL
Status: DISCONTINUED | OUTPATIENT
Start: 2024-12-07 | End: 2024-12-09 | Stop reason: HOSPADM

## 2024-12-07 RX ORDER — POTASSIUM CHLORIDE 1500 MG/1
40 TABLET, EXTENDED RELEASE ORAL DAILY
Status: DISCONTINUED | OUTPATIENT
Start: 2024-12-07 | End: 2024-12-07

## 2024-12-07 RX ADMIN — FUROSEMIDE 40 MG: 10 INJECTION, SOLUTION INTRAMUSCULAR; INTRAVENOUS at 12:46

## 2024-12-07 RX ADMIN — METOPROLOL SUCCINATE 25 MG: 25 TABLET, EXTENDED RELEASE ORAL at 08:28

## 2024-12-07 RX ADMIN — APIXABAN 2.5 MG: 5 TABLET, FILM COATED ORAL at 08:27

## 2024-12-07 RX ADMIN — SODIUM CHLORIDE 1000 MG: 900 INJECTION INTRAVENOUS at 22:15

## 2024-12-07 RX ADMIN — AZITHROMYCIN MONOHYDRATE 500 MG: 500 INJECTION, POWDER, LYOPHILIZED, FOR SOLUTION INTRAVENOUS at 23:09

## 2024-12-07 RX ADMIN — CETIRIZINE HYDROCHLORIDE 5 MG: 10 TABLET ORAL at 08:27

## 2024-12-07 RX ADMIN — TORSEMIDE 20 MG: 20 TABLET ORAL at 08:28

## 2024-12-07 RX ADMIN — AMIODARONE HYDROCHLORIDE 100 MG: 200 TABLET ORAL at 08:27

## 2024-12-07 RX ADMIN — ROSUVASTATIN CALCIUM 10 MG: 10 TABLET, FILM COATED ORAL at 08:28

## 2024-12-07 RX ADMIN — AMLODIPINE BESYLATE 5 MG: 5 TABLET ORAL at 08:28

## 2024-12-07 RX ADMIN — POTASSIUM CHLORIDE 20 MEQ: 1500 TABLET, EXTENDED RELEASE ORAL at 16:19

## 2024-12-07 RX ADMIN — Medication 10 ML: at 08:28

## 2024-12-07 RX ADMIN — Medication 10 ML: at 19:59

## 2024-12-07 RX ADMIN — FERROUS SULFATE TAB 325 MG (65 MG ELEMENTAL FE) 325 MG: 325 (65 FE) TAB at 08:28

## 2024-12-07 RX ADMIN — LOPERAMIDE HYDROCHLORIDE 2 MG: 2 CAPSULE ORAL at 23:05

## 2024-12-07 RX ADMIN — APIXABAN 2.5 MG: 5 TABLET, FILM COATED ORAL at 19:58

## 2024-12-07 RX ADMIN — POTASSIUM CHLORIDE 40 MEQ: 750 TABLET, EXTENDED RELEASE ORAL at 08:27

## 2024-12-07 RX ADMIN — Medication 50 MCG: at 08:27

## 2024-12-07 RX ADMIN — Medication 1 TABLET: at 08:28

## 2024-12-07 RX ADMIN — FAMOTIDINE 20 MG: 20 TABLET ORAL at 08:28

## 2024-12-07 NOTE — FLOWSHEET NOTE
12/06/24 2321   Vital Signs   Temp 98.3 °F (36.8 °C)   Temp Source Oral   Pulse 89   Heart Rate Source Monitor   Respirations 16   /72   MAP (Calculated) 94   BP Location Left upper arm   BP Method Automatic   Patient Position Semi fowlers   Oxygen Therapy   SpO2 93 %   O2 Device Nasal cannula   O2 Flow Rate (L/min) 1 L/min     Pt reassessment complete.  No changes noted.  Denies needs.  Call light within reach.

## 2024-12-07 NOTE — PROGRESS NOTES
Shift report given to nurse Ruiz at bedside. Patient care handed off in stable condition at this time. Indira Hamilton RN

## 2024-12-07 NOTE — CONSULTS
Lasix,adjusted by IM   Workup for pneumonia I agree with IV antibiotic and we will continue follow-up and workup  *-work up for pneumonia   *-strep ,legionella neg   *-sputum culture  *- procal 0.25   *_ Agree with IV abx Rocephin ,to finish PO another 2 days Omnicef or Augmentin on discharge   *-Need repeat CT to make sure of resolution in 6 -8 weeks    If not improving ,will consider bronch   GGO could be also ,viral like illness  CHF .with high BNP in ddx   Cardiology following   Office notified

## 2024-12-07 NOTE — PROGRESS NOTES
Hospitalist Progress Note  12/7/2024 3:15 PM  Subjective:   Admit Date: 12/4/2024  PCP: Christopher Gutierrez, APRN - CNP    Overnight Events: cont to need 2L O2, still SOB with ambulation    Data:   CBC:   Recent Labs     12/04/24 2049 12/06/24 0446 12/07/24  0952   WBC 16.1* 8.9 6.7   HGB 11.0* 8.9* 9.0*    231 269     BMP:    Recent Labs     12/04/24 2049 12/06/24  0446 12/07/24  0952    139 139   K 3.2* 3.1* 3.7   CL 97* 101 103   CO2 23 22 24   BUN 14 20 20   CREATININE 1.2 1.4* 1.3*   GLUCOSE 123* 109* 106*     Hepatic:   Recent Labs     12/04/24 2049 12/06/24  0446   AST 24 25   ALT 14 14   BILITOT 1.0 0.7   ALKPHOS 92 69       Objective:   Vitals: BP (!) 148/67   Pulse 91   Temp 97.5 °F (36.4 °C) (Oral)   Resp 16   Ht 1.524 m (5')   Wt 71.1 kg (156 lb 11.2 oz)   SpO2 92%   BMI 30.60 kg/m²         Assessment:   Principal Problem:    Pneumonia due to infectious organism, unspecified laterality, unspecified part of lung  Resolved Problems:    * No resolved hospital problems. *    Plan:     Acute hypoxic respiratory failure.  Pneumonia.  -was  requiring 4L O2-> weaned to 3L- , none at baseline. Cont to wean O2 as tolerated   -CT chest showing patchy GGO in LLL and lingula, likely alveolar edema or pneumonia.  -procal elevated at 0.25.  -sputum culture, strep/legionella urine - pending.  -received IV azithromycin and ceftriaxone in ED, continue.  -continuous pulse ox monitoring to maintain SPO2 92% or greater, wean as tolerated.  -pulm consulted.     Chronic diastolic CHF.  -last echo 4/2024 EF 55%, grade II DD, mild to moderate tricuspid regurgitation.   -CT showing mild cardiomegaly with small b/l pleural effusions and interstitial edema.  -BNP 6092.  -s/p 40mg IV lasix in ED.  -will challenge another dose of IV lasix and see if +diuresis  -I/Os, daily weights.      Sepsis criteria.  -likely in the setting of pneumonia.  -WBC 16.1, +RR,

## 2024-12-07 NOTE — FLOWSHEET NOTE
12/06/24 1934   Vital Signs   Temp 98 °F (36.7 °C)   Temp Source Oral   Pulse 80   Heart Rate Source Monitor   Respirations 16   BP (!) 147/63   MAP (Calculated) 91   BP Location Left upper arm   BP Method Automatic   Patient Position Semi fowlers     Pt assessment complete.  Pt lying in bed watching tv.  Lung sounds diminished.  Pt on 02 1liter via nasal canula.  Pt NSR per monitor with HR of 80.  Nightly medications given.  Denies needs at this time.  Call light within reach.

## 2024-12-07 NOTE — PROGRESS NOTES
Pt is lying in bed with their eyes closed. Respirations are easy and even. Call light within reach bed in lowest position with the wheels locked. Will continue to monitor. Patricia Vaughn RN

## 2024-12-07 NOTE — FLOWSHEET NOTE
12/07/24 0830   Oxygen Therapy   SpO2 (!) 88 %   O2 Device Nasal cannula   O2 Flow Rate (L/min) 1 L/min       MT called and reported oxygen saturation as shown. Oxygen increased to 2L NC. 93%.

## 2024-12-07 NOTE — PLAN OF CARE
Problem: Chronic Conditions and Co-morbidities  Goal: Patient's chronic conditions and co-morbidity symptoms are monitored and maintained or improved  Outcome: Progressing  Flowsheets (Taken 12/7/2024 0832)  Care Plan - Patient's Chronic Conditions and Co-Morbidity Symptoms are Monitored and Maintained or Improved: Monitor and assess patient's chronic conditions and comorbid symptoms for stability, deterioration, or improvement     Problem: Discharge Planning  Goal: Discharge to home or other facility with appropriate resources  Outcome: Progressing  Flowsheets (Taken 12/7/2024 0832)  Discharge to home or other facility with appropriate resources: Identify barriers to discharge with patient and caregiver

## 2024-12-08 PROBLEM — I48.20 ATRIAL FIBRILLATION, CHRONIC (HCC): Status: ACTIVE | Noted: 2024-12-08

## 2024-12-08 PROBLEM — J96.01 ACUTE RESPIRATORY FAILURE WITH HYPOXIA: Status: ACTIVE | Noted: 2024-12-08

## 2024-12-08 LAB
ANION GAP SERPL CALCULATED.3IONS-SCNC: 10 MMOL/L (ref 3–16)
BACTERIA BLD CULT ORG #2: NORMAL
BACTERIA BLD CULT: NORMAL
BASOPHILS # BLD: 0 K/UL (ref 0–0.2)
BASOPHILS NFR BLD: 0.7 %
BUN SERPL-MCNC: 18 MG/DL (ref 7–20)
C DIFF TOX A+B STL QL IA: NORMAL
CALCIUM SERPL-MCNC: 8.6 MG/DL (ref 8.3–10.6)
CHLORIDE SERPL-SCNC: 106 MMOL/L (ref 99–110)
CO2 SERPL-SCNC: 23 MMOL/L (ref 21–32)
CREAT SERPL-MCNC: 1.2 MG/DL (ref 0.6–1.2)
DEPRECATED RDW RBC AUTO: 14.1 % (ref 12.4–15.4)
EOSINOPHIL # BLD: 0.2 K/UL (ref 0–0.6)
EOSINOPHIL NFR BLD: 2.9 %
GFR SERPLBLD CREATININE-BSD FMLA CKD-EPI: 45 ML/MIN/{1.73_M2}
GLUCOSE SERPL-MCNC: 85 MG/DL (ref 70–99)
HCT VFR BLD AUTO: 27.9 % (ref 36–48)
HGB BLD-MCNC: 9.1 G/DL (ref 12–16)
LYMPHOCYTES # BLD: 0.6 K/UL (ref 1–5.1)
LYMPHOCYTES NFR BLD: 11 %
MCH RBC QN AUTO: 28.8 PG (ref 26–34)
MCHC RBC AUTO-ENTMCNC: 32.5 G/DL (ref 31–36)
MCV RBC AUTO: 88.7 FL (ref 80–100)
MONOCYTES # BLD: 0.6 K/UL (ref 0–1.3)
MONOCYTES NFR BLD: 9.6 %
NEUTROPHILS # BLD: 4.4 K/UL (ref 1.7–7.7)
NEUTROPHILS NFR BLD: 75.8 %
PLATELET # BLD AUTO: 272 K/UL (ref 135–450)
PMV BLD AUTO: 7 FL (ref 5–10.5)
POTASSIUM SERPL-SCNC: 4 MMOL/L (ref 3.5–5.1)
RBC # BLD AUTO: 3.15 M/UL (ref 4–5.2)
SODIUM SERPL-SCNC: 139 MMOL/L (ref 136–145)
WBC # BLD AUTO: 5.8 K/UL (ref 4–11)

## 2024-12-08 PROCEDURE — 6370000000 HC RX 637 (ALT 250 FOR IP): Performed by: INTERNAL MEDICINE

## 2024-12-08 PROCEDURE — 6360000002 HC RX W HCPCS

## 2024-12-08 PROCEDURE — 2580000003 HC RX 258: Performed by: INTERNAL MEDICINE

## 2024-12-08 PROCEDURE — 99233 SBSQ HOSP IP/OBS HIGH 50: CPT | Performed by: INTERNAL MEDICINE

## 2024-12-08 PROCEDURE — 85025 COMPLETE CBC W/AUTO DIFF WBC: CPT

## 2024-12-08 PROCEDURE — 6360000002 HC RX W HCPCS: Performed by: INTERNAL MEDICINE

## 2024-12-08 PROCEDURE — 80048 BASIC METABOLIC PNL TOTAL CA: CPT

## 2024-12-08 PROCEDURE — 94761 N-INVAS EAR/PLS OXIMETRY MLT: CPT

## 2024-12-08 PROCEDURE — 2060000000 HC ICU INTERMEDIATE R&B

## 2024-12-08 PROCEDURE — 36415 COLL VENOUS BLD VENIPUNCTURE: CPT

## 2024-12-08 PROCEDURE — 2580000003 HC RX 258

## 2024-12-08 PROCEDURE — 2700000000 HC OXYGEN THERAPY PER DAY

## 2024-12-08 PROCEDURE — 99232 SBSQ HOSP IP/OBS MODERATE 35: CPT | Performed by: INTERNAL MEDICINE

## 2024-12-08 RX ORDER — FUROSEMIDE 10 MG/ML
40 INJECTION INTRAMUSCULAR; INTRAVENOUS 2 TIMES DAILY
Status: DISCONTINUED | OUTPATIENT
Start: 2024-12-08 | End: 2024-12-09 | Stop reason: HOSPADM

## 2024-12-08 RX ADMIN — FUROSEMIDE 40 MG: 10 INJECTION, SOLUTION INTRAMUSCULAR; INTRAVENOUS at 07:46

## 2024-12-08 RX ADMIN — POTASSIUM CHLORIDE 20 MEQ: 1500 TABLET, EXTENDED RELEASE ORAL at 16:12

## 2024-12-08 RX ADMIN — AMLODIPINE BESYLATE 5 MG: 5 TABLET ORAL at 07:49

## 2024-12-08 RX ADMIN — METOPROLOL SUCCINATE 25 MG: 25 TABLET, EXTENDED RELEASE ORAL at 07:49

## 2024-12-08 RX ADMIN — POTASSIUM CHLORIDE 40 MEQ: 750 TABLET, EXTENDED RELEASE ORAL at 07:48

## 2024-12-08 RX ADMIN — Medication 10 ML: at 07:50

## 2024-12-08 RX ADMIN — SODIUM CHLORIDE 1000 MG: 900 INJECTION INTRAVENOUS at 21:58

## 2024-12-08 RX ADMIN — LOPERAMIDE HYDROCHLORIDE 2 MG: 2 CAPSULE ORAL at 06:40

## 2024-12-08 RX ADMIN — Medication 1 TABLET: at 07:48

## 2024-12-08 RX ADMIN — FERROUS SULFATE TAB 325 MG (65 MG ELEMENTAL FE) 325 MG: 325 (65 FE) TAB at 07:48

## 2024-12-08 RX ADMIN — ROSUVASTATIN CALCIUM 10 MG: 10 TABLET, FILM COATED ORAL at 07:49

## 2024-12-08 RX ADMIN — FAMOTIDINE 20 MG: 20 TABLET ORAL at 07:49

## 2024-12-08 RX ADMIN — Medication 50 MCG: at 07:49

## 2024-12-08 RX ADMIN — FUROSEMIDE 40 MG: 10 INJECTION, SOLUTION INTRAMUSCULAR; INTRAVENOUS at 16:11

## 2024-12-08 RX ADMIN — AMIODARONE HYDROCHLORIDE 100 MG: 200 TABLET ORAL at 07:49

## 2024-12-08 RX ADMIN — Medication 10 ML: at 21:55

## 2024-12-08 RX ADMIN — APIXABAN 2.5 MG: 5 TABLET, FILM COATED ORAL at 07:49

## 2024-12-08 RX ADMIN — AZITHROMYCIN MONOHYDRATE 500 MG: 500 INJECTION, POWDER, LYOPHILIZED, FOR SOLUTION INTRAVENOUS at 23:46

## 2024-12-08 RX ADMIN — APIXABAN 2.5 MG: 5 TABLET, FILM COATED ORAL at 21:55

## 2024-12-08 NOTE — FLOWSHEET NOTE
12/08/24 0012   Vital Signs   Temp 98.2 °F (36.8 °C)   Temp Source Oral   Pulse 83   Heart Rate Source Monitor   Respirations 16   /72   MAP (Calculated) 92   BP Location Left upper arm   BP Method Automatic   Patient Position Semi fowlers   Oxygen Therapy   SpO2 95 %   O2 Device Nasal cannula   O2 Flow Rate (L/min) 2 L/min     Pt reassessment complete.  Pt incontinent of stool.  Pt changed and repositioned.  No other needs at this time.  Call light within reach.

## 2024-12-08 NOTE — PROGRESS NOTES
Pulse oximetry on room air is 86%.  Pulse oximetry on 2 lpm at rest is 95%.  Pulse oximetry on 2 lpm with ambulation is 91%.

## 2024-12-08 NOTE — PLAN OF CARE
Problem: Chronic Conditions and Co-morbidities  Goal: Patient's chronic conditions and co-morbidity symptoms are monitored and maintained or improved  Outcome: Progressing  Flowsheets (Taken 12/8/2024 9872)  Care Plan - Patient's Chronic Conditions and Co-Morbidity Symptoms are Monitored and Maintained or Improved: Monitor and assess patient's chronic conditions and comorbid symptoms for stability, deterioration, or improvement

## 2024-12-08 NOTE — PROGRESS NOTES
Bedside report and transfer of care given to YOUNG Ruiz. Pt currently resting in bed with the call light within reach. Pt denies any other care needs at this time. Pt stable at this time.

## 2024-12-08 NOTE — PROGRESS NOTES
[facility]                                 Pulmonary/Critical care  Consult /Progress Note :                                                                  CC :SOB   Patient is being seen at the request of Dr Ordonez  for a consultation for hypoxia   HPI  Up to chair with no chest pain  Doing much better  On 2 L   Denies any CP  Needed 2 l on ambulation   Lasix increased     PHYSICAL EXAM:  Vitals:    12/08/24 1609   BP: (!) 142/65   Pulse: 71   Resp: 16   Temp: 97.6 °F (36.4 °C)   SpO2: 97%     Gen: No distress.   Eyes: PERRL. No sclera icterus. No conjunctival injection.   ENT: No discharge. Pharynx clear.   Neck: Trachea midline. No obvious mass.    Resp: rales  CV: Regular rate. Regular rhythm. No murmur or rub. No edema. Peripheral pulses are 2+.  Capillary refill is less than 3 seconds.  GI: Non-tender. Non-distended. No hernia.   Skin: Warm and dry. No nodule on exposed extremities.   Lymph: No cervical LAD. No supraclavicular LAD.   M/S: No cyanosis. No joint deformity. No clubbing.   Neuro: Awake. Alert. Moves all four extremities.   Psych: Oriented x 3. No anxiety.     LABS:  CBC:   Recent Labs     12/06/24  0446 12/07/24  0952 12/08/24  0514   WBC 8.9 6.7 5.8   HGB 8.9* 9.0* 9.1*   HCT 27.1* 27.4* 27.9*   MCV 88.4 87.7 88.7    269 272     BMP:   Recent Labs     12/06/24  0446 12/07/24  0952 12/08/24  0514    139 139   K 3.1* 3.7 4.0    103 106   CO2 22 24 23   BUN 20 20 18   CREATININE 1.4* 1.3* 1.2     LIVER PROFILE:   Recent Labs     12/06/24  0446   AST 25   ALT 14   BILITOT 0.7   ALKPHOS 69         Microbiology:  Sent    Imaging:  Chest imaging was reviewed by me and showed   Lung nodule with possible pulmonary edema    ASSESSMENT:  Hypoxia  Possible pneumonia we will pneumonia panel and sputum culture  A fib on elquis      PLAN:  Hypoxia seems related to congestive heart failure and fluid

## 2024-12-08 NOTE — FLOWSHEET NOTE
12/07/24 1953   Vital Signs   Temp 97.3 °F (36.3 °C)   Temp Source Oral   Pulse 78   Heart Rate Source Monitor   Respirations 16   /64   MAP (Calculated) 88   BP Location Left upper arm   BP Method Automatic   Patient Position Semi fowlers     Pt assessment complete.  Pt lying in bed quietly.  Lung sounds diminished.  Pt on 02 2liters via nasal canula.  Pt NSR per monitor with HR of 78.  George cath in place draining clear yellow urine.  Nightly medications given.  Denies needs at this time.  Call light within reach.

## 2024-12-09 VITALS
TEMPERATURE: 98.2 F | WEIGHT: 155.4 LBS | OXYGEN SATURATION: 95 % | RESPIRATION RATE: 16 BRPM | HEIGHT: 60 IN | DIASTOLIC BLOOD PRESSURE: 62 MMHG | SYSTOLIC BLOOD PRESSURE: 128 MMHG | BODY MASS INDEX: 30.51 KG/M2 | HEART RATE: 82 BPM

## 2024-12-09 PROBLEM — R91.1 PULMONARY NODULE: Status: ACTIVE | Noted: 2024-12-09

## 2024-12-09 LAB
ANION GAP SERPL CALCULATED.3IONS-SCNC: 12 MMOL/L (ref 3–16)
BASOPHILS # BLD: 0.1 K/UL (ref 0–0.2)
BASOPHILS NFR BLD: 0.8 %
BUN SERPL-MCNC: 22 MG/DL (ref 7–20)
CALCIUM SERPL-MCNC: 8.1 MG/DL (ref 8.3–10.6)
CHLORIDE SERPL-SCNC: 101 MMOL/L (ref 99–110)
CO2 SERPL-SCNC: 26 MMOL/L (ref 21–32)
CREAT SERPL-MCNC: 1.3 MG/DL (ref 0.6–1.2)
DEPRECATED RDW RBC AUTO: 14.1 % (ref 12.4–15.4)
EOSINOPHIL # BLD: 0.3 K/UL (ref 0–0.6)
EOSINOPHIL NFR BLD: 4 %
GFR SERPLBLD CREATININE-BSD FMLA CKD-EPI: 41 ML/MIN/{1.73_M2}
GLUCOSE SERPL-MCNC: 84 MG/DL (ref 70–99)
HCT VFR BLD AUTO: 28.8 % (ref 36–48)
HGB BLD-MCNC: 9.6 G/DL (ref 12–16)
LYMPHOCYTES # BLD: 0.9 K/UL (ref 1–5.1)
LYMPHOCYTES NFR BLD: 12.9 %
MCH RBC QN AUTO: 29 PG (ref 26–34)
MCHC RBC AUTO-ENTMCNC: 33.4 G/DL (ref 31–36)
MCV RBC AUTO: 86.9 FL (ref 80–100)
MONOCYTES # BLD: 0.7 K/UL (ref 0–1.3)
MONOCYTES NFR BLD: 9.7 %
NEUTROPHILS # BLD: 5 K/UL (ref 1.7–7.7)
NEUTROPHILS NFR BLD: 72.6 %
PLATELET # BLD AUTO: 330 K/UL (ref 135–450)
PMV BLD AUTO: 7.2 FL (ref 5–10.5)
POTASSIUM SERPL-SCNC: 4.5 MMOL/L (ref 3.5–5.1)
RBC # BLD AUTO: 3.31 M/UL (ref 4–5.2)
SODIUM SERPL-SCNC: 139 MMOL/L (ref 136–145)
WBC # BLD AUTO: 6.9 K/UL (ref 4–11)

## 2024-12-09 PROCEDURE — 99232 SBSQ HOSP IP/OBS MODERATE 35: CPT | Performed by: INTERNAL MEDICINE

## 2024-12-09 PROCEDURE — 85025 COMPLETE CBC W/AUTO DIFF WBC: CPT

## 2024-12-09 PROCEDURE — 2580000003 HC RX 258: Performed by: INTERNAL MEDICINE

## 2024-12-09 PROCEDURE — 6370000000 HC RX 637 (ALT 250 FOR IP): Performed by: INTERNAL MEDICINE

## 2024-12-09 PROCEDURE — 99238 HOSP IP/OBS DSCHRG MGMT 30/<: CPT | Performed by: INTERNAL MEDICINE

## 2024-12-09 PROCEDURE — 6360000002 HC RX W HCPCS: Performed by: INTERNAL MEDICINE

## 2024-12-09 PROCEDURE — 36415 COLL VENOUS BLD VENIPUNCTURE: CPT

## 2024-12-09 PROCEDURE — 94761 N-INVAS EAR/PLS OXIMETRY MLT: CPT

## 2024-12-09 PROCEDURE — 2700000000 HC OXYGEN THERAPY PER DAY

## 2024-12-09 PROCEDURE — 80048 BASIC METABOLIC PNL TOTAL CA: CPT

## 2024-12-09 RX ORDER — TORSEMIDE 20 MG/1
20 TABLET ORAL DAILY
Qty: 90 TABLET | Refills: 3 | Status: SHIPPED
Start: 2024-12-10

## 2024-12-09 RX ADMIN — METOPROLOL SUCCINATE 25 MG: 25 TABLET, EXTENDED RELEASE ORAL at 10:47

## 2024-12-09 RX ADMIN — FERROUS SULFATE TAB 325 MG (65 MG ELEMENTAL FE) 325 MG: 325 (65 FE) TAB at 10:47

## 2024-12-09 RX ADMIN — APIXABAN 2.5 MG: 5 TABLET, FILM COATED ORAL at 10:48

## 2024-12-09 RX ADMIN — FAMOTIDINE 20 MG: 20 TABLET ORAL at 10:48

## 2024-12-09 RX ADMIN — Medication 10 ML: at 10:52

## 2024-12-09 RX ADMIN — CETIRIZINE HYDROCHLORIDE 5 MG: 10 TABLET ORAL at 10:48

## 2024-12-09 RX ADMIN — FUROSEMIDE 40 MG: 10 INJECTION, SOLUTION INTRAMUSCULAR; INTRAVENOUS at 10:47

## 2024-12-09 RX ADMIN — POTASSIUM CHLORIDE 40 MEQ: 750 TABLET, EXTENDED RELEASE ORAL at 10:46

## 2024-12-09 RX ADMIN — ROSUVASTATIN CALCIUM 10 MG: 10 TABLET, FILM COATED ORAL at 10:48

## 2024-12-09 RX ADMIN — Medication 1 TABLET: at 10:46

## 2024-12-09 RX ADMIN — Medication 50 MCG: at 10:48

## 2024-12-09 RX ADMIN — AMLODIPINE BESYLATE 5 MG: 5 TABLET ORAL at 10:48

## 2024-12-09 RX ADMIN — AMIODARONE HYDROCHLORIDE 100 MG: 200 TABLET ORAL at 10:47

## 2024-12-09 NOTE — PROGRESS NOTES
Hospitalist Progress Note  12/8/2024 7:17 PM  Subjective:   Admit Date: 12/4/2024  PCP: Christopher Gutierrez, APRN - CNP    Overnight Events: cont to need 2L O2, diuresed some with lasix IV      Data:   CBC:   Recent Labs     12/06/24 0446 12/07/24  0952 12/08/24  0514   WBC 8.9 6.7 5.8   HGB 8.9* 9.0* 9.1*    269 272     BMP:    Recent Labs     12/06/24 0446 12/07/24  0952 12/08/24  0514    139 139   K 3.1* 3.7 4.0    103 106   CO2 22 24 23   BUN 20 20 18   CREATININE 1.4* 1.3* 1.2   GLUCOSE 109* 106* 85     Hepatic:   Recent Labs     12/06/24 0446   AST 25   ALT 14   BILITOT 0.7   ALKPHOS 69       Objective:   Vitals: BP (!) 142/65   Pulse 71   Temp 97.6 °F (36.4 °C) (Oral)   Resp 16   Ht 1.524 m (5')   Wt 71.9 kg (158 lb 8 oz)   SpO2 97%   BMI 30.95 kg/m²     Gen: No distress. Alert. Chronically ill-appearing.  Eyes: PERRL. No sclera icterus. No conjunctival injection.   ENT: No discharge. Pharynx clear.   Neck: No JVD.  Trachea midline.  Resp: On 2L O2 via NC. Intermittent dry cough. Bibasilar crackles with scattered rales. No accessory muscle use.   CV: Regular rate. Regular rhythm.+ murmur.  No rub. No edema.   Capillary Refill: Brisk,< 3 seconds   Peripheral Pulses: +2 palpable, equal bilaterally   GI: Non-tender. Non-distended.  Normal bowel sounds.  Skin: Warm and dry. No nodule on exposed extremities. No rash on exposed extremities.   M/S: No cyanosis. No joint deformity. No clubbing.   Neuro: Awake. Grossly nonfocal         Assessment:   Principal Problem:    Pneumonia due to infectious organism, unspecified laterality, unspecified part of lung  Active Problems:    Atrial fibrillation, chronic (HCC)    Acute respiratory failure with hypoxia  Resolved Problems:    * No resolved hospital problems. *    Plan:       Acute hypoxic respiratory failure.  Pneumonia.  -was  requiring 4L O2-> weaned to 3L- , none at baseline. Cont to

## 2024-12-09 NOTE — CARE COORDINATION
Met with pt at bedside. Pt is agreeable to DC home with family. Pt is agreeable to DC home with O2. Pt states she is ok with any DME company that is covered by her insurance.     Call placed to Es[place] with [facility] to see if she is able to service pt. Awaiting return call    Follow-Up copy of Important Message from Medicare (IMM2) has been explained to patient and/or designated healthcare decision maker (HCDM). Pt and/or HCDM aware that patient is permitted to stay an additional 4 hours prior to discharge to consider an appeal if they feel as though they are being discharged too soon. Patient may discharge as planned if chooses to do so.  Patient/HCDM voice no other concerns or questions regarding this process.

## 2024-12-09 NOTE — FLOWSHEET NOTE
12/08/24 2145   Vital Signs   Temp 97.8 °F (36.6 °C)   Temp Source Oral   Pulse 76   Heart Rate Source Monitor   Respirations 18   /64   MAP (Calculated) 89   BP Location Left upper arm   BP Method Automatic   Patient Position Semi fowlers   Pain Assessment   Pain Assessment None - Denies Pain   Oxygen Therapy   SpO2 98 %   O2 Device Nasal cannula   O2 Flow Rate (L/min) 2 L/min

## 2024-12-09 NOTE — PROGRESS NOTES
Bedside report and transfer of care given to YOUNG Aguayo. Pt currently resting in bed with the call light within reach. Pt denies any other care needs at this time. Pt stable at this time.

## 2024-12-09 NOTE — DISCHARGE SUMMARY
Hospital Medicine Discharge Summary    Patient: Rima Wyatt     Gender: female  : 1945   Age: 79 y.o.  MRN: [id number]    Admitting Physician: Nehemias Lagunas MD  Discharge Physician: Anabell Forbes,     Code Status: Full Code     Admit Date: 2024   Discharge Date:  2024     Discharge Diagnoses:    Active Hospital Problems    Diagnosis Date Noted    Atrial fibrillation, chronic (HCC) [I48.20] 2024    Acute respiratory failure with hypoxia [J96.01] 2024    Pneumonia due to infectious organism, unspecified laterality, unspecified part of lung [J18.9] 2024       Condition at Discharge: Stable. She says she had some leg swelling but this is now much better. She feels better, wants to go home.     Hospital Course:     Acute hypoxic respiratory failure.  Pneumonia.  -was  requiring 4L O2-> weaned to 3L- , none at baseline. Cont to wean O2 as tolerated   -CT chest showing patchy GGO in LLL and lingula, likely alveolar edema or pneumonia.  -procal elevated at 0.25.  -sputum culture, strep/legionella urine - neg  -received IV azithromycin and ceftriaxone in ED, continue.  -continuous pulse ox monitoring to maintain SPO2 92% or greater, wean as tolerated.  -pulm consulted.  -she is on room air  -will get walk test to double check sats especially with ambulation  -completed antibiotics  - walk test reviewed, will need antibiotics on discharge     Chronic diastolic CHF.  -last echo 2024 EF 55%, grade II DD, mild to moderate tricuspid regurgitation.   -CT showing mild cardiomegaly with small b/l pleural effusions and interstitial edema.  -BNP 6092.  -s/p 40mg IV lasix in ED.  -cont IV lasix until more diuresis, then switch to PO torsemide on dc  -I/Os, daily weights.      Sepsis criteria.  -likely in the setting of pneumonia.  -WBC 16.1, +RR, initial lactic 2.0, repeat 0.9.  -ABX per above.     Hypokalemia.  -K 3.2.  -PRN replacement protocol.     Incidental lung nodule.  -CT showing 6mm  moderate atherosclerosis.  No acute aortic abnormality identified.  Coronary artery atherosclerotic vascular calcifications are seen.  No acute abnormality in the branch vessels of the superior mediastinum and lower neck.  Mild cardiomegaly with intracardiac pacer leads.  No pericardial effusion.  Small hiatal hernia.  The mediastinal esophagus is unremarkable.  1.4 cm left thyroid nodule.  No follow-up recommended.  No lymphadenopathy or pneumomediastinum. Lungs/pleura: The central airways are patent.  Small bilateral pleural effusions.  No pneumothorax.  Patchy ground-glass opacities in the left lower lobe and lingula and to a lesser degree the right lower lobe.  Smooth bilateral interlobular septal thickening is noted.  No consolidation.  6 mm nodule in the left apex on image 27 series 3 new from 10/06/2023. Upper Abdomen: Limited images of the upper abdomen are unremarkable. Soft Tissues/Bones: No acute bone or soft tissue abnormality.     1. No pulmonary embolism identified. 2. Mild cardiomegaly with small bilateral pleural effusions and interstitial edema in keeping with congestive heart failure. 3. Patchy ground-glass opacities in the left lower lobe and lingula and to a lesser degree the right lower lobe. This could represent alveolar edema or pneumonia. 4. 6 mm nodule in the left apex new from 10/06/2023.  See recommendations below. RECOMMENDATIONS: Incidental left thyroid nodule measuring 1.4 cm. No follow-up imaging is recommended. Reference: J Am Benjamin Radiol. 2015 Feb;12(2): 143-50 Left solid pulmonary nodule within the upper lobe measuring 6 mm. Per Fleischner Society Guidelines, recommend a non-contrast Chest CT at 6-12 months. If patient is high risk for malignancy, consider an additional non-contrast Chest CT at 18-24 months. If patient is low risk for malignancy, non-contrast Chest CT at 18-24 months is optional. These guidelines do not apply to immunocompromised patients and patients with cancer.

## 2024-12-09 NOTE — PROGRESS NOTES
O2 Sat at rest on room air is 92 %.  O2 Sat with activity on room air is 85 %.  O2 Sat at rest with oxygen @  2 lpm is 98%.  O2 Sat with activity with oxygen @ 2 lpm is 95%.

## 2024-12-09 NOTE — PROGRESS NOTES
[facility]                                 Pulmonary/Critical care  Consult /Progress Note :                                                                  CC :SOB   Patient is being seen at the request of Dr Ordonez  for a consultation for hypoxia   HPI      PHYSICAL EXAM:  Vitals:    12/09/24 1047   BP: 135/63   Pulse: 80   Resp:    Temp:    SpO2:      Gen: No distress.   Eyes: PERRL. No sclera icterus. No conjunctival injection.   ENT: No discharge. Pharynx clear.   Neck: Trachea midline. No obvious mass.    Resp: rales  CV: Regular rate. Regular rhythm. No murmur or rub. No edema. Peripheral pulses are 2+.  Capillary refill is less than 3 seconds.  GI: Non-tender. Non-distended. No hernia.   Skin: Warm and dry. No nodule on exposed extremities.   Lymph: No cervical LAD. No supraclavicular LAD.   M/S: No cyanosis. No joint deformity. No clubbing.   Neuro: Awake. Alert. Moves all four extremities.   Psych: Oriented x 3. No anxiety.     LABS:  CBC:   Recent Labs     12/07/24  0952 12/08/24  0514 12/09/24  0419   WBC 6.7 5.8 6.9   HGB 9.0* 9.1* 9.6*   HCT 27.4* 27.9* 28.8*   MCV 87.7 88.7 86.9    272 330     BMP:   Recent Labs     12/07/24  0952 12/08/24  0514 12/09/24  0419    139 139   K 3.7 4.0 4.5    106 101   CO2 24 23 26   BUN 20 18 22*   CREATININE 1.3* 1.2 1.3*     LIVER PROFILE:   No results for input(s): \"AST\", \"ALT\", \"LIPASE\", \"AMYLASE\", \"BILIDIR\", \"BILITOT\", \"ALKPHOS\" in the last 72 hours.    Invalid input(s): \"ALB\"        Microbiology:  Sent    Imaging:  Chest imaging was reviewed by me and showed   Lung nodule with possible pulmonary edema    ASSESSMENT:  Hypoxia  Possible pneumonia we will pneumonia panel and sputum culture  A fib on elquis      PLAN:  Hypoxia seems related to congestive heart failure and fluid overload    Lasix adjusted by IM   *- procal 0.25   *_ Agree with IV abx Rocephin  ,to finish PO another 2 days Omnicef or Augmentin on discharge   Dr Pena notified office for follow up for a repeat CT to make sure of resolution in 6 -8 weeks

## 2024-12-09 NOTE — PLAN OF CARE
HEART FAILURE CARE PLAN:    Comorbidities Reviewed: Yes   Patient has a past medical history of Acute congestive heart failure, unspecified heart failure type (HCC), Acute diastolic CHF (congestive heart failure) (HCC), Arthritis, Atrial fibrillation (HCC), Closed subchondral insufficiency fracture of femoral condyle (HCC), Hypertension, Localized osteoarthrosis not specified whether primary or secondary, pelvic region and thigh, and Neurogenic bladder.     Weights Reviewed: Yes   Admission weight: 68 kg (150 lb)   Wt Readings from Last 3 Encounters:   12/08/24 71.9 kg (158 lb 8 oz)   11/19/24 69.4 kg (153 lb)   10/31/24 70.8 kg (156 lb)     Intake & Output Reviewed: Yes     Intake/Output Summary (Last 24 hours) at 12/9/2024 0135  Last data filed at 12/8/2024 2330  Gross per 24 hour   Intake 580 ml   Output 2750 ml   Net -2170 ml       ECHOCARDIOGRAM Reviewed: Yes   Patient's Ejection Fraction (EF) is greater than 40%     Medications Reviewed: Yes   SCHEDULED HOSPITAL MEDICATIONS:   furosemide  40 mg IntraVENous BID    potassium chloride  20 mEq Oral Daily before dinner    potassium chloride  40 mEq Oral Daily    amiodarone  100 mg Oral Daily    amLODIPine  5 mg Oral Daily    apixaban  2.5 mg Oral BID    cetirizine  5 mg Oral Daily    famotidine  20 mg Oral Daily    metoprolol succinate  25 mg Oral Daily    ferrous sulfate  325 mg Oral Daily with breakfast    rosuvastatin  10 mg Oral Daily    [Held by provider] torsemide  20 mg Oral Daily    vitamin B-12  50 mcg Oral Daily    therapeutic multivitamin-minerals  1 tablet Oral Daily    sodium chloride flush  5-40 mL IntraVENous 2 times per day     HOME MEDICATIONS:  Prior to Admission medications    Medication Sig Start Date End Date Taking? Authorizing Provider   amiodarone (PACERONE) 100 MG tablet Take 25 mg by mouth daily   Yes Provider, MD Ember   cetirizine (ZYRTEC) 10 MG tablet Take 1 tablet by mouth daily 11/19/24  Yes Christopher Gutierrez P, APRN - CNP    amLODIPine (NORVASC) 5 MG tablet Take 1 tablet by mouth daily 11/7/24  Yes Enrique Plaza, DO   metoprolol succinate (TOPROL XL) 25 MG extended release tablet Take 1 tablet by mouth daily 11/7/24  Yes Ana Maria Coyne MD   rosuvastatin (CRESTOR) 10 MG tablet Take 1 tablet by mouth once daily 11/4/24  Yes Enrique Plaza, DO   torsemide (DEMADEX) 20 MG tablet Take 1 tablet by mouth daily 9/3/24  Yes Enrique Plaza, DO   famotidine (PEPCID) 20 MG tablet Take 1 tablet by mouth daily 8/7/24  Yes Christopher Gutierrez APRN - CNP   apixaban (ELIQUIS) 2.5 MG TABS tablet Take 1 tablet by mouth 2 times daily 6/5/24  Yes Ana Maria Coyne MD   Multiple Vitamins-Minerals (THERAPEUTIC MULTIVITAMIN-MINERALS) tablet Take 1 tablet by mouth daily   Yes Ember Winchester MD   ferrous sulfate (IRON 325) 325 (65 Fe) MG tablet Take 1 tablet by mouth daily (with breakfast)   Yes Ember Winchester MD   vitamin B-12 (CYANOCOBALAMIN) 100 MCG tablet Take 0.5 tablets by mouth daily   Yes Ember Winchester MD   Cholecalciferol (VITAMIN D) 50 MCG (2000 UT) CAPS capsule Take by mouth   Yes Ember Winchester MD   Handavelino Rojoard MISC by Does not apply route Duration - 5 years 11/19/24   Christopher Gutierrez, APRN - CNP   amiodarone (CORDARONE) 200 MG tablet Take 0.5 tablets by mouth See Admin Instructions Take 0.5 tablet by mouth Monday through Thursday. 11/21/23 10/31/24  Ana Maria Coyne MD      Diet Reviewed: Yes   ADULT DIET; Regular    Goal of Care Reviewed: Yes   Patient and/or Family's stated Goal of Care this Admission: Reduce shortness of breath, increase activity tolerance, better understand heart failure and disease management, be more comfortable, and reduce lower extremity edema prior to discharge.     Electronically signed by Dede Beckford RN on 12/9/2024 at 1:35 AM

## 2024-12-09 NOTE — PLAN OF CARE
Problem: Safety - Adult  Goal: Free from fall injury  Outcome: Progressing     Problem: Chronic Conditions and Co-morbidities  Goal: Patient's chronic conditions and co-morbidity symptoms are monitored and maintained or improved  Outcome: Progressing     Problem: Discharge Planning  Goal: Discharge to home or other facility with appropriate resources  Outcome: Progressing     Problem: Pain  Goal: Verbalizes/displays adequate comfort level or baseline comfort level  Outcome: Progressing     Problem: Respiratory - Adult  Goal: Achieves optimal ventilation and oxygenation  Outcome: Progressing     Problem: Cardiovascular - Adult  Goal: Maintains optimal cardiac output and hemodynamic stability  Outcome: Progressing  Goal: Absence of cardiac dysrhythmias or at baseline  Outcome: Progressing     Problem: Musculoskeletal - Adult  Goal: Return mobility to safest level of function  Outcome: Progressing  Goal: Maintain proper alignment of affected body part  Outcome: Progressing  Goal: Return ADL status to a safe level of function  Outcome: Progressing     Problem: Gastrointestinal - Adult  Goal: Minimal or absence of nausea and vomiting  Outcome: Progressing  Goal: Maintains or returns to baseline bowel function  Outcome: Progressing  Goal: Maintains adequate nutritional intake  Outcome: Progressing  Goal: Establish and maintain optimal ostomy function  Outcome: Progressing     Problem: Metabolic/Fluid and Electrolytes - Adult  Goal: Electrolytes maintained within normal limits  Outcome: Progressing  Goal: Hemodynamic stability and optimal renal function maintained  Outcome: Progressing  Goal: Glucose maintained within prescribed range  Outcome: Progressing

## 2024-12-10 ENCOUNTER — TELEPHONE (OUTPATIENT)
Dept: FAMILY MEDICINE CLINIC | Age: 79
End: 2024-12-10

## 2024-12-10 NOTE — TELEPHONE ENCOUNTER
Care Transitions Initial Follow Up Call    Outreach made within 2 business days of discharge: Yes    Patient: Rima Wyatt Patient : 1945   MRN: [id number]  Reason for Admission: pneumonia   Discharge Date: 24       Spoke with: No answer, line busy, unable to LM     Discharge department/facility: [place]         Scheduled appointment with PCP within 7-14 days    Follow Up  Future Appointments   Date Time Provider Department Center   2/10/2025  1:00 PM Enrique Plaza DO [facility]   2025  2:30 PM SCHEDULE, RAMIRE[facility] DEVICE CHECK [facility]   2025  2:30 PM Ana Maria Coyne MD [facility]   2025  2:00 PM Christopher Gutierrez P, APRN - CNP [facility]       Damaris [place]

## 2024-12-19 ENCOUNTER — OFFICE VISIT (OUTPATIENT)
Dept: FAMILY MEDICINE CLINIC | Age: 79
End: 2024-12-19

## 2024-12-19 VITALS
WEIGHT: 153 LBS | HEART RATE: 75 BPM | SYSTOLIC BLOOD PRESSURE: 133 MMHG | BODY MASS INDEX: 29.88 KG/M2 | DIASTOLIC BLOOD PRESSURE: 82 MMHG | OXYGEN SATURATION: 97 %

## 2024-12-19 DIAGNOSIS — R09.02 HYPOXIA: ICD-10-CM

## 2024-12-19 DIAGNOSIS — R91.1 INCIDENTAL PULMONARY NODULE: ICD-10-CM

## 2024-12-19 DIAGNOSIS — Z87.01 HISTORY OF PNEUMONIA: Primary | ICD-10-CM

## 2024-12-19 DIAGNOSIS — Z99.81 ON HOME OXYGEN THERAPY: ICD-10-CM

## 2024-12-19 ASSESSMENT — ENCOUNTER SYMPTOMS: GASTROINTESTINAL NEGATIVE: 1

## 2024-12-19 NOTE — PROGRESS NOTES
[facility]  2024    Rima Wyatt (:  1945) is a 79 y.o. female, here for evaluation of the following medical concerns:    Chief Complaint   Patient presents with    oxygen     Face to face for oxygen return        ASSESSMENT/ PLAN  1. History of pneumonia  -Repeat chest CT in  for incidental finding of left apex pulmonary nodule 6 mm.  -No longer requiring oxygen therapy.  Oxygen saturation 97 in the office setting.  At home she is greater than 94%.  -Okay to be released from supplemental oxygen therapy  - CT CHEST WO CONTRAST; Future    2. Incidental pulmonary nodule  -See #1  - CT CHEST WO CONTRAST; Future    3. Hypoxia  -See #1    4. On home oxygen therapy  -See #1           No follow-ups on file.    HPI  Patient seen in office today after hospital discharge for admission for pneumonia.  She was diagnosed with bibasilar opacities left greater than right.  She also underwent chest CT that did show 6 mm pulmonary nodule that was new compared to 2023.  This was found in the left apex.  She was released from the hospital on oxygen therapy.  Today she denies the need for oxygen therapy and was monitoring oxygen saturations at home and was 94 to 95%.  Today in office she was 97%.  Currently denies any shortness of breath or chest pain.    ROS  Review of Systems   Constitutional: Negative.    HENT: Negative.     Cardiovascular: Negative.    Gastrointestinal: Negative.    Genitourinary: Negative.    Musculoskeletal: Negative.    Skin: Negative.        HISTORIES  Current Outpatient Medications on File Prior to Visit   Medication Sig Dispense Refill    torsemide (DEMADEX) 20 MG tablet Take 1 tablet by mouth daily 90 tablet 3    Handicap Placard MISC by Does not apply route Duration - 5 years 1 each 0    cetirizine (ZYRTEC) 10 MG tablet Take 1 tablet by mouth daily 30 tablet 0    amLODIPine (NORVASC) 5 MG tablet Take 1 tablet by mouth daily 90 tablet 2    metoprolol succinate

## 2024-12-23 NOTE — PROGRESS NOTES
Physician Progress Note      PATIENT:               MICHAEL TALAVERA  CSN #:                  [id number]  :                       1945  ADMIT DATE:       2024 8:30 PM  DISCH DATE:        2024 3:53 PM  RESPONDING  PROVIDER #:        Anabell Forbes DO          QUERY TEXT:    Patient admitted with sepsis.   Noted documentation of Chronic diastolic CHF   per Internal Medicine  note and Ed notes- Acute on chronic congestive   heart failure, unspecified heart failure type.  pulmonology notes -    Hypoxia seems related to congestive heart failure agree with Lasix.  If possible, please document in progress notes and discharge summary if you   are evaluating and /or treating any of the following:    The medical record reflects the following:  Risk Factors:  Clinical Indicators: Internal Medicine  note - Chronic diastolic CHF.   -last echo 2024 EF 55%, grade II DD, mild to moderate tricuspid   regurgitation.  -CT showing mild cardiomegaly with small b/l pleural effusions and   interstitial edema.  -BNP 6092.  -s/p 40mg IV lasix in ED.  -cont IV lasix until more diuresis, then switch to PO torsemide on dc    ED- Acute on chronic congestive heart failure, unspecified heart failure type    Treatment: -I/Os, daily weights, IV Lasix, monitor labs  Options provided:  -- Acute on chronic diastolic CHF confirmed.  -- Chronic diastolic CHF confirmed and acute CHF ruled out.  -- Other - I will add my own diagnosis  -- Disagree - Not applicable / Not valid  -- Disagree - Clinically unable to determine / Unknown  -- Refer to Clinical Documentation Reviewer    PROVIDER RESPONSE TEXT:    Acute on chronic diastolic CHF confirmed.    Query created by: Rupal Lazaro 2024 9:27 AM      Electronically signed by:  Anabell Forbes DO 2024 12:00 PM

## 2025-01-10 ENCOUNTER — APPOINTMENT (OUTPATIENT)
Dept: GENERAL RADIOLOGY | Age: 80
DRG: 189 | End: 2025-01-10
Payer: MEDICARE

## 2025-01-10 ENCOUNTER — HOSPITAL ENCOUNTER (INPATIENT)
Age: 80
LOS: 3 days | Discharge: HOME | DRG: 189 | End: 2025-01-13
Attending: STUDENT IN AN ORGANIZED HEALTH CARE EDUCATION/TRAINING PROGRAM | Admitting: INTERNAL MEDICINE
Payer: MEDICARE

## 2025-01-10 ENCOUNTER — TELEPHONE (OUTPATIENT)
Dept: FAMILY MEDICINE CLINIC | Age: 80
End: 2025-01-10

## 2025-01-10 ENCOUNTER — OFFICE VISIT (OUTPATIENT)
Dept: FAMILY MEDICINE CLINIC | Age: 80
End: 2025-01-10

## 2025-01-10 ENCOUNTER — APPOINTMENT (OUTPATIENT)
Dept: CT IMAGING | Age: 80
DRG: 189 | End: 2025-01-10
Payer: MEDICARE

## 2025-01-10 VITALS — OXYGEN SATURATION: 88 %

## 2025-01-10 DIAGNOSIS — J96.01 ACUTE RESPIRATORY FAILURE WITH HYPOXIA: ICD-10-CM

## 2025-01-10 DIAGNOSIS — I50.33 ACUTE ON CHRONIC DIASTOLIC (CONGESTIVE) HEART FAILURE (HCC): ICD-10-CM

## 2025-01-10 DIAGNOSIS — R06.02 SHORTNESS OF BREATH: Primary | ICD-10-CM

## 2025-01-10 DIAGNOSIS — R09.02 HYPOXEMIA: ICD-10-CM

## 2025-01-10 DIAGNOSIS — R06.2 WHEEZING: ICD-10-CM

## 2025-01-10 DIAGNOSIS — Z87.01 HISTORY OF PNEUMONIA: ICD-10-CM

## 2025-01-10 DIAGNOSIS — R09.89 BIBASILAR CRACKLES: ICD-10-CM

## 2025-01-10 DIAGNOSIS — J18.9 PNEUMONIA OF BOTH LUNGS DUE TO INFECTIOUS ORGANISM, UNSPECIFIED PART OF LUNG: Primary | ICD-10-CM

## 2025-01-10 DIAGNOSIS — I48.20 CHRONIC ATRIAL FIBRILLATION, UNSPECIFIED (HCC): ICD-10-CM

## 2025-01-10 DIAGNOSIS — N18.32 STAGE 3B CHRONIC KIDNEY DISEASE (HCC): ICD-10-CM

## 2025-01-10 DIAGNOSIS — R06.02 SHORTNESS OF BREATH: ICD-10-CM

## 2025-01-10 PROBLEM — J96.91 RESPIRATORY FAILURE WITH HYPOXIA: Status: ACTIVE | Noted: 2025-01-10

## 2025-01-10 LAB
ALBUMIN SERPL-MCNC: 3.8 G/DL (ref 3.4–5)
ALBUMIN/GLOB SERPL: 1.4 {RATIO} (ref 1.1–2.2)
ALP SERPL-CCNC: 76 U/L (ref 40–129)
ALT SERPL-CCNC: 22 U/L (ref 10–40)
ANION GAP SERPL CALCULATED.3IONS-SCNC: 10 MMOL/L (ref 3–16)
AST SERPL-CCNC: 33 U/L (ref 15–37)
BASE EXCESS BLDV CALC-SCNC: 0 MMOL/L (ref -3–3)
BASOPHILS # BLD: 0 K/UL (ref 0–0.2)
BASOPHILS NFR BLD: 0.5 %
BILIRUB SERPL-MCNC: 1.5 MG/DL (ref 0–1)
BILIRUB UR QL STRIP.AUTO: NEGATIVE
BUN SERPL-MCNC: 21 MG/DL (ref 7–20)
CALCIUM SERPL-MCNC: 8.4 MG/DL (ref 8.3–10.6)
CHLORIDE SERPL-SCNC: 90 MMOL/L (ref 99–110)
CLARITY UR: CLEAR
CO2 BLDV-SCNC: 26 MMOL/L
CO2 SERPL-SCNC: 27 MMOL/L (ref 21–32)
COHGB MFR BLDV: 1.8 % (ref 0–1.5)
COLOR UR: NORMAL
CREAT SERPL-MCNC: 1.3 MG/DL (ref 0.6–1.2)
DEPRECATED RDW RBC AUTO: 15 % (ref 12.4–15.4)
EKG DIAGNOSIS: NORMAL
EKG Q-T INTERVAL: 398 MS
EKG QRS DURATION: 92 MS
EKG QTC CALCULATION (BAZETT): 494 MS
EKG R AXIS: -10 DEGREES
EKG T AXIS: 115 DEGREES
EKG VENTRICULAR RATE: 93 BPM
EOSINOPHIL # BLD: 0 K/UL (ref 0–0.6)
EOSINOPHIL NFR BLD: 0.2 %
FLUAV RNA RESP QL NAA+PROBE: NOT DETECTED
FLUBV RNA RESP QL NAA+PROBE: NOT DETECTED
GFR SERPLBLD CREATININE-BSD FMLA CKD-EPI: 41 ML/MIN/{1.73_M2}
GLUCOSE SERPL-MCNC: 110 MG/DL (ref 70–99)
GLUCOSE UR STRIP.AUTO-MCNC: NEGATIVE MG/DL
HCO3 BLDV-SCNC: 24.6 MMOL/L (ref 23–29)
HCT VFR BLD AUTO: 27.4 % (ref 36–48)
HGB BLD-MCNC: 8.9 G/DL (ref 12–16)
HGB UR QL STRIP.AUTO: NEGATIVE
KETONES UR STRIP.AUTO-MCNC: NEGATIVE MG/DL
LACTATE BLDV-SCNC: 1.6 MMOL/L (ref 0.4–1.9)
LEUKOCYTE ESTERASE UR QL STRIP.AUTO: NEGATIVE
LYMPHOCYTES # BLD: 0.4 K/UL (ref 1–5.1)
LYMPHOCYTES NFR BLD: 5.4 %
MAGNESIUM SERPL-MCNC: 1.94 MG/DL (ref 1.8–2.4)
MCH RBC QN AUTO: 27.7 PG (ref 26–34)
MCHC RBC AUTO-ENTMCNC: 32.4 G/DL (ref 31–36)
MCV RBC AUTO: 85.7 FL (ref 80–100)
METHGB MFR BLDV: 0.3 %
MONOCYTES # BLD: 0.6 K/UL (ref 0–1.3)
MONOCYTES NFR BLD: 7.8 %
NEUTROPHILS # BLD: 7 K/UL (ref 1.7–7.7)
NEUTROPHILS NFR BLD: 86.1 %
NITRITE UR QL STRIP.AUTO: NEGATIVE
NT-PROBNP SERPL-MCNC: 4435 PG/ML (ref 0–449)
O2 CT VFR BLDV CALC: 11 VOL %
O2 THERAPY: ABNORMAL
PCO2 BLDV: 39.8 MMHG (ref 40–50)
PH BLDV: 7.41 [PH] (ref 7.35–7.45)
PH UR STRIP.AUTO: 6 [PH] (ref 5–8)
PLATELET # BLD AUTO: 381 K/UL (ref 135–450)
PMV BLD AUTO: 6.5 FL (ref 5–10.5)
PO2 BLDV: 46.4 MMHG (ref 25–40)
POTASSIUM SERPL-SCNC: 3.1 MMOL/L (ref 3.5–5.1)
PROCALCITONIN SERPL IA-MCNC: 0.13 NG/ML (ref 0–0.15)
PROT SERPL-MCNC: 6.5 G/DL (ref 6.4–8.2)
PROT UR STRIP.AUTO-MCNC: NEGATIVE MG/DL
RBC # BLD AUTO: 3.19 M/UL (ref 4–5.2)
RSV AG NOSE QL: NEGATIVE
SAO2 % BLDV: 83 %
SARS-COV-2 RNA RESP QL NAA+PROBE: NOT DETECTED
SODIUM SERPL-SCNC: 127 MMOL/L (ref 136–145)
SP GR UR STRIP.AUTO: 1.01 (ref 1–1.03)
TROPONIN, HIGH SENSITIVITY: 22 NG/L (ref 0–14)
TROPONIN, HIGH SENSITIVITY: 22 NG/L (ref 0–14)
UA COMPLETE W REFLEX CULTURE PNL UR: NORMAL
UA DIPSTICK W REFLEX MICRO PNL UR: NORMAL
URN SPEC COLLECT METH UR: NORMAL
UROBILINOGEN UR STRIP-ACNC: 0.2 E.U./DL
WBC # BLD AUTO: 8.1 K/UL (ref 4–11)

## 2025-01-10 PROCEDURE — 94640 AIRWAY INHALATION TREATMENT: CPT

## 2025-01-10 PROCEDURE — 84145 PROCALCITONIN (PCT): CPT

## 2025-01-10 PROCEDURE — 6370000000 HC RX 637 (ALT 250 FOR IP)

## 2025-01-10 PROCEDURE — 87040 BLOOD CULTURE FOR BACTERIA: CPT

## 2025-01-10 PROCEDURE — 84484 ASSAY OF TROPONIN QUANT: CPT

## 2025-01-10 PROCEDURE — 85025 COMPLETE CBC W/AUTO DIFF WBC: CPT

## 2025-01-10 PROCEDURE — 6360000002 HC RX W HCPCS: Performed by: NURSE PRACTITIONER

## 2025-01-10 PROCEDURE — 87636 SARSCOV2 & INF A&B AMP PRB: CPT

## 2025-01-10 PROCEDURE — 71045 X-RAY EXAM CHEST 1 VIEW: CPT

## 2025-01-10 PROCEDURE — 82803 BLOOD GASES ANY COMBINATION: CPT

## 2025-01-10 PROCEDURE — 94761 N-INVAS EAR/PLS OXIMETRY MLT: CPT

## 2025-01-10 PROCEDURE — 6370000000 HC RX 637 (ALT 250 FOR IP): Performed by: NURSE PRACTITIONER

## 2025-01-10 PROCEDURE — 99285 EMERGENCY DEPT VISIT HI MDM: CPT

## 2025-01-10 PROCEDURE — 73030 X-RAY EXAM OF SHOULDER: CPT

## 2025-01-10 PROCEDURE — 93005 ELECTROCARDIOGRAM TRACING: CPT | Performed by: STUDENT IN AN ORGANIZED HEALTH CARE EDUCATION/TRAINING PROGRAM

## 2025-01-10 PROCEDURE — 2700000000 HC OXYGEN THERAPY PER DAY

## 2025-01-10 PROCEDURE — 71250 CT THORAX DX C-: CPT

## 2025-01-10 PROCEDURE — 70450 CT HEAD/BRAIN W/O DYE: CPT

## 2025-01-10 PROCEDURE — 83880 ASSAY OF NATRIURETIC PEPTIDE: CPT

## 2025-01-10 PROCEDURE — 87807 RSV ASSAY W/OPTIC: CPT

## 2025-01-10 PROCEDURE — 2580000003 HC RX 258: Performed by: NURSE PRACTITIONER

## 2025-01-10 PROCEDURE — 93010 ELECTROCARDIOGRAM REPORT: CPT | Performed by: INTERNAL MEDICINE

## 2025-01-10 PROCEDURE — 83735 ASSAY OF MAGNESIUM: CPT

## 2025-01-10 PROCEDURE — 80053 COMPREHEN METABOLIC PANEL: CPT

## 2025-01-10 PROCEDURE — 83935 ASSAY OF URINE OSMOLALITY: CPT

## 2025-01-10 PROCEDURE — 83605 ASSAY OF LACTIC ACID: CPT

## 2025-01-10 PROCEDURE — 2500000003 HC RX 250 WO HCPCS

## 2025-01-10 PROCEDURE — 96374 THER/PROPH/DIAG INJ IV PUSH: CPT

## 2025-01-10 PROCEDURE — 87641 MR-STAPH DNA AMP PROBE: CPT

## 2025-01-10 PROCEDURE — 6370000000 HC RX 637 (ALT 250 FOR IP): Performed by: INTERNAL MEDICINE

## 2025-01-10 PROCEDURE — 1200000000 HC SEMI PRIVATE

## 2025-01-10 PROCEDURE — 83930 ASSAY OF BLOOD OSMOLALITY: CPT

## 2025-01-10 PROCEDURE — 81003 URINALYSIS AUTO W/O SCOPE: CPT

## 2025-01-10 PROCEDURE — 99223 1ST HOSP IP/OBS HIGH 75: CPT

## 2025-01-10 PROCEDURE — 36415 COLL VENOUS BLD VENIPUNCTURE: CPT

## 2025-01-10 RX ORDER — IPRATROPIUM BROMIDE AND ALBUTEROL SULFATE 2.5; .5 MG/3ML; MG/3ML
1 SOLUTION RESPIRATORY (INHALATION) ONCE
Status: COMPLETED | OUTPATIENT
Start: 2025-01-10 | End: 2025-01-10

## 2025-01-10 RX ORDER — FAMOTIDINE 20 MG/1
20 TABLET, FILM COATED ORAL DAILY
Status: DISCONTINUED | OUTPATIENT
Start: 2025-01-10 | End: 2025-01-13 | Stop reason: HOSPADM

## 2025-01-10 RX ORDER — ROSUVASTATIN CALCIUM 10 MG/1
10 TABLET, COATED ORAL DAILY
Status: DISCONTINUED | OUTPATIENT
Start: 2025-01-10 | End: 2025-01-13 | Stop reason: HOSPADM

## 2025-01-10 RX ORDER — IPRATROPIUM BROMIDE AND ALBUTEROL SULFATE 2.5; .5 MG/3ML; MG/3ML
1 SOLUTION RESPIRATORY (INHALATION)
Status: DISCONTINUED | OUTPATIENT
Start: 2025-01-10 | End: 2025-01-13

## 2025-01-10 RX ORDER — ACETAMINOPHEN 650 MG/1
650 SUPPOSITORY RECTAL EVERY 6 HOURS PRN
Status: DISCONTINUED | OUTPATIENT
Start: 2025-01-10 | End: 2025-01-13 | Stop reason: HOSPADM

## 2025-01-10 RX ORDER — SODIUM CHLORIDE 9 MG/ML
INJECTION, SOLUTION INTRAVENOUS CONTINUOUS
Status: DISCONTINUED | OUTPATIENT
Start: 2025-01-10 | End: 2025-01-10

## 2025-01-10 RX ORDER — SODIUM CHLORIDE 0.9 % (FLUSH) 0.9 %
5-40 SYRINGE (ML) INJECTION PRN
Status: DISCONTINUED | OUTPATIENT
Start: 2025-01-10 | End: 2025-01-13 | Stop reason: HOSPADM

## 2025-01-10 RX ORDER — BENZONATATE 100 MG/1
100 CAPSULE ORAL 3 TIMES DAILY PRN
Status: DISCONTINUED | OUTPATIENT
Start: 2025-01-10 | End: 2025-01-13 | Stop reason: HOSPADM

## 2025-01-10 RX ORDER — POLYETHYLENE GLYCOL 3350 17 G/17G
17 POWDER, FOR SOLUTION ORAL DAILY PRN
Status: DISCONTINUED | OUTPATIENT
Start: 2025-01-10 | End: 2025-01-13 | Stop reason: HOSPADM

## 2025-01-10 RX ORDER — GUAIFENESIN 600 MG/1
600 TABLET, EXTENDED RELEASE ORAL 2 TIMES DAILY
Status: DISCONTINUED | OUTPATIENT
Start: 2025-01-10 | End: 2025-01-13 | Stop reason: HOSPADM

## 2025-01-10 RX ORDER — ACETAMINOPHEN 325 MG/1
650 TABLET ORAL EVERY 6 HOURS PRN
Status: DISCONTINUED | OUTPATIENT
Start: 2025-01-10 | End: 2025-01-13 | Stop reason: HOSPADM

## 2025-01-10 RX ORDER — IPRATROPIUM BROMIDE AND ALBUTEROL SULFATE 2.5; .5 MG/3ML; MG/3ML
1 SOLUTION RESPIRATORY (INHALATION)
Status: DISCONTINUED | OUTPATIENT
Start: 2025-01-10 | End: 2025-01-10

## 2025-01-10 RX ORDER — METOPROLOL SUCCINATE 25 MG/1
25 TABLET, EXTENDED RELEASE ORAL DAILY
Status: DISCONTINUED | OUTPATIENT
Start: 2025-01-10 | End: 2025-01-13 | Stop reason: HOSPADM

## 2025-01-10 RX ORDER — ALBUTEROL SULFATE 0.83 MG/ML
2.5 SOLUTION RESPIRATORY (INHALATION) EVERY 4 HOURS PRN
Status: DISCONTINUED | OUTPATIENT
Start: 2025-01-10 | End: 2025-01-13 | Stop reason: HOSPADM

## 2025-01-10 RX ORDER — AMLODIPINE BESYLATE 5 MG/1
5 TABLET ORAL DAILY
Status: DISCONTINUED | OUTPATIENT
Start: 2025-01-10 | End: 2025-01-13 | Stop reason: HOSPADM

## 2025-01-10 RX ORDER — IPRATROPIUM BROMIDE AND ALBUTEROL SULFATE 2.5; .5 MG/3ML; MG/3ML
1 SOLUTION RESPIRATORY (INHALATION) ONCE
Status: SHIPPED | OUTPATIENT
Start: 2025-01-10

## 2025-01-10 RX ORDER — SODIUM CHLORIDE 0.9 % (FLUSH) 0.9 %
5-40 SYRINGE (ML) INJECTION EVERY 12 HOURS SCHEDULED
Status: DISCONTINUED | OUTPATIENT
Start: 2025-01-10 | End: 2025-01-13 | Stop reason: HOSPADM

## 2025-01-10 RX ORDER — SODIUM CHLORIDE 9 MG/ML
INJECTION, SOLUTION INTRAVENOUS PRN
Status: DISCONTINUED | OUTPATIENT
Start: 2025-01-10 | End: 2025-01-13 | Stop reason: HOSPADM

## 2025-01-10 RX ORDER — SODIUM CHLORIDE 9 MG/ML
INJECTION, SOLUTION INTRAVENOUS CONTINUOUS
Status: DISCONTINUED | OUTPATIENT
Start: 2025-01-11 | End: 2025-01-12

## 2025-01-10 RX ORDER — ONDANSETRON 4 MG/1
4 TABLET, ORALLY DISINTEGRATING ORAL EVERY 8 HOURS PRN
Status: DISCONTINUED | OUTPATIENT
Start: 2025-01-10 | End: 2025-01-13 | Stop reason: HOSPADM

## 2025-01-10 RX ORDER — POTASSIUM CHLORIDE 1500 MG/1
20 TABLET, EXTENDED RELEASE ORAL ONCE
Status: COMPLETED | OUTPATIENT
Start: 2025-01-11 | End: 2025-01-11

## 2025-01-10 RX ORDER — ONDANSETRON 2 MG/ML
4 INJECTION INTRAMUSCULAR; INTRAVENOUS EVERY 6 HOURS PRN
Status: DISCONTINUED | OUTPATIENT
Start: 2025-01-10 | End: 2025-01-13 | Stop reason: HOSPADM

## 2025-01-10 RX ORDER — VANCOMYCIN 1.5 G/300ML
1500 INJECTION, SOLUTION INTRAVENOUS ONCE
Status: COMPLETED | OUTPATIENT
Start: 2025-01-10 | End: 2025-01-10

## 2025-01-10 RX ADMIN — GUAIFENESIN 600 MG: 600 TABLET, EXTENDED RELEASE ORAL at 22:02

## 2025-01-10 RX ADMIN — Medication 10 ML: at 22:02

## 2025-01-10 RX ADMIN — CEFEPIME 2000 MG: 2 INJECTION, POWDER, FOR SOLUTION INTRAVENOUS at 14:26

## 2025-01-10 RX ADMIN — IPRATROPIUM BROMIDE AND ALBUTEROL SULFATE 1 DOSE: 2.5; .5 SOLUTION RESPIRATORY (INHALATION) at 16:10

## 2025-01-10 RX ADMIN — VANCOMYCIN 1500 MG: 1.5 INJECTION, SOLUTION INTRAVENOUS at 15:14

## 2025-01-10 RX ADMIN — APIXABAN 2.5 MG: 5 TABLET, FILM COATED ORAL at 22:02

## 2025-01-10 RX ADMIN — IPRATROPIUM BROMIDE AND ALBUTEROL SULFATE 1 DOSE: 2.5; .5 SOLUTION RESPIRATORY (INHALATION) at 12:00

## 2025-01-10 RX ADMIN — IPRATROPIUM BROMIDE AND ALBUTEROL SULFATE 1 DOSE: 2.5; .5 SOLUTION RESPIRATORY (INHALATION) at 19:50

## 2025-01-10 ASSESSMENT — ENCOUNTER SYMPTOMS
WHEEZING: 1
CHEST TIGHTNESS: 1
SHORTNESS OF BREATH: 1
COUGH: 1
GASTROINTESTINAL NEGATIVE: 1

## 2025-01-10 ASSESSMENT — PATIENT HEALTH QUESTIONNAIRE - PHQ9
9. THOUGHTS THAT YOU WOULD BE BETTER OFF DEAD, OR OF HURTING YOURSELF: NOT AT ALL
4. FEELING TIRED OR HAVING LITTLE ENERGY: NOT AT ALL
3. TROUBLE FALLING OR STAYING ASLEEP: NOT AT ALL
2. FEELING DOWN, DEPRESSED OR HOPELESS: NOT AT ALL
1. LITTLE INTEREST OR PLEASURE IN DOING THINGS: NOT AT ALL
7. TROUBLE CONCENTRATING ON THINGS, SUCH AS READING THE NEWSPAPER OR WATCHING TELEVISION: NOT AT ALL
6. FEELING BAD ABOUT YOURSELF - OR THAT YOU ARE A FAILURE OR HAVE LET YOURSELF OR YOUR FAMILY DOWN: NOT AT ALL
SUM OF ALL RESPONSES TO PHQ QUESTIONS 1-9: 0
SUM OF ALL RESPONSES TO PHQ QUESTIONS 1-9: 0
8. MOVING OR SPEAKING SO SLOWLY THAT OTHER PEOPLE COULD HAVE NOTICED. OR THE OPPOSITE, BEING SO FIGETY OR RESTLESS THAT YOU HAVE BEEN MOVING AROUND A LOT MORE THAN USUAL: NOT AT ALL
SUM OF ALL RESPONSES TO PHQ QUESTIONS 1-9: 0
10. IF YOU CHECKED OFF ANY PROBLEMS, HOW DIFFICULT HAVE THESE PROBLEMS MADE IT FOR YOU TO DO YOUR WORK, TAKE CARE OF THINGS AT HOME, OR GET ALONG WITH OTHER PEOPLE: NOT DIFFICULT AT ALL
SUM OF ALL RESPONSES TO PHQ QUESTIONS 1-9: 0
SUM OF ALL RESPONSES TO PHQ9 QUESTIONS 1 & 2: 0
5. POOR APPETITE OR OVEREATING: NOT AT ALL

## 2025-01-10 ASSESSMENT — PAIN SCALES - GENERAL: PAINLEVEL_OUTOF10: 0

## 2025-01-10 NOTE — PROGRESS NOTES
4 Eyes Skin Assessment     NAME:  Rima Wyatt  YOB: 1945  MEDICAL RECORD NUMBER:  [id number]    The patient is being assessed for  Admission    I agree that at least one RN has performed a thorough Head to Toe Skin Assessment on the patient. ALL assessment sites listed below have been assessed.      Areas assessed by both nurses:    Head, Face, Ears, Shoulders, Back, Chest, Arms, Elbows, Hands, Sacrum. Buttock, Coccyx, Ischium, Legs. Feet and Heels, and Under Medical Devices         Does the Patient have a Wound? No noted wound(s)       José Miguel Prevention initiated by RN: No  Wound Care Orders initiated by RN: No    Pressure Injury (Stage 3,4, Unstageable, DTI, NWPT, and Complex wounds) if present, place Wound referral order by RN under : No    New Ostomies, if present place, Ostomy referral order under : No     Nurse 1 eSignature: Electronically signed by Portia Washington RN on 1/10/25 at 6:37 PM EST    **SHARE this note so that the co-signing nurse can place an eSignature**    Nurse 2 eSignature: {Esignature:[id number]}

## 2025-01-10 NOTE — ED PROVIDER NOTES
[facility] [place] [facility]  EMERGENCY DEPARTMENT ENCOUNTER        Pt Name: Rima Wyatt  MRN: [id number]  Birthdate 1945  Date of evaluation: 1/10/2025  Provider: IMELDA Baeza CNP  PCP: Christopher Gutierrez APRN - CNP  Note Started: 11:35 AM EST 1/10/25      GARRET. I have evaluated this patient.        CHIEF COMPLAINT       Chief Complaint   Patient presents with    Shortness of Breath       HISTORY OF PRESENT ILLNESS: 1 or more Elements     History From: Patient     Limitations to history : None    Social Determinants Significantly Affecting Health : None    Chief Complaint: Shortness of breath     Rima Wyatt is a 79 y.o. female who presents to the emergency department a day with symptoms of shortness of breath.  Patient states that she has been feeling this way for the last 4 to 5 days.  States that she has symptoms of cough and congestion.  Had a recent diagnosis of pneumonia.  Was on antibiotics in December admitted to the hospital.  She was discharged with some supplemental oxygen for home but has graduated from that has not needed it.  States that she went to see her family doctor today with concerns for respiratory symptoms of shortness of breath and cough.  She was sent to the Emergency Department with concerns for pneumonia and hypoxia.  Upon arrival she reported 80% room air saturation.    Nursing Notes were all reviewed and agreed with or any disagreements were addressed in the HPI.    REVIEW OF SYSTEMS :      Review of Systems    Positives and Pertinent negatives as per HPI.     SURGICAL HISTORY     Past Surgical History:   Procedure Laterality Date    BACK SURGERY      CATARACT REMOVAL WITH IMPLANT  01/14/2011    LEFT EYE    EYE SURGERY  12/14/2010    cataract rt eye    HYSTERECTOMY (CERVIX STATUS UNKNOWN)      HYSTERECTOMY, TOTAL ABDOMINAL (CERVIX REMOVED)      JOINT REPLACEMENT Bilateral 1997    left and right hip    OTHER SURGICAL HISTORY Right     RIGHT TOTAL KNEE REPLACEMENT

## 2025-01-10 NOTE — H&P
Hospital Medicine History & Physical      PCP: Christopher Gutierrez, APRN - CNP    Date of Admission: 1/10/2025    Date of Service: Pt seen/examined on 01/10/25       Chief Complaint:    Chief Complaint   Patient presents with    Shortness of Breath         History Of Present Illness:      The patient is a 79 y.o. female with PMHx of diastolic CHF, afib, HTN, HLD, CKD, CAD, SSS, and GERD who presents to [facility] for hypoxia. Her daughter is at bedside providing some of the history. She was admitted at the beginning of December for a pneumonia and states that she almost felt fully recovered after this. She would use the oxygen given to her as needed when she felt short of breath. Her symptoms of a cough and general fatigue returned January 1st. She states that she does not produce any sputum with this cough. She went to her doctors appointment today, where she was directed to come into the ED. Her daughter states that when the patient gets up to go to move around at home, her breathing becomes more strained and her O2 drops. She also states that she sleeps in a recliner at home, but she has been doing this for a while/ there is no recent change. She also has a history of TB as a child, but has never smoked before. She denies chest pain, abdominal pain, nausea, vomiting, and diarrhea. History obtained from the patient and review of EMR.     Past Medical History:        Diagnosis Date    Acute congestive heart failure, unspecified heart failure type (HCC) 10/06/2023    Acute diastolic CHF (congestive heart failure) (HCC) 10/07/2023    Arthritis     Atrial fibrillation (HCC)     Closed subchondral insufficiency fracture of femoral condyle (HCC) 08/12/2020    Hypertension     Localized osteoarthrosis not specified whether primary or secondary, pelvic region and thigh 05/08/2015    Neurogenic bladder     caused by a back surgery in 2017 per pt       Past Surgical History:        Procedure Laterality Date    BACK  Take 0.5 tablets by mouth daily    Provider, MD Ember   Cholecalciferol (VITAMIN D) 50 MCG (2000 UT) CAPS capsule Take by mouth    Provider, MD Ember       Allergies:  Prednisone and Hydrochlorothiazide    Social History:      TOBACCO:   reports that she has never smoked. She has never used smokeless tobacco.  ETOH:   reports no history of alcohol use.      Family History:   Positive as follows:        Problem Relation Age of Onset    Arthritis Mother     Diabetes Mother     High Blood Pressure Mother     Diabetes Sister     High Blood Pressure Sister     Diabetes Brother     High Blood Pressure Brother     Diabetes Sister        REVIEW OF SYSTEMS:       Constitutional: Negative for fever, + for fatigue   HENT: Negative for sore throat   Eyes: Negative for redness   Respiratory: +  for dyspnea, cough   Cardiovascular: Negative for chest pain   Gastrointestinal: Negative for vomiting, diarrhea   Genitourinary: Negative for hematuria   Musculoskeletal: Negative for arthralgias   Skin: Negative for rash   Neurological: Negative for syncope   Hematological: Negative for adenopathy   Psychiatric/Behavorial: Negative for anxiety    PHYSICAL EXAM:  /68   Pulse 90   Temp 98.6 °F (37 °C)   Resp 22   SpO2 97%     Gen: No distress. Alert.   Eyes: PERRL. No sclera icterus. No conjunctival injection.   ENT: No discharge. Pharynx clear.   Neck: No JVD.  No Carotid Bruit. Trachea midline.  Resp: 2L O2 NC. No accessory muscle use. No crackles. + decreased breath sounds, wheezing.   CV: Regular rate. Regular rhythm. No murmur.  No rub. No edema.   GI: Non-tender. Non-distended. No masses. No organomegaly. Normal bowel sounds. No hernia.   Skin: Warm and dry. No nodule on exposed extremities. No rash on exposed extremities.   M/S: No cyanosis. No joint deformity. No clubbing.   Neuro: Awake. Grossly nonfocal.    Psych: Oriented x 3. No anxiety or agitation.     CBC:   Recent Labs     01/10/25  1138   WBC 8.1

## 2025-01-10 NOTE — CONSULTS
Pharmacy to dose IV Vanco - HCAP  1,500mg IV x1 given in ED to provide Gram+ organism coverage to include MRSA.  Will continue to pulse dose based on lab results until a daily dose can be calculated.  Random Vanco with AM labs.  Carlos Gibson RPH PharmD 1/10/2025 3:47 PM

## 2025-01-10 NOTE — PROGRESS NOTES
Pharmacy to Dose Vancomycin per ED  Dx: PNA  Wt 69kg  Vanc loading dose 1500mg times one  Betty Stern Pharm D 1/10/86284:22 PM  .

## 2025-01-10 NOTE — ED PROVIDER NOTES
I independently examined and evaluated Rima Wyatt.  I personally saw the patient and performed a substantive portion of the visit including all aspects of the medical decision making.    In brief, this 79-year-old female is presenting with worsening shortness of breath for the last 5 days.  Endorses cough and congestion.  States he was recently treated for pneumonia.  Has not noted any fevers or chills recently.    Focused exam revealed   General: Alert, no acute distress, patient resting comfortably   Skin: warm, intact, no pallor noted   Head: Normocephalic, atraumatic   Eye: Normal conjunctiva   Cardiac: Normal peripheral perfusion  Respiratory: No acute distress   Musculoskeletal: No deformity, full ROM.   Neurological: alert and oriented, normal sensory and motor observed.   Psychiatric: Cooperative    ED course: Labwork obtained and does show a moderate hyponatremia, kidney function at baseline.  Modestly elevated BNP that is actually improved compared to before.  Flu and COVID-negative.  Chest x-ray did not show any acute process and CT head was reassuring.  Ultimately obtain CT chest for better evaluation of this patient's presenting symptoms.  This shows right lower lobe bronchiolitis and some residual groundglass opacities.  For the possibility of bacterial source with her recent hospitalization for pneumonia, she was treated with broad-spectrum antibiotics and will be admitted for further treatment.    All diagnostic, treatment, and disposition decisions were made by myself in conjunction with the advanced practice provider/resident.    I personally saw the patient and made/approved the management plan and take responsibility for the patient management.     For all further details of the patient's emergency department visit, please see the advanced practice provider's/resident's documentation.    ECG    The Ekg interpreted by me shows sinus rhythm with a rate of 93 bpm.  Left axis deviation.  No acute

## 2025-01-10 NOTE — PROGRESS NOTES
[facility]  1/10/2025    Rima Wyatt (:  1945) is a 79 y.o. female, here for evaluation of the following medical concerns:    Chief Complaint   Patient presents with    Fatigue    Cough     Started last Thursday     Fall     Pt fell on left         ASSESSMENT/ PLAN  1. Acute respiratory failure with hypoxia  -acute distress noted on exam.  Declines EMS services to the hospital  -DuoNeb given  -Slight improvement with crackles and wheezing  -She does have diffuse wheezing throughout with crackles in the bibasilar lungs  -Oxygen saturation 88 to 91% on room air  -Patient agrees to be seen in ER by private vehicle  - ipratropium 0.5 mg-albuterol 2.5 mg (DUONEB) nebulizer solution 1 Dose    2. Shortness of breath  -See #1  - ipratropium 0.5 mg-albuterol 2.5 mg (DUONEB) nebulizer solution 1 Dose    3. Wheezing  -See #1  - ipratropium 0.5 mg-albuterol 2.5 mg (DUONEB) nebulizer solution 1 Dose    4. Bibasilar crackles  -See #1  - ipratropium 0.5 mg-albuterol 2.5 mg (DUONEB) nebulizer solution 1 Dose    5. History of pneumonia  -See #1  - ipratropium 0.5 mg-albuterol 2.5 mg (DUONEB) nebulizer solution 1 Dose    6. Stage 3b chronic kidney disease (HCC)  -Stable on last blood work checks    7. Chronic atrial fibrillation, unspecified (HCC)  -Chronic A-fib but appears to be rate controlled at this time    8. Acute on chronic diastolic (congestive) heart failure (HCC)  -Could be contributing to acute respiratory failure at this time  -Grade 2 diastolic dysfunction in 2024               No follow-ups on file.    HPI  Patient seen in office today in acute distress with shortness of breath, wheezing and cough.  Denies any fever, chills or bodyaches.  She denies any chest pain or leg swelling.  She was recently seen on  by me for hospital discharge follow-up following admission for pneumonia.  She was requiring oxygen and then was not requiring oxygen after oxygen saturations checked by me in

## 2025-01-11 PROBLEM — R09.02 HYPOXEMIA: Status: ACTIVE | Noted: 2025-01-10

## 2025-01-11 PROBLEM — R06.2 WHEEZING: Status: ACTIVE | Noted: 2025-01-11

## 2025-01-11 PROBLEM — R09.89 BIBASILAR CRACKLES: Status: ACTIVE | Noted: 2025-01-11

## 2025-01-11 LAB
ANION GAP SERPL CALCULATED.3IONS-SCNC: 10 MMOL/L (ref 3–16)
BASOPHILS # BLD: 0 K/UL (ref 0–0.2)
BASOPHILS NFR BLD: 0.5 %
BUN SERPL-MCNC: 20 MG/DL (ref 7–20)
CALCIUM SERPL-MCNC: 7.8 MG/DL (ref 8.3–10.6)
CHLORIDE SERPL-SCNC: 96 MMOL/L (ref 99–110)
CO2 SERPL-SCNC: 23 MMOL/L (ref 21–32)
CREAT SERPL-MCNC: 1.3 MG/DL (ref 0.6–1.2)
DEPRECATED RDW RBC AUTO: 15.2 % (ref 12.4–15.4)
EOSINOPHIL # BLD: 0.1 K/UL (ref 0–0.6)
EOSINOPHIL NFR BLD: 1.3 %
GFR SERPLBLD CREATININE-BSD FMLA CKD-EPI: 41 ML/MIN/{1.73_M2}
GLUCOSE SERPL-MCNC: 102 MG/DL (ref 70–99)
HCT VFR BLD AUTO: 23.1 % (ref 36–48)
HGB BLD-MCNC: 7.6 G/DL (ref 12–16)
LYMPHOCYTES # BLD: 0.4 K/UL (ref 1–5.1)
LYMPHOCYTES NFR BLD: 7.2 %
MAGNESIUM SERPL-MCNC: 2.11 MG/DL (ref 1.8–2.4)
MCH RBC QN AUTO: 28.5 PG (ref 26–34)
MCHC RBC AUTO-ENTMCNC: 32.8 G/DL (ref 31–36)
MCV RBC AUTO: 87 FL (ref 80–100)
MONOCYTES # BLD: 0.6 K/UL (ref 0–1.3)
MONOCYTES NFR BLD: 9.3 %
NEUTROPHILS # BLD: 5.1 K/UL (ref 1.7–7.7)
NEUTROPHILS NFR BLD: 81.7 %
OSMOLALITY SERPL: 290 MOSM/KG (ref 280–301)
OSMOLALITY UR: 267 MOSM/KG (ref 390–1070)
PLATELET # BLD AUTO: 324 K/UL (ref 135–450)
PMV BLD AUTO: 6.6 FL (ref 5–10.5)
POTASSIUM SERPL-SCNC: 3.1 MMOL/L (ref 3.5–5.1)
RBC # BLD AUTO: 2.65 M/UL (ref 4–5.2)
SODIUM SERPL-SCNC: 129 MMOL/L (ref 136–145)
VANCOMYCIN SERPL-MCNC: 15.4 UG/ML
WBC # BLD AUTO: 6.2 K/UL (ref 4–11)

## 2025-01-11 PROCEDURE — 2580000003 HC RX 258: Performed by: INTERNAL MEDICINE

## 2025-01-11 PROCEDURE — 6370000000 HC RX 637 (ALT 250 FOR IP)

## 2025-01-11 PROCEDURE — 6360000002 HC RX W HCPCS: Performed by: INTERNAL MEDICINE

## 2025-01-11 PROCEDURE — 6370000000 HC RX 637 (ALT 250 FOR IP): Performed by: INTERNAL MEDICINE

## 2025-01-11 PROCEDURE — 80202 ASSAY OF VANCOMYCIN: CPT

## 2025-01-11 PROCEDURE — 94761 N-INVAS EAR/PLS OXIMETRY MLT: CPT

## 2025-01-11 PROCEDURE — 6360000002 HC RX W HCPCS

## 2025-01-11 PROCEDURE — 2580000003 HC RX 258

## 2025-01-11 PROCEDURE — 83735 ASSAY OF MAGNESIUM: CPT

## 2025-01-11 PROCEDURE — 51702 INSERT TEMP BLADDER CATH: CPT

## 2025-01-11 PROCEDURE — 85025 COMPLETE CBC W/AUTO DIFF WBC: CPT

## 2025-01-11 PROCEDURE — 2700000000 HC OXYGEN THERAPY PER DAY

## 2025-01-11 PROCEDURE — 99233 SBSQ HOSP IP/OBS HIGH 50: CPT | Performed by: INTERNAL MEDICINE

## 2025-01-11 PROCEDURE — 2500000003 HC RX 250 WO HCPCS

## 2025-01-11 PROCEDURE — 80048 BASIC METABOLIC PNL TOTAL CA: CPT

## 2025-01-11 PROCEDURE — 36415 COLL VENOUS BLD VENIPUNCTURE: CPT

## 2025-01-11 PROCEDURE — 94640 AIRWAY INHALATION TREATMENT: CPT

## 2025-01-11 PROCEDURE — 99223 1ST HOSP IP/OBS HIGH 75: CPT | Performed by: INTERNAL MEDICINE

## 2025-01-11 PROCEDURE — 1200000000 HC SEMI PRIVATE

## 2025-01-11 RX ORDER — VANCOMYCIN 750 MG/150ML
750 INJECTION, SOLUTION INTRAVENOUS ONCE
Status: COMPLETED | OUTPATIENT
Start: 2025-01-11 | End: 2025-01-11

## 2025-01-11 RX ORDER — POTASSIUM CHLORIDE 7.45 MG/ML
10 INJECTION INTRAVENOUS PRN
Status: DISCONTINUED | OUTPATIENT
Start: 2025-01-11 | End: 2025-01-13 | Stop reason: HOSPADM

## 2025-01-11 RX ORDER — LEVOFLOXACIN 750 MG/1
750 TABLET, FILM COATED ORAL
Status: DISCONTINUED | OUTPATIENT
Start: 2025-01-11 | End: 2025-01-13 | Stop reason: HOSPADM

## 2025-01-11 RX ORDER — LEVOFLOXACIN 5 MG/ML
750 INJECTION, SOLUTION INTRAVENOUS
Status: DISCONTINUED | OUTPATIENT
Start: 2025-01-11 | End: 2025-01-11

## 2025-01-11 RX ORDER — POTASSIUM CHLORIDE 1500 MG/1
40 TABLET, EXTENDED RELEASE ORAL PRN
Status: DISCONTINUED | OUTPATIENT
Start: 2025-01-11 | End: 2025-01-13 | Stop reason: HOSPADM

## 2025-01-11 RX ORDER — GUAIFENESIN/DEXTROMETHORPHAN 100-10MG/5
5 SYRUP ORAL EVERY 4 HOURS PRN
Status: DISCONTINUED | OUTPATIENT
Start: 2025-01-11 | End: 2025-01-13 | Stop reason: HOSPADM

## 2025-01-11 RX ORDER — PREDNISONE 20 MG/1
40 TABLET ORAL DAILY
Status: DISCONTINUED | OUTPATIENT
Start: 2025-01-11 | End: 2025-01-13 | Stop reason: HOSPADM

## 2025-01-11 RX ADMIN — POTASSIUM CHLORIDE 40 MEQ: 1500 TABLET, EXTENDED RELEASE ORAL at 17:59

## 2025-01-11 RX ADMIN — ROSUVASTATIN CALCIUM 10 MG: 10 TABLET, FILM COATED ORAL at 09:01

## 2025-01-11 RX ADMIN — CEFEPIME 2000 MG: 2 INJECTION, POWDER, FOR SOLUTION INTRAVENOUS at 14:16

## 2025-01-11 RX ADMIN — Medication 10 ML: at 21:40

## 2025-01-11 RX ADMIN — IPRATROPIUM BROMIDE AND ALBUTEROL SULFATE 1 DOSE: 2.5; .5 SOLUTION RESPIRATORY (INHALATION) at 15:47

## 2025-01-11 RX ADMIN — APIXABAN 2.5 MG: 5 TABLET, FILM COATED ORAL at 21:40

## 2025-01-11 RX ADMIN — ALBUTEROL SULFATE 2.5 MG: 2.5 SOLUTION RESPIRATORY (INHALATION) at 23:12

## 2025-01-11 RX ADMIN — LEVOFLOXACIN 750 MG: 750 TABLET, FILM COATED ORAL at 22:37

## 2025-01-11 RX ADMIN — FAMOTIDINE 20 MG: 20 TABLET, FILM COATED ORAL at 09:01

## 2025-01-11 RX ADMIN — IPRATROPIUM BROMIDE AND ALBUTEROL SULFATE 1 DOSE: 2.5; .5 SOLUTION RESPIRATORY (INHALATION) at 07:55

## 2025-01-11 RX ADMIN — METOPROLOL SUCCINATE 25 MG: 25 TABLET, EXTENDED RELEASE ORAL at 09:01

## 2025-01-11 RX ADMIN — SODIUM CHLORIDE: 9 INJECTION, SOLUTION INTRAVENOUS at 00:12

## 2025-01-11 RX ADMIN — GUAIFENESIN 600 MG: 600 TABLET, EXTENDED RELEASE ORAL at 21:40

## 2025-01-11 RX ADMIN — AMLODIPINE BESYLATE 5 MG: 5 TABLET ORAL at 09:02

## 2025-01-11 RX ADMIN — POTASSIUM CHLORIDE 20 MEQ: 1500 TABLET, EXTENDED RELEASE ORAL at 00:06

## 2025-01-11 RX ADMIN — BENZONATATE 100 MG: 100 CAPSULE ORAL at 02:28

## 2025-01-11 RX ADMIN — IPRATROPIUM BROMIDE AND ALBUTEROL SULFATE 1 DOSE: 2.5; .5 SOLUTION RESPIRATORY (INHALATION) at 19:24

## 2025-01-11 RX ADMIN — IPRATROPIUM BROMIDE AND ALBUTEROL SULFATE 1 DOSE: 2.5; .5 SOLUTION RESPIRATORY (INHALATION) at 11:22

## 2025-01-11 RX ADMIN — GUAIFENESIN 600 MG: 600 TABLET, EXTENDED RELEASE ORAL at 09:01

## 2025-01-11 RX ADMIN — VANCOMYCIN 750 MG: 750 INJECTION, SOLUTION INTRAVENOUS at 08:59

## 2025-01-11 RX ADMIN — APIXABAN 2.5 MG: 5 TABLET, FILM COATED ORAL at 09:01

## 2025-01-11 RX ADMIN — CEFEPIME 2000 MG: 2 INJECTION, POWDER, FOR SOLUTION INTRAVENOUS at 02:34

## 2025-01-11 RX ADMIN — PREDNISONE 40 MG: 20 TABLET ORAL at 21:42

## 2025-01-11 RX ADMIN — SODIUM CHLORIDE: 9 INJECTION, SOLUTION INTRAVENOUS at 16:39

## 2025-01-11 RX ADMIN — BENZONATATE 100 MG: 100 CAPSULE ORAL at 09:01

## 2025-01-11 ASSESSMENT — PAIN SCALES - GENERAL: PAINLEVEL_OUTOF10: 0

## 2025-01-11 NOTE — PROGRESS NOTES
HEART FAILURE CARE PLAN:    Comorbidities Reviewed: Yes   Patient has a past medical history of Acute congestive heart failure, unspecified heart failure type (HCC), Acute diastolic CHF (congestive heart failure) (HCC), Arthritis, Atrial fibrillation (HCC), Closed subchondral insufficiency fracture of femoral condyle (HCC), Hypertension, Localized osteoarthrosis not specified whether primary or secondary, pelvic region and thigh, and Neurogenic bladder.     Weights Reviewed: Yes   Admission weight: 69 kg (152 lb 1.9 oz)   Wt Readings from Last 3 Encounters:   01/10/25 69 kg (152 lb 1.9 oz)   12/19/24 69.4 kg (153 lb)   12/09/24 70.5 kg (155 lb 6.4 oz)     Intake & Output Reviewed: Yes     Intake/Output Summary (Last 24 hours) at 1/10/2025 2731  Last data filed at 1/10/2025 2347  Gross per 24 hour   Intake --   Output 750 ml   Net -750 ml       ECHOCARDIOGRAM Reviewed: Yes   Patient's Ejection Fraction (EF) is greater than 40%     Medications Reviewed: Yes   SCHEDULED HOSPITAL MEDICATIONS:   sodium chloride flush  5-40 mL IntraVENous 2 times per day    guaiFENesin  600 mg Oral BID    [START ON 1/11/2025] cefepime  2,000 mg IntraVENous Q12H    [START ON 1/11/2025] vancomycin (VANCOCIN) intermittent dosing (placeholder)   Other RX Placeholder    ipratropium 0.5 mg-albuterol 2.5 mg  1 Dose Inhalation 4x Daily RT    amLODIPine  5 mg Oral Daily    apixaban  2.5 mg Oral BID    metoprolol succinate  25 mg Oral Daily    rosuvastatin  10 mg Oral Daily    famotidine  20 mg Oral Daily    [START ON 1/11/2025] potassium chloride  20 mEq Oral Once     HOME MEDICATIONS:  Prior to Admission medications    Medication Sig Start Date End Date Taking? Authorizing Provider   torsemide (DEMADEX) 20 MG tablet Take 1 tablet by mouth daily 12/10/24  Yes Anabell Forbes, DO   amLODIPine (NORVASC) 5 MG tablet Take 1 tablet by mouth daily 11/7/24  Yes Enrique Plaza, DO   metoprolol succinate (TOPROL XL) 25 MG extended release tablet Take 1  tablet by mouth daily 11/7/24  Yes Ana Maria Coyne MD   rosuvastatin (CRESTOR) 10 MG tablet Take 1 tablet by mouth once daily 11/4/24  Yes Enrique Plaza DO   famotidine (PEPCID) 20 MG tablet Take 1 tablet by mouth daily 8/7/24  Yes Christopher Gutierrez APRN - CNP   apixaban (ELIQUIS) 2.5 MG TABS tablet Take 1 tablet by mouth 2 times daily 6/5/24  Yes Ana Maria Coyne MD   Multiple Vitamins-Minerals (THERAPEUTIC MULTIVITAMIN-MINERALS) tablet Take 1 tablet by mouth daily   Yes Ember Winchester MD   ferrous sulfate (IRON 325) 325 (65 Fe) MG tablet Take 1 tablet by mouth daily (with breakfast)   Yes Ember Winchester MD   amiodarone (CORDARONE) 200 MG tablet Take 0.5 tablets by mouth See Admin Instructions Take 0.5 tablet by mouth Monday through Thursday. 11/21/23 1/10/25 Yes Ana Maria Coyne MD   vitamin B-12 (CYANOCOBALAMIN) 100 MCG tablet Take 0.5 tablets by mouth daily   Yes Ember Winchester MD   Handicap Placard MISC by Does not apply route Duration - 5 years 11/19/24   Christopher Gutierrez APRN - CNP   cetirizine (ZYRTEC) 10 MG tablet Take 1 tablet by mouth daily  Patient not taking: Reported on 1/10/2025 11/19/24   Christopher Gutierrez APRN - CNP   Cholecalciferol (VITAMIN D) 50 MCG (2000 UT) CAPS capsule Take by mouth  Patient not taking: Reported on 1/10/2025    ProviderEmber MD      Diet Reviewed: Yes   ADULT DIET; Regular; Low Sodium (2 gm)    Goal of Care Reviewed: Yes   Patient and/or Family's stated Goal of Care this Admission: Reduce shortness of breath, increase activity tolerance, better understand heart failure and disease management, be more comfortable, and reduce lower extremity edema prior to discharge.     Electronically signed by Anabell Tamayo RN on 1/10/2025 at 11:52 PM

## 2025-01-11 NOTE — PROGRESS NOTES
1/11  Vanc random = 15.4 mcg/mL at 0608.  Give vancomycin 750 mg x1.  Continue to check vanc levels daily.  Aaron Mejia, PharmD  1/11/2025 7:37 AM

## 2025-01-11 NOTE — CONSULTS
Pulmonary & Critical Care Consultation Note    CC: pneumonia    HISTORY OF PRESENT ILLNESS: 79-year-old female with a history of CHF presented with shortness of breath and cough.  She has general malaise and a cough with green sputum.  Her CAT scan shows some improvement overall since her last admission for pneumonia but there is a new area of right lower lobe pneumonia.  Patient denies aspiration.  The patient denies history of COPD or asthma however she is currently wheezing.  She is also needed oxygen during previous admissions.  PAST MEDICAL HISTORY:  Past Medical History:   Diagnosis Date    Acute congestive heart failure, unspecified heart failure type ([facility]) 10/06/2023    Acute diastolic CHF (congestive heart failure) ([facility]) 10/07/2023    Arthritis     Atrial fibrillation ([facility])     Closed subchondral insufficiency fracture of femoral condyle ([facility]) 08/12/2020    Hypertension     Localized osteoarthrosis not specified whether primary or secondary, pelvic region and thigh 05/08/2015    Neurogenic bladder     caused by a back surgery in 2017 per pt     PAST SURGICAL HISTORY:  Past Surgical History:   Procedure Laterality Date    BACK SURGERY      CATARACT REMOVAL WITH IMPLANT  01/14/2011    LEFT EYE    EYE SURGERY  12/14/2010    cataract rt eye    HYSTERECTOMY (CERVIX STATUS UNKNOWN)      HYSTERECTOMY, TOTAL ABDOMINAL (CERVIX REMOVED)      JOINT REPLACEMENT Bilateral 1997    left and right hip    OTHER SURGICAL HISTORY Right     RIGHT TOTAL KNEE REPLACEMENT     PACEMAKER INSERTION      October 2022    TOTAL KNEE ARTHROPLASTY Right 12/06/2021    RIGHT TOTAL KNEE REPLACEMENT performed by Wilbert Ball MD at [facility]       FAMILY HISTORY:  family history includes Arthritis in her mother; Diabetes in her brother, mother, sister, and sister; High Blood Pressure in her brother, mother, and sister.    SOCIAL HISTORY:   reports that she has never smoked. She has never used smokeless tobacco.    Scheduled Meds:   sodium  chloride flush  5-40 mL IntraVENous 2 times per day    guaiFENesin  600 mg Oral BID    cefepime  2,000 mg IntraVENous Q12H    vancomycin (VANCOCIN) intermittent dosing (placeholder)   Other RX Placeholder    ipratropium 0.5 mg-albuterol 2.5 mg  1 Dose Inhalation 4x Daily RT    amLODIPine  5 mg Oral Daily    apixaban  2.5 mg Oral BID    metoprolol succinate  25 mg Oral Daily    rosuvastatin  10 mg Oral Daily    famotidine  20 mg Oral Daily     Continuous Infusions:   sodium chloride      sodium chloride 75 mL/hr at 01/11/25 1235     PRN Meds:  sodium chloride flush, sodium chloride, ondansetron **OR** ondansetron, polyethylene glycol, acetaminophen **OR** acetaminophen, benzonatate, albuterol    ALLERGIES:  Patient is allergic to prednisone and hydrochlorothiazide.    REVIEW OF SYSTEMS:  Constitutional: Negative for fever  HENT: Negative for sore throat  Eyes: Negative for redness   Respiratory: + for dyspnea, cough  Cardiovascular: Negative for chest pain  Gastrointestinal: Negative for vomiting, diarrhea   Genitourinary: Negative for hematuria   Musculoskeletal: Negative for arthralgias   Skin: Negative for rash  Neurological: Negative for syncope  Hematological: Negative for adenopathy  Psychiatric/Behavorial: Negative for anxiety    PHYSICAL EXAM:  Blood pressure 137/63, pulse 78, temperature 98 °F (36.7 °C), temperature source Oral, resp. rate 18, height 1.524 m (5'), weight 69 kg (152 lb 1.9 oz), SpO2 95%.' on RA  Gen: No distress.   Eyes: No sclera icterus. No conjunctival injection.   Neck: Trachea midline. No obvious mass.    Resp: No accessory muscle use. No crackles. +wheezes. No rhonchi. No dullness on percussion.  CV: Regular rate. Regular rhythm. No murmur or rub. No edema.  GI: Non-tender. Non-distended. No hernia.   Skin: Warm and dry. No nodule on exposed extremities.   Lymph: No cervical LAD. No supraclavicular LAD.   M/S: No cyanosis. No joint deformity. No clubbing.   Neuro: Awake. Alert. Moves

## 2025-01-11 NOTE — PROGRESS NOTES
Progress Note    Admit Date:  1/10/2025    Subjective:  Ms. Wyatt was admitted for shortness of breath.  She is on oxygen at home.  She is subjectively feeling some improved since her admission.  She has advanced kidney disease.  She sees nephrology on a regular basis.  Denies any chest pain.    She has a past medical history of diastolic heart failure, A-fib, hypertension, hyperlipidemia, CAD, sick sinus syndrome and GERD.  At time of her presentation she was hypoxic.  Last admission was in December for pneumonia.    Objective:   /63   Pulse 78   Temp 98 °F (36.7 °C) (Oral)   Resp 18   Ht 1.524 m (5')   Wt 69 kg (152 lb 1.9 oz)   SpO2 95%   BMI 29.71 kg/m²        Intake/Output Summary (Last 24 hours) at 1/11/2025 1502  Last data filed at 1/11/2025 1409  Gross per 24 hour   Intake 2021.46 ml   Output 1325 ml   Net 696.46 ml       Physical Exam:    General appearance: Ill-appearing  Head: Normocephalic, without obvious abnormality, atraumatic  Eyes: conjunctivae/corneas clear. PERRL, EOM's intact.  Neck: no adenopathy, no carotid bruit, no JVD, supple, symmetrical, trachea midline and thyroid not enlarged, symmetric, no tenderness/mass/nodules  Lungs: Bilateral crackles.  Diminished breath sounds.  Minimal accessory muscle use   heart: regular rate and rhythm, S1, S2 normal, no murmur, click, rub or gallop  Abdomen: soft, non-tender; bowel sounds normal; no masses,  no organomegaly  Extremities: extremities normal, atraumatic, no cyanosis or edema  Pulses: 2+ and symmetric  Skin: Skin color, texture, turgor normal. No rashes or lesions  Neurologic: Grossly normal    Scheduled Meds:   sodium chloride flush  5-40 mL IntraVENous 2 times per day    guaiFENesin  600 mg Oral BID    cefepime  2,000 mg IntraVENous Q12H    vancomycin (VANCOCIN) intermittent dosing (placeholder)   Other RX Placeholder    ipratropium 0.5 mg-albuterol 2.5 mg  1 Dose Inhalation 4x Daily RT    amLODIPine  5 mg Oral Daily    apixaban

## 2025-01-11 NOTE — PROGRESS NOTES
Bedside report given to Uyen VIDAL  pt in stable condition no needs at this time. Call light within reach

## 2025-01-11 NOTE — CONSULTS
Patient seen and examined, consult note dictated.    Assessment and Plan:    1- CKD: The patient has mild, chronic kidney disease with a baseline creatinine of 1.2 to 1.4 mg/dl. Her urinary output is well maintained and her serum creatinine is currently at baseline.    2- Hyponatremia: Likely secondary to volume deletion and slowly improving with isotonic fluids, monitor.    3- HTN: Blood pressure within acceptable range.    4- Anemia: Stable hemoglobin, monitor.

## 2025-01-11 NOTE — PROGRESS NOTES
Bedside Mobility Assessment Tool (BMAT):     Assessment Level 1- Sit and Shake    1. From a semi-reclined position, ask patient to sit up and rotate to a seated position at the side of the bed. Can use the bedrail.    2. Ask patient to reach out and grab your hand and shake making sure patient reaches across his/her midline.   Pass- Patient is able to come to a seated position, maintain core strength. Maintains seated balance while reaching across midline. Move on to Assessment Level 2.     Assessment Level 2- Stretch and Point   1. With patient in seated position at the side of the bed, have patient place both feet on the floor (or stool) with knees no higher than hips.    2. Ask patient to stretch one leg and straighten the knee, then bend the ankle/flex and point the toes. If appropriate, repeat with the other leg.   Pass- Patient is able to demonstrate appropriate quad strength on intended weight bearing limb(s). Move onto Assessment Level 3.     Assessment Level 3- Stand   1. Ask patient to elevate off the bed or chair (seated to standing) using an assistive device (cane, bedrail).    2. Patient should be able to raise buttocks off be and hold for a count of five. May repeat once.   Fail- Patient unable to demonstrate standing stability. Patient is MOBILITY LEVEL 3.     Assessment Level 4- Walk   1. Ask patient to march in place at bedside.    2. Then ask patient to advance step and return each foot. Some medical conditions may render a patient from stepping backwards, use your best clinical judgement.   Pass- Patient demonstrates balance while shifting weight and ability to step, takes independent steps, does not use assistive device patient is MOBILITY LEVEL 4.      Mobility Level- 4

## 2025-01-11 NOTE — FLOWSHEET NOTE
Pt A/Ox4. VSS. Denies pain. TITUS present. O2 in place at 2 liters baseline. No distress noted. Positive non-productive cough. Bilateral expiratory wheezing and crackles to bilateral lung bases upon auscultation. Shift assessment complete. See flowsheet. AM med's given. See MAR. Denies needs at this time. Telemetry remains in place. Bed alarm on. Side rails 2/4. Bed in low position. Call light within reach.     Bedside Mobility Assessment Tool (BMAT):     Assessment Level 1- Sit and Shake    1. From a semi-reclined position, ask patient to sit up and rotate to a seated position at the side of the bed. Can use the bedrail.    2. Ask patient to reach out and grab your hand and shake making sure patient reaches across his/her midline.   Pass- Patient is able to come to a seated position, maintain core strength. Maintains seated balance while reaching across midline. Move on to Assessment Level 2.     Assessment Level 2- Stretch and Point   1. With patient in seated position at the side of the bed, have patient place both feet on the floor (or stool) with knees no higher than hips.    2. Ask patient to stretch one leg and straighten the knee, then bend the ankle/flex and point the toes. If appropriate, repeat with the other leg.   Pass- Patient is able to demonstrate appropriate quad strength on intended weight bearing limb(s). Move onto Assessment Level 3.     Assessment Level 3- Stand   1. Ask patient to elevate off the bed or chair (seated to standing) using an assistive device (cane, bedrail).    2. Patient should be able to raise buttocks off be and hold for a count of five. May repeat once.   Pass- Patient maintains standing stability for at least 5 seconds, proceed to assessment level 4.    Assessment Level 4- Walk   1. Ask patient to march in place at bedside.    2. Then ask patient to advance step and return each foot. Some medical conditions may render a patient from stepping backwards, use your best clinical  judgement.   Fail- Patient not able to complete tasks OR requires use of assistive device. Patient is MOBILITY LEVEL 3.       Mobility Level- 3      01/11/25 0854   Vital Signs   Temp 98 °F (36.7 °C)   Temp Source Oral   Pulse 78   Heart Rate Source Monitor   Respirations 18   /63   MAP (Calculated) 88   BP Location Right upper arm   BP Method Automatic   Patient Position Semi fowlers   Pain Assessment   Pain Assessment None - Denies Pain   Pain Level 0   Care Plan - Pain Goals   Verbalizes/displays adequate comfort level or baseline comfort level Encourage patient to monitor pain and request assistance;Assess pain using appropriate pain scale   Opioid-Induced Sedation   POSS Score 1   Oxygen Therapy   SpO2 94 %   Pulse Oximetry Type Intermittent   O2 Device Nasal cannula   O2 Flow Rate (L/min) 2 L/min   Rhythm Interpretation   Cardiac Rhythm Sinus rhythm

## 2025-01-11 NOTE — PLAN OF CARE
tablet by mouth once daily 11/4/24  Yes Enrique Plaza,    famotidine (PEPCID) 20 MG tablet Take 1 tablet by mouth daily 8/7/24  Yes Christopher Gutierrez APRN - CNP   apixaban (ELIQUIS) 2.5 MG TABS tablet Take 1 tablet by mouth 2 times daily 6/5/24  Yes Ana Maria Coyne MD   Multiple Vitamins-Minerals (THERAPEUTIC MULTIVITAMIN-MINERALS) tablet Take 1 tablet by mouth daily   Yes Ebmer Winchester MD   ferrous sulfate (IRON 325) 325 (65 Fe) MG tablet Take 1 tablet by mouth daily (with breakfast)   Yes Ember Winchester MD   amiodarone (CORDARONE) 200 MG tablet Take 0.5 tablets by mouth See Admin Instructions Take 0.5 tablet by mouth Monday through Thursday. 11/21/23 1/10/25 Yes Ana Maria Coyne MD   vitamin B-12 (CYANOCOBALAMIN) 100 MCG tablet Take 0.5 tablets by mouth daily   Yes Ember Winchester MD   Handicap Placard MISC by Does not apply route Duration - 5 years 11/19/24   Christopher Gutierrez APRN - CNP   cetirizine (ZYRTEC) 10 MG tablet Take 1 tablet by mouth daily  Patient not taking: Reported on 1/10/2025 11/19/24   Christopher Gutierrez APRN - CNP   Cholecalciferol (VITAMIN D) 50 MCG (2000 UT) CAPS capsule Take by mouth  Patient not taking: Reported on 1/10/2025    Ember Winchester MD      Diet Reviewed: Yes   ADULT DIET; Regular; Low Sodium (2 gm); 1000 ml    Goal of Care Reviewed: Yes   Patient and/or Family's stated Goal of Care this Admission: Reduce shortness of breath and be more comfortable prior to discharge.     Electronically signed by Uyen Limon RN on 1/11/2025 at 4:56 PM       Problem: Chronic Conditions and Co-morbidities  Goal: Patient's chronic conditions and co-morbidity symptoms are monitored and maintained or improved  Outcome: Progressing     Problem: Discharge Planning  Goal: Discharge to home or other facility with appropriate resources  Outcome: Progressing     Problem: Pain  Goal: Verbalizes/displays adequate comfort level or baseline comfort level  Outcome:

## 2025-01-11 NOTE — PLAN OF CARE
Problem: Discharge Planning  Goal: Discharge to home or other facility with appropriate resources  Outcome: Progressing  Flowsheets (Taken 1/10/2025 1720 by Portia Washington, RN)  Discharge to home or other facility with appropriate resources: Identify barriers to discharge with patient and caregiver     Problem: Pain  Goal: Verbalizes/displays adequate comfort level or baseline comfort level  1/10/2025 2349 by Anabell Tamayo, RN  Outcome: Progressing  1/10/2025 1838 by Portia Washington, RN  Outcome: Progressing     Problem: Safety - Adult  Goal: Free from fall injury  Outcome: Progressing

## 2025-01-11 NOTE — FLOWSHEET NOTE
01/10/25 2146   Vital Signs   Temp 98.8 °F (37.1 °C)   Temp Source Oral   Pulse 75   Heart Rate Source Monitor   Respirations 18   /77   MAP (Calculated) 85   BP Location Left upper arm   BP Method Automatic   Patient Position Semi fowlers   Oxygen Therapy   SpO2 95 %   O2 Device Nasal cannula   O2 Flow Rate (L/min) 2 L/min     Pt A/O assessment completed. Meds given per MAR. Pt denies any needs at this time. Call light within reach and bed alarm on

## 2025-01-11 NOTE — PROGRESS NOTES
01/10/25 1900   RT Protocol   History Pulmonary Disease 0   Respiratory pattern 0   Breath sounds 6   Cough 0   Indications for Bronchodilator Therapy Wheezing associated with pulm disorder   Bronchodilator Assessment Score 6     RT Inhaler-Nebulizer Bronchodilator Protocol Note    There is a bronchodilator order in the chart from a provider indicating to follow the RT Bronchodilator Protocol and there is an “Initiate RT Inhaler-Nebulizer Bronchodilator Protocol” order as well (see protocol at bottom of note).    CXR Findings:  XR CHEST PORTABLE    Result Date: 1/10/2025  Stable radiographic appearance of the chest with no acute cardiopulmonary abnormality detected.       The findings from the last RT Protocol Assessment were as follows:   History Pulmonary Disease: None or smoker <15 pack years  Respiratory Pattern: Regular pattern and RR 12-20 bpm  Breath Sounds: Inspiratory and expiratory or bilateral wheezing and/or rhonchi  Cough: Strong, spontaneous, non-productive  Indication for Bronchodilator Therapy: Wheezing associated with pulm disorder  Bronchodilator Assessment Score: 6    Aerosolized bronchodilator medication orders have been revised according to the RT Inhaler-Nebulizer Bronchodilator Protocol below.    Respiratory Therapist to perform RT Therapy Protocol Assessment initially then follow the protocol.  Repeat RT Therapy Protocol Assessment PRN for score 0-3 or on second treatment, BID, and PRN for scores above 3.    No Indications - adjust the frequency to every 6 hours PRN wheezing or bronchospasm, if no treatments needed after 48 hours then discontinue using Per Protocol order mode.     If indication present, adjust the RT bronchodilator orders based on the Bronchodilator Assessment Score as indicated below.  Use Inhaler orders unless patient has one or more of the following: on home nebulizer, not able to hold breath for 10 seconds, is not alert and oriented, cannot activate and use MDI

## 2025-01-12 LAB
ANION GAP SERPL CALCULATED.3IONS-SCNC: 12 MMOL/L (ref 3–16)
BASOPHILS # BLD: 0 K/UL (ref 0–0.2)
BASOPHILS NFR BLD: 0.2 %
BUN SERPL-MCNC: 19 MG/DL (ref 7–20)
CALCIUM SERPL-MCNC: 7.9 MG/DL (ref 8.3–10.6)
CHLORIDE SERPL-SCNC: 105 MMOL/L (ref 99–110)
CO2 SERPL-SCNC: 21 MMOL/L (ref 21–32)
CREAT SERPL-MCNC: 1.2 MG/DL (ref 0.6–1.2)
DEPRECATED RDW RBC AUTO: 15.3 % (ref 12.4–15.4)
EOSINOPHIL # BLD: 0 K/UL (ref 0–0.6)
EOSINOPHIL NFR BLD: 0 %
GFR SERPLBLD CREATININE-BSD FMLA CKD-EPI: 45 ML/MIN/{1.73_M2}
GLUCOSE SERPL-MCNC: 133 MG/DL (ref 70–99)
HCT VFR BLD AUTO: 24.1 % (ref 36–48)
HGB BLD-MCNC: 7.8 G/DL (ref 12–16)
LYMPHOCYTES # BLD: 0.4 K/UL (ref 1–5.1)
LYMPHOCYTES NFR BLD: 6.1 %
MCH RBC QN AUTO: 28.1 PG (ref 26–34)
MCHC RBC AUTO-ENTMCNC: 32.2 G/DL (ref 31–36)
MCV RBC AUTO: 87.3 FL (ref 80–100)
MONOCYTES # BLD: 0.1 K/UL (ref 0–1.3)
MONOCYTES NFR BLD: 2.3 %
MRSA DNA SPEC QL NAA+PROBE: NORMAL
NEUTROPHILS # BLD: 5.6 K/UL (ref 1.7–7.7)
NEUTROPHILS NFR BLD: 91.4 %
PLATELET # BLD AUTO: 336 K/UL (ref 135–450)
PMV BLD AUTO: 6.6 FL (ref 5–10.5)
POTASSIUM SERPL-SCNC: 3.9 MMOL/L (ref 3.5–5.1)
RBC # BLD AUTO: 2.76 M/UL (ref 4–5.2)
SODIUM SERPL-SCNC: 138 MMOL/L (ref 136–145)
VANCOMYCIN SERPL-MCNC: 13.6 UG/ML
WBC # BLD AUTO: 6.2 K/UL (ref 4–11)

## 2025-01-12 PROCEDURE — 2580000003 HC RX 258: Performed by: INTERNAL MEDICINE

## 2025-01-12 PROCEDURE — 6370000000 HC RX 637 (ALT 250 FOR IP): Performed by: INTERNAL MEDICINE

## 2025-01-12 PROCEDURE — 80048 BASIC METABOLIC PNL TOTAL CA: CPT

## 2025-01-12 PROCEDURE — 85025 COMPLETE CBC W/AUTO DIFF WBC: CPT

## 2025-01-12 PROCEDURE — 2700000000 HC OXYGEN THERAPY PER DAY

## 2025-01-12 PROCEDURE — 80202 ASSAY OF VANCOMYCIN: CPT

## 2025-01-12 PROCEDURE — 94761 N-INVAS EAR/PLS OXIMETRY MLT: CPT

## 2025-01-12 PROCEDURE — 1200000000 HC SEMI PRIVATE

## 2025-01-12 PROCEDURE — 6370000000 HC RX 637 (ALT 250 FOR IP)

## 2025-01-12 PROCEDURE — 2500000003 HC RX 250 WO HCPCS

## 2025-01-12 PROCEDURE — 36415 COLL VENOUS BLD VENIPUNCTURE: CPT

## 2025-01-12 PROCEDURE — 92526 ORAL FUNCTION THERAPY: CPT

## 2025-01-12 PROCEDURE — 2580000003 HC RX 258

## 2025-01-12 PROCEDURE — 99232 SBSQ HOSP IP/OBS MODERATE 35: CPT | Performed by: INTERNAL MEDICINE

## 2025-01-12 PROCEDURE — 92610 EVALUATE SWALLOWING FUNCTION: CPT

## 2025-01-12 PROCEDURE — 6360000002 HC RX W HCPCS: Performed by: INTERNAL MEDICINE

## 2025-01-12 PROCEDURE — 94640 AIRWAY INHALATION TREATMENT: CPT

## 2025-01-12 RX ORDER — HYDROXYZINE HYDROCHLORIDE 25 MG/1
25 TABLET, FILM COATED ORAL ONCE
Status: COMPLETED | OUTPATIENT
Start: 2025-01-12 | End: 2025-01-12

## 2025-01-12 RX ORDER — TORSEMIDE 20 MG/1
20 TABLET ORAL DAILY
Status: DISCONTINUED | OUTPATIENT
Start: 2025-01-12 | End: 2025-01-13 | Stop reason: HOSPADM

## 2025-01-12 RX ADMIN — IPRATROPIUM BROMIDE AND ALBUTEROL SULFATE 1 DOSE: 2.5; .5 SOLUTION RESPIRATORY (INHALATION) at 20:02

## 2025-01-12 RX ADMIN — IPRATROPIUM BROMIDE AND ALBUTEROL SULFATE 1 DOSE: 2.5; .5 SOLUTION RESPIRATORY (INHALATION) at 15:27

## 2025-01-12 RX ADMIN — Medication 10 ML: at 22:02

## 2025-01-12 RX ADMIN — ROSUVASTATIN CALCIUM 10 MG: 10 TABLET, FILM COATED ORAL at 09:01

## 2025-01-12 RX ADMIN — FAMOTIDINE 20 MG: 20 TABLET, FILM COATED ORAL at 09:01

## 2025-01-12 RX ADMIN — VANCOMYCIN HYDROCHLORIDE 1000 MG: 1 INJECTION, POWDER, LYOPHILIZED, FOR SOLUTION INTRAVENOUS at 09:06

## 2025-01-12 RX ADMIN — PREDNISONE 40 MG: 20 TABLET ORAL at 09:01

## 2025-01-12 RX ADMIN — APIXABAN 2.5 MG: 5 TABLET, FILM COATED ORAL at 09:01

## 2025-01-12 RX ADMIN — AMLODIPINE BESYLATE 5 MG: 5 TABLET ORAL at 09:01

## 2025-01-12 RX ADMIN — APIXABAN 2.5 MG: 5 TABLET, FILM COATED ORAL at 22:01

## 2025-01-12 RX ADMIN — HYDROXYZINE HYDROCHLORIDE 25 MG: 25 TABLET ORAL at 00:15

## 2025-01-12 RX ADMIN — IPRATROPIUM BROMIDE AND ALBUTEROL SULFATE 1 DOSE: 2.5; .5 SOLUTION RESPIRATORY (INHALATION) at 08:13

## 2025-01-12 RX ADMIN — SODIUM CHLORIDE 25 ML: 9 INJECTION, SOLUTION INTRAVENOUS at 09:04

## 2025-01-12 RX ADMIN — TORSEMIDE 20 MG: 20 TABLET ORAL at 15:00

## 2025-01-12 RX ADMIN — METOPROLOL SUCCINATE 25 MG: 25 TABLET, EXTENDED RELEASE ORAL at 09:01

## 2025-01-12 RX ADMIN — IPRATROPIUM BROMIDE AND ALBUTEROL SULFATE 1 DOSE: 2.5; .5 SOLUTION RESPIRATORY (INHALATION) at 12:48

## 2025-01-12 RX ADMIN — Medication 5 ML: at 09:03

## 2025-01-12 RX ADMIN — GUAIFENESIN 600 MG: 600 TABLET, EXTENDED RELEASE ORAL at 22:01

## 2025-01-12 RX ADMIN — GUAIFENESIN 600 MG: 600 TABLET, EXTENDED RELEASE ORAL at 09:01

## 2025-01-12 RX ADMIN — SODIUM CHLORIDE: 9 INJECTION, SOLUTION INTRAVENOUS at 06:14

## 2025-01-12 ASSESSMENT — PAIN DESCRIPTION - LOCATION: LOCATION: GENERALIZED

## 2025-01-12 ASSESSMENT — PAIN DESCRIPTION - FREQUENCY: FREQUENCY: CONTINUOUS

## 2025-01-12 ASSESSMENT — PAIN DESCRIPTION - ONSET: ONSET: GRADUAL

## 2025-01-12 ASSESSMENT — PAIN DESCRIPTION - PAIN TYPE: TYPE: CHRONIC PAIN

## 2025-01-12 ASSESSMENT — PAIN DESCRIPTION - DESCRIPTORS: DESCRIPTORS: ACHING;DISCOMFORT

## 2025-01-12 ASSESSMENT — PAIN - FUNCTIONAL ASSESSMENT: PAIN_FUNCTIONAL_ASSESSMENT: ACTIVITIES ARE NOT PREVENTED

## 2025-01-12 ASSESSMENT — PAIN SCALES - GENERAL
PAINLEVEL_OUTOF10: 3
PAINLEVEL_OUTOF10: 0

## 2025-01-12 NOTE — PROGRESS NOTES
Per Pharmacy we are out of IV Levaquin 500 and 750. Sent message to pulmonology      1659 Message would not be read till AM placed call to office per clinical

## 2025-01-12 NOTE — PROGRESS NOTES
Progress Note    Admit Date:  1/10/2025    Subjective:  Ms. Wyatt was admitted for shortness of breath.  She is on oxygen at home.  She is subjectively feeling some improved since her admission.  She has advanced kidney disease.  She sees nephrology on a regular basis.  Denies any chest pain.    She has a past medical history of diastolic heart failure, A-fib, hypertension, hyperlipidemia, CAD, sick sinus syndrome and GERD.  At time of her presentation she was hypoxic.  Last admission was in December for pneumonia.      1/12- had speech evaluation. Did well. On 2 L of oxygen. Breathing much better.    Objective:   /63   Pulse 72   Temp 97.9 °F (36.6 °C) (Oral)   Resp 22   Ht 1.524 m (5')   Wt 70.1 kg (154 lb 9.6 oz)   SpO2 98%   BMI 30.19 kg/m²        Intake/Output Summary (Last 24 hours) at 1/12/2025 1407  Last data filed at 1/12/2025 1356  Gross per 24 hour   Intake 2935.26 ml   Output 775 ml   Net 2160.26 ml       Physical Exam:    General appearance:awake and alert.  Head: Normocephalic, without obvious abnormality, atraumatic  Eyes: conjunctivae/corneas clear. PERRL, EOM's intact.  Neck: no adenopathy, no carotid bruit, no JVD, supple, symmetrical, trachea midline and thyroid not enlarged, symmetric, no tenderness/mass/nodules  Lungs: Bilateral crackles and few wheezes.  Diminished breath sounds.  No accessory muscle use   heart: regular rate and rhythm, S1, S2 normal, no murmur, click, rub or gallop  Abdomen: soft, non-tender; bowel sounds normal; no masses,  no organomegaly  Extremities: extremities normal, atraumatic, no cyanosis or edema  Pulses: 2+ and symmetric  Skin: Skin color, texture, turgor normal. No rashes or lesions  Neurologic: Grossly normal    Scheduled Meds:   predniSONE  40 mg Oral Daily    levoFLOXacin  750 mg Oral Q48H    sodium chloride flush  5-40 mL IntraVENous 2 times per day    guaiFENesin  600 mg Oral BID    vancomycin (VANCOCIN) intermittent dosing (placeholder)

## 2025-01-12 NOTE — PROGRESS NOTES
1/12  Vanc random = 13.6 mcg/mL at 0603.  Give vancomycin 1000 mg x1.  Check vanc level tomorrow in the AM.  Aaron Mejia, PharmD  1/12/2025 7:47 AM

## 2025-01-12 NOTE — PROGRESS NOTES
01/11/25 1900   RT Protocol   History Pulmonary Disease 0   Respiratory pattern 0   Breath sounds 6   Cough 0   Indications for Bronchodilator Therapy Wheezing associated with pulm disorder   Bronchodilator Assessment Score 6     RT Inhaler-Nebulizer Bronchodilator Protocol Note    There is a bronchodilator order in the chart from a provider indicating to follow the RT Bronchodilator Protocol and there is an “Initiate RT Inhaler-Nebulizer Bronchodilator Protocol” order as well (see protocol at bottom of note).    CXR Findings:  XR CHEST PORTABLE    Result Date: 1/10/2025  Stable radiographic appearance of the chest with no acute cardiopulmonary abnormality detected.       The findings from the last RT Protocol Assessment were as follows:   History Pulmonary Disease: None or smoker <15 pack years  Respiratory Pattern: Regular pattern and RR 12-20 bpm  Breath Sounds: Inspiratory and expiratory or bilateral wheezing and/or rhonchi  Cough: Strong, spontaneous, non-productive  Indication for Bronchodilator Therapy: Wheezing associated with pulm disorder  Bronchodilator Assessment Score: 6    Aerosolized bronchodilator medication orders have been revised according to the RT Inhaler-Nebulizer Bronchodilator Protocol below.    Respiratory Therapist to perform RT Therapy Protocol Assessment initially then follow the protocol.  Repeat RT Therapy Protocol Assessment PRN for score 0-3 or on second treatment, BID, and PRN for scores above 3.    No Indications - adjust the frequency to every 6 hours PRN wheezing or bronchospasm, if no treatments needed after 48 hours then discontinue using Per Protocol order mode.     If indication present, adjust the RT bronchodilator orders based on the Bronchodilator Assessment Score as indicated below.  Use Inhaler orders unless patient has one or more of the following: on home nebulizer, not able to hold breath for 10 seconds, is not alert and oriented, cannot activate and use MDI

## 2025-01-12 NOTE — PROGRESS NOTES
HEART FAILURE CARE PLAN:    Comorbidities Reviewed: Yes   Patient has a past medical history of Acute congestive heart failure, unspecified heart failure type (HCC), Acute diastolic CHF (congestive heart failure) (HCC), Arthritis, Atrial fibrillation (HCC), Closed subchondral insufficiency fracture of femoral condyle (HCC), Hypertension, Localized osteoarthrosis not specified whether primary or secondary, pelvic region and thigh, and Neurogenic bladder.     Weights Reviewed: Yes   Admission weight: 69 kg (152 lb 1.9 oz)   Wt Readings from Last 3 Encounters:   01/10/25 69 kg (152 lb 1.9 oz)   12/19/24 69.4 kg (153 lb)   12/09/24 70.5 kg (155 lb 6.4 oz)     Intake & Output Reviewed: Yes     Intake/Output Summary (Last 24 hours) at 1/12/2025 0050  Last data filed at 1/12/2025 0021  Gross per 24 hour   Intake 3068.29 ml   Output 950 ml   Net 2118.29 ml       ECHOCARDIOGRAM Reviewed: Yes   Patient's Ejection Fraction (EF) is greater than 40%     Medications Reviewed: Yes   SCHEDULED HOSPITAL MEDICATIONS:   predniSONE  40 mg Oral Daily    levoFLOXacin  750 mg Oral Q48H    sodium chloride flush  5-40 mL IntraVENous 2 times per day    guaiFENesin  600 mg Oral BID    vancomycin (VANCOCIN) intermittent dosing (placeholder)   Other RX Placeholder    ipratropium 0.5 mg-albuterol 2.5 mg  1 Dose Inhalation 4x Daily RT    amLODIPine  5 mg Oral Daily    apixaban  2.5 mg Oral BID    metoprolol succinate  25 mg Oral Daily    rosuvastatin  10 mg Oral Daily    famotidine  20 mg Oral Daily     HOME MEDICATIONS:  Prior to Admission medications    Medication Sig Start Date End Date Taking? Authorizing Provider   torsemide (DEMADEX) 20 MG tablet Take 1 tablet by mouth daily 12/10/24  Yes Anabell Forbes, DO   amLODIPine (NORVASC) 5 MG tablet Take 1 tablet by mouth daily 11/7/24  Yes Enrique Plaza, DO   metoprolol succinate (TOPROL XL) 25 MG extended release tablet Take 1 tablet by mouth daily 11/7/24  Yes Ana Maria Coyne MD   rosuvastatin

## 2025-01-12 NOTE — FLOWSHEET NOTE
01/11/25 2134   Vital Signs   Temp 97.6 °F (36.4 °C)   Temp Source Oral   Pulse 92   Heart Rate Source Monitor   Respirations 20   BP (!) 144/70   MAP (Calculated) 95   BP Location Right upper arm   BP Method Automatic   Patient Position Semi fowlers   Oxygen Therapy   SpO2 93 %   O2 Device Nasal cannula   O2 Flow Rate (L/min) 2 L/min     Pt A/O audible wheezes at this time. Pt requesting RT. RT called at this time. Assessment completed. Meds given per MAR. Informed pt of medication changes. Still waiting to hear back from Pulmonology.. Pt denies any other needs. Call light within reach

## 2025-01-12 NOTE — PLAN OF CARE
SLP completed evaluation. Please refer to notes in EMR.         Problem: SLP Adult - Impaired Swallowing  Goal: By Discharge: Advance to least restrictive diet without signs or symptoms of aspiration for planned discharge setting.  See evaluation for individualized goals.  Outcome: Progressing    Rose Mary Johnson MA, CCC-SLP  Speech Language Pathologist

## 2025-01-12 NOTE — CONSULTS
[address] [address]                              CONSULTATION      PATIENT NAME: MICHAEL TALAVERA               : 1945  MED REC NO: [id number]                      ROOM: 0218  ACCOUNT NO: [id number]                       ADMIT DATE: 01/10/2025  PROVIDER: Dorian Jefferson MD      CONSULT DATE: 2025    REASON FOR CONSULTATION:  Chronic kidney disease management and hyponatremia.    HISTORY OF PRESENT ILLNESS:  The patient is an 79-year-old  female patient with a past medical history significant for mild chronic kidney disease and baseline creatinine ranging between 1.2 and 1.4 mg/dL.  The patient presented to [facility] complaining of worsening shortness of breath and was diagnosed with a new right lobe bronchiolitis.  The patient was also noted to have hyponatremia with a serum sodium of 129, which prompted Nephrology consultation.    PAST MEDICAL HISTORY:    1. Chronic kidney disease.  2. Diastolic heart failure.  3. Atrial fibrillation.  4. Osteoarthritis.  5. Hypertension.  6. Neurogenic bladder.    PAST SURGICAL HISTORY:    1. Hysterectomy.  2. Pacemaker placement.  3. Right total knee arthroplasty.    ALLERGIES:  PATIENT IS ALLERGIC TO PREDNISONE AND HYDROCHLOROTHIAZIDE.      SOCIAL HISTORY:  The patient does not smoke or drink alcohol.    FAMILY HISTORY:  Significant for hypertension and diabetes mellitus.    REVIEW OF SYSTEMS:  The patient denied any nausea, vomiting, or abdominal pain.  Otherwise, a 10-point review of systems was relatively unremarkable.    PHYSICAL EXAMINATION:  VITAL SIGNS:  Blood pressure 137/63, heart rate 78, respirations 18, temperature 98 Fahrenheit.  The patient is saturating 95% on 2 L nasal cannula.  GENERAL APPEARANCE:  The patient is alert, oriented x3, not in acute distress.  HEENT:  Eyes reveal normal conjunctivae.  Reactive pupils.  NECK:  Reveals midline trachea

## 2025-01-12 NOTE — PROGRESS NOTES
Department of Internal Medicine  Nephrology Progress Note        SUBJECTIVE:    We are following this patient for CKD and Hyponatremia.  The patient was seen and examined; she feels well today with no CP, SOB, nausea or vomiting.    ROS: No fever or chills.  Social: Family at bedside.    Physical Exam:    VITALS:  /85   Pulse 89   Temp 97.5 °F (36.4 °C) (Oral)   Resp 16   Ht 1.524 m (5')   Wt 70.1 kg (154 lb 9.6 oz)   SpO2 93%   BMI 30.19 kg/m²     General appearance: Seems comfortable, no acute distress.  Neck: Trachea midline, thyroid normal.   Lungs:  Non labored breathing, CTA to anterior auscultation.  Heart:  S1S2 normal, rub or gallop. No peripheral edema.  Abdomen: Soft, non-tender, no organomegaly.   Skin: No lesions or rashes, warm to touch.     DATA:    CBC with Differential:    Lab Results   Component Value Date/Time    WBC 6.2 01/12/2025 06:03 AM    RBC 2.76 01/12/2025 06:03 AM    HGB 7.8 01/12/2025 06:03 AM    HCT 24.1 01/12/2025 06:03 AM     01/12/2025 06:03 AM    MCV 87.3 01/12/2025 06:03 AM    MCH 28.1 01/12/2025 06:03 AM    MCHC 32.2 01/12/2025 06:03 AM    RDW 15.3 01/12/2025 06:03 AM    BANDSPCT 1 08/03/2023 05:40 AM    LYMPHOPCT 6.1 01/12/2025 06:03 AM    MONOPCT 2.3 01/12/2025 06:03 AM    EOSPCT 0.0 01/12/2025 06:03 AM    BASOPCT 0.2 01/12/2025 06:03 AM    MONOSABS 0.1 01/12/2025 06:03 AM    LYMPHSABS 0.4 01/12/2025 06:03 AM    EOSABS 0.0 01/12/2025 06:03 AM    BASOSABS 0.0 01/12/2025 06:03 AM     BMP:    Lab Results   Component Value Date/Time     01/12/2025 06:03 AM    K 3.9 01/12/2025 06:03 AM     01/12/2025 06:03 AM    CO2 21 01/12/2025 06:03 AM    BUN 19 01/12/2025 06:03 AM    CREATININE 1.2 01/12/2025 06:03 AM    CALCIUM 7.9 01/12/2025 06:03 AM    GFRAA 48 10/17/2022 04:21 AM    LABGLOM 45 01/12/2025 06:03 AM    LABGLOM 30 10/30/2023 12:00 AM    GLUCOSE 133 01/12/2025 06:03 AM    GLUCOSE 98 10/30/2023 12:00 AM       IMPRESSION/RECOMMENDATIONS:      1- CKD:  The patient has mild, chronic kidney disease with a baseline creatinine of 1.2 to 1.4 mg/dl. Her urinary output is well maintained and her serum creatinine is currently at baseline.     2- Hyponatremia: Likely secondary to volume deletion and resolved with isotonic fluids, monitor.     3- HTN: Blood pressure within acceptable range.     4- Anemia: Stable hemoglobin, monitor.

## 2025-01-12 NOTE — PLAN OF CARE
Problem: Safety - Adult  Goal: Free from fall injury  1/12/2025 0046 by Anabell Tamayo RN  Outcome: Progressing     Problem: Pain  Goal: Verbalizes/displays adequate comfort level or baseline comfort level  1/12/2025 0046 by Anabell Tamayo RN  Outcome: Progressing  1/11/2025 1656 by Uyen Briones RN  Outcome: Progressing  Flowsheets (Taken 1/11/2025 0854)  Verbalizes/displays adequate comfort level or baseline comfort level:   Encourage patient to monitor pain and request assistance   Assess pain using appropriate pain scale     Problem: Discharge Planning  Goal: Discharge to home or other facility with appropriate resources  1/12/2025 0046 by Anabell Tamayo RN  Outcome: Progressing  1/11/2025 1656 by Uyen Briones RN  Outcome: Progressing     Problem: Respiratory - Adult  Goal: Achieves optimal ventilation and oxygenation  1/12/2025 0046 by Anabell Tamayo RN  Outcome: Progressing  1/11/2025 1656 by Uyen Briones RN  Outcome: Progressing     Problem: Skin/Tissue Integrity - Adult  Goal: Skin integrity remains intact  1/12/2025 0046 by Anabell Tamayo RN  Outcome: Progressing  1/11/2025 1656 by Uyen Briones RN  Outcome: Progressing

## 2025-01-12 NOTE — PLAN OF CARE
HEART FAILURE CARE PLAN:    Comorbidities Reviewed: Yes   Patient has a past medical history of Acute congestive heart failure, unspecified heart failure type (HCC), Acute diastolic CHF (congestive heart failure) (HCC), Arthritis, Atrial fibrillation (HCC), Closed subchondral insufficiency fracture of femoral condyle (HCC), Hypertension, Localized osteoarthrosis not specified whether primary or secondary, pelvic region and thigh, and Neurogenic bladder.     Weights Reviewed: Yes   Admission weight: 69 kg (152 lb 1.9 oz)   Wt Readings from Last 3 Encounters:   01/12/25 70.1 kg (154 lb 9.6 oz)   12/19/24 69.4 kg (153 lb)   12/09/24 70.5 kg (155 lb 6.4 oz)     Intake & Output Reviewed: Yes     Intake/Output Summary (Last 24 hours) at 1/12/2025 1359  Last data filed at 1/12/2025 1356  Gross per 24 hour   Intake 2935.26 ml   Output 775 ml   Net 2160.26 ml       ECHOCARDIOGRAM Reviewed: Yes   Patient's Ejection Fraction (EF) is greater than 40%     Medications Reviewed: Yes   SCHEDULED HOSPITAL MEDICATIONS:   predniSONE  40 mg Oral Daily    levoFLOXacin  750 mg Oral Q48H    sodium chloride flush  5-40 mL IntraVENous 2 times per day    guaiFENesin  600 mg Oral BID    vancomycin (VANCOCIN) intermittent dosing (placeholder)   Other RX Placeholder    ipratropium 0.5 mg-albuterol 2.5 mg  1 Dose Inhalation 4x Daily RT    amLODIPine  5 mg Oral Daily    apixaban  2.5 mg Oral BID    metoprolol succinate  25 mg Oral Daily    rosuvastatin  10 mg Oral Daily    famotidine  20 mg Oral Daily     HOME MEDICATIONS:  Prior to Admission medications    Medication Sig Start Date End Date Taking? Authorizing Provider   torsemide (DEMADEX) 20 MG tablet Take 1 tablet by mouth daily 12/10/24  Yes Anabell Forbes, DO   amLODIPine (NORVASC) 5 MG tablet Take 1 tablet by mouth daily 11/7/24  Yes Enrique Plaza, DO   metoprolol succinate (TOPROL XL) 25 MG extended release tablet Take 1 tablet by mouth daily 11/7/24  Yes Ana Maria Coyne MD    rosuvastatin (CRESTOR) 10 MG tablet Take 1 tablet by mouth once daily 11/4/24  Yes Enrique Plaza DO   famotidine (PEPCID) 20 MG tablet Take 1 tablet by mouth daily 8/7/24  Yes Christopher Gutierrez APRN - CNP   apixaban (ELIQUIS) 2.5 MG TABS tablet Take 1 tablet by mouth 2 times daily 6/5/24  Yes Ana Maria Coyne MD   Multiple Vitamins-Minerals (THERAPEUTIC MULTIVITAMIN-MINERALS) tablet Take 1 tablet by mouth daily   Yes Ember Winchester MD   ferrous sulfate (IRON 325) 325 (65 Fe) MG tablet Take 1 tablet by mouth daily (with breakfast)   Yes Ember Winchester MD   amiodarone (CORDARONE) 200 MG tablet Take 0.5 tablets by mouth See Admin Instructions Take 0.5 tablet by mouth Monday through Thursday. 11/21/23 1/10/25 Yes Ana Maria Coyne MD   vitamin B-12 (CYANOCOBALAMIN) 100 MCG tablet Take 0.5 tablets by mouth daily   Yes Ember Winchester MD   Handicap Placard MISC by Does not apply route Duration - 5 years 11/19/24   Christopher Gutierrez APRN - CNP   cetirizine (ZYRTEC) 10 MG tablet Take 1 tablet by mouth daily  Patient not taking: Reported on 1/10/2025 11/19/24   Christopher Gutierrez APRN - CNP   Cholecalciferol (VITAMIN D) 50 MCG (2000 UT) CAPS capsule Take by mouth  Patient not taking: Reported on 1/10/2025    ProviderEmber MD      Diet Reviewed: Yes   ADULT DIET; Regular; Low Sodium (2 gm); 1000 ml    Goal of Care Reviewed: Yes   Patient and/or Family's stated Goal of Care this Admission: increase activity tolerance prior to discharge.     Electronically signed by Uyen Briones RN on 1/12/2025 at 1:59 PM       Problem: Chronic Conditions and Co-morbidities  Goal: Patient's chronic conditions and co-morbidity symptoms are monitored and maintained or improved  1/12/2025 1359 by Uyen Briones RN  Outcome: Progressing  1/12/2025 0046 by Anabell Tamayo RN  Outcome: Progressing     Problem: Discharge Planning  Goal: Discharge to home or other facility with appropriate

## 2025-01-12 NOTE — PROGRESS NOTES
Speech Language Pathology  Swallowing Disorders and Dysphagia  Clinical Bedside Swallow Assessment  Facility/Department: [address] MEDICAL-SURGICAL  Instrumentation: Not clinically indicated at this time. Will continue to monitor  Diet recommendation: IDDSI 7 Regular Solids; IDDSI 0 Thin Liquids; Meds whole with thin liquids  Risk management: upright for all intake, stay upright for at least 30 mins after intake, distant supervision with intake, oral care q4 hrs to reduce adverse affects in the event of aspiration, increase physical mobility as able, slow rate of intake, general aspiration precautions, and hold PO and contact SLP if s/s of aspiration or worsening respiratory status develop. NO TALKING WHILE EATING. Check oral cavity for pocketing of regular solids.    NAME:Rima Wyatt  : 1945 (79 y.o.)   MRN: [id number]  ROOM: 0218/0218-01  ADMISSION DATE: 1/10/2025  Chief Complaint   Patient presents with    Shortness of Breath     Past Medical History:   Diagnosis Date    Acute congestive heart failure, unspecified heart failure type (HCC) 10/06/2023    Acute diastolic CHF (congestive heart failure) (HCC) 10/07/2023    Arthritis     Atrial fibrillation (HCC)     Closed subchondral insufficiency fracture of femoral condyle (HCC) 2020    Hypertension     Localized osteoarthrosis not specified whether primary or secondary, pelvic region and thigh 2015    Neurogenic bladder     caused by a back surgery in 2017 per pt     Past Surgical History:   Procedure Laterality Date    BACK SURGERY      CATARACT REMOVAL WITH IMPLANT  2011    LEFT EYE    EYE SURGERY  2010    cataract rt eye    HYSTERECTOMY (CERVIX STATUS UNKNOWN)      HYSTERECTOMY, TOTAL ABDOMINAL (CERVIX REMOVED)      JOINT REPLACEMENT Bilateral     left and right hip    OTHER SURGICAL HISTORY Right     RIGHT TOTAL KNEE REPLACEMENT     PACEMAKER INSERTION      2022    TOTAL KNEE ARTHROPLASTY Right 2021     cough  compromised  immune function  poor oral health  reduced ambulation     Swallow prognosis is fair. Instrumental swallow study is not yet indicated.  Given likelihood of s/s of aspiration at bedside related to baseline cough vs true s/s of aspiration, RN-reported tolerance of regular diet, and stable vitals pt is safe for oral diet of regular solids/thin liquids/meds whole in water at this time from SLP standpoint. Given pt's chest imaging, recurrent PNA, and possibility of aspirating material at bedside vs cough, recommend SLP follows pt for diet tolerance, changes in chest imaging, and potential for worsening respiratory status. Correlate with MD.     Instrumentation: Not clinically indicated at this time. Will continue to monitor  Diet recommendation: IDDSI 7 Regular Solids; IDDSI 0 Thin Liquids; Meds whole with thin liquids  Risk management: upright for all intake, stay upright for at least 30 mins after intake, distant supervision with intake, oral care q4 hrs to reduce adverse affects in the event of aspiration, increase physical mobility as able, slow rate of intake, general aspiration precautions, and hold PO and contact SLP if s/s of aspiration or worsening respiratory status develop. NO TALKING WHILE EATING. Check oral cavity for pocketing of regular solids.    Prognosis: Fair    Recommended Intervention:   Dysphagia treatment                 Dysphagia Therapeutic Intervention:   Diet tolerance monitoring    Referrals:   NA    Goals:  Short Term Goals:  Timeframe for Short Term Goals: (5 days 1/17)  Goal 1: The patient will tolerate repeat BSE when able for ongoing assessment  Goal 2: The patient will recall/perform recommended compensatory strategies given min cues  Goal 3: The patient will tolerate recommended diet with no clinical s/s of aspiration 5/5      Long Term Goals:   Timeframe for Long Term Goals: (7 days 1/19)  Goal 1: The patient will tolerate least restrictive diet with no clinical s/s

## 2025-01-12 NOTE — PROGRESS NOTES
RT Inhaler-Nebulizer Bronchodilator Protocol Note    There is a bronchodilator order in the chart from a provider indicating to follow the RT Bronchodilator Protocol and there is an “Initiate RT Inhaler-Nebulizer Bronchodilator Protocol” order as well (see protocol at bottom of note).    CXR Findings:  XR CHEST PORTABLE    Result Date: 1/10/2025  Stable radiographic appearance of the chest with no acute cardiopulmonary abnormality detected.       The findings from the last RT Protocol Assessment were as follows:   History Pulmonary Disease: None or smoker <15 pack years  Respiratory Pattern: Regular pattern and RR 12-20 bpm  Breath Sounds: Inspiratory and expiratory or bilateral wheezing and/or rhonchi  Cough: Strong, spontaneous, non-productive  Indication for Bronchodilator Therapy: Wheezing associated with pulm disorder  Bronchodilator Assessment Score: 6    Aerosolized bronchodilator medication orders have been revised according to the RT Inhaler-Nebulizer Bronchodilator Protocol below.    Respiratory Therapist to perform RT Therapy Protocol Assessment initially then follow the protocol.  Repeat RT Therapy Protocol Assessment PRN for score 0-3 or on second treatment, BID, and PRN for scores above 3.    No Indications - adjust the frequency to every 6 hours PRN wheezing or bronchospasm, if no treatments needed after 48 hours then discontinue using Per Protocol order mode.     If indication present, adjust the RT bronchodilator orders based on the Bronchodilator Assessment Score as indicated below.  Use Inhaler orders unless patient has one or more of the following: on home nebulizer, not able to hold breath for 10 seconds, is not alert and oriented, cannot activate and use MDI correctly, or respiratory rate 25 breaths per minute or more, then use the equivalent nebulizer order(s) with same Frequency and PRN reasons based on the score.  If a patient is on this medication at home then do not decrease Frequency

## 2025-01-12 NOTE — FLOWSHEET NOTE
Pt A/Ox4. VSS. Pain 3/10, see assessment below. Offered Tylenol for mild pain, pt declined. TITUS. Mild accessory muscle use. Bilateral and auditory wheezing. Shift assessment complete. See flowsheet. AM med's given. See MAR. Denies needs at this time. Telemetry and continuous pulse ox remains in place. Bed alarm on. Side rails 2/4. Bed in low position. Call light within reach.     Bedside Mobility Assessment Tool (BMAT):     Assessment Level 1- Sit and Shake    1. From a semi-reclined position, ask patient to sit up and rotate to a seated position at the side of the bed. Can use the bedrail.    2. Ask patient to reach out and grab your hand and shake making sure patient reaches across his/her midline.   Pass- Patient is able to come to a seated position, maintain core strength. Maintains seated balance while reaching across midline. Move on to Assessment Level 2.     Assessment Level 2- Stretch and Point   1. With patient in seated position at the side of the bed, have patient place both feet on the floor (or stool) with knees no higher than hips.    2. Ask patient to stretch one leg and straighten the knee, then bend the ankle/flex and point the toes. If appropriate, repeat with the other leg.   Pass- Patient is able to demonstrate appropriate quad strength on intended weight bearing limb(s). Move onto Assessment Level 3.     Assessment Level 3- Stand   1. Ask patient to elevate off the bed or chair (seated to standing) using an assistive device (cane, bedrail).    2. Patient should be able to raise buttocks off be and hold for a count of five. May repeat once.   Pass- Patient maintains standing stability for at least 5 seconds, proceed to assessment level 4.    Assessment Level 4- Walk   1. Ask patient to march in place at bedside.    2. Then ask patient to advance step and return each foot. Some medical conditions may render a patient from stepping backwards, use your best clinical judgement.   Fail- Patient not  able to complete tasks OR requires use of assistive device. Patient is MOBILITY LEVEL 3.       Mobility Level- 3      01/12/25 0853   Vital Signs   Temp 97.9 °F (36.6 °C)   Temp Source Oral   Pulse 72   Heart Rate Source Monitor   Respirations 22   /63   MAP (Calculated) 83   BP Location Right upper arm   BP Method Automatic   Patient Position High fowlers   Pain Assessment   Pain Assessment 0-10   Pain Level 3   Patient's Stated Pain Goal 3   Pain Location Generalized   Pain Descriptors Aching;Discomfort   Functional Pain Assessment Activities are not prevented   Pain Type Chronic pain   Pain Frequency Continuous   Pain Onset Gradual   Non-Pharmaceutical Pain Intervention(s) Declines   Care Plan - Pain Goals   Verbalizes/displays adequate comfort level or baseline comfort level Encourage patient to monitor pain and request assistance;Assess pain using appropriate pain scale   Opioid-Induced Sedation   POSS Score 1   Oxygen Therapy   SpO2 98 %   Pulse Oximeter Device Mode Intermittent   O2 Device Nasal cannula   O2 Flow Rate (L/min) 2 L/min

## 2025-01-12 NOTE — PROGRESS NOTES
Pulmonary Progress Note  CC: pneumonia    Subjective:  feeling better      EXAM: /61   Pulse 73   Temp 98.2 °F (36.8 °C) (Oral)   Resp 22   Ht 1.524 m (5')   Wt 70.1 kg (154 lb 9.6 oz)   SpO2 96%   BMI 30.19 kg/m²  on 2L  Constitutional:  No acute distress.   Eyes: PERRL. Conjunctivae anicteric.   ENT: Normal nose. Normal tongue.    Neck:  Trachea is midline.   Respiratory: No accessory muscle usage.   decreased breath sounds. No wheezes. No rales. No Rhonchi.  Cardiovascular: Normal S1S2. No digit clubbing. No digit cyanosis. No LE edema.   Psychiatric: No anxiety or Agitation. Alert and Oriented to person, place and time.    Scheduled Meds:   torsemide  20 mg Oral Daily    predniSONE  40 mg Oral Daily    levoFLOXacin  750 mg Oral Q48H    sodium chloride flush  5-40 mL IntraVENous 2 times per day    guaiFENesin  600 mg Oral BID    vancomycin (VANCOCIN) intermittent dosing (placeholder)   Other RX Placeholder    ipratropium 0.5 mg-albuterol 2.5 mg  1 Dose Inhalation 4x Daily RT    amLODIPine  5 mg Oral Daily    apixaban  2.5 mg Oral BID    metoprolol succinate  25 mg Oral Daily    rosuvastatin  10 mg Oral Daily    famotidine  20 mg Oral Daily     Continuous Infusions:   sodium chloride Stopped (01/12/25 1128)     PRN Meds:  potassium chloride **OR** potassium alternative oral replacement **OR** potassium chloride, guaiFENesin-dextromethorphan, sodium chloride flush, sodium chloride, ondansetron **OR** ondansetron, polyethylene glycol, acetaminophen **OR** acetaminophen, benzonatate, albuterol    Labs:  CBC:   Recent Labs     01/10/25  1138 01/11/25  0608 01/12/25  0603   WBC 8.1 6.2 6.2   HGB 8.9* 7.6* 7.8*   HCT 27.4* 23.1* 24.1*   MCV 85.7 87.0 87.3    324 336     BMP:   Recent Labs     01/10/25  1138 01/11/25  0608 01/12/25  0603   * 129* 138   K 3.1* 3.1* 3.9   CL 90* 96* 105   CO2 27 23 21   BUN 21* 20 19   CREATININE 1.3* 1.3* 1.2       Chest images independently reviewed by me and

## 2025-01-13 ENCOUNTER — TELEPHONE (OUTPATIENT)
Dept: PULMONOLOGY | Age: 80
End: 2025-01-13

## 2025-01-13 VITALS
WEIGHT: 162 LBS | BODY MASS INDEX: 31.8 KG/M2 | HEART RATE: 88 BPM | SYSTOLIC BLOOD PRESSURE: 138 MMHG | HEIGHT: 60 IN | TEMPERATURE: 98 F | DIASTOLIC BLOOD PRESSURE: 70 MMHG | OXYGEN SATURATION: 93 % | RESPIRATION RATE: 20 BRPM

## 2025-01-13 PROBLEM — J98.01 BRONCHOSPASM: Status: ACTIVE | Noted: 2025-01-13

## 2025-01-13 PROBLEM — J21.9 BRONCHIOLITIS: Status: ACTIVE | Noted: 2025-01-13

## 2025-01-13 PROBLEM — R09.02 HYPOXIA: Status: ACTIVE | Noted: 2025-01-13

## 2025-01-13 LAB
ANION GAP SERPL CALCULATED.3IONS-SCNC: 11 MMOL/L (ref 3–16)
BASOPHILS # BLD: 0 K/UL (ref 0–0.2)
BASOPHILS NFR BLD: 0.4 %
BUN SERPL-MCNC: 26 MG/DL (ref 7–20)
CALCIUM SERPL-MCNC: 8.2 MG/DL (ref 8.3–10.6)
CHLORIDE SERPL-SCNC: 103 MMOL/L (ref 99–110)
CO2 SERPL-SCNC: 21 MMOL/L (ref 21–32)
CREAT SERPL-MCNC: 1.4 MG/DL (ref 0.6–1.2)
DEPRECATED RDW RBC AUTO: 15.8 % (ref 12.4–15.4)
EOSINOPHIL # BLD: 0 K/UL (ref 0–0.6)
EOSINOPHIL NFR BLD: 0 %
GFR SERPLBLD CREATININE-BSD FMLA CKD-EPI: 38 ML/MIN/{1.73_M2}
GLUCOSE SERPL-MCNC: 113 MG/DL (ref 70–99)
HCT VFR BLD AUTO: 22.2 % (ref 36–48)
HGB BLD-MCNC: 7.1 G/DL (ref 12–16)
LYMPHOCYTES # BLD: 0.6 K/UL (ref 1–5.1)
LYMPHOCYTES NFR BLD: 7.3 %
MAGNESIUM SERPL-MCNC: 2.31 MG/DL (ref 1.8–2.4)
MCH RBC QN AUTO: 28.2 PG (ref 26–34)
MCHC RBC AUTO-ENTMCNC: 32.1 G/DL (ref 31–36)
MCV RBC AUTO: 87.8 FL (ref 80–100)
MONOCYTES # BLD: 0.7 K/UL (ref 0–1.3)
MONOCYTES NFR BLD: 8 %
NEUTROPHILS # BLD: 6.9 K/UL (ref 1.7–7.7)
NEUTROPHILS NFR BLD: 84.3 %
PLATELET # BLD AUTO: 347 K/UL (ref 135–450)
PMV BLD AUTO: 6.5 FL (ref 5–10.5)
POTASSIUM SERPL-SCNC: 3.3 MMOL/L (ref 3.5–5.1)
RBC # BLD AUTO: 2.52 M/UL (ref 4–5.2)
SODIUM SERPL-SCNC: 135 MMOL/L (ref 136–145)
VANCOMYCIN SERPL-MCNC: 15.7 UG/ML
WBC # BLD AUTO: 8.2 K/UL (ref 4–11)

## 2025-01-13 PROCEDURE — 94761 N-INVAS EAR/PLS OXIMETRY MLT: CPT

## 2025-01-13 PROCEDURE — 85025 COMPLETE CBC W/AUTO DIFF WBC: CPT

## 2025-01-13 PROCEDURE — 6370000000 HC RX 637 (ALT 250 FOR IP): Performed by: INTERNAL MEDICINE

## 2025-01-13 PROCEDURE — 99238 HOSP IP/OBS DSCHRG MGMT 30/<: CPT | Performed by: INTERNAL MEDICINE

## 2025-01-13 PROCEDURE — 94640 AIRWAY INHALATION TREATMENT: CPT

## 2025-01-13 PROCEDURE — 99233 SBSQ HOSP IP/OBS HIGH 50: CPT | Performed by: INTERNAL MEDICINE

## 2025-01-13 PROCEDURE — 6360000002 HC RX W HCPCS: Performed by: INTERNAL MEDICINE

## 2025-01-13 PROCEDURE — 6370000000 HC RX 637 (ALT 250 FOR IP)

## 2025-01-13 PROCEDURE — 80202 ASSAY OF VANCOMYCIN: CPT

## 2025-01-13 PROCEDURE — 83735 ASSAY OF MAGNESIUM: CPT

## 2025-01-13 PROCEDURE — 2500000003 HC RX 250 WO HCPCS

## 2025-01-13 PROCEDURE — 2700000000 HC OXYGEN THERAPY PER DAY

## 2025-01-13 PROCEDURE — 80048 BASIC METABOLIC PNL TOTAL CA: CPT

## 2025-01-13 PROCEDURE — 36415 COLL VENOUS BLD VENIPUNCTURE: CPT

## 2025-01-13 RX ORDER — LEVOFLOXACIN 750 MG/1
750 TABLET, FILM COATED ORAL
Qty: 3 TABLET | Refills: 0 | Status: SHIPPED | OUTPATIENT
Start: 2025-01-13 | End: 2025-01-13 | Stop reason: HOSPADM

## 2025-01-13 RX ORDER — IPRATROPIUM BROMIDE AND ALBUTEROL SULFATE 2.5; .5 MG/3ML; MG/3ML
1 SOLUTION RESPIRATORY (INHALATION)
Status: DISCONTINUED | OUTPATIENT
Start: 2025-01-13 | End: 2025-01-13 | Stop reason: HOSPADM

## 2025-01-13 RX ORDER — VANCOMYCIN 750 MG/150ML
750 INJECTION, SOLUTION INTRAVENOUS ONCE
Status: COMPLETED | OUTPATIENT
Start: 2025-01-13 | End: 2025-01-13

## 2025-01-13 RX ORDER — GUAIFENESIN 600 MG/1
600 TABLET, EXTENDED RELEASE ORAL 2 TIMES DAILY
Qty: 20 TABLET | Refills: 0 | Status: SHIPPED | OUTPATIENT
Start: 2025-01-13

## 2025-01-13 RX ORDER — DOXYCYCLINE HYCLATE 100 MG
100 TABLET ORAL 2 TIMES DAILY
Qty: 10 TABLET | Refills: 0 | Status: SHIPPED | OUTPATIENT
Start: 2025-01-13 | End: 2025-01-18

## 2025-01-13 RX ORDER — PREDNISONE 10 MG/1
TABLET ORAL
Qty: 18 TABLET | Refills: 0 | Status: SHIPPED | OUTPATIENT
Start: 2025-01-13

## 2025-01-13 RX ORDER — BENZONATATE 100 MG/1
100 CAPSULE ORAL 3 TIMES DAILY PRN
Qty: 20 CAPSULE | Refills: 0 | Status: SHIPPED | OUTPATIENT
Start: 2025-01-13 | End: 2025-01-20

## 2025-01-13 RX ADMIN — METOPROLOL SUCCINATE 25 MG: 25 TABLET, EXTENDED RELEASE ORAL at 09:01

## 2025-01-13 RX ADMIN — GUAIFENESIN 600 MG: 600 TABLET, EXTENDED RELEASE ORAL at 09:01

## 2025-01-13 RX ADMIN — FAMOTIDINE 20 MG: 20 TABLET, FILM COATED ORAL at 09:01

## 2025-01-13 RX ADMIN — POTASSIUM CHLORIDE 40 MEQ: 1500 TABLET, EXTENDED RELEASE ORAL at 09:07

## 2025-01-13 RX ADMIN — PREDNISONE 40 MG: 20 TABLET ORAL at 09:01

## 2025-01-13 RX ADMIN — TORSEMIDE 20 MG: 20 TABLET ORAL at 09:01

## 2025-01-13 RX ADMIN — APIXABAN 2.5 MG: 5 TABLET, FILM COATED ORAL at 09:01

## 2025-01-13 RX ADMIN — IPRATROPIUM BROMIDE AND ALBUTEROL SULFATE 1 DOSE: 2.5; .5 SOLUTION RESPIRATORY (INHALATION) at 10:54

## 2025-01-13 RX ADMIN — Medication 10 ML: at 09:03

## 2025-01-13 RX ADMIN — VANCOMYCIN 750 MG: 750 INJECTION, SOLUTION INTRAVENOUS at 09:12

## 2025-01-13 RX ADMIN — ROSUVASTATIN CALCIUM 10 MG: 10 TABLET, FILM COATED ORAL at 09:01

## 2025-01-13 RX ADMIN — AMLODIPINE BESYLATE 5 MG: 5 TABLET ORAL at 09:02

## 2025-01-13 NOTE — FLOWSHEET NOTE
01/13/25 0753   Vital Signs   Temp 98.6 °F (37 °C)   Temp Source Oral   Pulse 86   Heart Rate Source Monitor   Respirations 21   /65   MAP (Calculated) 88   BP Location Right upper arm   BP Method Automatic   Patient Position Semi karinawlers   Pain Assessment   Pain Assessment None - Denies Pain   Care Plan - Pain Goals   Verbalizes/displays adequate comfort level or baseline comfort level Encourage patient to monitor pain and request assistance;Assess pain using appropriate pain scale;Administer analgesics based on type and severity of pain and evaluate response;Implement non-pharmacological measures as appropriate and evaluate response;Consider cultural and social influences on pain and pain management;Notify Licensed Independent Practitioner if interventions unsuccessful or patient reports new pain   Opioid-Induced Sedation   POSS Score 1   Oxygen Therapy   SpO2 98 %   O2 Device Nasal cannula   O2 Flow Rate (L/min) 1 L/min     Patient sitting up in bed, fed self fruit for breakfast, expiratory wheeze and crackles noted Brian lobes. Skin pink warm and dry. 98% on 1 l/m, room air trial done with poor results, 80% on room air while up on side of bed. Oxygen on at this time 1 l/m at 95%. No edema noted. Will continue to monitor. Call bell and bedside table within reach. Bed alarm on.

## 2025-01-13 NOTE — PLAN OF CARE
Problem: Chronic Conditions and Co-morbidities  Goal: Patient's chronic conditions and co-morbidity symptoms are monitored and maintained or improved  1/13/2025 1412 by Ada Spring RN  Outcome: Completed  1/13/2025 1015 by Ada Spring RN  Outcome: Progressing  Flowsheets (Taken 1/13/2025 0839)  Care Plan - Patient's Chronic Conditions and Co-Morbidity Symptoms are Monitored and Maintained or Improved:   Monitor and assess patient's chronic conditions and comorbid symptoms for stability, deterioration, or improvement   Collaborate with multidisciplinary team to address chronic and comorbid conditions and prevent exacerbation or deterioration   Update acute care plan with appropriate goals if chronic or comorbid symptoms are exacerbated and prevent overall improvement and discharge     Problem: Discharge Planning  Goal: Discharge to home or other facility with appropriate resources  1/13/2025 1412 by Ada Spring RN  Outcome: Completed  1/13/2025 1015 by Ada Spring RN  Outcome: Progressing  Flowsheets (Taken 1/13/2025 0839)  Discharge to home or other facility with appropriate resources:   Identify barriers to discharge with patient and caregiver   Arrange for needed discharge resources and transportation as appropriate   Identify discharge learning needs (meds, wound care, etc)   Arrange for interpreters to assist at discharge as needed   Refer to discharge planning if patient needs post-hospital services based on physician order or complex needs related to functional status, cognitive ability or social support system     Problem: Pain  Goal: Verbalizes/displays adequate comfort level or baseline comfort level  1/13/2025 1412 by Ada Spring RN  Outcome: Completed  1/13/2025 1015 by Ada Spring RN  Outcome: Progressing  Flowsheets (Taken 1/13/2025 8863)  Verbalizes/displays adequate comfort level or baseline comfort level:   Encourage patient to monitor pain and

## 2025-01-13 NOTE — DISCHARGE SUMMARY
MONOPCT 8.0 01/13/2025 06:12 AM    EOSPCT 0.0 01/13/2025 06:12 AM    BASOPCT 0.4 01/13/2025 06:12 AM    NEUTROABS 6.9 01/13/2025 06:12 AM    LYMPHSABS 0.6 01/13/2025 06:12 AM    MONOSABS 0.7 01/13/2025 06:12 AM    EOSABS 0.0 01/13/2025 06:12 AM    BASOSABS 0.0 01/13/2025 06:12 AM     BNP:   Lab Results   Component Value Date/Time     01/13/2025 06:12 AM    K 3.3 01/13/2025 06:12 AM    CO2 21 01/13/2025 06:12 AM    BUN 26 01/13/2025 06:12 AM    CREATININE 1.4 01/13/2025 06:12 AM    CALCIUM 8.2 01/13/2025 06:12 AM                Medication List        START taking these medications      benzonatate 100 MG capsule  Commonly known as: TESSALON  Take 1 capsule by mouth 3 times daily as needed for Cough     doxycycline hyclate 100 MG tablet  Commonly known as: VIBRA-TABS  Take 1 tablet by mouth 2 times daily for 5 days     guaiFENesin 600 MG extended release tablet  Commonly known as: MUCINEX  Take 1 tablet by mouth 2 times daily     predniSONE 10 MG tablet  Commonly known as: DELTASONE  30 mg x 3 days, 20 mg x 3 days, 10 mg x 3 days then stop            CONTINUE taking these medications      amiodarone 200 MG tablet  Commonly known as: CORDARONE  Take 0.5 tablets by mouth See Admin Instructions Take 0.5 tablet by mouth Monday through Thursday.     amLODIPine 5 MG tablet  Commonly known as: NORVASC  Take 1 tablet by mouth daily     apixaban 2.5 MG Tabs tablet  Commonly known as: ELIQUIS  Take 1 tablet by mouth 2 times daily     famotidine 20 MG tablet  Commonly known as: PEPCID  Take 1 tablet by mouth daily     ferrous sulfate 325 (65 Fe) MG tablet  Commonly known as: IRON 325     Handicap Placard Misc  by Does not apply route Duration - 5 years     metoprolol succinate 25 MG extended release tablet  Commonly known as: TOPROL XL  Take 1 tablet by mouth daily     rosuvastatin 10 MG tablet  Commonly known as: CRESTOR  Take 1 tablet by mouth once daily     therapeutic multivitamin-minerals tablet     torsemide 20 MG  tablet  Commonly known as: DEMADEX  Take 1 tablet by mouth daily     vitamin B-12 100 MCG tablet  Commonly known as: CYANOCOBALAMIN            STOP taking these medications      cetirizine 10 MG tablet  Commonly known as: ZYRTEC     vitamin D 50 MCG (2000 UT) Caps capsule  Commonly known as: CHOLECALCIFEROL               Where to Get Your Medications        These medications were sent to [facility]  - [place], [place] - [address] - P [phone] - F [phone]  [address] [place] [id number]      Phone: [phone]   benzonatate 100 MG capsule  doxycycline hyclate 100 MG tablet  guaiFENesin 600 MG extended release tablet  predniSONE 10 MG tablet           Discharge Condition/Location: stable/home     Follow Up:    PCP in 1 weeks      Time Spent on discharge is more than 30 minutes in the examination, evaluation, counseling and review of medications and discharge plan.      Antolin Corona MD, 1/13/2025 12:41 PM

## 2025-01-13 NOTE — PROGRESS NOTES
O2 Sat at rest on room air is 94 %.  O2 Sat with activity on room air is 80 %.  O2 Sat at rest with oxygen @  1 lpm is 96%.  O2 Sat with activity with oxygen @ 1 lpm is 93%.

## 2025-01-13 NOTE — PROGRESS NOTES
Department of Internal Medicine  Nephrology Progress Note        SUBJECTIVE:    We are following this patient for CKD and Hyponatremia.  The patient was seen and examined; she feels well today with no CP, SOB, nausea or vomiting.    ROS: No fever or chills.  Social: no Family at bedside.    Physical Exam:    VITALS:  /65   Pulse 90   Temp 98.6 °F (37 °C) (Oral)   Resp 19   Ht 1.524 m (5')   Wt 73.5 kg (162 lb)   SpO2 98%   BMI 31.64 kg/m²     General appearance: Seems comfortable, no acute distress.  Neck: Trachea midline, thyroid normal.   Lungs:  Non labored breathing, CTA to anterior auscultation.  Heart:  S1S2 normal, rub or gallop. No peripheral edema.  Abdomen: Soft, non-tender, no organomegaly.   Skin: No lesions or rashes, warm to touch.     DATA:    CBC with Differential:    Lab Results   Component Value Date/Time    WBC 8.2 01/13/2025 06:12 AM    RBC 2.52 01/13/2025 06:12 AM    HGB 7.1 01/13/2025 06:12 AM    HCT 22.2 01/13/2025 06:12 AM     01/13/2025 06:12 AM    MCV 87.8 01/13/2025 06:12 AM    MCH 28.2 01/13/2025 06:12 AM    MCHC 32.1 01/13/2025 06:12 AM    RDW 15.8 01/13/2025 06:12 AM    BANDSPCT 1 08/03/2023 05:40 AM    LYMPHOPCT 7.3 01/13/2025 06:12 AM    MONOPCT 8.0 01/13/2025 06:12 AM    EOSPCT 0.0 01/13/2025 06:12 AM    BASOPCT 0.4 01/13/2025 06:12 AM    MONOSABS 0.7 01/13/2025 06:12 AM    LYMPHSABS 0.6 01/13/2025 06:12 AM    EOSABS 0.0 01/13/2025 06:12 AM    BASOSABS 0.0 01/13/2025 06:12 AM     BMP:    Lab Results   Component Value Date/Time     01/13/2025 06:12 AM    K 3.3 01/13/2025 06:12 AM     01/13/2025 06:12 AM    CO2 21 01/13/2025 06:12 AM    BUN 26 01/13/2025 06:12 AM    CREATININE 1.4 01/13/2025 06:12 AM    CALCIUM 8.2 01/13/2025 06:12 AM    GFRAA 48 10/17/2022 04:21 AM    LABGLOM 38 01/13/2025 06:12 AM    LABGLOM 30 10/30/2023 12:00 AM    GLUCOSE 113 01/13/2025 06:12 AM    GLUCOSE 98 10/30/2023 12:00 AM       IMPRESSION/RECOMMENDATIONS:      1- CKD: The

## 2025-01-13 NOTE — PROGRESS NOTES
01/13/25 0000   RT Protocol   History Pulmonary Disease 0   Respiratory pattern 0   Breath sounds 6   Cough 0   Indications for Bronchodilator Therapy Wheezing associated with pulm disorder   Bronchodilator Assessment Score 6     RT Inhaler-Nebulizer Bronchodilator Protocol Note    There is a bronchodilator order in the chart from a provider indicating to follow the RT Bronchodilator Protocol and there is an “Initiate RT Inhaler-Nebulizer Bronchodilator Protocol” order as well (see protocol at bottom of note).    CXR Findings:  No results found.    The findings from the last RT Protocol Assessment were as follows:   History Pulmonary Disease: None or smoker <15 pack years  Respiratory Pattern: Regular pattern and RR 12-20 bpm  Breath Sounds: Inspiratory and expiratory or bilateral wheezing and/or rhonchi  Cough: Strong, spontaneous, non-productive  Indication for Bronchodilator Therapy: Wheezing associated with pulm disorder  Bronchodilator Assessment Score: 6    Aerosolized bronchodilator medication orders have been revised according to the RT Inhaler-Nebulizer Bronchodilator Protocol below.    Respiratory Therapist to perform RT Therapy Protocol Assessment initially then follow the protocol.  Repeat RT Therapy Protocol Assessment PRN for score 0-3 or on second treatment, BID, and PRN for scores above 3.    No Indications - adjust the frequency to every 6 hours PRN wheezing or bronchospasm, if no treatments needed after 48 hours then discontinue using Per Protocol order mode.     If indication present, adjust the RT bronchodilator orders based on the Bronchodilator Assessment Score as indicated below.  Use Inhaler orders unless patient has one or more of the following: on home nebulizer, not able to hold breath for 10 seconds, is not alert and oriented, cannot activate and use MDI correctly, or respiratory rate 25 breaths per minute or more, then use the equivalent nebulizer order(s) with same Frequency and PRN  reasons based on the score.  If a patient is on this medication at home then do not decrease Frequency below that used at home.    0-3 - enter or revise RT bronchodilator order(s) to equivalent RT Bronchodilator order with Frequency of every 4 hours PRN for wheezing or increased work of breathing using Per Protocol order mode.        4-6 - enter or revise RT Bronchodilator order(s) to two equivalent RT bronchodilator orders with one order with BID Frequency and one order with Frequency of every 4 hours PRN wheezing or increased work of breathing using Per Protocol order mode.        7-10 - enter or revise RT Bronchodilator order(s) to two equivalent RT bronchodilator orders with one order with TID Frequency and one order with Frequency of every 4 hours PRN wheezing or increased work of breathing using Per Protocol order mode.       11-13 - enter or revise RT Bronchodilator order(s) to one equivalent RT bronchodilator order with QID Frequency and an Albuterol order with Frequency of every 4 hours PRN wheezing or increased work of breathing using Per Protocol order mode.      Greater than 13 - enter or revise RT Bronchodilator order(s) to one equivalent RT bronchodilator order with every 4 hours Frequency and an Albuterol order with Frequency of every 2 hours PRN wheezing or increased work of breathing using Per Protocol order mode.       Electronically signed by Aaron Nieto RCP on 1/13/2025 at 12:29 AM

## 2025-01-13 NOTE — PROGRESS NOTES
Patient given discharge instructions both verbally and written slong with follow up appointments and hard copy of new medications purposes and side effects, patient and daughter expressed full understanding.

## 2025-01-13 NOTE — PROGRESS NOTES
Removed tobias catheter per nurse protocol. Pt tolerated removal well. Placed patient-supplied intermittent catheters on bedside table.

## 2025-01-13 NOTE — PROGRESS NOTES
Pulmonary Progress Note  CC: Pneumonia    Subjective:    Cough with white sputum  No hemoptysis  1 L O2, uses 2 L at home    EXAM: BP (!) 146/78   Pulse 94   Temp 97.7 °F (36.5 °C) (Oral)   Resp 20   Ht 1.524 m (5')   Wt 70.1 kg (154 lb 9.6 oz)   SpO2 95%   BMI 30.19 kg/m²  on 1L  Constitutional:  No acute distress.   Eyes: PERRL. Conjunctivae anicteric.   ENT: Normal nose. Normal tongue.    Neck:  Trachea is midline.   Respiratory: No accessory muscle usage.   decreased breath sounds.  Few wheezes. No rales. No Rhonchi.  Cardiovascular: Normal S1S2. No digit clubbing. No digit cyanosis. No LE edema.   Psychiatric: No anxiety or Agitation. Alert and Oriented to person, place and time.    Scheduled Meds:   torsemide  20 mg Oral Daily    predniSONE  40 mg Oral Daily    levoFLOXacin  750 mg Oral Q48H    sodium chloride flush  5-40 mL IntraVENous 2 times per day    guaiFENesin  600 mg Oral BID    vancomycin (VANCOCIN) intermittent dosing (placeholder)   Other RX Placeholder    ipratropium 0.5 mg-albuterol 2.5 mg  1 Dose Inhalation 4x Daily RT    amLODIPine  5 mg Oral Daily    apixaban  2.5 mg Oral BID    metoprolol succinate  25 mg Oral Daily    rosuvastatin  10 mg Oral Daily    famotidine  20 mg Oral Daily     Continuous Infusions:   sodium chloride Stopped (01/12/25 1128)     PRN Meds:  potassium chloride **OR** potassium alternative oral replacement **OR** potassium chloride, guaiFENesin-dextromethorphan, sodium chloride flush, sodium chloride, ondansetron **OR** ondansetron, polyethylene glycol, acetaminophen **OR** acetaminophen, benzonatate, albuterol    Labs:  CBC:   Recent Labs     01/11/25  0608 01/12/25  0603 01/13/25  0612   WBC 6.2 6.2 8.2   HGB 7.6* 7.8* 7.1*   HCT 23.1* 24.1* 22.2*   MCV 87.0 87.3 87.8    336 347     BMP:   Recent Labs     01/11/25  0608 01/12/25  0603 01/13/25  0612   * 138 135*   K 3.1* 3.9 3.3*   CL 96* 105 103   CO2 23 21 21   BUN 20 19 26*   CREATININE 1.3* 1.2

## 2025-01-13 NOTE — PROGRESS NOTES
RT Inhaler-Nebulizer Bronchodilator Protocol Note    There is a bronchodilator order in the chart from a provider indicating to follow the RT Bronchodilator Protocol and there is an “Initiate RT Inhaler-Nebulizer Bronchodilator Protocol” order as well (see protocol at bottom of note).    CXR Findings:  No results found.    The findings from the last RT Protocol Assessment were as follows:   History Pulmonary Disease: (P) None or smoker <15 pack years  Respiratory Pattern: (P) Regular pattern and RR 12-20 bpm  Breath Sounds: (P) Slightly diminished and/or crackles  Cough: (P) Strong, spontaneous, non-productive  Indication for Bronchodilator Therapy: (P) Decreased or absent breath sounds  Bronchodilator Assessment Score: (P) 2    Aerosolized bronchodilator medication orders have been revised according to the RT Inhaler-Nebulizer Bronchodilator Protocol below.    Respiratory Therapist to perform RT Therapy Protocol Assessment initially then follow the protocol.  Repeat RT Therapy Protocol Assessment PRN for score 0-3 or on second treatment, BID, and PRN for scores above 3.    No Indications - adjust the frequency to every 6 hours PRN wheezing or bronchospasm, if no treatments needed after 48 hours then discontinue using Per Protocol order mode.     If indication present, adjust the RT bronchodilator orders based on the Bronchodilator Assessment Score as indicated below.  Use Inhaler orders unless patient has one or more of the following: on home nebulizer, not able to hold breath for 10 seconds, is not alert and oriented, cannot activate and use MDI correctly, or respiratory rate 25 breaths per minute or more, then use the equivalent nebulizer order(s) with same Frequency and PRN reasons based on the score.  If a patient is on this medication at home then do not decrease Frequency below that used at home.    0-3 - enter or revise RT bronchodilator order(s) to equivalent RT Bronchodilator order with Frequency of every

## 2025-01-13 NOTE — PROGRESS NOTES
Progress Note    Admit Date:  1/10/2025    Subjective:  Ms. Wyatt was admitted for shortness of breath.  She is on oxygen at home.  She is subjectively feeling some improved since her admission.  She has advanced kidney disease.  She sees nephrology on a regular basis.  Denies any chest pain.    She has a past medical history of diastolic heart failure, A-fib, hypertension, hyperlipidemia, CAD, sick sinus syndrome and GERD.  At time of her presentation she was hypoxic.  Last admission was in December for pneumonia.      1/13- Ms Wyatt feels better although cough still present  Needing 1 L on ambulation - uses 2 at home   No fevers noted      Objective:   BP (!) 146/78   Pulse 94   Temp 97.7 °F (36.5 °C) (Oral)   Resp 20   Ht 1.524 m (5')   Wt 70.1 kg (154 lb 9.6 oz)   SpO2 95%   BMI 30.19 kg/m²        Intake/Output Summary (Last 24 hours) at 1/13/2025 0740  Last data filed at 1/13/2025 0107  Gross per 24 hour   Intake 781.58 ml   Output 1450 ml   Net -668.42 ml       Physical Exam:    General appearance:awake and alert.      General: elderly female up in bed  Awake, alert and oriented. Appears to be not in any distress  Mucous Membranes:  Pink , anicteric  Neck: No JVD, no carotid bruit, no thyromegaly  Chest:  Clear to auscultation bilaterally, resolving jody rhonchi  Cardiovascular:  RRR S1S2 heard, no murmurs or gallops  Abdomen:  Soft, undistended, non tender, no organomegaly, BS present  Extremities: large extensive ecchymoses to left LE involving thigh, knee and upper leg  Mild ecchymoses to left UE  No edema or cyanosis. Distal pulses well felt  Neurological : grossly normal      Scheduled Meds:   vancomycin  750 mg IntraVENous Once    torsemide  20 mg Oral Daily    predniSONE  40 mg Oral Daily    levoFLOXacin  750 mg Oral Q48H    sodium chloride flush  5-40 mL IntraVENous 2 times per day    guaiFENesin  600 mg Oral BID    vancomycin (VANCOCIN) intermittent dosing (placeholder)   Other RX  Influenza B NOT DETECTED    RSV Detection [[id number]] Collected: 01/10/25 1154    Order Status: Completed Specimen: Nasopharyngeal Swab Updated: 01/10/25 1211     RSV Rapid Ag Negative    Blood Culture 1 [[id number]] Collected: 01/10/25 1138    Order Status: Completed Specimen: Blood Updated: 01/11/25 1715     Blood Culture, Routine No Growth to date.  Any change in status will be called.    Narrative:      ORDER#: [id number]                          ORDERED BY: LIA BARAHONA  SOURCE: Blood LAC                          COLLECTED:  01/10/25 11:38  ANTIBIOTICS AT ALEK.:                      RECEIVED :  01/10/25 11:48  If child <=2 yrs old please draw pediatric bottle.~Blood Culture 1             CT CHEST WO CONTRAST   Final Result   1. New acute right lower lobe bronchiolitis.   2. Improved with residual bilateral nonspecific ground-glass opacities   favoring a resolving infectious/inflammatory process rather than pulmonary   edema.   3. Unchanged mild interstitial edema.   4. Decreasing 3 mm left pulmonary nodule.   5. Unchanged small loculated left pleural effusion.      RECOMMENDATIONS:   Fleischner Society guidelines for follow-up and management of incidentally   detected pulmonary nodules:      Single Solid Nodule:      Nodule size less than 6 mm   In a low-risk patient, no routine follow-up.   In a high-risk patient, optional CT at 12 months.         CT HEAD WO CONTRAST   Final Result   No acute intracranial abnormality.         XR SHOULDER LEFT (MIN 2 VIEWS)   Final Result   1. No acute abnormality.         XR CHEST PORTABLE   Final Result   Stable radiographic appearance of the chest with no acute cardiopulmonary   abnormality detected.             Assessment:  Principal Problem:    Hypoxemia  Active Problems:    CKD (chronic kidney disease) stage 4, GFR 15-29 ml/min ([facility])    Hypertension    Hyponatremia    Atrial fibrillation (HCC)    Gastroesophageal reflux disease without esophagitis    Hiatal hernia

## 2025-01-13 NOTE — PROGRESS NOTES
1/13  Vanc random = 15.7 mcg/mL at 0612.  Give vancomycin 750 mg x1.  Continue to check vanc levels daily.  Aaron Mejia, PharmD  1/13/2025 7:15 AM

## 2025-01-13 NOTE — DISCHARGE INSTRUCTIONS
You are anemic likely from extensive bruising on skin   Monitor cbc with PCP in 1-2 weeks    F/w pulmonary in  4 weeks

## 2025-01-13 NOTE — PLAN OF CARE
Problem: Chronic Conditions and Co-morbidities  Goal: Patient's chronic conditions and co-morbidity symptoms are monitored and maintained or improved  Outcome: Progressing  Flowsheets (Taken 1/13/2025 0839)  Care Plan - Patient's Chronic Conditions and Co-Morbidity Symptoms are Monitored and Maintained or Improved:   Monitor and assess patient's chronic conditions and comorbid symptoms for stability, deterioration, or improvement   Collaborate with multidisciplinary team to address chronic and comorbid conditions and prevent exacerbation or deterioration   Update acute care plan with appropriate goals if chronic or comorbid symptoms are exacerbated and prevent overall improvement and discharge     Problem: Discharge Planning  Goal: Discharge to home or other facility with appropriate resources  Outcome: Progressing  Flowsheets (Taken 1/13/2025 0839)  Discharge to home or other facility with appropriate resources:   Identify barriers to discharge with patient and caregiver   Arrange for needed discharge resources and transportation as appropriate   Identify discharge learning needs (meds, wound care, etc)   Arrange for interpreters to assist at discharge as needed   Refer to discharge planning if patient needs post-hospital services based on physician order or complex needs related to functional status, cognitive ability or social support system     Problem: Pain  Goal: Verbalizes/displays adequate comfort level or baseline comfort level  Outcome: Progressing  Flowsheets (Taken 1/13/2025 0753)  Verbalizes/displays adequate comfort level or baseline comfort level:   Encourage patient to monitor pain and request assistance   Assess pain using appropriate pain scale   Administer analgesics based on type and severity of pain and evaluate response   Implement non-pharmacological measures as appropriate and evaluate response   Consider cultural and social influences on pain and pain management   Notify Licensed  electrolyte replacement as ordered   Monitor labs and assess patient for signs and symptoms of electrolyte imbalances   Monitor response to electrolyte replacements, including repeat lab results as appropriate   Fluid restriction as ordered   Instruct patient on fluid and nutrition restrictions as appropriate  Goal: Hemodynamic stability and optimal renal function maintained  Outcome: Progressing  Flowsheets (Taken 1/13/2025 0839)  Hemodynamic stability and optimal renal function maintained:   Monitor labs and assess for signs and symptoms of volume excess or deficit   Monitor intake, output and patient weight   Monitor urine specific gravity, serum osmolarity and serum sodium as indicated or ordered   Monitor response to interventions for patient's volume status, including labs, urine output, blood pressure (other measures as available)   Encourage oral intake as appropriate   Instruct patient on fluid and nutrition restrictions as appropriate     Problem: Hematologic - Adult  Goal: Maintains hematologic stability  Outcome: Progressing  Flowsheets (Taken 1/13/2025 0839)  Maintains hematologic stability:   Assess for signs and symptoms of bleeding or hemorrhage   Monitor labs for bleeding or clotting disorders   Administer blood products/factors as ordered

## 2025-01-13 NOTE — CARE COORDINATION
Case Management Assessment  Initial Evaluation and discharge note    Date/Time of Evaluation: 1/13/2025 1:08 PM  Assessment Completed by: Zaida Freed RN    If patient is discharged prior to next notation, then this note serves as note for discharge by case management.    Patient Name: Rima Wyatt                   YOB: 1945  Diagnosis: Shortness of breath [R06.02]  Wheezing [R06.2]  Hypoxemia [R09.02]  History of pneumonia [Z87.01]  Bibasilar crackles [R09.89]  Acute respiratory failure with hypoxia [J96.01]  Respiratory failure with hypoxia [J96.91]  Pneumonia of both lungs due to infectious organism, unspecified part of lung [J18.9]                   Date / Time: 1/10/2025 11:11 AM    Patient Admission Status: Inpatient   Readmission Risk (Low < 19, Mod (19-27), High > 27): Readmission Risk Score: 20.4    Current PCP: Christopher Gutierrez, APRN - CNP  PCP verified by CM?      Chart Reviewed: Yes      History Provided by: Patient  Patient Orientation: Alert and Oriented    Patient Cognition: Alert    Hospitalization in the last 30 days (Readmission):  No    If yes, Readmission Assessment in CM Navigator will be completed.    Advance Directives:      Code Status: Full Code   Patient's Primary Decision Maker is: Legal Next of Kin    Primary Decision Maker: Becca Brewer - Child - [phone]    Discharge Planning:    Patient lives with: Alone Type of Home: House (Staes lives 2 story hme but she is on 1st floor. 3 steps PAOLO)  Primary Care Giver: Self  Patient Support Systems include: Children   Current Financial resources: Other (Comment) ([employer])  Current community resources: None  Current services prior to admission: Durable Medical Equipment            Current DME: Oxygen Therapy (Comment), Walker, Cane ([employer] for home O2)            Type of Home Care services:  None    ADLS  Prior functional level: Independent in ADLs/IADLs  Current functional level: Independent in

## 2025-01-13 NOTE — PROGRESS NOTES
HEART FAILURE CARE PLAN:    Comorbidities Reviewed: Yes   Patient has a past medical history of Acute congestive heart failure, unspecified heart failure type (HCC), Acute diastolic CHF (congestive heart failure) (HCC), Arthritis, Atrial fibrillation (HCC), Closed subchondral insufficiency fracture of femoral condyle (HCC), Hypertension, Localized osteoarthrosis not specified whether primary or secondary, pelvic region and thigh, and Neurogenic bladder.     Weights Reviewed: Yes   Admission weight: 69 kg (152 lb 1.9 oz)   Wt Readings from Last 3 Encounters:   01/13/25 73.5 kg (162 lb)   12/19/24 69.4 kg (153 lb)   12/09/24 70.5 kg (155 lb 6.4 oz)     Intake & Output Reviewed: Yes     Intake/Output Summary (Last 24 hours) at 1/13/2025 1016  Last data filed at 1/13/2025 0833  Gross per 24 hour   Intake 635.13 ml   Output 2150 ml   Net -1514.87 ml       ECHOCARDIOGRAM Reviewed: Yes   Patient's Ejection Fraction (EF) is greater than 40%     Medications Reviewed: Yes   SCHEDULED HOSPITAL MEDICATIONS:   torsemide  20 mg Oral Daily    predniSONE  40 mg Oral Daily    levoFLOXacin  750 mg Oral Q48H    sodium chloride flush  5-40 mL IntraVENous 2 times per day    guaiFENesin  600 mg Oral BID    vancomycin (VANCOCIN) intermittent dosing (placeholder)   Other RX Placeholder    ipratropium 0.5 mg-albuterol 2.5 mg  1 Dose Inhalation 4x Daily RT    amLODIPine  5 mg Oral Daily    apixaban  2.5 mg Oral BID    metoprolol succinate  25 mg Oral Daily    rosuvastatin  10 mg Oral Daily    famotidine  20 mg Oral Daily     HOME MEDICATIONS:  Prior to Admission medications    Medication Sig Start Date End Date Taking? Authorizing Provider   torsemide (DEMADEX) 20 MG tablet Take 1 tablet by mouth daily 12/10/24  Yes Anbaell Forbes, DO   amLODIPine (NORVASC) 5 MG tablet Take 1 tablet by mouth daily 11/7/24  Yes Enrique Plaza, DO   metoprolol succinate (TOPROL XL) 25 MG extended release tablet Take 1 tablet by mouth daily 11/7/24  Yes Doretha

## 2025-01-14 ENCOUNTER — TELEPHONE (OUTPATIENT)
Dept: FAMILY MEDICINE CLINIC | Age: 80
End: 2025-01-14

## 2025-01-14 ENCOUNTER — CARE COORDINATION (OUTPATIENT)
Dept: CASE MANAGEMENT | Age: 80
End: 2025-01-14

## 2025-01-14 LAB
BACTERIA BLD CULT ORG #2: NORMAL
BACTERIA BLD CULT: NORMAL

## 2025-01-14 NOTE — CARE COORDINATION
specialist for any further questions, concerns, or needs.    Kandy Garland RN BSN  Care Transition Nurse  [phone]    [phone]

## 2025-01-14 NOTE — TELEPHONE ENCOUNTER
Care Coordinator requested patient be called to get hospital follow up scheduled, she was discharged from [facility] yesterday 1/13. She would not let nurse schedule her.

## 2025-01-17 NOTE — PROGRESS NOTES
Physician Progress Note      PATIENT:               MICHAEL TALAVERA  CSN #:                  [id number]  :                       1945  ADMIT DATE:       1/10/2025 11:11 AM  DISCH DATE:        2025 2:21 PM  RESPONDING  PROVIDER #:        Antolin Corona MD          QUERY TEXT:    Pt with pulmonary consult and the progress note on  states RLL pneumonia.    Discharge summary notes, Acute RLL bronchiolitis.  Patient received IV vanc   and cefepime initially and changed to Levaquin/vanc.  After study, please   clarify:    The medical record reflects the following:  Risk Factors: age, cad, CKD IV, htn, CHF  Clinical Indicators: pulmonary consult and the progress note on  states   RLL pneumonia.  Discharge summary notes, Acute RLL bronchiolitis  Treatment: pulmonary consult, pulse ox, resp cultures, vanc and cefepime   initially and changed to Levaquin/vanc, imaging  Options provided:  -- Pneumonia being treated.  -- Pneumonia ruled out after study.  -- Other - I will add my own diagnosis  -- Disagree - Not applicable / Not valid  -- Disagree - Clinically unable to determine / Unknown  -- Refer to Clinical Documentation Reviewer    PROVIDER RESPONSE TEXT:    This patient treated for pneumonia.    Query created by: Eli Thomas on 2025 12:31 PM      Electronically signed by:  Antolin Corona MD 2025 11:35 AM

## 2025-01-21 ENCOUNTER — HOSPITAL ENCOUNTER (EMERGENCY)
Age: 80
Discharge: HOME | End: 2025-01-21
Attending: EMERGENCY MEDICINE
Payer: MEDICARE

## 2025-01-21 ENCOUNTER — APPOINTMENT (OUTPATIENT)
Dept: CT IMAGING | Age: 80
End: 2025-01-21
Payer: MEDICARE

## 2025-01-21 VITALS
SYSTOLIC BLOOD PRESSURE: 159 MMHG | RESPIRATION RATE: 14 BRPM | HEIGHT: 60 IN | TEMPERATURE: 97.5 F | BODY MASS INDEX: 29.06 KG/M2 | DIASTOLIC BLOOD PRESSURE: 83 MMHG | OXYGEN SATURATION: 96 % | HEART RATE: 86 BPM | WEIGHT: 148 LBS

## 2025-01-21 DIAGNOSIS — S70.02XA HIP HEMATOMA, LEFT, INITIAL ENCOUNTER: ICD-10-CM
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
## 2025-01-21 DIAGNOSIS — S32.10XA CLOSED FRACTURE OF SACRUM, UNSPECIFIED FRACTURE MORPHOLOGY, INITIAL ENCOUNTER (HCC): ICD-10-CM

## 2025-01-21 DIAGNOSIS — S32.592A CLOSED FRACTURE OF MULTIPLE PUBIC RAMI, LEFT, INITIAL ENCOUNTER (HCC): ICD-10-CM

## 2025-01-21 PROCEDURE — 72131 CT LUMBAR SPINE W/O DYE: CPT

## 2025-01-21 PROCEDURE — 6370000000 HC RX 637 (ALT 250 FOR IP): Performed by: EMERGENCY MEDICINE

## 2025-01-21 PROCEDURE — 72192 CT PELVIS W/O DYE: CPT

## 2025-01-21 PROCEDURE — 99284 EMERGENCY DEPT VISIT MOD MDM: CPT

## 2025-01-21 RX ORDER — OXYCODONE AND ACETAMINOPHEN 5; 325 MG/1; MG/1
1 TABLET ORAL EVERY 6 HOURS PRN
Qty: 12 TABLET | Refills: 0 | Status: SHIPPED | OUTPATIENT
Start: 2025-01-21 | End: 2025-01-24 | Stop reason: SDUPTHER

## 2025-01-21 RX ORDER — OXYCODONE AND ACETAMINOPHEN 5; 325 MG/1; MG/1
1 TABLET ORAL ONCE
Status: COMPLETED | OUTPATIENT
Start: 2025-01-21 | End: 2025-01-21

## 2025-01-21 RX ADMIN — OXYCODONE HYDROCHLORIDE AND ACETAMINOPHEN 1 TABLET: 5; 325 TABLET ORAL at 13:10

## 2025-01-21 ASSESSMENT — PAIN DESCRIPTION - LOCATION
LOCATION: HIP;BACK
LOCATION: BACK

## 2025-01-21 ASSESSMENT — PAIN DESCRIPTION - PAIN TYPE: TYPE: ACUTE PAIN

## 2025-01-21 ASSESSMENT — PAIN DESCRIPTION - DESCRIPTORS: DESCRIPTORS: SHOOTING

## 2025-01-21 ASSESSMENT — PAIN SCALES - GENERAL
PAINLEVEL_OUTOF10: 6
PAINLEVEL_OUTOF10: 6
PAINLEVEL_OUTOF10: 3

## 2025-01-21 ASSESSMENT — PAIN - FUNCTIONAL ASSESSMENT: PAIN_FUNCTIONAL_ASSESSMENT: 0-10

## 2025-01-21 ASSESSMENT — PAIN DESCRIPTION - ORIENTATION: ORIENTATION: LEFT;LOWER

## 2025-01-21 NOTE — DISCHARGE INSTRUCTIONS
You have several left-sided pelvic fractures.  These are typically nonsurgical.  You can be 50% weightbearing as tolerated with your walker.  You have a hematoma of your left hip as well.  This will be monitored and should continue to improve.  You have been given a prescription for pain medicine to take.  This is a narcotic and may make you drowsy so do not take it in combination with other sedating medication or alcohol.  You have been referred to follow-up with orthopedic surgery.  If your pain is worsening or you feel like you cannot manage this at home please return to the ER immediately.

## 2025-01-21 NOTE — ED PROVIDER NOTES
OTHER SURGICAL HISTORY Right     RIGHT TOTAL KNEE REPLACEMENT     PACEMAKER INSERTION      October 2022    TOTAL KNEE ARTHROPLASTY Right 12/06/2021    RIGHT TOTAL KNEE REPLACEMENT performed by Wilbert Ball MD at [facility] OR     Family History   Problem Relation Age of Onset    Arthritis Mother     Diabetes Mother     High Blood Pressure Mother     Diabetes Sister     High Blood Pressure Sister     Diabetes Brother     High Blood Pressure Brother     Diabetes Sister      Social History     Socioeconomic History    Marital status:      Spouse name: Not on file    Number of children: Not on file    Years of education: Not on file    Highest education level: Not on file   Occupational History    Not on file   Tobacco Use    Smoking status: Never    Smokeless tobacco: Never   Vaping Use    Vaping status: Never Used   Substance and Sexual Activity    Alcohol use: No    Drug use: No    Sexual activity: Not Currently   Other Topics Concern    Not on file   Social History Narrative    Not on file     Social Determinants of Health     Financial Resource Strain: Medium Risk (3/20/2024)    Overall Financial Resource Strain (CARDIA)     Difficulty of Paying Living Expenses: Somewhat hard   Food Insecurity: No Food Insecurity (1/10/2025)    Hunger Vital Sign     Worried About Running Out of Food in the Last Year: Never true     Ran Out of Food in the Last Year: Never true   Transportation Needs: No Transportation Needs (1/10/2025)    PRAPARE - Transportation     Lack of Transportation (Medical): No     Lack of Transportation (Non-Medical): No   Physical Activity: Inactive (7/17/2024)    Exercise Vital Sign     Days of Exercise per Week: 0 days     Minutes of Exercise per Session: 0 min   Stress: Not on file   Social Connections: Not on file   Intimate Partner Violence: Unknown (4/19/2024)    Received from [employer] [facility]    Interpersonal Safety     Feel  MG per tablet Take 1 tablet by mouth every 6 hours as needed for Pain for up to 3 days. Intended supply: 3 days. Take lowest dose possible to manage pain Max Daily Amount: 4 tablets 12 tablet 0    guaiFENesin (MUCINEX) 600 MG extended release tablet Take 1 tablet by mouth 2 times daily 20 tablet 0    predniSONE (DELTASONE) 10 MG tablet 30 mg x 3 days, 20 mg x 3 days, 10 mg x 3 days then stop 18 tablet 0    torsemide (DEMADEX) 20 MG tablet Take 1 tablet by mouth daily 90 tablet 3    Handicap Placard MISC by Does not apply route Duration - 5 years 1 each 0    amLODIPine (NORVASC) 5 MG tablet Take 1 tablet by mouth daily 90 tablet 2    metoprolol succinate (TOPROL XL) 25 MG extended release tablet Take 1 tablet by mouth daily 90 tablet 2    rosuvastatin (CRESTOR) 10 MG tablet Take 1 tablet by mouth once daily 90 tablet 0    famotidine (PEPCID) 20 MG tablet Take 1 tablet by mouth daily 60 tablet 3    apixaban (ELIQUIS) 2.5 MG TABS tablet Take 1 tablet by mouth 2 times daily 180 tablet 3    Multiple Vitamins-Minerals (THERAPEUTIC MULTIVITAMIN-MINERALS) tablet Take 1 tablet by mouth daily      ferrous sulfate (IRON 325) 325 (65 Fe) MG tablet Take 1 tablet by mouth daily (with breakfast)      amiodarone (CORDARONE) 200 MG tablet Take 0.5 tablets by mouth See Admin Instructions Take 0.5 tablet by mouth Monday through Thursday. 30 tablet 3    vitamin B-12 (CYANOCOBALAMIN) 100 MCG tablet Take 0.5 tablets by mouth daily       Allergies   Allergen Reactions    Prednisone Other (See Comments)     Pt. States not allergic    Hydrochlorothiazide      Hyponatremia       REVIEW OF SYSTEMS    Unless otherwise stated in this report, this patient's positive and negative responses for review of systems (constitutional, eyes, ENT, cardiovascular, respiratory, gastrointestinal, neurological, genitourinary, musculoskeletal, integument systems and systems related to the presenting problem) are either stated in the preceding paragraph, were

## 2025-01-23 ENCOUNTER — CARE COORDINATION (OUTPATIENT)
Dept: CARE COORDINATION | Age: 80
End: 2025-01-23

## 2025-01-23 NOTE — TELEPHONE ENCOUNTER
I didn't realize patient had not been called. I called patient and left message for patient to call to schedule appt.

## 2025-01-23 NOTE — CARE COORDINATION
Ambulatory Care Coordination Note     1/23/2025      ACM and Patient outreach attempt by this ACM today to offer care management services. ACM was unable to reach the patient by telephone today;   left voice message requesting a return phone call to this ACM.     ACM: Dede Pires RN     PCP/Specialist follow up:   Future Appointments         Provider Specialty Dept Phone    2/6/2025 11:45 AM Howard Hull MD Pulmonology [phone]    2/10/2025 1:00 PM Enrique Plaza DO Cardiology [phone]    2/12/2025 2:30 PM SCHEDULE, RAMIREZ DEVICE CHECK Cardiology [phone] 2/12/2025 2:30 PM Ana Maria Coyne MD Cardiology [phone]    3/13/2025 9:20 AM Christopher Gutierrez, APRN - CNP Family Medicine [phone]    4/17/2025 3:00 PM (Arrive by 2:30 PM) [facility] CT MAIN Radiology [phone]            Follow Up:   Plan for next ACM outreach in approximately 1-2 days  to complete:  - outreach attempt to offer care management services  ED F/U .

## 2025-01-24 ENCOUNTER — TELEPHONE (OUTPATIENT)
Dept: FAMILY MEDICINE CLINIC | Age: 80
End: 2025-01-24

## 2025-01-24 DIAGNOSIS — S32.10XA CLOSED FRACTURE OF SACRUM, UNSPECIFIED FRACTURE MORPHOLOGY, INITIAL ENCOUNTER (HCC): ICD-10-CM

## 2025-01-24 DIAGNOSIS — S32.592A CLOSED FRACTURE OF MULTIPLE PUBIC RAMI, LEFT, INITIAL ENCOUNTER (HCC): ICD-10-CM

## 2025-01-24 DIAGNOSIS — S70.02XA HIP HEMATOMA, LEFT, INITIAL ENCOUNTER: ICD-10-CM

## 2025-01-24 RX ORDER — OXYCODONE AND ACETAMINOPHEN 5; 325 MG/1; MG/1
1 TABLET ORAL EVERY 6 HOURS PRN
Qty: 12 TABLET | Refills: 0 | Status: SHIPPED | OUTPATIENT
Start: 2025-01-24 | End: 2025-01-27

## 2025-01-24 NOTE — TELEPHONE ENCOUNTER
Pts daughter Nicole, states pt went to the ER on Tuesday 1/21 because pt fell. Pt has 3 pelvic fractures. Nicole states the ER dr only gave pt enough Oxycodone for 3 days. Pt is in a lot of pain. Pt also has a [facility] f/u with you on Monday. Pt was in the hospital on Deandre 10.  Nicole wants to know if there is anything you can send in to help with the pain, just until pts appt on Monday. Please send to [facility]. Please advise.

## 2025-01-27 ENCOUNTER — TELEMEDICINE (OUTPATIENT)
Dept: FAMILY MEDICINE CLINIC | Age: 80
End: 2025-01-27
Payer: MEDICARE

## 2025-01-27 ENCOUNTER — TELEPHONE (OUTPATIENT)
Dept: ORTHOPEDIC SURGERY | Age: 80
End: 2025-01-27

## 2025-01-27 DIAGNOSIS — S32.10XS CLOSED FRACTURE OF SACRUM, UNSPECIFIED PORTION OF SACRUM, SEQUELA: ICD-10-CM

## 2025-01-27 DIAGNOSIS — Z09 HOSPITAL DISCHARGE FOLLOW-UP: Primary | ICD-10-CM

## 2025-01-27 DIAGNOSIS — S32.592S: ICD-10-CM

## 2025-01-27 PROCEDURE — 1036F TOBACCO NON-USER: CPT

## 2025-01-27 PROCEDURE — 99214 OFFICE O/P EST MOD 30 MIN: CPT

## 2025-01-27 PROCEDURE — G8427 DOCREV CUR MEDS BY ELIG CLIN: HCPCS

## 2025-01-27 PROCEDURE — 1090F PRES/ABSN URINE INCON ASSESS: CPT

## 2025-01-27 PROCEDURE — G8400 PT W/DXA NO RESULTS DOC: HCPCS

## 2025-01-27 PROCEDURE — 1123F ACP DISCUSS/DSCN MKR DOCD: CPT

## 2025-01-27 PROCEDURE — 1111F DSCHRG MED/CURRENT MED MERGE: CPT

## 2025-01-27 PROCEDURE — 1159F MED LIST DOCD IN RCRD: CPT

## 2025-01-27 PROCEDURE — G8417 CALC BMI ABV UP PARAM F/U: HCPCS

## 2025-01-27 RX ORDER — LIDOCAINE 50 MG/G
1 PATCH TOPICAL DAILY
Qty: 10 PATCH | Refills: 0 | Status: SHIPPED | OUTPATIENT
Start: 2025-01-27 | End: 2025-02-06

## 2025-01-27 RX ORDER — OXYCODONE AND ACETAMINOPHEN 5; 325 MG/1; MG/1
1 TABLET ORAL EVERY 8 HOURS PRN
Qty: 28 TABLET | Refills: 0 | Status: SHIPPED | OUTPATIENT
Start: 2025-01-27 | End: 2025-01-29 | Stop reason: SDUPTHER

## 2025-01-27 ASSESSMENT — ENCOUNTER SYMPTOMS
BACK PAIN: 1
GASTROINTESTINAL NEGATIVE: 1

## 2025-01-27 NOTE — TELEPHONE ENCOUNTER
----- Message from Dr. Enrique Arndt DO sent at 1/22/2025 10:43 AM EST -----  ED referral, rah or I can see  ----- Message -----  From: Oma Wilcox MD  Sent: 1/22/2025   4:41 AM EST  To: Enrique Arndt DO

## 2025-01-27 NOTE — TELEPHONE ENCOUNTER
SPOKE TO PT AND SHE STATED SHE DOES NOT NEED AN APPT AT THIS TIME THAT HER PCP IS TAKING CARE OF EVERYTHING.

## 2025-01-27 NOTE — PROGRESS NOTES
state where the patient is located as indicated above. If you are not or unsure, please re-schedule the visit: Yes, I confirm.      An electronic signature was used to authenticate this note.  --IMELDA Zambrano - CNP

## 2025-01-29 ENCOUNTER — TELEPHONE (OUTPATIENT)
Dept: FAMILY MEDICINE CLINIC | Age: 80
End: 2025-01-29

## 2025-01-29 DIAGNOSIS — S32.592S: ICD-10-CM

## 2025-01-29 DIAGNOSIS — S32.10XS CLOSED FRACTURE OF SACRUM, UNSPECIFIED PORTION OF SACRUM, SEQUELA: ICD-10-CM

## 2025-01-29 RX ORDER — OXYCODONE AND ACETAMINOPHEN 5; 325 MG/1; MG/1
1 TABLET ORAL EVERY 8 HOURS PRN
Qty: 28 TABLET | Refills: 0 | Status: SHIPPED | OUTPATIENT
Start: 2025-01-29 | End: 2025-02-07

## 2025-01-29 NOTE — TELEPHONE ENCOUNTER
Pt daughter is asking if they can get a letter to send to the oxygen company so they can cancel her mom oxygen order she said she does not need it anymore

## 2025-01-29 NOTE — TELEPHONE ENCOUNTER
A request to fill percocet was sent to [facility] on 1/27/25. [facility] does not have a supply available. Asking if the prescription can be re-sent to [facility]

## 2025-01-31 ENCOUNTER — CARE COORDINATION (OUTPATIENT)
Dept: CARE COORDINATION | Age: 80
End: 2025-01-31

## 2025-01-31 NOTE — CARE COORDINATION
Ambulatory Care Coordination Note     1/31/2025      patient outreach attempt by this ACM today to offer care management services. [facility] was unable to reach the patient by telephone today;   No voice mail to leave message. This [facility] will have CCSS send letter via mail and include this ACM's contact information      No further Ambulatory Care Manager follow up scheduled.

## 2025-02-01 DIAGNOSIS — I99.8 ISCHEMIA: ICD-10-CM

## 2025-02-03 RX ORDER — ROSUVASTATIN CALCIUM 10 MG/1
10 TABLET, COATED ORAL DAILY
Qty: 90 TABLET | Refills: 0 | Status: SHIPPED | OUTPATIENT
Start: 2025-02-03

## 2025-02-03 NOTE — TELEPHONE ENCOUNTER
Last Office Visit: 8/20/2024 Provider: AMP  **Is provider OOT? No    Next Office Visit: 2/10/2025 Provider: AMP      LAST LABS:   Liver:   Lab Results   Component Value Date    ALT 22 01/10/2025    AST 33 01/10/2025    ALKPHOS 76 01/10/2025    BILITOT 1.5 (H) 01/10/2025      Lipid:   Lab Results   Component Value Date    CHOL 136 07/17/2024    TRIG 113 07/17/2024    HDL 65 (H) 07/17/2024    LDL 48 07/17/2024    VLDL 23 07/17/2024

## 2025-02-05 ENCOUNTER — ENROLLMENT (OUTPATIENT)
Dept: PHARMACY | Facility: CLINIC | Age: 80
End: 2025-02-05

## 2025-02-05 NOTE — PROGRESS NOTES
CARDIOLOGY OFFICE NOTE      Patient Name: Rima Wyatt  Primary Care physician: Christopher Gutierrez, IMELDA - CNP    Reason for Referral/Chief Complaint: Rima Wyatt is a 79 y.o. patient who presents today for a cardiology follow up.     Chief Complaint   Patient presents with    Follow-up    Atrial Fibrillation    Hypertension    Congestive Heart Failure      History of Present Illness:   Rima Wyatt is a very pleasant 79 y.o. female with a medical history notable for PAF s/p pacemaker 10/2022 (on Eliquis), HTN, Hyponatremia, CKD, GERD, HFpEF, falls, anemia.   OV 11/8/23 She was having intermittent SOB.  She notices coughing more at night.  Her dry weight in office is between 146 lbs-148 lbs. At home she is between 142 lbs-144 lbs.   OV 2/1/24, Patient stated she is \"not as short of breath.\" She stated she will still have SOB but she can now walk to the mail box and back without difficulty.  In the interim, she had a echo 4/15/24 that demonstrated EF of 55%, moderate cLVH, grade II DD, moderate mitral annular calcification, left atrium severely dilated, moderate MR, mild-moderate TR.   OV 8/20/24 She reports she has a cough and read that a \"leaky valve can cause this\". She reports the cough is not all the time, notices it more after she drinks green tea. She reports she can do daily activities to an extend until her back pain starts up.   Admitted 12/4/24 with pneumonia. Admitted 1/10/25 with chronic hypoxic respiratory failure, right lower lobe bronchiolitis.   Patient also admitted on 1/10/25 with fall, acute on chronic hypoxic respiratory failure/ acute rt LL bronchiolitis. Patient had an acute nondisplaced fracture of the left superior and inferior pubic rami and left sacral ala with hematoma of hip. Patient was anemic with hgb of 7.1.       Today, she presents in a wheelchair. She is here with her daughter. She recently fell and broke her sacrum. She was having falls but no recurrent falls since her

## 2025-02-10 ENCOUNTER — OFFICE VISIT (OUTPATIENT)
Dept: CARDIOLOGY CLINIC | Age: 80
End: 2025-02-10
Payer: MEDICARE

## 2025-02-10 VITALS
WEIGHT: 133.8 LBS | HEIGHT: 60 IN | OXYGEN SATURATION: 97 % | BODY MASS INDEX: 26.27 KG/M2 | DIASTOLIC BLOOD PRESSURE: 64 MMHG | SYSTOLIC BLOOD PRESSURE: 150 MMHG | HEART RATE: 83 BPM

## 2025-02-10 DIAGNOSIS — I36.1 NONRHEUMATIC TRICUSPID VALVE REGURGITATION: ICD-10-CM

## 2025-02-10 DIAGNOSIS — I49.5 SSS (SICK SINUS SYNDROME) (HCC): ICD-10-CM

## 2025-02-10 DIAGNOSIS — I50.32 CHRONIC HEART FAILURE WITH PRESERVED EJECTION FRACTION (HFPEF) (HCC): ICD-10-CM

## 2025-02-10 DIAGNOSIS — I48.19 PERSISTENT ATRIAL FIBRILLATION (HCC): ICD-10-CM

## 2025-02-10 DIAGNOSIS — Z95.0 MRI SAFE CARDIAC PACEMAKER IN SITU: ICD-10-CM

## 2025-02-10 DIAGNOSIS — I50.31 ACUTE DIASTOLIC (CONGESTIVE) HEART FAILURE (HCC): ICD-10-CM

## 2025-02-10 DIAGNOSIS — R29.6 FALLS: ICD-10-CM

## 2025-02-10 DIAGNOSIS — I99.8 ISCHEMIA: ICD-10-CM

## 2025-02-10 DIAGNOSIS — Z79.899 MEDICATION MANAGEMENT: ICD-10-CM

## 2025-02-10 DIAGNOSIS — E78.2 MIXED HYPERLIPIDEMIA: ICD-10-CM

## 2025-02-10 DIAGNOSIS — I10 PRIMARY HYPERTENSION: Primary | ICD-10-CM

## 2025-02-10 DIAGNOSIS — I34.0 NONRHEUMATIC MITRAL VALVE REGURGITATION: ICD-10-CM

## 2025-02-10 DIAGNOSIS — N18.4 CKD (CHRONIC KIDNEY DISEASE) STAGE 4, GFR 15-29 ML/MIN (HCC): ICD-10-CM

## 2025-02-10 PROCEDURE — G8417 CALC BMI ABV UP PARAM F/U: HCPCS | Performed by: STUDENT IN AN ORGANIZED HEALTH CARE EDUCATION/TRAINING PROGRAM

## 2025-02-10 PROCEDURE — 1090F PRES/ABSN URINE INCON ASSESS: CPT | Performed by: STUDENT IN AN ORGANIZED HEALTH CARE EDUCATION/TRAINING PROGRAM

## 2025-02-10 PROCEDURE — 3077F SYST BP >= 140 MM HG: CPT | Performed by: STUDENT IN AN ORGANIZED HEALTH CARE EDUCATION/TRAINING PROGRAM

## 2025-02-10 PROCEDURE — 1036F TOBACCO NON-USER: CPT | Performed by: STUDENT IN AN ORGANIZED HEALTH CARE EDUCATION/TRAINING PROGRAM

## 2025-02-10 PROCEDURE — G8400 PT W/DXA NO RESULTS DOC: HCPCS | Performed by: STUDENT IN AN ORGANIZED HEALTH CARE EDUCATION/TRAINING PROGRAM

## 2025-02-10 PROCEDURE — 1111F DSCHRG MED/CURRENT MED MERGE: CPT | Performed by: STUDENT IN AN ORGANIZED HEALTH CARE EDUCATION/TRAINING PROGRAM

## 2025-02-10 PROCEDURE — G8427 DOCREV CUR MEDS BY ELIG CLIN: HCPCS | Performed by: STUDENT IN AN ORGANIZED HEALTH CARE EDUCATION/TRAINING PROGRAM

## 2025-02-10 PROCEDURE — 1123F ACP DISCUSS/DSCN MKR DOCD: CPT | Performed by: STUDENT IN AN ORGANIZED HEALTH CARE EDUCATION/TRAINING PROGRAM

## 2025-02-10 PROCEDURE — 99214 OFFICE O/P EST MOD 30 MIN: CPT | Performed by: STUDENT IN AN ORGANIZED HEALTH CARE EDUCATION/TRAINING PROGRAM

## 2025-02-10 PROCEDURE — 3078F DIAST BP <80 MM HG: CPT | Performed by: STUDENT IN AN ORGANIZED HEALTH CARE EDUCATION/TRAINING PROGRAM

## 2025-02-10 PROCEDURE — 1159F MED LIST DOCD IN RCRD: CPT | Performed by: STUDENT IN AN ORGANIZED HEALTH CARE EDUCATION/TRAINING PROGRAM

## 2025-02-10 RX ORDER — ROSUVASTATIN CALCIUM 10 MG/1
10 TABLET, COATED ORAL DAILY
Qty: 90 TABLET | Refills: 3 | Status: SHIPPED | OUTPATIENT
Start: 2025-02-10

## 2025-02-10 NOTE — PATIENT INSTRUCTIONS
Continue taking  rosuvastain 10mg daily, metoprolol 25mg daily, torsemide 20mg daily, eliquis 2.5mg twice a day, amlodipine 5mg daily, amiodarone 100mg daily.   Please obtain the following labs; -LIPID FASTING, CBC, CMP, TSH, IRON AND TIBC, FERRITIN   Discuss watchman with Dr. Coyne.   Take torsemide daily as needed for weight gain >3 lbs in 24 hours or >5 lbs in 3 days and/or increased swelling/shortness of breath     3 = A little assistance

## 2025-02-12 ENCOUNTER — OFFICE VISIT (OUTPATIENT)
Dept: CARDIOLOGY CLINIC | Age: 80
End: 2025-02-12
Payer: MEDICARE

## 2025-02-12 ENCOUNTER — NURSE ONLY (OUTPATIENT)
Dept: CARDIOLOGY CLINIC | Age: 80
End: 2025-02-12

## 2025-02-12 VITALS
HEART RATE: 81 BPM | SYSTOLIC BLOOD PRESSURE: 134 MMHG | WEIGHT: 132 LBS | HEIGHT: 60 IN | OXYGEN SATURATION: 98 % | BODY MASS INDEX: 25.91 KG/M2 | DIASTOLIC BLOOD PRESSURE: 62 MMHG

## 2025-02-12 DIAGNOSIS — Z79.899 ENCOUNTER FOR MONITORING AMIODARONE THERAPY: ICD-10-CM

## 2025-02-12 DIAGNOSIS — I10 PRIMARY HYPERTENSION: ICD-10-CM

## 2025-02-12 DIAGNOSIS — I48.3 TYPICAL ATRIAL FLUTTER (HCC): ICD-10-CM

## 2025-02-12 DIAGNOSIS — I48.0 PAF (PAROXYSMAL ATRIAL FIBRILLATION) (HCC): Primary | ICD-10-CM

## 2025-02-12 DIAGNOSIS — Z95.0 MRI SAFE CARDIAC PACEMAKER IN SITU: Primary | ICD-10-CM

## 2025-02-12 DIAGNOSIS — I49.5 SSS (SICK SINUS SYNDROME) (HCC): ICD-10-CM

## 2025-02-12 DIAGNOSIS — Z51.81 ENCOUNTER FOR MONITORING AMIODARONE THERAPY: ICD-10-CM

## 2025-02-12 DIAGNOSIS — I49.5 SINUS NODE DYSFUNCTION (HCC): ICD-10-CM

## 2025-02-12 PROCEDURE — 1123F ACP DISCUSS/DSCN MKR DOCD: CPT | Performed by: INTERNAL MEDICINE

## 2025-02-12 PROCEDURE — 1159F MED LIST DOCD IN RCRD: CPT | Performed by: INTERNAL MEDICINE

## 2025-02-12 PROCEDURE — 1111F DSCHRG MED/CURRENT MED MERGE: CPT | Performed by: INTERNAL MEDICINE

## 2025-02-12 PROCEDURE — 99215 OFFICE O/P EST HI 40 MIN: CPT | Performed by: INTERNAL MEDICINE

## 2025-02-12 PROCEDURE — G8427 DOCREV CUR MEDS BY ELIG CLIN: HCPCS | Performed by: INTERNAL MEDICINE

## 2025-02-12 PROCEDURE — G8417 CALC BMI ABV UP PARAM F/U: HCPCS | Performed by: INTERNAL MEDICINE

## 2025-02-12 PROCEDURE — 1090F PRES/ABSN URINE INCON ASSESS: CPT | Performed by: INTERNAL MEDICINE

## 2025-02-12 PROCEDURE — G8400 PT W/DXA NO RESULTS DOC: HCPCS | Performed by: INTERNAL MEDICINE

## 2025-02-12 PROCEDURE — 3075F SYST BP GE 130 - 139MM HG: CPT | Performed by: INTERNAL MEDICINE

## 2025-02-12 PROCEDURE — 3078F DIAST BP <80 MM HG: CPT | Performed by: INTERNAL MEDICINE

## 2025-02-12 PROCEDURE — 1036F TOBACCO NON-USER: CPT | Performed by: INTERNAL MEDICINE

## 2025-02-12 PROCEDURE — G2211 COMPLEX E/M VISIT ADD ON: HCPCS | Performed by: INTERNAL MEDICINE

## 2025-02-12 ASSESSMENT — ENCOUNTER SYMPTOMS
WHEEZING: 0
HEMATOCHEZIA: 0
RIGHT EYE: 0
STRIDOR: 0
LEFT EYE: 0
HEMATEMESIS: 0

## 2025-02-12 NOTE — PROGRESS NOTES
study.    Current Medications     Current Outpatient Medications   Medication Sig Dispense Refill    rosuvastatin (CRESTOR) 10 MG tablet Take 1 tablet by mouth daily 90 tablet 3    torsemide (DEMADEX) 20 MG tablet Take 1 tablet by mouth daily 90 tablet 3    Handicap Placard MISC by Does not apply route Duration - 5 years 1 each 0    amLODIPine (NORVASC) 5 MG tablet Take 1 tablet by mouth daily 90 tablet 2    metoprolol succinate (TOPROL XL) 25 MG extended release tablet Take 1 tablet by mouth daily 90 tablet 2    famotidine (PEPCID) 20 MG tablet Take 1 tablet by mouth daily (Patient taking differently: Take 1 tablet by mouth as needed) 60 tablet 3    apixaban (ELIQUIS) 2.5 MG TABS tablet Take 1 tablet by mouth 2 times daily 180 tablet 3    Multiple Vitamins-Minerals (THERAPEUTIC MULTIVITAMIN-MINERALS) tablet Take 1 tablet by mouth daily      ferrous sulfate (IRON 325) 325 (65 Fe) MG tablet Take 1 tablet by mouth daily (with breakfast)      amiodarone (CORDARONE) 200 MG tablet Take 0.5 tablets by mouth See Admin Instructions Take 0.5 tablet by mouth Monday through Thursday. 30 tablet 3    vitamin B-12 (CYANOCOBALAMIN) 100 MCG tablet Take 0.5 tablets by mouth daily       Current Facility-Administered Medications   Medication Dose Route Frequency Provider Last Rate Last Admin    ipratropium 0.5 mg-albuterol 2.5 mg (DUONEB) nebulizer solution 1 Dose  1 Dose Inhalation Once          Lab Review     Lab Results   Component Value Date/Time     01/13/2025 06:12 AM    K 3.3 01/13/2025 06:12 AM     01/13/2025 06:12 AM    CO2 21 01/13/2025 06:12 AM    BUN 26 01/13/2025 06:12 AM    CREATININE 1.4 01/13/2025 06:12 AM    GLUCOSE 113 01/13/2025 06:12 AM    GLUCOSE 98 10/30/2023 12:00 AM    CALCIUM 8.2 01/13/2025 06:12 AM      Lab Results   Component Value Date    WBC 8.2 01/13/2025    HGB 7.1 (L) 01/13/2025    HCT 22.2 (L) 01/13/2025    MCV 87.8 01/13/2025     01/13/2025     Lab Results   Component Value Date

## 2025-02-24 LAB
ALBUMIN: 4.2 G/DL
ALP BLD-CCNC: 180 U/L
ALT SERPL-CCNC: 19 U/L
ANION GAP SERPL CALCULATED.3IONS-SCNC: 11.6 MMOL/L
AST SERPL-CCNC: 28 U/L
BASOPHILS ABSOLUTE: ABNORMAL
BASOPHILS RELATIVE PERCENT: 0.6 %
BILIRUB SERPL-MCNC: 0.7 MG/DL (ref 0.1–1.4)
BUN BLDV-MCNC: 13 MG/DL
CALCIUM SERPL-MCNC: 9.4 MG/DL
CHLORIDE BLD-SCNC: 107 MMOL/L
CHOLESTEROL, TOTAL: 130 MG/DL
CHOLESTEROL/HDL RATIO: NORMAL
CO2: 25 MMOL/L
CREAT SERPL-MCNC: 0.9 MG/DL
EOSINOPHILS ABSOLUTE: ABNORMAL
EOSINOPHILS RELATIVE PERCENT: 2 %
FERRITIN: 108 NG/ML (ref 9–150)
GFR, ESTIMATED: 60
GLUCOSE BLD-MCNC: 101 MG/DL
HCT VFR BLD CALC: 35.1 % (ref 36–46)
HDLC SERPL-MCNC: 47 MG/DL (ref 35–70)
HEMOGLOBIN: 10.6 G/DL (ref 12–16)
IRON: 54
LDL CHOLESTEROL: 55
LYMPHOCYTES ABSOLUTE: 0.9 /ΜL
LYMPHOCYTES RELATIVE PERCENT: 17.1 %
MCH RBC QN AUTO: 26.8 PG
MCHC RBC AUTO-ENTMCNC: 30.2 G/DL
MCV RBC AUTO: 88.9 FL
MONOCYTES ABSOLUTE: ABNORMAL
MONOCYTES RELATIVE PERCENT: 7.7 %
NEUTROPHILS ABSOLUTE: 3.9 /ΜL
NEUTROPHILS RELATIVE PERCENT: 72.2 %
NONHDLC SERPL-MCNC: NORMAL MG/DL
PLATELET # BLD: 288 K/ΜL
PMV BLD AUTO: ABNORMAL FL
POTASSIUM SERPL-SCNC: 3.6 MMOL/L
RBC # BLD: 3.95 10^6/ΜL
SODIUM BLD-SCNC: 140 MMOL/L
TOTAL IRON BINDING CAPACITY: 289
TOTAL PROTEIN: 6.8 G/DL (ref 6.4–8.2)
TRIGL SERPL-MCNC: 140 MG/DL
VLDLC SERPL CALC-MCNC: 28 MG/DL
WBC # BLD: 5.4 10^3/ML

## 2025-02-26 DIAGNOSIS — I50.31 ACUTE DIASTOLIC (CONGESTIVE) HEART FAILURE (HCC): ICD-10-CM

## 2025-02-26 DIAGNOSIS — Z79.899 MEDICATION MANAGEMENT: ICD-10-CM

## 2025-03-04 ENCOUNTER — APPOINTMENT (OUTPATIENT)
Dept: GENERAL RADIOLOGY | Age: 80
DRG: 291 | End: 2025-03-04
Payer: MEDICARE

## 2025-03-04 ENCOUNTER — HOSPITAL ENCOUNTER (INPATIENT)
Age: 80
LOS: 2 days | Discharge: HOME | DRG: 291 | End: 2025-03-06
Attending: EMERGENCY MEDICINE | Admitting: STUDENT IN AN ORGANIZED HEALTH CARE EDUCATION/TRAINING PROGRAM
Payer: MEDICARE
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
## 2025-03-04 DIAGNOSIS — J96.01 ACUTE RESPIRATORY FAILURE WITH HYPOXEMIA (HCC): ICD-10-CM

## 2025-03-04 DIAGNOSIS — Z99.81 HYPOXEMIA REQUIRING SUPPLEMENTAL OXYGEN: ICD-10-CM

## 2025-03-04 DIAGNOSIS — I50.9 ACUTE CONGESTIVE HEART FAILURE, UNSPECIFIED HEART FAILURE TYPE (HCC): ICD-10-CM

## 2025-03-04 DIAGNOSIS — I51.7 CARDIOMEGALY: ICD-10-CM

## 2025-03-04 DIAGNOSIS — J18.9 HOSPITAL-ACQUIRED PNEUMONIA: ICD-10-CM

## 2025-03-04 DIAGNOSIS — I50.9 ACUTE ON CHRONIC HEART FAILURE, UNSPECIFIED HEART FAILURE TYPE (HCC): Primary | ICD-10-CM

## 2025-03-04 DIAGNOSIS — R09.02 HYPOXEMIA REQUIRING SUPPLEMENTAL OXYGEN: ICD-10-CM

## 2025-03-04 PROBLEM — J15.9 COMMUNITY ACQUIRED BACTERIAL PNEUMONIA: Status: ACTIVE | Noted: 2025-03-04

## 2025-03-04 LAB
ALBUMIN SERPL-MCNC: 3.9 G/DL (ref 3.4–5)
ALBUMIN/GLOB SERPL: 1.1 {RATIO} (ref 1.1–2.2)
ALP SERPL-CCNC: 170 U/L (ref 40–129)
ALT SERPL-CCNC: 15 U/L (ref 10–40)
ANION GAP SERPL CALCULATED.3IONS-SCNC: 15 MMOL/L (ref 3–16)
AST SERPL-CCNC: 19 U/L (ref 15–37)
BASE EXCESS BLDV CALC-SCNC: -4.9 MMOL/L (ref -3–3)
BASOPHILS # BLD: 0.1 K/UL (ref 0–0.2)
BASOPHILS NFR BLD: 1.1 %
BILIRUB SERPL-MCNC: 0.8 MG/DL (ref 0–1)
BILIRUB UR QL STRIP.AUTO: NEGATIVE
BUN SERPL-MCNC: 18 MG/DL (ref 7–20)
CALCIUM SERPL-MCNC: 8.7 MG/DL (ref 8.3–10.6)
CHLORIDE SERPL-SCNC: 99 MMOL/L (ref 99–110)
CLARITY UR: CLEAR
CO2 BLDV-SCNC: 21 MMOL/L
CO2 SERPL-SCNC: 21 MMOL/L (ref 21–32)
COHGB MFR BLDV: 1.5 % (ref 0–1.5)
COLOR UR: YELLOW
CREAT SERPL-MCNC: 1.1 MG/DL (ref 0.6–1.2)
DEPRECATED RDW RBC AUTO: 15.9 % (ref 12.4–15.4)
EOSINOPHIL # BLD: 0 K/UL (ref 0–0.6)
EOSINOPHIL NFR BLD: 0.2 %
EPI CELLS #/AREA URNS HPF: NORMAL /HPF (ref 0–5)
FLUAV RNA RESP QL NAA+PROBE: NOT DETECTED
FLUBV RNA RESP QL NAA+PROBE: NOT DETECTED
GFR SERPLBLD CREATININE-BSD FMLA CKD-EPI: 50 ML/MIN/{1.73_M2}
GLUCOSE SERPL-MCNC: 106 MG/DL (ref 70–99)
GLUCOSE UR STRIP.AUTO-MCNC: NEGATIVE MG/DL
HCO3 BLDV-SCNC: 20.1 MMOL/L (ref 23–29)
HCT VFR BLD AUTO: 33.8 % (ref 36–48)
HGB BLD-MCNC: 10.9 G/DL (ref 12–16)
HGB UR QL STRIP.AUTO: ABNORMAL
INR PPP: 1.34 (ref 0.85–1.15)
KETONES UR STRIP.AUTO-MCNC: NEGATIVE MG/DL
LACTATE BLDV-SCNC: 1 MMOL/L (ref 0.4–1.9)
LEUKOCYTE ESTERASE UR QL STRIP.AUTO: NEGATIVE
LYMPHOCYTES # BLD: 0.6 K/UL (ref 1–5.1)
LYMPHOCYTES NFR BLD: 5.5 %
MAGNESIUM SERPL-MCNC: 2.36 MG/DL (ref 1.8–2.4)
MCH RBC QN AUTO: 27.8 PG (ref 26–34)
MCHC RBC AUTO-ENTMCNC: 32.2 G/DL (ref 31–36)
MCV RBC AUTO: 86.2 FL (ref 80–100)
METHGB MFR BLDV: 0.3 %
MONOCYTES # BLD: 0.8 K/UL (ref 0–1.3)
MONOCYTES NFR BLD: 7.9 %
NEUTROPHILS # BLD: 8.8 K/UL (ref 1.7–7.7)
NEUTROPHILS NFR BLD: 85.3 %
NITRITE UR QL STRIP.AUTO: NEGATIVE
NT-PROBNP SERPL-MCNC: 6457 PG/ML (ref 0–449)
O2 THERAPY: ABNORMAL
PCO2 BLDV: 37 MMHG (ref 40–50)
PH BLDV: 7.35 [PH] (ref 7.35–7.45)
PH UR STRIP.AUTO: 6 [PH] (ref 5–8)
PLATELET # BLD AUTO: 320 K/UL (ref 135–450)
PMV BLD AUTO: 7.7 FL (ref 5–10.5)
PO2 BLDV: 52.6 MMHG (ref 25–40)
POTASSIUM SERPL-SCNC: 3.9 MMOL/L (ref 3.5–5.1)
PROCALCITONIN SERPL IA-MCNC: 0.09 NG/ML (ref 0–0.15)
PROT SERPL-MCNC: 7.3 G/DL (ref 6.4–8.2)
PROT UR STRIP.AUTO-MCNC: NEGATIVE MG/DL
PROTHROMBIN TIME: 16.7 SEC (ref 11.9–14.9)
RBC # BLD AUTO: 3.92 M/UL (ref 4–5.2)
RBC #/AREA URNS HPF: NORMAL /HPF (ref 0–4)
SAO2 % BLDV: 86 %
SARS-COV-2 RNA RESP QL NAA+PROBE: NOT DETECTED
SODIUM SERPL-SCNC: 135 MMOL/L (ref 136–145)
SP GR UR STRIP.AUTO: <=1.005 (ref 1–1.03)
TROPONIN, HIGH SENSITIVITY: 24 NG/L (ref 0–14)
TROPONIN, HIGH SENSITIVITY: 24 NG/L (ref 0–14)
UA COMPLETE W REFLEX CULTURE PNL UR: ABNORMAL
UA DIPSTICK W REFLEX MICRO PNL UR: YES
URN SPEC COLLECT METH UR: ABNORMAL
UROBILINOGEN UR STRIP-ACNC: 0.2 E.U./DL
WBC # BLD AUTO: 10.3 K/UL (ref 4–11)
WBC #/AREA URNS HPF: NORMAL /HPF (ref 0–5)

## 2025-03-04 PROCEDURE — 6370000000 HC RX 637 (ALT 250 FOR IP): Performed by: STUDENT IN AN ORGANIZED HEALTH CARE EDUCATION/TRAINING PROGRAM

## 2025-03-04 PROCEDURE — 82803 BLOOD GASES ANY COMBINATION: CPT

## 2025-03-04 PROCEDURE — 2500000003 HC RX 250 WO HCPCS: Performed by: STUDENT IN AN ORGANIZED HEALTH CARE EDUCATION/TRAINING PROGRAM

## 2025-03-04 PROCEDURE — 2500000003 HC RX 250 WO HCPCS: Performed by: PHYSICIAN ASSISTANT

## 2025-03-04 PROCEDURE — 81001 URINALYSIS AUTO W/SCOPE: CPT

## 2025-03-04 PROCEDURE — 2060000000 HC ICU INTERMEDIATE R&B

## 2025-03-04 PROCEDURE — 84145 PROCALCITONIN (PCT): CPT

## 2025-03-04 PROCEDURE — 83605 ASSAY OF LACTIC ACID: CPT

## 2025-03-04 PROCEDURE — 94761 N-INVAS EAR/PLS OXIMETRY MLT: CPT

## 2025-03-04 PROCEDURE — 87040 BLOOD CULTURE FOR BACTERIA: CPT

## 2025-03-04 PROCEDURE — 96375 TX/PRO/DX INJ NEW DRUG ADDON: CPT

## 2025-03-04 PROCEDURE — 6360000002 HC RX W HCPCS: Performed by: PHYSICIAN ASSISTANT

## 2025-03-04 PROCEDURE — 87636 SARSCOV2 & INF A&B AMP PRB: CPT

## 2025-03-04 PROCEDURE — 93005 ELECTROCARDIOGRAM TRACING: CPT | Performed by: EMERGENCY MEDICINE

## 2025-03-04 PROCEDURE — 84484 ASSAY OF TROPONIN QUANT: CPT

## 2025-03-04 PROCEDURE — 71046 X-RAY EXAM CHEST 2 VIEWS: CPT

## 2025-03-04 PROCEDURE — 85610 PROTHROMBIN TIME: CPT

## 2025-03-04 PROCEDURE — 2700000000 HC OXYGEN THERAPY PER DAY

## 2025-03-04 PROCEDURE — 85025 COMPLETE CBC W/AUTO DIFF WBC: CPT

## 2025-03-04 PROCEDURE — 0202U NFCT DS 22 TRGT SARS-COV-2: CPT

## 2025-03-04 PROCEDURE — 36415 COLL VENOUS BLD VENIPUNCTURE: CPT

## 2025-03-04 PROCEDURE — 6370000000 HC RX 637 (ALT 250 FOR IP): Performed by: PHYSICIAN ASSISTANT

## 2025-03-04 PROCEDURE — 80053 COMPREHEN METABOLIC PANEL: CPT

## 2025-03-04 PROCEDURE — 99285 EMERGENCY DEPT VISIT HI MDM: CPT

## 2025-03-04 PROCEDURE — 96374 THER/PROPH/DIAG INJ IV PUSH: CPT

## 2025-03-04 PROCEDURE — 83735 ASSAY OF MAGNESIUM: CPT

## 2025-03-04 PROCEDURE — 83880 ASSAY OF NATRIURETIC PEPTIDE: CPT

## 2025-03-04 RX ORDER — FUROSEMIDE 10 MG/ML
40 INJECTION INTRAMUSCULAR; INTRAVENOUS 2 TIMES DAILY
Status: DISCONTINUED | OUTPATIENT
Start: 2025-03-05 | End: 2025-03-06 | Stop reason: HOSPADM

## 2025-03-04 RX ORDER — ALBUTEROL SULFATE 0.83 MG/ML
2.5 SOLUTION RESPIRATORY (INHALATION) ONCE
Status: COMPLETED | OUTPATIENT
Start: 2025-03-04 | End: 2025-03-04

## 2025-03-04 RX ORDER — ONDANSETRON 2 MG/ML
4 INJECTION INTRAMUSCULAR; INTRAVENOUS EVERY 6 HOURS PRN
Status: DISCONTINUED | OUTPATIENT
Start: 2025-03-04 | End: 2025-03-04

## 2025-03-04 RX ORDER — POLYETHYLENE GLYCOL 3350 17 G/17G
17 POWDER, FOR SOLUTION ORAL DAILY PRN
Status: DISCONTINUED | OUTPATIENT
Start: 2025-03-04 | End: 2025-03-06 | Stop reason: HOSPADM

## 2025-03-04 RX ORDER — POTASSIUM CHLORIDE 7.45 MG/ML
10 INJECTION INTRAVENOUS PRN
Status: DISCONTINUED | OUTPATIENT
Start: 2025-03-04 | End: 2025-03-06 | Stop reason: HOSPADM

## 2025-03-04 RX ORDER — IPRATROPIUM BROMIDE AND ALBUTEROL SULFATE 2.5; .5 MG/3ML; MG/3ML
1 SOLUTION RESPIRATORY (INHALATION)
Status: DISCONTINUED | OUTPATIENT
Start: 2025-03-05 | End: 2025-03-05

## 2025-03-04 RX ORDER — SODIUM CHLORIDE 9 MG/ML
INJECTION, SOLUTION INTRAVENOUS PRN
Status: DISCONTINUED | OUTPATIENT
Start: 2025-03-04 | End: 2025-03-06 | Stop reason: HOSPADM

## 2025-03-04 RX ORDER — VANCOMYCIN 1.75 G/350ML
1250 INJECTION, SOLUTION INTRAVENOUS ONCE
Status: COMPLETED | OUTPATIENT
Start: 2025-03-04 | End: 2025-03-04

## 2025-03-04 RX ORDER — AMLODIPINE BESYLATE 5 MG/1
5 TABLET ORAL DAILY
Status: DISCONTINUED | OUTPATIENT
Start: 2025-03-05 | End: 2025-03-06 | Stop reason: HOSPADM

## 2025-03-04 RX ORDER — METOPROLOL SUCCINATE 25 MG/1
25 TABLET, EXTENDED RELEASE ORAL DAILY
Status: DISCONTINUED | OUTPATIENT
Start: 2025-03-05 | End: 2025-03-06 | Stop reason: HOSPADM

## 2025-03-04 RX ORDER — ONDANSETRON 4 MG/1
4 TABLET, ORALLY DISINTEGRATING ORAL EVERY 8 HOURS PRN
Status: DISCONTINUED | OUTPATIENT
Start: 2025-03-04 | End: 2025-03-04

## 2025-03-04 RX ORDER — FAMOTIDINE 20 MG/1
20 TABLET, FILM COATED ORAL DAILY
Status: DISCONTINUED | OUTPATIENT
Start: 2025-03-05 | End: 2025-03-06 | Stop reason: HOSPADM

## 2025-03-04 RX ORDER — LEVOFLOXACIN 5 MG/ML
500 INJECTION, SOLUTION INTRAVENOUS ONCE
Status: COMPLETED | OUTPATIENT
Start: 2025-03-04 | End: 2025-03-04

## 2025-03-04 RX ORDER — PROCHLORPERAZINE MALEATE 10 MG
10 TABLET ORAL EVERY 8 HOURS PRN
Status: DISCONTINUED | OUTPATIENT
Start: 2025-03-04 | End: 2025-03-06 | Stop reason: HOSPADM

## 2025-03-04 RX ORDER — ACETAMINOPHEN 650 MG/1
650 SUPPOSITORY RECTAL EVERY 6 HOURS PRN
Status: DISCONTINUED | OUTPATIENT
Start: 2025-03-04 | End: 2025-03-06 | Stop reason: HOSPADM

## 2025-03-04 RX ORDER — ROSUVASTATIN CALCIUM 10 MG/1
10 TABLET, COATED ORAL DAILY
Status: DISCONTINUED | OUTPATIENT
Start: 2025-03-05 | End: 2025-03-06 | Stop reason: HOSPADM

## 2025-03-04 RX ORDER — POTASSIUM CHLORIDE 1500 MG/1
40 TABLET, EXTENDED RELEASE ORAL PRN
Status: DISCONTINUED | OUTPATIENT
Start: 2025-03-04 | End: 2025-03-06 | Stop reason: HOSPADM

## 2025-03-04 RX ORDER — IPRATROPIUM BROMIDE AND ALBUTEROL SULFATE 2.5; .5 MG/3ML; MG/3ML
2 SOLUTION RESPIRATORY (INHALATION) ONCE
Status: COMPLETED | OUTPATIENT
Start: 2025-03-04 | End: 2025-03-04

## 2025-03-04 RX ORDER — ACETAMINOPHEN 325 MG/1
650 TABLET ORAL EVERY 6 HOURS PRN
Status: DISCONTINUED | OUTPATIENT
Start: 2025-03-04 | End: 2025-03-06 | Stop reason: HOSPADM

## 2025-03-04 RX ORDER — FUROSEMIDE 10 MG/ML
40 INJECTION INTRAMUSCULAR; INTRAVENOUS ONCE
Status: COMPLETED | OUTPATIENT
Start: 2025-03-04 | End: 2025-03-04

## 2025-03-04 RX ORDER — LEVOFLOXACIN 5 MG/ML
250 INJECTION, SOLUTION INTRAVENOUS EVERY 24 HOURS
Status: DISCONTINUED | OUTPATIENT
Start: 2025-03-05 | End: 2025-03-05 | Stop reason: SDUPTHER

## 2025-03-04 RX ORDER — PROCHLORPERAZINE EDISYLATE 5 MG/ML
10 INJECTION INTRAMUSCULAR; INTRAVENOUS EVERY 6 HOURS PRN
Status: DISCONTINUED | OUTPATIENT
Start: 2025-03-04 | End: 2025-03-06 | Stop reason: HOSPADM

## 2025-03-04 RX ORDER — AMIODARONE HYDROCHLORIDE 200 MG/1
100 TABLET ORAL SEE ADMIN INSTRUCTIONS
Status: DISCONTINUED | OUTPATIENT
Start: 2025-03-04 | End: 2025-03-05

## 2025-03-04 RX ORDER — SODIUM CHLORIDE 0.9 % (FLUSH) 0.9 %
5-40 SYRINGE (ML) INJECTION EVERY 12 HOURS SCHEDULED
Status: DISCONTINUED | OUTPATIENT
Start: 2025-03-04 | End: 2025-03-06 | Stop reason: HOSPADM

## 2025-03-04 RX ORDER — MAGNESIUM SULFATE IN WATER 40 MG/ML
2000 INJECTION, SOLUTION INTRAVENOUS PRN
Status: DISCONTINUED | OUTPATIENT
Start: 2025-03-04 | End: 2025-03-06 | Stop reason: HOSPADM

## 2025-03-04 RX ORDER — SODIUM CHLORIDE, SODIUM LACTATE, POTASSIUM CHLORIDE, CALCIUM CHLORIDE 600; 310; 30; 20 MG/100ML; MG/100ML; MG/100ML; MG/100ML
INJECTION, SOLUTION INTRAVENOUS CONTINUOUS
Status: CANCELLED | OUTPATIENT
Start: 2025-03-04 | End: 2025-03-05

## 2025-03-04 RX ORDER — SODIUM CHLORIDE 0.9 % (FLUSH) 0.9 %
5-40 SYRINGE (ML) INJECTION PRN
Status: DISCONTINUED | OUTPATIENT
Start: 2025-03-04 | End: 2025-03-06 | Stop reason: HOSPADM

## 2025-03-04 RX ORDER — LEVOFLOXACIN 5 MG/ML
750 INJECTION, SOLUTION INTRAVENOUS
Status: DISCONTINUED | OUTPATIENT
Start: 2025-03-07 | End: 2025-03-05 | Stop reason: SDUPTHER

## 2025-03-04 RX ADMIN — VANCOMYCIN 1250 MG: 1.75 INJECTION, SOLUTION INTRAVENOUS at 22:29

## 2025-03-04 RX ADMIN — FUROSEMIDE 40 MG: 10 INJECTION, SOLUTION INTRAMUSCULAR; INTRAVENOUS at 20:26

## 2025-03-04 RX ADMIN — IPRATROPIUM BROMIDE AND ALBUTEROL SULFATE 2 DOSE: 2.5; .5 SOLUTION RESPIRATORY (INHALATION) at 18:37

## 2025-03-04 RX ADMIN — LEVOFLOXACIN 500 MG: 500 INJECTION, SOLUTION INTRAVENOUS at 20:29

## 2025-03-04 RX ADMIN — SODIUM CHLORIDE, PRESERVATIVE FREE 10 ML: 5 INJECTION INTRAVENOUS at 22:51

## 2025-03-04 RX ADMIN — METHYLPREDNISOLONE SODIUM SUCCINATE 125 MG: 125 INJECTION INTRAMUSCULAR; INTRAVENOUS at 18:39

## 2025-03-04 RX ADMIN — APIXABAN 2.5 MG: 5 TABLET, FILM COATED ORAL at 22:51

## 2025-03-04 RX ADMIN — ALBUTEROL SULFATE 2.5 MG: 2.5 SOLUTION RESPIRATORY (INHALATION) at 18:37

## 2025-03-04 NOTE — ED PROVIDER NOTES
specified.    DISCHARGE MEDICATIONS:  New Prescriptions    No medications on file       DISCONTINUED MEDICATIONS:  Discontinued Medications    No medications on file              (Please note that portions of this note were completed with a voice recognition program.  Efforts were made to edit the dictations but occasionally words are mis-transcribed.)    Abiel Santana PA-C (electronically signed)        Abiel Santana PA-C  03/04/25 [id number]
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
## 2025-03-05 LAB
ANION GAP SERPL CALCULATED.3IONS-SCNC: 18 MMOL/L (ref 3–16)
BACTERIA UR CULT: NORMAL
BILIRUB UR QL STRIP.AUTO: NEGATIVE
BUN SERPL-MCNC: 20 MG/DL (ref 7–20)
CALCIUM SERPL-MCNC: 8.1 MG/DL (ref 8.3–10.6)
CHLORIDE SERPL-SCNC: 99 MMOL/L (ref 99–110)
CLARITY UR: CLEAR
CO2 SERPL-SCNC: 15 MMOL/L (ref 21–32)
COLOR UR: YELLOW
CREAT SERPL-MCNC: 1.2 MG/DL (ref 0.6–1.2)
DEPRECATED RDW RBC AUTO: 16.3 % (ref 12.4–15.4)
EKG ATRIAL RATE: 80 BPM
EKG ATRIAL RATE: 87 BPM
EKG DIAGNOSIS: NORMAL
EKG DIAGNOSIS: NORMAL
EKG P AXIS: 83 DEGREES
EKG P-R INTERVAL: 136 MS
EKG P-R INTERVAL: 166 MS
EKG Q-T INTERVAL: 400 MS
EKG Q-T INTERVAL: 424 MS
EKG QRS DURATION: 100 MS
EKG QRS DURATION: 98 MS
EKG QTC CALCULATION (BAZETT): 482 MS
EKG QTC CALCULATION (BAZETT): 489 MS
EKG R AXIS: -14 DEGREES
EKG R AXIS: 34 DEGREES
EKG T AXIS: 37 DEGREES
EKG T AXIS: 50 DEGREES
EKG VENTRICULAR RATE: 80 BPM
EKG VENTRICULAR RATE: 87 BPM
GFR SERPLBLD CREATININE-BSD FMLA CKD-EPI: 45 ML/MIN/{1.73_M2}
GLUCOSE SERPL-MCNC: 171 MG/DL (ref 70–99)
GLUCOSE UR STRIP.AUTO-MCNC: NEGATIVE MG/DL
HCT VFR BLD AUTO: 30.2 % (ref 36–48)
HGB BLD-MCNC: 9.7 G/DL (ref 12–16)
HGB UR QL STRIP.AUTO: NEGATIVE
KETONES UR STRIP.AUTO-MCNC: NEGATIVE MG/DL
LACTATE BLDV-SCNC: 1 MMOL/L (ref 0.4–1.9)
LEUKOCYTE ESTERASE UR QL STRIP.AUTO: NEGATIVE
MAGNESIUM SERPL-MCNC: 2.11 MG/DL (ref 1.8–2.4)
MCH RBC QN AUTO: 27.3 PG (ref 26–34)
MCHC RBC AUTO-ENTMCNC: 32.2 G/DL (ref 31–36)
MCV RBC AUTO: 84.8 FL (ref 80–100)
MRSA DNA SPEC QL NAA+PROBE: NORMAL
NITRITE UR QL STRIP.AUTO: NEGATIVE
NT-PROBNP SERPL-MCNC: 9907 PG/ML (ref 0–449)
PH UR STRIP.AUTO: 6 [PH] (ref 5–8)
PHOSPHATE SERPL-MCNC: 3.2 MG/DL (ref 2.5–4.9)
PLATELET # BLD AUTO: 271 K/UL (ref 135–450)
PMV BLD AUTO: 8 FL (ref 5–10.5)
POTASSIUM SERPL-SCNC: 3.4 MMOL/L (ref 3.5–5.1)
PROT UR STRIP.AUTO-MCNC: NEGATIVE MG/DL
RBC # BLD AUTO: 3.56 M/UL (ref 4–5.2)
REPORT: NORMAL
RESP PATH DNA+RNA PNL NPH NAA+NON-PROBE: NORMAL
SODIUM SERPL-SCNC: 132 MMOL/L (ref 136–145)
SP GR UR STRIP.AUTO: <=1.005 (ref 1–1.03)
UA DIPSTICK W REFLEX MICRO PNL UR: NORMAL
URN SPEC COLLECT METH UR: NORMAL
UROBILINOGEN UR STRIP-ACNC: 0.2 E.U./DL
VANCOMYCIN SERPL-MCNC: 17.8 UG/ML
WBC # BLD AUTO: 5.2 K/UL (ref 4–11)

## 2025-03-05 PROCEDURE — 6370000000 HC RX 637 (ALT 250 FOR IP): Performed by: STUDENT IN AN ORGANIZED HEALTH CARE EDUCATION/TRAINING PROGRAM

## 2025-03-05 PROCEDURE — 2500000003 HC RX 250 WO HCPCS: Performed by: STUDENT IN AN ORGANIZED HEALTH CARE EDUCATION/TRAINING PROGRAM

## 2025-03-05 PROCEDURE — 6370000000 HC RX 637 (ALT 250 FOR IP): Performed by: INTERNAL MEDICINE

## 2025-03-05 PROCEDURE — 81003 URINALYSIS AUTO W/O SCOPE: CPT

## 2025-03-05 PROCEDURE — 93005 ELECTROCARDIOGRAM TRACING: CPT | Performed by: STUDENT IN AN ORGANIZED HEALTH CARE EDUCATION/TRAINING PROGRAM

## 2025-03-05 PROCEDURE — 80202 ASSAY OF VANCOMYCIN: CPT

## 2025-03-05 PROCEDURE — 36415 COLL VENOUS BLD VENIPUNCTURE: CPT

## 2025-03-05 PROCEDURE — 83880 ASSAY OF NATRIURETIC PEPTIDE: CPT

## 2025-03-05 PROCEDURE — 2700000000 HC OXYGEN THERAPY PER DAY

## 2025-03-05 PROCEDURE — 99233 SBSQ HOSP IP/OBS HIGH 50: CPT | Performed by: INTERNAL MEDICINE

## 2025-03-05 PROCEDURE — 83605 ASSAY OF LACTIC ACID: CPT

## 2025-03-05 PROCEDURE — 80048 BASIC METABOLIC PNL TOTAL CA: CPT

## 2025-03-05 PROCEDURE — 84100 ASSAY OF PHOSPHORUS: CPT

## 2025-03-05 PROCEDURE — 87449 NOS EACH ORGANISM AG IA: CPT

## 2025-03-05 PROCEDURE — 87641 MR-STAPH DNA AMP PROBE: CPT

## 2025-03-05 PROCEDURE — 2580000003 HC RX 258: Performed by: STUDENT IN AN ORGANIZED HEALTH CARE EDUCATION/TRAINING PROGRAM

## 2025-03-05 PROCEDURE — 6360000002 HC RX W HCPCS: Performed by: INTERNAL MEDICINE

## 2025-03-05 PROCEDURE — 94761 N-INVAS EAR/PLS OXIMETRY MLT: CPT

## 2025-03-05 PROCEDURE — 6360000002 HC RX W HCPCS: Performed by: STUDENT IN AN ORGANIZED HEALTH CARE EDUCATION/TRAINING PROGRAM

## 2025-03-05 PROCEDURE — 87086 URINE CULTURE/COLONY COUNT: CPT

## 2025-03-05 PROCEDURE — 85027 COMPLETE CBC AUTOMATED: CPT

## 2025-03-05 PROCEDURE — 2580000003 HC RX 258: Performed by: INTERNAL MEDICINE

## 2025-03-05 PROCEDURE — 83735 ASSAY OF MAGNESIUM: CPT

## 2025-03-05 PROCEDURE — 94640 AIRWAY INHALATION TREATMENT: CPT

## 2025-03-05 PROCEDURE — 2060000000 HC ICU INTERMEDIATE R&B

## 2025-03-05 RX ORDER — LEVOFLOXACIN 5 MG/ML
750 INJECTION, SOLUTION INTRAVENOUS
Status: DISCONTINUED | OUTPATIENT
Start: 2025-03-05 | End: 2025-03-05

## 2025-03-05 RX ORDER — IPRATROPIUM BROMIDE AND ALBUTEROL SULFATE 2.5; .5 MG/3ML; MG/3ML
1 SOLUTION RESPIRATORY (INHALATION)
Status: DISCONTINUED | OUTPATIENT
Start: 2025-03-05 | End: 2025-03-05

## 2025-03-05 RX ORDER — DILTIAZEM HYDROCHLORIDE 5 MG/ML
10 INJECTION INTRAVENOUS ONCE
Status: COMPLETED | OUTPATIENT
Start: 2025-03-06 | End: 2025-03-06

## 2025-03-05 RX ORDER — LEVOFLOXACIN 5 MG/ML
750 INJECTION, SOLUTION INTRAVENOUS
Status: DISCONTINUED | OUTPATIENT
Start: 2025-03-07 | End: 2025-03-05

## 2025-03-05 RX ORDER — AMIODARONE HYDROCHLORIDE 200 MG/1
100 TABLET ORAL
Status: DISCONTINUED | OUTPATIENT
Start: 2025-03-05 | End: 2025-03-06 | Stop reason: HOSPADM

## 2025-03-05 RX ORDER — IPRATROPIUM BROMIDE AND ALBUTEROL SULFATE 2.5; .5 MG/3ML; MG/3ML
1 SOLUTION RESPIRATORY (INHALATION) EVERY 4 HOURS PRN
Status: DISCONTINUED | OUTPATIENT
Start: 2025-03-05 | End: 2025-03-06 | Stop reason: HOSPADM

## 2025-03-05 RX ORDER — AZITHROMYCIN 250 MG/1
250 TABLET, FILM COATED ORAL DAILY
Status: DISCONTINUED | OUTPATIENT
Start: 2025-03-06 | End: 2025-03-06 | Stop reason: HOSPADM

## 2025-03-05 RX ORDER — LEVOFLOXACIN 5 MG/ML
250 INJECTION, SOLUTION INTRAVENOUS EVERY 24 HOURS
Status: DISCONTINUED | OUTPATIENT
Start: 2025-03-05 | End: 2025-03-05

## 2025-03-05 RX ORDER — IPRATROPIUM BROMIDE AND ALBUTEROL SULFATE 2.5; .5 MG/3ML; MG/3ML
1 SOLUTION RESPIRATORY (INHALATION)
Status: DISCONTINUED | OUTPATIENT
Start: 2025-03-05 | End: 2025-03-06

## 2025-03-05 RX ORDER — AZITHROMYCIN 250 MG/1
500 TABLET, FILM COATED ORAL DAILY
Status: COMPLETED | OUTPATIENT
Start: 2025-03-05 | End: 2025-03-05

## 2025-03-05 RX ADMIN — AMLODIPINE BESYLATE 5 MG: 5 TABLET ORAL at 10:20

## 2025-03-05 RX ADMIN — METOPROLOL SUCCINATE 25 MG: 25 TABLET, EXTENDED RELEASE ORAL at 10:20

## 2025-03-05 RX ADMIN — CEFEPIME 2000 MG: 2 INJECTION, POWDER, FOR SOLUTION INTRAVENOUS at 14:00

## 2025-03-05 RX ADMIN — AMIODARONE HYDROCHLORIDE 100 MG: 200 TABLET ORAL at 13:57

## 2025-03-05 RX ADMIN — POTASSIUM CHLORIDE 40 MEQ: 1500 TABLET, EXTENDED RELEASE ORAL at 10:20

## 2025-03-05 RX ADMIN — ROSUVASTATIN CALCIUM 10 MG: 10 TABLET, FILM COATED ORAL at 10:20

## 2025-03-05 RX ADMIN — AZITHROMYCIN DIHYDRATE 500 MG: 250 TABLET ORAL at 13:56

## 2025-03-05 RX ADMIN — IPRATROPIUM BROMIDE AND ALBUTEROL SULFATE 1 DOSE: 2.5; .5 SOLUTION RESPIRATORY (INHALATION) at 20:23

## 2025-03-05 RX ADMIN — SODIUM CHLORIDE, PRESERVATIVE FREE 10 ML: 5 INJECTION INTRAVENOUS at 10:21

## 2025-03-05 RX ADMIN — FUROSEMIDE 40 MG: 10 INJECTION, SOLUTION INTRAMUSCULAR; INTRAVENOUS at 10:21

## 2025-03-05 RX ADMIN — APIXABAN 2.5 MG: 5 TABLET, FILM COATED ORAL at 20:18

## 2025-03-05 RX ADMIN — IPRATROPIUM BROMIDE AND ALBUTEROL SULFATE 1 DOSE: .5; 2.5 SOLUTION RESPIRATORY (INHALATION) at 11:38

## 2025-03-05 RX ADMIN — FUROSEMIDE 40 MG: 10 INJECTION, SOLUTION INTRAMUSCULAR; INTRAVENOUS at 17:44

## 2025-03-05 RX ADMIN — VANCOMYCIN HYDROCHLORIDE 750 MG: 750 INJECTION, POWDER, LYOPHILIZED, FOR SOLUTION INTRAVENOUS at 10:31

## 2025-03-05 RX ADMIN — APIXABAN 2.5 MG: 5 TABLET, FILM COATED ORAL at 10:20

## 2025-03-05 ASSESSMENT — PAIN SCALES - GENERAL
PAINLEVEL_OUTOF10: 0

## 2025-03-05 NOTE — WOUND CARE
Photo reviewed, spoke with staff RN.  Pt has POA stage 2 PI to sacrum.  Foam dressing in place and appropriate.  Call wound care RN for worsening.

## 2025-03-05 NOTE — CONSULTS
Pharmacy to Dose Vancomycin    Dx: Pneumonia      CrCl- 33 mL/min    Ordering Provider: EL Head    ED loading dose of 1250 mg given in once on 03/04 @ 2030 per hospital protocol.    Please send new order for a vancomycin consult when patient is admitted to the floor.   Thank you for the consult.    Amanda Aamya, PharmD, [facility] 3/4/2025 8:16 PM    
[facility]   Pharmacy Pharmacokinetic Monitoring Service - Vancomycin     Rima Wyatt is a 79 y.o. female starting on vancomycin therapy for CAP. Pharmacy consulted by Irma Dorman for monitoring and adjustment.    Target Concentration: Dosing based on anticipated concentration <15 mg/L due to renal impairment/insufficiency    Additional Antimicrobials: Levofloxacin    Pertinent Laboratory Values:   Wt Readings from Last 1 Encounters:   03/04/25 59.9 kg (132 lb 2 oz)     Temp Readings from Last 1 Encounters:   03/04/25 98 °F (36.7 °C) (Oral)     Estimated Creatinine Clearance: 32 mL/min (based on SCr of 1.1 mg/dL).  Recent Labs     03/04/25  1843   CREATININE 1.1   BUN 18   WBC 10.3     Procalcitonin: 0.09    Pertinent Cultures:  Culture Date Source Results        MRSA Nasal Swab: not ordered. Order placed by pharmacy.    Plan:  Concentration-guided dosing due to renal impairment/insufficiency  A vancomycin loading dose of 1250 mg was given in ED 3/4 @ 2229  Vancomycin concentration ordered for 3/5 with AM labs  Renal labs as indicated; Placeholder vancomycin order placed on the MAR   Pharmacy will pulse dose due to age and current renal function    Thank you for the consult,  Yaneth Reyes RPH  3/5/2025 4:26 AM   
day on Monday Tuesday Wednesday Thursday    cefepime  2,000 mg IntraVENous Once    Followed by    [START ON 3/6/2025] cefepime  2,000 mg IntraVENous Q12H    azithromycin  500 mg Oral Daily    Followed by    [START ON 3/6/2025] azithromycin  250 mg Oral Daily    apixaban  2.5 mg Oral BID    amLODIPine  5 mg Oral Daily    [Held by provider] famotidine  20 mg Oral Daily    metoprolol succinate  25 mg Oral Daily    rosuvastatin  10 mg Oral Daily    sodium chloride flush  5-40 mL IntraVENous 2 times per day    furosemide  40 mg IntraVENous BID     Continuous Infusions:   sodium chloride       PRN Meds:  ipratropium 0.5 mg-albuterol 2.5 mg, sodium chloride flush, sodium chloride, potassium chloride **OR** potassium alternative oral replacement **OR** potassium chloride, magnesium sulfate, polyethylene glycol, acetaminophen **OR** acetaminophen, prochlorperazine **OR** prochlorperazine    ALLERGIES:  Patient is allergic to prednisone and hydrochlorothiazide.    REVIEW OF SYSTEMS:  Constitutional: Negative for fever  HENT: Negative for sore throat  Eyes: Negative for redness   Respiratory: Negative for dyspnea, cough  Cardiovascular: Negative for chest pain  Gastrointestinal: Negative for vomiting, diarrhea   Genitourinary: Negative for hematuria   Musculoskeletal: Negative for arthralgias   Skin: Negative for rash  Neurological: Negative for syncope  Hematological: Negative for adenopathy  Psychiatric/Behavorial: Negative for anxiety    PHYSICAL EXAM:  Blood pressure 110/61, pulse 82, temperature 98.1 °F (36.7 °C), temperature source Oral, resp. rate 16, height 1.524 m (5'), weight 59.9 kg (132 lb 2 oz), SpO2 93%. 1L  Gen: No distress.   Eyes: No sclera icterus. No conjunctival injection.   Neck: Trachea midline. No obvious mass.    Resp: No accessory muscle use. + Bibasilar crackles. No wheezes. No rhonchi.   CV: Regular rate. Regular rhythm. No murmur or rub. No edema.  GI: Non-tender. Non-distended. No hernia.   Skin:

## 2025-03-05 NOTE — H&P
insertion; Hysterectomy, total abdominal; and back surgery.    Fam HX: family history includes Arthritis in her mother; Diabetes in her brother, mother, sister, and sister; High Blood Pressure in her brother, mother, and sister.    Soc HX:   Social History     Socioeconomic History    Marital status:    Tobacco Use    Smoking status: Never    Smokeless tobacco: Never   Vaping Use    Vaping status: Never Used   Substance and Sexual Activity    Alcohol use: No    Drug use: No    Sexual activity: Not Currently     Social Determinants of Health     Financial Resource Strain: Medium Risk (3/20/2024)    Overall Financial Resource Strain (CARDIA)     Difficulty of Paying Living Expenses: Somewhat hard   Food Insecurity: No Food Insecurity (1/10/2025)    Hunger Vital Sign     Worried About Running Out of Food in the Last Year: Never true     Ran Out of Food in the Last Year: Never true   Transportation Needs: No Transportation Needs (1/10/2025)    PRAPARE - Transportation     Lack of Transportation (Medical): No     Lack of Transportation (Non-Medical): No   Physical Activity: Inactive (3/2/2025)    Exercise Vital Sign     Days of Exercise per Week: 0 days     Minutes of Exercise per Session: 0 min    Received from [employer], [employer]    Interpersonal Safety   Housing Stability: Low Risk  (1/10/2025)    Housing Stability Vital Sign     Unable to Pay for Housing in the Last Year: No     Number of Times Moved in the Last Year: 0     Homeless in the Last Year: No       Allergies:   Allergies:   Allergies   Allergen Reactions    Prednisone Other (See Comments)     Pt. States not allergic    Hydrochlorothiazide      Hyponatremia       Medications:   Medications:    levofloxacin  500 mg IntraVENous Once    vancomycin  1,250 mg IntraVENous Once    ipratropium 0.5 mg-albuterol 2.5 mg  1 Dose Inhalation Once      Infusions:   PRN Meds:      Labs      CBC:   Recent

## 2025-03-05 NOTE — PLAN OF CARE
Patient admitted to room 321 from ED. Patient oriented to room, call light, bed rails, phone, lights and bathroom. Patient instructed about the schedule of the day including: vital sign frequency, lab draws, possible tests, frequency of MD and staff rounds, daily weights, I &O's and prescribed diet. Telemetry box in place, patient aware of placement and reason. Bed locked, in lowest position, side rails up 2/4, call light within reach.        Recliner Assessment  Patient is able to demonstrate the ability to move from a reclining position to an upright position within the recliner.       4 Eyes Skin Assessment     NAME:  Rima Wyatt  YOB: 1945  MEDICAL RECORD NUMBER:  [id number]    The patient is being assessed for  Admission    I agree that at least one RN has performed a thorough Head to Toe Skin Assessment on the patient. ALL assessment sites listed below have been assessed.      Areas assessed by both nurses:    Head, Face, Ears, Shoulders, Back, Chest, Arms, Elbows, Hands, Sacrum. Buttock, Coccyx, Ischium, Legs. Feet and Heels, and Under Medical Devices         Does the Patient have a Wound? Yes wound(s) were present on assessment. LDA wound assessment was Initiated and completed by YOUNG Valdez Prevention initiated by RN: No  Wound Care Orders initiated by RN: Yes    Pressure Injury (Stage 3,4, Unstageable, DTI, NWPT, and Complex wounds) if present, place Wound referral order by RN under : Yes    New Ostomies, if present place, Ostomy referral order under : No     Nurse 1 eSignature: Electronically signed by Elizabeth Hoang RN on 3/5/25 at 12:01 AM EST    **SHARE this note so that the co-signing nurse can place an eSignature**    Nurse 2 eSignature: Electronically signed by Dede Beckford RN on 3/5/2025 at 2:35 AM

## 2025-03-05 NOTE — FLOWSHEET NOTE
03/04/25 2204   Vital Signs   Temp 98 °F (36.7 °C)   Temp Source Oral   Pulse 88   Heart Rate Source Monitor   Respirations 20   BP (!) 131/57   MAP (Calculated) 82   BP Location Right upper arm   BP Method Automatic   Patient Position Semi fowlers   Pain Assessment   Pain Assessment None - Denies Pain   Oxygen Therapy   SpO2 99 %   O2 Device Nasal cannula   O2 Flow Rate (L/min) 2 L/min   Height and Weight   Height 1.524 m (5')   Weight - Scale 59.9 kg (132 lb 2 oz)   Weight Method Bed scale   BSA (Calculated - sq m) 1.59 sq meters   BMI (Calculated) 25.9   Rhythm Interpretation   Cardiac Rhythm Sinus rhythm     Admission Assessment completed. Scheduled medications given per MAR, On 2L of oxygen, A&O X4, Vital Signs completed and Charted, Patient denies any further needs at this time. Call light within reach, Reminded patient to call RN if she needs anything.

## 2025-03-06 VITALS
HEART RATE: 102 BPM | DIASTOLIC BLOOD PRESSURE: 69 MMHG | BODY MASS INDEX: 25.76 KG/M2 | OXYGEN SATURATION: 94 % | TEMPERATURE: 97.4 F | SYSTOLIC BLOOD PRESSURE: 110 MMHG | RESPIRATION RATE: 15 BRPM | HEIGHT: 60 IN | WEIGHT: 131.2 LBS

## 2025-03-06 LAB
ANION GAP SERPL CALCULATED.3IONS-SCNC: 17 MMOL/L (ref 3–16)
BUN SERPL-MCNC: 32 MG/DL (ref 7–20)
CALCIUM SERPL-MCNC: 8.3 MG/DL (ref 8.3–10.6)
CHLORIDE SERPL-SCNC: 107 MMOL/L (ref 99–110)
CO2 SERPL-SCNC: 19 MMOL/L (ref 21–32)
CREAT SERPL-MCNC: 1.4 MG/DL (ref 0.6–1.2)
EKG ATRIAL RATE: 178 BPM
EKG DIAGNOSIS: NORMAL
EKG Q-T INTERVAL: 380 MS
EKG QRS DURATION: 94 MS
EKG QTC CALCULATION (BAZETT): 525 MS
EKG R AXIS: 6 DEGREES
EKG T AXIS: 27 DEGREES
EKG VENTRICULAR RATE: 115 BPM
GFR SERPLBLD CREATININE-BSD FMLA CKD-EPI: 38 ML/MIN/{1.73_M2}
GLUCOSE SERPL-MCNC: 114 MG/DL (ref 70–99)
POTASSIUM SERPL-SCNC: 3.8 MMOL/L (ref 3.5–5.1)
SODIUM SERPL-SCNC: 143 MMOL/L (ref 136–145)

## 2025-03-06 PROCEDURE — 2500000003 HC RX 250 WO HCPCS: Performed by: STUDENT IN AN ORGANIZED HEALTH CARE EDUCATION/TRAINING PROGRAM

## 2025-03-06 PROCEDURE — 94761 N-INVAS EAR/PLS OXIMETRY MLT: CPT

## 2025-03-06 PROCEDURE — 6360000002 HC RX W HCPCS: Performed by: STUDENT IN AN ORGANIZED HEALTH CARE EDUCATION/TRAINING PROGRAM

## 2025-03-06 PROCEDURE — 80048 BASIC METABOLIC PNL TOTAL CA: CPT

## 2025-03-06 PROCEDURE — 94640 AIRWAY INHALATION TREATMENT: CPT

## 2025-03-06 PROCEDURE — 6370000000 HC RX 637 (ALT 250 FOR IP): Performed by: INTERNAL MEDICINE

## 2025-03-06 PROCEDURE — 6370000000 HC RX 637 (ALT 250 FOR IP): Performed by: STUDENT IN AN ORGANIZED HEALTH CARE EDUCATION/TRAINING PROGRAM

## 2025-03-06 PROCEDURE — 36415 COLL VENOUS BLD VENIPUNCTURE: CPT

## 2025-03-06 RX ORDER — TORSEMIDE 20 MG/1
TABLET ORAL
Qty: 1 TABLET | Refills: 0
Start: 2025-03-06

## 2025-03-06 RX ORDER — AZITHROMYCIN 250 MG/1
250 TABLET, FILM COATED ORAL DAILY
Qty: 3 TABLET | Refills: 0 | Status: SHIPPED | OUTPATIENT
Start: 2025-03-07 | End: 2025-03-10

## 2025-03-06 RX ADMIN — AZITHROMYCIN DIHYDRATE 250 MG: 250 TABLET ORAL at 08:11

## 2025-03-06 RX ADMIN — METOPROLOL SUCCINATE 25 MG: 25 TABLET, EXTENDED RELEASE ORAL at 08:11

## 2025-03-06 RX ADMIN — DILTIAZEM HYDROCHLORIDE 10 MG: 5 INJECTION, SOLUTION INTRAVENOUS at 00:08

## 2025-03-06 RX ADMIN — ROSUVASTATIN CALCIUM 10 MG: 10 TABLET, FILM COATED ORAL at 08:11

## 2025-03-06 RX ADMIN — APIXABAN 2.5 MG: 5 TABLET, FILM COATED ORAL at 08:11

## 2025-03-06 RX ADMIN — SODIUM CHLORIDE, PRESERVATIVE FREE 10 ML: 5 INJECTION INTRAVENOUS at 09:00

## 2025-03-06 RX ADMIN — AMLODIPINE BESYLATE 5 MG: 5 TABLET ORAL at 08:11

## 2025-03-06 RX ADMIN — AMIODARONE HYDROCHLORIDE 100 MG: 200 TABLET ORAL at 08:11

## 2025-03-06 RX ADMIN — IPRATROPIUM BROMIDE AND ALBUTEROL SULFATE 1 DOSE: 2.5; .5 SOLUTION RESPIRATORY (INHALATION) at 07:24

## 2025-03-06 ASSESSMENT — PAIN SCALES - GENERAL
PAINLEVEL_OUTOF10: 0
PAINLEVEL_OUTOF10: 0

## 2025-03-06 NOTE — DISCHARGE SUMMARY
Name:  Rima Wyatt  Room:  /0321-01  MRN:    [id number]    Discharge Summary      This discharge summary is in conjunction with a complete physical exam done on the day of discharge.      Discharging Physician: HAIDER COLON MD      Admit: 3/4/2025  Discharge:  3/6/2025     Diagnoses this Admission    Principal Problem:    Community acquired bacterial pneumonia  Active Problems:    PAF (paroxysmal atrial fibrillation) (HCC)    Acute on chronic heart failure (HCC)    Hospital-acquired pneumonia  Resolved Problems:    * No resolved hospital problems. *          Procedures (Please Review Full Report for Details)      Consults    PHARMACY TO DOSE VANCOMYCIN  IP CONSULT TO HOSPITALIST  IP CONSULT TO PULMONOLOGY      HPI:  Rima Wyatt is a 79 y.o. female who presents with generalized weakness cough fatigue shortness of breath patient believes that the cardiologist changed the pacemaker threshold and patient believes that her symptoms are because of that.  Symptoms started 3 days ago while the change with the pacemaker was done before that.  Explained in detail that the heart rate is still in the 90s and the pacemaker is not acting up at the moment.  Patient showed understanding.  Did have admission with pneumonia recently as well.  Denies any nausea vomiting diarrhea constipation dizziness lightheadedness leg pain and leg swelling.  To be admitted to the hospital for the management of her concerns for recurrent pneumonia       Physical Exam at Discharge:  /84   Pulse (!) 119   Temp 97.7 °F (36.5 °C) (Axillary)   Resp 16   Ht 1.524 m (5')   Wt 59.5 kg (131 lb 3.2 oz)   SpO2 93%   BMI 25.62 kg/m²         Hospital Course      Acute Hypoxia  No pneumonia   -recent admission 1/10 for pneumonia   -currently requiring 2L O2 to maintain sats   -CXR as above  -supp O2, wean as tolerated  -sputum culture and pneumonia panel pending  -MRSA, strep, and legionella pending  -continue vanc and

## 2025-03-06 NOTE — PLAN OF CARE
Problem: Chronic Conditions and Co-morbidities  Goal: Patient's chronic conditions and co-morbidity symptoms are monitored and maintained or improved  Outcome: Progressing     Problem: Discharge Planning  Goal: Discharge to home or other facility with appropriate resources  Outcome: Progressing     Problem: Safety - Adult  Goal: Free from fall injury  Outcome: Progressing     Problem: Skin/Tissue Integrity  Goal: Skin integrity remains intact  Description: 1.  Monitor for areas of redness and/or skin breakdown  2.  Assess vascular access sites hourly  3.  Every 4-6 hours minimum:  Change oxygen saturation probe site  4.  Every 4-6 hours:  If on nasal continuous positive airway pressure, respiratory therapy assess nares and determine need for appliance change or resting period  Outcome: Progressing     Problem: Pain  Goal: Verbalizes/displays adequate comfort level or baseline comfort level  Outcome: Progressing

## 2025-03-06 NOTE — CARE COORDINATION
12:10 PM  EDUARDO observed DC order. Pt will be DC-ing back home and will be transported by family.   No further CM needs.   Pt's O2 is 93% on RA.    Last IMM: 3/4    EDUARDO informed RN pt is ready for DC from CM standpoint.     Electronically signed by JESSICA Paniagua on 3/6/2025 at 12:11 PM  
supports the patient's individualized plan of care/goals and shares the quality data associated with the providers was provided to:     Patient Representative Name:       The Patient and/or Patient Representative Agree with the Discharge Plan?      Julia English  Case Management Department  Ph: [phone] Fax: [phone]

## 2025-03-06 NOTE — DISCHARGE INSTR - COC
{THERAPEUTIC INTERVENTION:9238411947}  Weight Bearing Status/Restrictions: { CC Weight Bearin}  Other Medical Equipment (for information only, NOT a DME order):  {EQUIPMENT:491491892}  Other Treatments: ***    Patient's personal belongings (please select all that are sent with patient):  {CHP DME Belongings:577834326}    RN SIGNATURE:  {Esignature:[id number]}    CASE MANAGEMENT/SOCIAL WORK SECTION    Inpatient Status Date: ***    Readmission Risk Assessment Score:  [facility] RISK OF UNPLANNED READMISSION 2.0             23.3 Total Score        Discharging to Facility/ Agency   Name:   Address:  Phone:  Fax:    Dialysis Facility (if applicable)   Name:  Address:  Dialysis Schedule:  Phone:  Fax:    / signature: {Esignature:[id number]}    PHYSICIAN SECTION    Prognosis: {Prognosis:0045880327}    Condition at Discharge: { Patient Condition:695137397}    Rehab Potential (if transferring to Rehab): {Prognosis:7366676067}    Recommended Labs or Other Treatments After Discharge: ***    Physician Certification: I certify the above information and transfer of Rima Wyatt  is necessary for the continuing treatment of the diagnosis listed and that she requires {Admit to Appropriate Level of Care:02274} for {GREATER/LESS:222006537} 30 days.     Update Admission H&P: {CHP DME Changes in HandP:031054157}    PHYSICIAN SIGNATURE:  {Esignature:[id number]}

## 2025-03-06 NOTE — DISCHARGE INSTRUCTIONS
how much effort it is taking for you to do your normal daily tasks.   You should be able to recognize when too much exertion is being expended.    Elements of Energy Conservation:   Prioritize/Plan: Decide what needs to be done today, and what can wait for a later date, write to do lists, plan ahead to avoid extra trips, and gather supplies and equipment needed before starting an activity.   Position: Avoid tiring and awkward posture that may impair breathing.  Pace: Slow and steady pace, never rushing!  Pursed lip breathing.  Pursed Lip Breathing: \"Smell the roses, blow out the candles\".

## 2025-03-06 NOTE — PROGRESS NOTES
03/06/25 0700   RT Protocol   History Pulmonary Disease 0   Respiratory pattern 0   Breath sounds 2   Cough 0   Indications for Bronchodilator Therapy Decreased or absent breath sounds   Bronchodilator Assessment Score 2       
  Pharmacy Vancomycin Consult     Vancomycin Day: 2/5  Current Dosing: pulse  Current indication: CAP    Temp max:  98    Recent Labs     03/04/25  1843 03/05/25  0526   BUN 18 20   CREATININE 1.1 1.2   WBC 10.3 5.2       Intake/Output Summary (Last 24 hours) at 3/5/2025 0732  Last data filed at 3/4/2025 2340  Gross per 24 hour   Intake 315.75 ml   Output 200 ml   Net 115.75 ml     Culture Date      Source                       Results      Ht Readings from Last 1 Encounters:   03/04/25 1.524 m (5')        Wt Readings from Last 1 Encounters:   03/04/25 59.9 kg (132 lb 2 oz)       Body mass index is 25.8 kg/m².    Estimated Creatinine Clearance: 30 mL/min (based on SCr of 1.2 mg/dL).    Random: 17.8 mcg/ml    Assessment/Plan:  SeCr stable.  Will dose vancomycin @ 750 mg q24h. Projected AUC = 484 with a trough at steady state of 14.6 mcg/ml.  A new vancomycin trough has been ordered for 3/7 @ 0600.   
  Physician Progress Note      PATIENT:               MICHAEL TALAVERA  CSN #:                  [id number]  :                       1945  ADMIT DATE:       3/4/2025 6:04 PM  DISCH DATE:  RESPONDING  PROVIDER #:        Caron Estrada MD          QUERY TEXT:    Patient admitted with pneumonia and acute diastolic CHF. Documentation   reflects acute hypoxic respiratory failure in H&P on 3/4/25.  If possible,   please document in the progress notes and discharge summary if acute hypoxic   respiratory failure was:    The medical record reflects the following:  Risk Factors: Age 80yo. - 2025 admitted for pna, CHF, PAF, HTN, OA,   Neurogenic bladder  Clinical Indicators: VS- 98.4, 88, 20, 147/73   86% RA  98% 2L..... VBG- ph   7.352, pco2 37.0, po2 52.6, HCO3 20.1, BE -4.9, O2 sat 86...CO2 15...Per ED   provider note on 3/4/25- Pulmonary effort is normal....Per attending pn on   3/5/25-Acute Hypoxia  Treatment: Oxygen 2L, Prov, Lasix IV, Duoneb, Levaquin IV, Solu IV  Options provided:  -- Acute hypoxic respiratory failure confirmed after study  -- Acute hypoxic respiratory failure confirmed after study, treated and   resolved  -- Acute hypoxic respiratory failure  ruled out after study but hypoxia   confirmed  -- Acute hypoxic respiratory failure  ruled out after study  -- Other - I will add my own diagnosis  -- Disagree - Not applicable / Not valid  -- Disagree - Clinically unable to determine / Unknown  -- Refer to Clinical Documentation Reviewer    PROVIDER RESPONSE TEXT:    Acute hypoxic respiratory failure confirmed after study, treated and resolved.    Query created by: Jia Wright on 3/5/2025 10:50 AM      Electronically signed by:  Caron Estrada MD 3/5/2025 12:26 PM          
Additive QT interval prolongation may occur with ondansetron. Arrhythmias occur most commonly when QTC >500.  Patient's QTC is 489 and is on other QTc prolonging meds (amiodarone & famotidine plus levaquin ordered to start)  Changed ondansetron to Compazine per policy.    
At 2315 /58, HR 74, Rhythm appeared NSR  At 2330 /64,     MD made aware patients HR sustaining in 140's for over 15 min.     EKG and Cardizem ordered.     At 0100 Heart rhythm appears to be ST,   
PM assessment completed. Scheduled medications given per MAR. VSS room air, A/O x4, Patient does not appear in distress and denies any needs at this time. Call light in reach, will monitor.     
Progress Note    Admit Date:  3/4/2025    Presented with shortness of breath.  Admitted for acute hypoxia and possible hospital acquired pneumonia.     Subjective:  Ms. Wyatt says she feels better   No cough    Objective:   BP (!) 105/55   Pulse 85   Temp 98 °F (36.7 °C) (Oral)   Resp 20   Ht 1.524 m (5')   Wt 59.9 kg (132 lb 2 oz)   SpO2 97%   BMI 25.80 kg/m²        Intake/Output Summary (Last 24 hours) at 3/5/2025 0737  Last data filed at 3/4/2025 2340  Gross per 24 hour   Intake 315.75 ml   Output 200 ml   Net 115.75 ml       Physical Exam:   Gen: No distress. Alert.   Eyes: PERRL. No sclera icterus. No conjunctival injection.   ENT: No discharge. Pharynx clear.   Neck: No JVD.  No Carotid Bruit. Trachea midline.  Resp: No accessory muscle use. No crackles. No wheezes. No rhonchi.   CV: Regular rate. Regular rhythm. No murmur.  No rub. No edema.   Capillary Refill: Brisk,< 3 seconds   Peripheral Pulses: +2 palpable, equal bilaterally   GI: Non-tender. Non-distended. No masses. No organomegaly. Normal bowel sounds. No hernia.   Skin: Warm and dry. No nodule on exposed extremities. No rash on exposed extremities.   M/S: No cyanosis. No joint deformity. No clubbing.   Neuro: Awake. Grossly nonfocal    Psych: Oriented x 3. No anxiety or agitation.         Medications:  ipratropium 0.5 mg-albuterol 2.5 mg, 1 Dose, TID RT  levofloxacin, 250 mg, Q24H   Followed by  [START ON 3/7/2025] levofloxacin, 750 mg, Q48H  [Held by provider] amiodarone, 100 mg, See Admin Instructions  apixaban, 2.5 mg, BID  amLODIPine, 5 mg, Daily  [Held by provider] famotidine, 20 mg, Daily  metoprolol succinate, 25 mg, Daily  rosuvastatin, 10 mg, Daily  sodium chloride flush, 5-40 mL, 2 times per day  furosemide, 40 mg, BID      PRN Medications:  ipratropium 0.5 mg-albuterol 2.5 mg, 1 Dose, Q4H PRN  sodium chloride flush, 5-40 mL, PRN  sodium chloride, , PRN  potassium chloride, 40 mEq, PRN   Or  potassium alternative oral replacement, 
Pulmonary Progress Note  CC: SOB    Subjective:  weaned to room air      EXAM: /84   Pulse (!) 119   Temp 97.7 °F (36.5 °C) (Axillary)   Resp 16   Ht 1.524 m (5')   Wt 59.5 kg (131 lb 3.2 oz)   SpO2 93%   BMI 25.62 kg/m²  on ra  Constitutional:  No acute distress.   Eyes: PERRL. Conjunctivae anicteric.   ENT: Normal nose. Normal tongue.    Neck:  Trachea is midline.   Respiratory: No accessory muscle usage.   decreased breath sounds. No wheezes. + rales. No Rhonchi.  Cardiovascular: Normal S1S2. No digit clubbing. No digit cyanosis. No LE edema.   Psychiatric: No anxiety or Agitation. Alert and Oriented to person, place and time.    Scheduled Meds:   amiodarone  100 mg Oral Once per day on Monday Tuesday Wednesday Thursday    azithromycin  250 mg Oral Daily    apixaban  2.5 mg Oral BID    amLODIPine  5 mg Oral Daily    [Held by provider] famotidine  20 mg Oral Daily    metoprolol succinate  25 mg Oral Daily    rosuvastatin  10 mg Oral Daily    sodium chloride flush  5-40 mL IntraVENous 2 times per day    [Held by provider] furosemide  40 mg IntraVENous BID     Continuous Infusions:   sodium chloride       PRN Meds:  ipratropium 0.5 mg-albuterol 2.5 mg, sodium chloride flush, sodium chloride, potassium chloride **OR** potassium alternative oral replacement **OR** potassium chloride, magnesium sulfate, polyethylene glycol, acetaminophen **OR** acetaminophen, prochlorperazine **OR** prochlorperazine    Labs:  CBC:   Recent Labs     03/04/25 1843 03/05/25 0526   WBC 10.3 5.2   HGB 10.9* 9.7*   HCT 33.8* 30.2*   MCV 86.2 84.8    271     BMP:   Recent Labs     03/04/25 1843 03/05/25  0526 03/06/25  0456   * 132* 143   K 3.9 3.4* 3.8   CL 99 99 107   CO2 21 15* 19*   PHOS  --  3.2  --    BUN 18 20 32*   CREATININE 1.1 1.2 1.4*       Microbiology:  Procal 0.09  Rapid flu/covid negative     Imaging:  Chest images independently reviewed by me and showed:  3/4/25 CXR:  heart is borderline enlarged 
Report has been given to crispin RN Care transferred at this time.   
Shift assessment complete see flow sheet respirations easy and even. Patient denies any pain or needs at this time. Bed is locked in the lowest position with call light within reach.  Lasix has been held until Dr. Estrada can see her at bedside to determine if he would still like to give to patient. Patient had elevated heart rates last night and was given IV diltiazem and Heart rate has been controlled this morning.   
increased work of breathing using Per Protocol order mode.        4-6 - enter or revise RT Bronchodilator order(s) to two equivalent RT bronchodilator orders with one order with BID Frequency and one order with Frequency of every 4 hours PRN wheezing or increased work of breathing using Per Protocol order mode.        7-10 - enter or revise RT Bronchodilator order(s) to two equivalent RT bronchodilator orders with one order with TID Frequency and one order with Frequency of every 4 hours PRN wheezing or increased work of breathing using Per Protocol order mode.       11-13 - enter or revise RT Bronchodilator order(s) to one equivalent RT bronchodilator order with QID Frequency and an Albuterol order with Frequency of every 4 hours PRN wheezing or increased work of breathing using Per Protocol order mode.      Greater than 13 - enter or revise RT Bronchodilator order(s) to one equivalent RT bronchodilator order with every 4 hours Frequency and an Albuterol order with Frequency of every 2 hours PRN wheezing or increased work of breathing using Per Protocol order mode.         Electronically signed by Angeles Mitchell RCP on 3/5/2025 at 8:26 PM  
wheezing or increased work of breathing using Per Protocol order mode.        4-6 - enter or revise RT Bronchodilator order(s) to two equivalent RT bronchodilator orders with one order with BID Frequency and one order with Frequency of every 4 hours PRN wheezing or increased work of breathing using Per Protocol order mode.        7-10 - enter or revise RT Bronchodilator order(s) to two equivalent RT bronchodilator orders with one order with TID Frequency and one order with Frequency of every 4 hours PRN wheezing or increased work of breathing using Per Protocol order mode.       11-13 - enter or revise RT Bronchodilator order(s) to one equivalent RT bronchodilator order with QID Frequency and an Albuterol order with Frequency of every 4 hours PRN wheezing or increased work of breathing using Per Protocol order mode.      Greater than 13 - enter or revise RT Bronchodilator order(s) to one equivalent RT bronchodilator order with every 4 hours Frequency and an Albuterol order with Frequency of every 2 hours PRN wheezing or increased work of breathing using Per Protocol order mode.         Electronically signed by Angeles Mitchell RCP on 3/5/2025 at 12:33 AM  
  Diet: ADULT DIET; Regular; 5 carb choices (75 gm/meal)  Code Status: Full Code    Dc maria alejandra Coto M.D.

## 2025-03-07 ENCOUNTER — CARE COORDINATION (OUTPATIENT)
Dept: CASE MANAGEMENT | Age: 80
End: 2025-03-07

## 2025-03-07 ENCOUNTER — TELEPHONE (OUTPATIENT)
Dept: CARDIOLOGY CLINIC | Age: 80
End: 2025-03-07

## 2025-03-07 LAB
LEGIONELLA AG UR QL: NORMAL
S PNEUM AG UR QL: NORMAL

## 2025-03-07 NOTE — CARE COORDINATION
Care Transitions Note    Initial Call - Call within 2 business days of discharge: Yes    Patient Current Location:  [place]    Care Transition Nurse contacted the patient by telephone to perform post hospital discharge assessment, verified name and  as identifiers. Provided introduction to self, and explanation of the Care Transition Nurse role.     Patient: Rima Wyatt    Patient : 1945   MRN: [id number]    Reason for Admission: SOB  Discharge Date: 3/6/25  RURS: Readmission Risk Score: 23.3      Last Discharge Facility       Date Complaint Diagnosis Description Type Department Provider    3/4/25 Shortness of Breath Acute on chronic heart failure, unspecified heart failure type (HCC) ... [facility]; [place]            Was this an external facility discharge? No    Additional needs identified to be addressed with provider   No needs identified             Method of communication with provider: none.      Care Summary Note: Spoke briefly with Rima. She states she is \"doing pretty good.\" She states she is on the other line and is not able to talk at this time. She states she will call  writer back. Call was ended. CT team will follow.          Follow Up Appointment:     Future Appointments         Provider Specialty Dept Phone    3/14/2025 12:00 PM Minerva Mathews, APRN - CNP [facility] Medicine [phone]    3/25/2025 2:45 PM Howard Hull MD Pulmonology [phone]    2025 3:00 PM (Arrive by 2:30 PM) [facility] [place] Radiology [phone]    2025 2:15 PM RAMIREZ MARTINI DEVICE CHECK Cardiology [phone]    2025 2:15 PM Ana Maria Coyne MD Cardiology [phone]    2025 1:15 PM Enrique Plaza DO Cardiology [phone]    2025 1:00 PM Christopher Gutierrez, APRN - CNP Family Medicine [phone]            Care Transition Nurse provided contact information.  Plan for follow-up on next business day.  based on

## 2025-03-07 NOTE — TELEPHONE ENCOUNTER
----- Message from Dr. Enrique Plaza DO sent at 3/7/2025  1:15 PM EST -----  Please call patient.  Electrolytes normal.  Renal function has improved.  Hemoglobin is improved.  Iron studies consistent with anemia of chronic disease.  Cholesterol is well-controlled.  Continue current medications  ----- Message -----  From: Kian Frances RN  Sent: 2/26/2025  10:21 AM EST  To: Enrique Plaza DO

## 2025-03-08 LAB
BACTERIA BLD CULT ORG #2: NORMAL
BACTERIA BLD CULT: NORMAL

## 2025-03-10 ENCOUNTER — CARE COORDINATION (OUTPATIENT)
Dept: CASE MANAGEMENT | Age: 80
End: 2025-03-10

## 2025-03-10 NOTE — CARE COORDINATION
Care Transitions Note    Initial Call - Call within 2 business days of discharge: Yes    Attempted to reach patient for transitions of care follow up. Unable to reach patient.    Outreach Attempts:   Multiple attempts to contact patient at phone numbers on file.   HIPAA compliant voicemail left for patient.     Patient: Rmia Wyatt    Patient : 1945   MRN: [id number]    Reason for Admission: SOB  Discharge Date: 3/6/25  RURS: Readmission Risk Score: 23.3    Last Discharge Facility       Date Complaint Diagnosis Description Type Department Provider    3/4/25 Shortness of Breath Acute on chronic heart failure, unspecified heart failure type (HCC) ... ED to Hosp-Admission (Discharged) (ADMITTED) [facility] [place] [facility] Kimberly Montana MD; [place]            Was this an external facility discharge? No    Follow Up Appointment:   Patient has hospital follow up appointment scheduled within 14 days of discharge.    Future Appointments         Provider Specialty Dept Phone    3/14/2025 12:00 PM Minerva Mathews, APRN - CNP Family Medicine [phone]    3/25/2025 2:45 PM Howard Hull MD Pulmonology [phone]    2025 3:00 PM (Arrive by 2:30 PM[place] [facility] [place] [phone]    2025 2:15 PM SCHEDULE, RAMIREZ DEVICE CHECK Cardiology [phone]    2025 2:15 PM Ana Maria Coyne MD Cardiology [phone]    2025 1:15 PM Enrique Plaza DO Cardiology [phone]    2025 1:00 PM Christopher Gutierrez, APRN - CNP Family Medicine [phone]            No further follow-up call indicated     Kandy Garland RN BSN  Care Transition Nurse  [phone]

## 2025-03-14 ENCOUNTER — OFFICE VISIT (OUTPATIENT)
Dept: FAMILY MEDICINE CLINIC | Age: 80
End: 2025-03-14

## 2025-03-14 VITALS
HEIGHT: 60 IN | BODY MASS INDEX: 27.09 KG/M2 | HEART RATE: 123 BPM | WEIGHT: 138 LBS | SYSTOLIC BLOOD PRESSURE: 116 MMHG | OXYGEN SATURATION: 99 % | DIASTOLIC BLOOD PRESSURE: 75 MMHG

## 2025-03-14 DIAGNOSIS — N18.4 CKD (CHRONIC KIDNEY DISEASE) STAGE 4, GFR 15-29 ML/MIN (HCC): ICD-10-CM

## 2025-03-14 DIAGNOSIS — M25.512 ACUTE PAIN OF LEFT SHOULDER: ICD-10-CM

## 2025-03-14 DIAGNOSIS — K21.9 GASTROESOPHAGEAL REFLUX DISEASE WITHOUT ESOPHAGITIS: ICD-10-CM

## 2025-03-14 DIAGNOSIS — I51.7 CARDIOMEGALY: ICD-10-CM

## 2025-03-14 DIAGNOSIS — Z09 HOSPITAL DISCHARGE FOLLOW-UP: Primary | ICD-10-CM

## 2025-03-14 DIAGNOSIS — I50.9 ACUTE CONGESTIVE HEART FAILURE, UNSPECIFIED HEART FAILURE TYPE (HCC): ICD-10-CM

## 2025-03-14 PROBLEM — M84.453A CLOSED SUBCHONDRAL INSUFFICIENCY FRACTURE OF FEMORAL CONDYLE (HCC): Status: RESOLVED | Noted: 2020-08-12 | Resolved: 2025-03-14

## 2025-03-14 RX ORDER — TORSEMIDE 20 MG/1
20 TABLET ORAL DAILY
Qty: 30 TABLET | Refills: 2 | Status: SHIPPED | OUTPATIENT
Start: 2025-03-14

## 2025-03-14 RX ORDER — FAMOTIDINE 20 MG/1
20 TABLET, FILM COATED ORAL NIGHTLY PRN
Qty: 90 TABLET | Refills: 1 | Status: SHIPPED | OUTPATIENT
Start: 2025-03-14 | End: 2025-06-12

## 2025-03-14 NOTE — PROGRESS NOTES
Tabs tablet  Commonly known as: ELIQUIS  Take 1 tablet by mouth 2 times daily     ferrous sulfate 325 (65 Fe) MG tablet  Commonly known as: IRON 325     Handicap Placard Misc  by Does not apply route Duration - 5 years     metoprolol succinate 25 MG extended release tablet  Commonly known as: TOPROL XL  Take 1 tablet by mouth daily     rosuvastatin 10 MG tablet  Commonly known as: CRESTOR  Take 1 tablet by mouth daily     therapeutic multivitamin-minerals tablet     vitamin B-12 100 MCG tablet  Commonly known as: CYANOCOBALAMIN               Where to Get Your Medications        These medications were sent to [facility] [id number] [facility] - P [phone] - F [phone] [address] [address] [address]      Phone: [phone]   famotidine 20 MG tablet  torsemide 20 MG tablet           Medications marked \"taking\" at this time  Outpatient Medications Marked as Taking for the 3/14/25 encounter (Office Visit) with Minerva Mathews APRN - CNP   Medication Sig Dispense Refill    famotidine (PEPCID) 20 MG tablet Take 1 tablet by mouth nightly as needed (heartburn) 90 tablet 1    torsemide (DEMADEX) 20 MG tablet Take 1 tablet by mouth daily swelling 30 tablet 2    rosuvastatin (CRESTOR) 10 MG tablet Take 1 tablet by mouth daily 90 tablet 3    Handicap Placard MISC by Does not apply route Duration - 5 years 1 each 0    amLODIPine (NORVASC) 5 MG tablet Take 1 tablet by mouth daily 90 tablet 2    metoprolol succinate (TOPROL XL) 25 MG extended release tablet Take 1 tablet by mouth daily 90 tablet 2    apixaban (ELIQUIS) 2.5 MG TABS tablet Take 1 tablet by mouth 2 times daily 180 tablet 3    Multiple Vitamins-Minerals (THERAPEUTIC MULTIVITAMIN-MINERALS) tablet Take 1 tablet by mouth daily      ferrous sulfate (IRON 325) 325 (65 Fe) MG tablet Take 1 tablet by mouth daily (with breakfast)      amiodarone (CORDARONE) 200 MG tablet Take 0.5 tablets by mouth See Admin Instructions Take 0.5

## 2025-03-18 ENCOUNTER — TELEPHONE (OUTPATIENT)
Dept: PULMONOLOGY | Age: 80
End: 2025-03-18

## 2025-04-03 ENCOUNTER — HOSPITAL ENCOUNTER (OUTPATIENT)
Age: 80
Discharge: HOME | End: 2025-04-03
Payer: MEDICARE

## 2025-04-03 ENCOUNTER — APPOINTMENT (OUTPATIENT)
Dept: GENERAL RADIOLOGY | Age: 80
DRG: 291 | End: 2025-04-03
Payer: MEDICARE

## 2025-04-03 ENCOUNTER — TELEPHONE (OUTPATIENT)
Dept: CARDIOLOGY CLINIC | Age: 80
End: 2025-04-03

## 2025-04-03 ENCOUNTER — RESULTS FOLLOW-UP (OUTPATIENT)
Dept: CARDIOLOGY | Age: 80
End: 2025-04-03

## 2025-04-03 ENCOUNTER — HOSPITAL ENCOUNTER (INPATIENT)
Age: 80
LOS: 3 days | Discharge: ANOTHER HOSPITAL | DRG: 291 | End: 2025-04-06
Attending: INTERNAL MEDICINE | Admitting: STUDENT IN AN ORGANIZED HEALTH CARE EDUCATION/TRAINING PROGRAM
Payer: MEDICARE

## 2025-04-03 DIAGNOSIS — R09.89 PULMONARY VENOUS CONGESTION: ICD-10-CM

## 2025-04-03 DIAGNOSIS — Z78.9 SELF-CATHETERIZES URINARY BLADDER: ICD-10-CM

## 2025-04-03 DIAGNOSIS — N18.32 STAGE 3B CHRONIC KIDNEY DISEASE (HCC): ICD-10-CM

## 2025-04-03 DIAGNOSIS — R60.0 BILATERAL LOWER EXTREMITY EDEMA: ICD-10-CM

## 2025-04-03 DIAGNOSIS — Z79.899 MEDICATION MANAGEMENT: ICD-10-CM

## 2025-04-03 DIAGNOSIS — E87.6 HYPOKALEMIA: Primary | ICD-10-CM

## 2025-04-03 DIAGNOSIS — I48.11 LONGSTANDING PERSISTENT ATRIAL FIBRILLATION (HCC): ICD-10-CM

## 2025-04-03 PROBLEM — I50.31 ACUTE HEART FAILURE WITH PRESERVED EJECTION FRACTION (HFPEF) (HCC): Status: ACTIVE | Noted: 2025-04-03

## 2025-04-03 LAB
ANION GAP SERPL CALCULATED.3IONS-SCNC: 14 MMOL/L (ref 3–16)
ANION GAP SERPL CALCULATED.3IONS-SCNC: 15 MMOL/L (ref 3–16)
BASOPHILS # BLD: 0 K/UL (ref 0–0.2)
BASOPHILS NFR BLD: 0.7 %
BUN SERPL-MCNC: 30 MG/DL (ref 7–20)
BUN SERPL-MCNC: 30 MG/DL (ref 7–20)
CALCIUM SERPL-MCNC: 8.6 MG/DL (ref 8.3–10.6)
CALCIUM SERPL-MCNC: 9 MG/DL (ref 8.3–10.6)
CHLORIDE SERPL-SCNC: 102 MMOL/L (ref 99–110)
CHLORIDE SERPL-SCNC: 102 MMOL/L (ref 99–110)
CO2 SERPL-SCNC: 23 MMOL/L (ref 21–32)
CO2 SERPL-SCNC: 23 MMOL/L (ref 21–32)
CREAT SERPL-MCNC: 1.3 MG/DL (ref 0.6–1.2)
CREAT SERPL-MCNC: 1.5 MG/DL (ref 0.6–1.2)
DEPRECATED RDW RBC AUTO: 16.9 % (ref 12.4–15.4)
EOSINOPHIL # BLD: 0.1 K/UL (ref 0–0.6)
EOSINOPHIL NFR BLD: 1.7 %
GFR SERPLBLD CREATININE-BSD FMLA CKD-EPI: 35 ML/MIN/{1.73_M2}
GFR SERPLBLD CREATININE-BSD FMLA CKD-EPI: 41 ML/MIN/{1.73_M2}
GLUCOSE SERPL-MCNC: 116 MG/DL (ref 70–99)
GLUCOSE SERPL-MCNC: 95 MG/DL (ref 70–99)
HCT VFR BLD AUTO: 29.2 % (ref 36–48)
HGB BLD-MCNC: 9.2 G/DL (ref 12–16)
INR PPP: 1.52 (ref 0.85–1.15)
LYMPHOCYTES # BLD: 0.7 K/UL (ref 1–5.1)
LYMPHOCYTES NFR BLD: 11.8 %
MAGNESIUM SERPL-MCNC: 2 MG/DL (ref 1.8–2.4)
MCH RBC QN AUTO: 25.2 PG (ref 26–34)
MCHC RBC AUTO-ENTMCNC: 31.7 G/DL (ref 31–36)
MCV RBC AUTO: 79.5 FL (ref 80–100)
MONOCYTES # BLD: 0.7 K/UL (ref 0–1.3)
MONOCYTES NFR BLD: 12.2 %
NEUTROPHILS # BLD: 4.4 K/UL (ref 1.7–7.7)
NEUTROPHILS NFR BLD: 73.6 %
NT-PROBNP SERPL-MCNC: 7023 PG/ML (ref 0–449)
PLATELET # BLD AUTO: 368 K/UL (ref 135–450)
PMV BLD AUTO: 7.1 FL (ref 5–10.5)
POTASSIUM SERPL-SCNC: 2.9 MMOL/L (ref 3.5–5.1)
POTASSIUM SERPL-SCNC: 3.8 MMOL/L (ref 3.5–5.1)
PROTHROMBIN TIME: 18.5 SEC (ref 11.9–14.9)
RBC # BLD AUTO: 3.67 M/UL (ref 4–5.2)
SODIUM SERPL-SCNC: 139 MMOL/L (ref 136–145)
SODIUM SERPL-SCNC: 140 MMOL/L (ref 136–145)
TROPONIN, HIGH SENSITIVITY: 23 NG/L (ref 0–14)
TROPONIN, HIGH SENSITIVITY: 31 NG/L (ref 0–14)
WBC # BLD AUTO: 6 K/UL (ref 4–11)

## 2025-04-03 PROCEDURE — 84439 ASSAY OF FREE THYROXINE: CPT

## 2025-04-03 PROCEDURE — 80048 BASIC METABOLIC PNL TOTAL CA: CPT

## 2025-04-03 PROCEDURE — 71045 X-RAY EXAM CHEST 1 VIEW: CPT

## 2025-04-03 PROCEDURE — 36415 COLL VENOUS BLD VENIPUNCTURE: CPT

## 2025-04-03 PROCEDURE — 6370000000 HC RX 637 (ALT 250 FOR IP): Performed by: PHYSICIAN ASSISTANT

## 2025-04-03 PROCEDURE — 85610 PROTHROMBIN TIME: CPT

## 2025-04-03 PROCEDURE — 85025 COMPLETE CBC W/AUTO DIFF WBC: CPT

## 2025-04-03 PROCEDURE — 2060000000 HC ICU INTERMEDIATE R&B

## 2025-04-03 PROCEDURE — 83880 ASSAY OF NATRIURETIC PEPTIDE: CPT

## 2025-04-03 PROCEDURE — 84484 ASSAY OF TROPONIN QUANT: CPT

## 2025-04-03 PROCEDURE — 84443 ASSAY THYROID STIM HORMONE: CPT

## 2025-04-03 PROCEDURE — 93005 ELECTROCARDIOGRAM TRACING: CPT | Performed by: PHYSICIAN ASSISTANT

## 2025-04-03 PROCEDURE — 99285 EMERGENCY DEPT VISIT HI MDM: CPT

## 2025-04-03 PROCEDURE — 83735 ASSAY OF MAGNESIUM: CPT

## 2025-04-03 RX ORDER — METOPROLOL TARTRATE 25 MG/1
25 TABLET, FILM COATED ORAL ONCE
Status: COMPLETED | OUTPATIENT
Start: 2025-04-03 | End: 2025-04-03

## 2025-04-03 RX ORDER — ENOXAPARIN SODIUM 100 MG/ML
30 INJECTION SUBCUTANEOUS DAILY
Status: CANCELLED | OUTPATIENT
Start: 2025-04-04

## 2025-04-03 RX ADMIN — METOPROLOL TARTRATE 25 MG: 25 TABLET, FILM COATED ORAL at 18:42

## 2025-04-03 RX ADMIN — POTASSIUM BICARBONATE 40 MEQ: 782 TABLET, EFFERVESCENT ORAL at 18:42

## 2025-04-03 ASSESSMENT — PAIN - FUNCTIONAL ASSESSMENT: PAIN_FUNCTIONAL_ASSESSMENT: NONE - DENIES PAIN

## 2025-04-03 NOTE — ED PROVIDER NOTES
ECG reveals atrial fibrillation with heart rate of 115.  Poor R wave progression, upright T waves, and lateral leads but inverted in leads I and aVL,  I have compared this ECG to 1 from March 5 and the T wave inversion in the anterior leads appears to be new and the atrial fibs is chronic     Luzmaria Nolan MD  04/03/25 1948          9:32 PM EDT  Pt Name: Rima Wyatt                                       MRN: [id number]  Birthdate 1945  Date of evaluation: 4/3/2025  Patients PCP:    Christopher Gutierrez, APRN - CNP    Chief Complaint      Chief Complaint   Patient presents with    Leg Swelling     BLE edema and low potassium.  SOB since Sunday           height is 1.524 m (5') and weight is 65.8 kg (145 lb). Her oral temperature is 97.7 °F (36.5 °C). Her blood pressure is 111/77 and her pulse is 109 (abnormal). Her respiration is 19 and oxygen saturation is 98%.    AttestationThe patient was seen by me in conjunction with the advanced practice clinician. I have personally performed and/or participated in the history, exam and medical decision making and agree with all pertinent clinical information. All lab results, EKG tracings, and radiographic studies or interpretations were reviewed by me.    I have participated in the key portions of the examination and treatment plan for the patient. I have reviewed the APC's findings and agree.I have personally performed a face-to-face diagnostic evaluation on the patient.  My findings are as follows:    This patient presented with shortness of breath.   They appear non toxic.   Vitals viewed.   Exam shows: Patient comfortable at rest, dyspneic with exertion, lung sounds clear, conversational and appropriate.  Impression: Patient has abnormal chest x-ray with bilateral effusions and pulmonary edema, BNP is 7000, troponin is elevated,   No acute ischemic changes on ECG.  Labs Reviewed   CBC WITH AUTO DIFFERENTIAL - Abnormal; Notable for the following components:        Luzmaria GOODWIN MD  04/03/25 5845

## 2025-04-03 NOTE — ED PROVIDER NOTES
[facility] EMERGENCY DEPARTMENT  EMERGENCY DEPARTMENT ENCOUNTER        Pt Name: Rima Wyatt  MRN: [id number]  Birthdate 1945  Date of evaluation: 4/3/2025  Provider: Abiel Santana PA-C  PCP: Christopher Gutierrez, IMELDA - BANDAR  Note Started: 6:31 PM EDT 4/3/25       I have seen and evaluated this patient with my supervising physician ISAAC VIEYRA .      CHIEF COMPLAINT       Chief Complaint   Patient presents with    Leg Swelling     BLE edema and low potassium.  SOB since Sunday        HISTORY OF PRESENT ILLNESS: 1 or more Elements     History From: Patient and daughter    Rima Wyatt is a 79 y.o. female who presents department with daughter Becca.  The patient referred by PCP.  Patient had BMP today.  Potassium 2.9.  Magnesium 2.0.  She is on Demadex 20 mg daily.  On Sunday or 4 days ago she was advanced to the twice daily dosing due to lower extremity swelling.  She does have compression stockings in the door at home.  She is not currently wearing them.  At this time she does not indicate any shortness of breath or chest pain.  The patient typically runs low potassium such as between 3.2 and 3.6 through my review of chart.    The patient clearly indicates she would not be here if her potassium is normal.    I do note that she is slightly tachycardic at 109.  She is A-fib with a very mild RVR.  She is on amiodarone and Toprol-XL.  She did take her medications as prescribed this morning.    Nursing Notes were all reviewed and agreed with or any disagreements were addressed in the HPI.    REVIEW OF SYSTEMS :      Review of Systems    Positives and Pertinent negatives as per HPI.     SURGICAL HISTORY     Past Surgical History:   Procedure Laterality Date    BACK SURGERY      CATARACT REMOVAL WITH IMPLANT  01/14/2011    LEFT EYE    EYE SURGERY  12/14/2010    cataract rt eye    HYSTERECTOMY (CERVIX STATUS UNKNOWN)      HYSTERECTOMY, TOTAL ABDOMINAL (CERVIX REMOVED)      JOINT REPLACEMENT  (*)     Lymphocytes Absolute 0.7 (*)     All other components within normal limits   BASIC METABOLIC PANEL W/ REFLEX TO MG FOR LOW K - Abnormal; Notable for the following components:    Glucose 116 (*)     BUN 30 (*)     Creatinine 1.5 (*)     Est, Glom Filt Rate 35 (*)     All other components within normal limits   PROTIME-INR - Abnormal; Notable for the following components:    Protime 18.5 (*)     INR 1.52 (*)     All other components within normal limits   TROPONIN - Abnormal; Notable for the following components:    Troponin, High Sensitivity 31 (*)     All other components within normal limits   BRAIN NATRIURETIC PEPTIDE - Abnormal; Notable for the following components:    NT Pro-BNP 7,023 (*)     All other components within normal limits   TROPONIN - Abnormal; Notable for the following components:    Troponin, High Sensitivity 23 (*)     All other components within normal limits       When ordered only abnormal lab results are displayed. All other labs were within normal range or not returned as of this dictation.    EKG: When ordered, EKG's are interpreted by the Emergency Department Physician in the absence of a cardiologist.  Please see their note for interpretation of EKG.    RADIOLOGY:   Non-plain film images such as CT, Ultrasound and MRI are read by the radiologist.     Portable chest X-Ray Independently interpreted by me: This image shows stable mild cardiomegaly with increased pulmonary vascular congestion and small right pleural effusion more prominent than previous study of 3/5/2025.    Interpretation per the Radiologist below, if available at the time of this note:    XR CHEST PORTABLE   Final Result   Stable mild cardiomegaly with increasing central pulmonary congestion.      Hypoinflation with mild bibasilar atelectasis or early infiltrates.      Small right pleural effusion which is more prominent           No results found.      ED Provider US Interpretation.    No results  her serum potassium, regulation of A-fib with soft RVR as well as improving renal function currently stage IIIb.  Patient does have history of back surgery with nerve injury and she does self cath.    Cardiology Dr. Plaza, nephrology Dr. Mederos.    Case reviewed discussed with attending physician who did evaluate the patient.    I did send [employer] note to hospitalist at 11 PM.      I am the Primary Clinician of Record.  FINAL IMPRESSION      1. Hypokalemia    2. Bilateral lower extremity edema    3. Pulmonary venous congestion    4. Longstanding persistent atrial fibrillation (HCC)    5. Stage 3b chronic kidney disease (HCC)    6. Self-catheterizes urinary bladder          DISPOSITION/PLAN     DISPOSITION Admitted 04/03/2025 11:34:00 PM               PATIENT REFERRED TO:  No follow-up provider specified.    DISCHARGE MEDICATIONS:  New Prescriptions    No medications on file       DISCONTINUED MEDICATIONS:  Discontinued Medications    No medications on file              (Please note that portions of this note were completed with a voice recognition program.  Efforts were made to edit the dictations but occasionally words are mis-transcribed.)    Abiel Santana PA-C (electronically signed)        Abiel Santana PA-C  04/03/25 1040

## 2025-04-03 NOTE — TELEPHONE ENCOUNTER
Called and spoke with patient; she states she is still swollen. I advised her to go to the ED per AMP result message. Omi ROUSE

## 2025-04-03 NOTE — TELEPHONE ENCOUNTER
PER AMP can you call if she is still swollen? If so we need to go send ER     If feeling better need to HOLD lasix and take 60meq potassium today and repeat first thing in morning

## 2025-04-04 PROBLEM — E87.6 HYPOKALEMIA: Status: ACTIVE | Noted: 2025-04-04

## 2025-04-04 PROBLEM — I48.91 ATRIAL FIBRILLATION WITH RAPID VENTRICULAR RESPONSE (HCC): Status: ACTIVE | Noted: 2025-04-04

## 2025-04-04 PROBLEM — R33.9 URINARY RETENTION: Status: ACTIVE | Noted: 2025-04-04

## 2025-04-04 LAB
ALBUMIN SERPL-MCNC: 3.6 G/DL (ref 3.4–5)
ALP SERPL-CCNC: 113 U/L (ref 40–129)
ALT SERPL-CCNC: 19 U/L (ref 10–40)
ANION GAP SERPL CALCULATED.3IONS-SCNC: 13 MMOL/L (ref 3–16)
ANION GAP SERPL CALCULATED.3IONS-SCNC: 16 MMOL/L (ref 3–16)
AST SERPL-CCNC: 22 U/L (ref 15–37)
BACTERIA URNS QL MICRO: ABNORMAL /HPF
BASOPHILS # BLD: 0 K/UL (ref 0–0.2)
BASOPHILS NFR BLD: 0.7 %
BILIRUB DIRECT SERPL-MCNC: 0.4 MG/DL (ref 0–0.3)
BILIRUB INDIRECT SERPL-MCNC: 0.2 MG/DL (ref 0–1)
BILIRUB SERPL-MCNC: 0.6 MG/DL (ref 0–1)
BILIRUB UR QL STRIP.AUTO: NEGATIVE
BUN SERPL-MCNC: 25 MG/DL (ref 7–20)
BUN SERPL-MCNC: 27 MG/DL (ref 7–20)
CALCIUM SERPL-MCNC: 8.6 MG/DL (ref 8.3–10.6)
CALCIUM SERPL-MCNC: 8.9 MG/DL (ref 8.3–10.6)
CHLORIDE SERPL-SCNC: 102 MMOL/L (ref 99–110)
CHLORIDE SERPL-SCNC: 104 MMOL/L (ref 99–110)
CHOLEST SERPL-MCNC: 78 MG/DL (ref 0–199)
CLARITY UR: CLEAR
CO2 SERPL-SCNC: 22 MMOL/L (ref 21–32)
CO2 SERPL-SCNC: 24 MMOL/L (ref 21–32)
COLOR UR: YELLOW
CREAT SERPL-MCNC: 1.2 MG/DL (ref 0.6–1.2)
CREAT SERPL-MCNC: 1.3 MG/DL (ref 0.6–1.2)
DEPRECATED RDW RBC AUTO: 16.7 % (ref 12.4–15.4)
EKG DIAGNOSIS: NORMAL
EKG DIAGNOSIS: NORMAL
EKG Q-T INTERVAL: 282 MS
EKG Q-T INTERVAL: 372 MS
EKG QRS DURATION: 86 MS
EKG QRS DURATION: 86 MS
EKG QTC CALCULATION (BAZETT): 390 MS
EKG QTC CALCULATION (BAZETT): 523 MS
EKG R AXIS: -20 DEGREES
EKG R AXIS: -3 DEGREES
EKG T AXIS: 138 DEGREES
EKG T AXIS: 165 DEGREES
EKG VENTRICULAR RATE: 115 BPM
EKG VENTRICULAR RATE: 119 BPM
EOSINOPHIL # BLD: 0.1 K/UL (ref 0–0.6)
EOSINOPHIL NFR BLD: 1.8 %
EPI CELLS #/AREA URNS HPF: ABNORMAL /HPF (ref 0–5)
FLUAV RNA RESP QL NAA+PROBE: NOT DETECTED
FLUBV RNA RESP QL NAA+PROBE: NOT DETECTED
GFR SERPLBLD CREATININE-BSD FMLA CKD-EPI: 41 ML/MIN/{1.73_M2}
GFR SERPLBLD CREATININE-BSD FMLA CKD-EPI: 45 ML/MIN/{1.73_M2}
GLUCOSE SERPL-MCNC: 159 MG/DL (ref 70–99)
GLUCOSE SERPL-MCNC: 82 MG/DL (ref 70–99)
GLUCOSE UR STRIP.AUTO-MCNC: NEGATIVE MG/DL
HCT VFR BLD AUTO: 29.4 % (ref 36–48)
HDLC SERPL-MCNC: 34 MG/DL (ref 40–60)
HGB BLD-MCNC: 9.2 G/DL (ref 12–16)
HGB UR QL STRIP.AUTO: ABNORMAL
HYALINE CASTS #/AREA URNS LPF: ABNORMAL /LPF (ref 0–2)
KETONES UR STRIP.AUTO-MCNC: NEGATIVE MG/DL
LDLC SERPL CALC-MCNC: 32 MG/DL
LEUKOCYTE ESTERASE UR QL STRIP.AUTO: ABNORMAL
LYMPHOCYTES # BLD: 0.6 K/UL (ref 1–5.1)
LYMPHOCYTES NFR BLD: 11.7 %
MAGNESIUM SERPL-MCNC: 1.79 MG/DL (ref 1.8–2.4)
MAGNESIUM SERPL-MCNC: 1.85 MG/DL (ref 1.8–2.4)
MCH RBC QN AUTO: 25.5 PG (ref 26–34)
MCHC RBC AUTO-ENTMCNC: 31.4 G/DL (ref 31–36)
MCV RBC AUTO: 81 FL (ref 80–100)
MONOCYTES # BLD: 0.6 K/UL (ref 0–1.3)
MONOCYTES NFR BLD: 11.8 %
NEUTROPHILS # BLD: 3.9 K/UL (ref 1.7–7.7)
NEUTROPHILS NFR BLD: 74 %
NITRITE UR QL STRIP.AUTO: NEGATIVE
PH UR STRIP.AUTO: 6 [PH] (ref 5–8)
PLATELET # BLD AUTO: 315 K/UL (ref 135–450)
PMV BLD AUTO: 7.1 FL (ref 5–10.5)
POTASSIUM SERPL-SCNC: 3.1 MMOL/L (ref 3.5–5.1)
POTASSIUM SERPL-SCNC: 3.3 MMOL/L (ref 3.5–5.1)
PROT SERPL-MCNC: 5.9 G/DL (ref 6.4–8.2)
PROT UR STRIP.AUTO-MCNC: ABNORMAL MG/DL
RBC # BLD AUTO: 3.63 M/UL (ref 4–5.2)
RBC #/AREA URNS HPF: ABNORMAL /HPF (ref 0–4)
SARS-COV-2 RNA RESP QL NAA+PROBE: NOT DETECTED
SODIUM SERPL-SCNC: 140 MMOL/L (ref 136–145)
SODIUM SERPL-SCNC: 141 MMOL/L (ref 136–145)
SP GR UR STRIP.AUTO: 1.01 (ref 1–1.03)
T4 FREE SERPL-MCNC: 3.8 NG/DL (ref 0.9–1.8)
TRIGL SERPL-MCNC: 62 MG/DL (ref 0–150)
TSH SERPL DL<=0.005 MIU/L-ACNC: <0.01 UIU/ML (ref 0.27–4.2)
TSH SERPL DL<=0.005 MIU/L-ACNC: <0.01 UIU/ML (ref 0.27–4.2)
UA COMPLETE W REFLEX CULTURE PNL UR: YES
UA DIPSTICK W REFLEX MICRO PNL UR: YES
URN SPEC COLLECT METH UR: ABNORMAL
UROBILINOGEN UR STRIP-ACNC: 0.2 E.U./DL
VLDLC SERPL CALC-MCNC: 12 MG/DL
WBC # BLD AUTO: 5.3 K/UL (ref 4–11)
WBC #/AREA URNS HPF: ABNORMAL /HPF (ref 0–5)

## 2025-04-04 PROCEDURE — 87086 URINE CULTURE/COLONY COUNT: CPT

## 2025-04-04 PROCEDURE — 80048 BASIC METABOLIC PNL TOTAL CA: CPT

## 2025-04-04 PROCEDURE — 6370000000 HC RX 637 (ALT 250 FOR IP): Performed by: STUDENT IN AN ORGANIZED HEALTH CARE EDUCATION/TRAINING PROGRAM

## 2025-04-04 PROCEDURE — 80076 HEPATIC FUNCTION PANEL: CPT

## 2025-04-04 PROCEDURE — 93010 ELECTROCARDIOGRAM REPORT: CPT | Performed by: INTERNAL MEDICINE

## 2025-04-04 PROCEDURE — 82728 ASSAY OF FERRITIN: CPT

## 2025-04-04 PROCEDURE — 85025 COMPLETE CBC W/AUTO DIFF WBC: CPT

## 2025-04-04 PROCEDURE — 2500000003 HC RX 250 WO HCPCS: Performed by: STUDENT IN AN ORGANIZED HEALTH CARE EDUCATION/TRAINING PROGRAM

## 2025-04-04 PROCEDURE — 83550 IRON BINDING TEST: CPT

## 2025-04-04 PROCEDURE — 87636 SARSCOV2 & INF A&B AMP PRB: CPT

## 2025-04-04 PROCEDURE — 2060000000 HC ICU INTERMEDIATE R&B

## 2025-04-04 PROCEDURE — 99233 SBSQ HOSP IP/OBS HIGH 50: CPT

## 2025-04-04 PROCEDURE — 6360000002 HC RX W HCPCS: Performed by: STUDENT IN AN ORGANIZED HEALTH CARE EDUCATION/TRAINING PROGRAM

## 2025-04-04 PROCEDURE — 83735 ASSAY OF MAGNESIUM: CPT

## 2025-04-04 PROCEDURE — 83540 ASSAY OF IRON: CPT

## 2025-04-04 PROCEDURE — 36415 COLL VENOUS BLD VENIPUNCTURE: CPT

## 2025-04-04 PROCEDURE — 80061 LIPID PANEL: CPT

## 2025-04-04 PROCEDURE — 6370000000 HC RX 637 (ALT 250 FOR IP): Performed by: INTERNAL MEDICINE

## 2025-04-04 PROCEDURE — 99223 1ST HOSP IP/OBS HIGH 75: CPT | Performed by: INTERNAL MEDICINE

## 2025-04-04 PROCEDURE — 84443 ASSAY THYROID STIM HORMONE: CPT

## 2025-04-04 PROCEDURE — 81001 URINALYSIS AUTO W/SCOPE: CPT

## 2025-04-04 RX ORDER — POTASSIUM CHLORIDE 7.45 MG/ML
10 INJECTION INTRAVENOUS PRN
Status: DISCONTINUED | OUTPATIENT
Start: 2025-04-04 | End: 2025-04-06 | Stop reason: HOSPADM

## 2025-04-04 RX ORDER — ONDANSETRON 4 MG/1
4 TABLET, ORALLY DISINTEGRATING ORAL EVERY 8 HOURS PRN
Status: DISCONTINUED | OUTPATIENT
Start: 2025-04-04 | End: 2025-04-06 | Stop reason: HOSPADM

## 2025-04-04 RX ORDER — AMLODIPINE BESYLATE 5 MG/1
5 TABLET ORAL DAILY
Status: DISCONTINUED | OUTPATIENT
Start: 2025-04-04 | End: 2025-04-06 | Stop reason: HOSPADM

## 2025-04-04 RX ORDER — METOPROLOL SUCCINATE 25 MG/1
25 TABLET, EXTENDED RELEASE ORAL DAILY
Status: DISCONTINUED | OUTPATIENT
Start: 2025-04-04 | End: 2025-04-04

## 2025-04-04 RX ORDER — POTASSIUM CHLORIDE 1500 MG/1
40 TABLET, EXTENDED RELEASE ORAL PRN
Status: DISCONTINUED | OUTPATIENT
Start: 2025-04-04 | End: 2025-04-06 | Stop reason: HOSPADM

## 2025-04-04 RX ORDER — PROCHLORPERAZINE EDISYLATE 5 MG/ML
10 INJECTION INTRAMUSCULAR; INTRAVENOUS EVERY 6 HOURS PRN
Status: DISCONTINUED | OUTPATIENT
Start: 2025-04-04 | End: 2025-04-06 | Stop reason: HOSPADM

## 2025-04-04 RX ORDER — ROSUVASTATIN CALCIUM 10 MG/1
10 TABLET, COATED ORAL DAILY
Status: DISCONTINUED | OUTPATIENT
Start: 2025-04-04 | End: 2025-04-06 | Stop reason: HOSPADM

## 2025-04-04 RX ORDER — METOPROLOL SUCCINATE 50 MG/1
50 TABLET, EXTENDED RELEASE ORAL DAILY
Status: DISCONTINUED | OUTPATIENT
Start: 2025-04-05 | End: 2025-04-06 | Stop reason: HOSPADM

## 2025-04-04 RX ORDER — FERROUS SULFATE 325(65) MG
325 TABLET ORAL
Status: DISCONTINUED | OUTPATIENT
Start: 2025-04-04 | End: 2025-04-06 | Stop reason: HOSPADM

## 2025-04-04 RX ORDER — FAMOTIDINE 20 MG/1
10 TABLET, FILM COATED ORAL NIGHTLY PRN
Status: DISCONTINUED | OUTPATIENT
Start: 2025-04-04 | End: 2025-04-06 | Stop reason: HOSPADM

## 2025-04-04 RX ORDER — SODIUM CHLORIDE 9 MG/ML
INJECTION, SOLUTION INTRAVENOUS PRN
Status: DISCONTINUED | OUTPATIENT
Start: 2025-04-04 | End: 2025-04-06 | Stop reason: HOSPADM

## 2025-04-04 RX ORDER — ACETAMINOPHEN 650 MG/1
650 SUPPOSITORY RECTAL EVERY 6 HOURS PRN
Status: DISCONTINUED | OUTPATIENT
Start: 2025-04-04 | End: 2025-04-06 | Stop reason: HOSPADM

## 2025-04-04 RX ORDER — SODIUM CHLORIDE 0.9 % (FLUSH) 0.9 %
5-40 SYRINGE (ML) INJECTION PRN
Status: DISCONTINUED | OUTPATIENT
Start: 2025-04-04 | End: 2025-04-06 | Stop reason: HOSPADM

## 2025-04-04 RX ORDER — POLYETHYLENE GLYCOL 3350 17 G/17G
17 POWDER, FOR SOLUTION ORAL DAILY PRN
Status: DISCONTINUED | OUTPATIENT
Start: 2025-04-04 | End: 2025-04-06 | Stop reason: HOSPADM

## 2025-04-04 RX ORDER — AMIODARONE HYDROCHLORIDE 200 MG/1
400 TABLET ORAL DAILY
Status: DISCONTINUED | OUTPATIENT
Start: 2025-04-04 | End: 2025-04-06 | Stop reason: HOSPADM

## 2025-04-04 RX ORDER — UBIDECARENONE 75 MG
50 CAPSULE ORAL DAILY
Status: DISCONTINUED | OUTPATIENT
Start: 2025-04-04 | End: 2025-04-06 | Stop reason: HOSPADM

## 2025-04-04 RX ORDER — SODIUM CHLORIDE 0.9 % (FLUSH) 0.9 %
5-40 SYRINGE (ML) INJECTION EVERY 12 HOURS SCHEDULED
Status: DISCONTINUED | OUTPATIENT
Start: 2025-04-04 | End: 2025-04-06 | Stop reason: HOSPADM

## 2025-04-04 RX ORDER — ACETAMINOPHEN 325 MG/1
650 TABLET ORAL EVERY 6 HOURS PRN
Status: DISCONTINUED | OUTPATIENT
Start: 2025-04-04 | End: 2025-04-06 | Stop reason: HOSPADM

## 2025-04-04 RX ORDER — FUROSEMIDE 10 MG/ML
40 INJECTION INTRAMUSCULAR; INTRAVENOUS 2 TIMES DAILY
Status: DISCONTINUED | OUTPATIENT
Start: 2025-04-04 | End: 2025-04-06 | Stop reason: HOSPADM

## 2025-04-04 RX ORDER — AMIODARONE HYDROCHLORIDE 200 MG/1
100 TABLET ORAL DAILY
Status: DISCONTINUED | OUTPATIENT
Start: 2025-04-04 | End: 2025-04-04

## 2025-04-04 RX ORDER — ONDANSETRON 2 MG/ML
4 INJECTION INTRAMUSCULAR; INTRAVENOUS EVERY 6 HOURS PRN
Status: DISCONTINUED | OUTPATIENT
Start: 2025-04-04 | End: 2025-04-04 | Stop reason: SDUPTHER

## 2025-04-04 RX ORDER — MAGNESIUM SULFATE IN WATER 40 MG/ML
2000 INJECTION, SOLUTION INTRAVENOUS PRN
Status: DISCONTINUED | OUTPATIENT
Start: 2025-04-04 | End: 2025-04-06 | Stop reason: HOSPADM

## 2025-04-04 RX ADMIN — FUROSEMIDE 40 MG: 10 INJECTION, SOLUTION INTRAMUSCULAR; INTRAVENOUS at 01:42

## 2025-04-04 RX ADMIN — SODIUM CHLORIDE, PRESERVATIVE FREE 10 ML: 5 INJECTION INTRAVENOUS at 09:11

## 2025-04-04 RX ADMIN — ONDANSETRON 4 MG: 4 TABLET, ORALLY DISINTEGRATING ORAL at 18:30

## 2025-04-04 RX ADMIN — APIXABAN 2.5 MG: 5 TABLET, FILM COATED ORAL at 09:00

## 2025-04-04 RX ADMIN — FUROSEMIDE 40 MG: 10 INJECTION, SOLUTION INTRAMUSCULAR; INTRAVENOUS at 17:46

## 2025-04-04 RX ADMIN — METOPROLOL SUCCINATE 25 MG: 25 TABLET, EXTENDED RELEASE ORAL at 01:53

## 2025-04-04 RX ADMIN — SODIUM CHLORIDE, PRESERVATIVE FREE 10 ML: 5 INJECTION INTRAVENOUS at 19:49

## 2025-04-04 RX ADMIN — Medication 3 MG: at 22:01

## 2025-04-04 RX ADMIN — AMLODIPINE BESYLATE 5 MG: 5 TABLET ORAL at 09:00

## 2025-04-04 RX ADMIN — FUROSEMIDE 40 MG: 10 INJECTION, SOLUTION INTRAMUSCULAR; INTRAVENOUS at 09:05

## 2025-04-04 RX ADMIN — AMIODARONE HYDROCHLORIDE 400 MG: 200 TABLET ORAL at 09:00

## 2025-04-04 RX ADMIN — POTASSIUM CHLORIDE 40 MEQ: 1500 TABLET, EXTENDED RELEASE ORAL at 20:57

## 2025-04-04 RX ADMIN — ROSUVASTATIN CALCIUM 10 MG: 10 TABLET, FILM COATED ORAL at 09:00

## 2025-04-04 RX ADMIN — Medication 50 MCG: at 09:00

## 2025-04-04 RX ADMIN — APIXABAN 2.5 MG: 5 TABLET, FILM COATED ORAL at 19:48

## 2025-04-04 RX ADMIN — FERROUS SULFATE TAB 325 MG (65 MG ELEMENTAL FE) 325 MG: 325 (65 FE) TAB at 09:00

## 2025-04-04 ASSESSMENT — PAIN SCALES - GENERAL: PAINLEVEL_OUTOF10: 0

## 2025-04-04 NOTE — PROGRESS NOTES
Patient admitted to ED holds. Patient alert and oriented x 4 and uses a walker at baseline. Patient with no c/o nausea or pain at this time. Assessment completed, see flowsheet. Patient's stretcher is locked and in lowest position with side rails x 2. Non slip socks applied. George inserted per order. No order for UA noted and pt in Afib RVR on tele with rates 110's-120's. This RN notified Jose L Agrawal MD via perfect serve. See orders to give metoprolol early and for UA. MD also notified that med rec is complete.

## 2025-04-04 NOTE — CONSULTS
[facility] [place]   CONSULTATION  [phone]        Reason for Consultation/Chief Complaint: \"I have been asked to see for  CHF exacerbation with  preserved EF.\"  Consulted by Dr. Jose L Agrawal for HFpEF  Follows with Dr. Plaza and Dr. Coyne and was last seen 2/10/25    History of Present Illness:  Rima Wyatt is a 79 y.o. patient who presented to the hospital with complaints of  increasing shortness of breath and bilat leg edema for last 5 days. No chest pain fever cough hemoptysis. She has orthopnea. Dyspnea on minimal exertion walking 15 feet.  She has been compliant with her meds and diet.  Epic chart reviewed and reveals that here amiodarone dose has been gradually reduced to 100mg  4x a week.  She has h/o afib and flutter. Both are paroxysmal.   I have been asked to provide consultation regarding further management and testing.    PMH PAF, Atrial Flutter, h/o Cardioversion 10/2022, s/p pacemaker 10/2022 (on Eliquis), HTN, Hyponatremia, CKD, GERD, HFpEF, falls, anemia.     To avoid amiodarone toxicity, we gradually reduced dose to current 100 mg four times per week (none on Friday/Saturday and Sunday). AF burden about 1% and she has no symptoms associated with those episodes.    If increased arrhythmia burden, consider RF ablation of atrial flutter.     Given frequent falls (plus arthritis issues needing NSAIDs) and need for anticoagulation, we discussed today option of left atrial appendage occlusion. She is interested but wishes to wait until her back feels better (non-displaced fracture from recent fall). Patient will think about option and we will discuss it further at next visit.     Past Medical History:   has a past medical history of Acute congestive heart failure, unspecified heart failure type (HCC), Acute diastolic CHF (congestive heart failure) (HCC), Arthritis, Atrial fibrillation (HCC), Closed subchondral insufficiency fracture of femoral condyle (HCC), Hypertension, Localized  10/18/22    Cardioversion 10/13/22  1. Failed attempts of external electrical cardioversion.    Assessment  Acute on chronic CHF with preserved EF  CHF decompensated by onset of afib and atrial tachycardia.will do IV diuresis.  Parox afib parox aflutter increase amiodarone dose to 200mg daily at discharge. I will give her some extra next few days in hosp. Rate control with beta blockers. Check TSH  Chronic anemia not iron def  Permanent pacemaker due to bradyarrhythmia last check in February normal low afib burden  Discussed with hospitalist team  Patient Active Problem List   Diagnosis    Localized osteoarthrosis not specified whether primary or secondary, pelvic region and thigh    DDD (degenerative disc disease), lumbar    Acute pain of right knee    Primary osteoarthritis of right knee    Degenerative tear of lateral meniscus of right knee    Insufficiency fracture of tibia    Hypertension    Anemia associated with acute blood loss    S/P TKR (total knee replacement), right    Persistent atrial fibrillation ([facility])    Narrow complex tachycardia    SSS (sick sinus syndrome) ([facility])    CKD (chronic kidney disease) stage 4, GFR 15-29 ml/min ([facility])    Tachyarrhythmia    Obesity    Bradycardia    MRI safe cardiac pacemaker -Medtronic DC     Hyponatremia    Orthostatic hypotension    Recurrent falls    Transaminitis    PAF (paroxysmal atrial fibrillation) ([facility])    Gastroesophageal reflux disease without esophagitis    Acute on chronic heart failure ([facility])    Acute respiratory failure with hypoxemia    Acute on chronic diastolic (congestive) heart failure    Hiatal hernia    Coronary artery calcification seen on CAT scan    Cardiomegaly    Hospital-acquired pneumonia    Atrial fibrillation, chronic ([facility])    Acute respiratory failure with hypoxia    Pulmonary nodule    Hypoxemia    Bibasilar crackles    Wheezing    Bronchiolitis    Bronchospasm    Hypoxia    Chronic heart failure with preserved ejection fraction (HFpEF) ([facility])

## 2025-04-04 NOTE — PROGRESS NOTES
Progress Note    Admit Date:  4/3/2025    Subjective:  Ms. Wyatt lying in bed, feeling well.  Reports SOB is resolved, does still get SOB on exertion.  Leg swelling going down.  Reports she has had to self cath at home since back surgery in 2016.  Denies any new or worsening urinary sx.      Objective:   /71   Pulse (!) 112   Temp 97.6 °F (36.4 °C) (Oral)   Resp 20   Ht 1.524 m (5')   Wt 68.3 kg (150 lb 9.6 oz)   SpO2 97%   BMI 29.41 kg/m²        Intake/Output Summary (Last 24 hours) at 4/4/2025 0750  Last data filed at 4/4/2025 0619  Gross per 24 hour   Intake 210 ml   Output 1250 ml   Net -1040 ml       Physical Exam:   Gen: No distress. Alert. Pleasant elderly female.  Eyes: PERRL. No sclera icterus. No conjunctival injection.   ENT: No discharge. Pharynx clear.   Neck: No JVD.  No Carotid Bruit. Trachea midline.  Resp: bilateral crackles, diminished right base. No accessory muscle use. No wheezes. No rhonchi.   CV:Irregularly irregular. No murmur.  No rub. 2+ BLE and feet edema.    Capillary Refill: Brisk,< 3 seconds   Peripheral Pulses: +2 palpable, equal bilaterally   GI: Non-tender. Non-distended. No masses. No organomegaly. Normal bowel sounds. No hernia.   Skin: Warm and dry. No nodule on exposed extremities. No rash on exposed extremities.   M/S: No cyanosis. No joint deformity. No clubbing.   Neuro: Awake. Grossly nonfocal    Psych: Oriented x 3. No anxiety or agitation.         Medications:  sodium chloride flush, 5-40 mL, 2 times per day  furosemide, 40 mg, BID  amiodarone, 100 mg, Daily  amLODIPine, 5 mg, Daily  apixaban, 2.5 mg, BID  ferrous sulfate, 325 mg, Daily with breakfast  rosuvastatin, 10 mg, Daily  vitamin B-12, 50 mcg, Daily  metoprolol succinate, 25 mg, Daily  ipratropium 0.5 mg-albuterol 2.5 mg, 1 Dose, Once      PRN Medications:  sodium chloride flush, 5-40 mL, PRN  sodium chloride, , PRN  ondansetron, 4 mg, Q8H PRN  polyethylene glycol, 17 g, Daily PRN  acetaminophen, 650  DETECTED     Influenza B NOT DETECTED             RADIOLOGY  XR CHEST PORTABLE   Final Result   Stable mild cardiomegaly with increasing central pulmonary congestion.      Hypoinflation with mild bibasilar atelectasis or early infiltrates.      Small right pleural effusion which is more prominent             Assessment/Plan:  Acute on chronic diastolic CHF.  -last echo 4/2024 EF 55%, grade II DD, mild to moderate tricuspid regurgitation .  -CXR increasing pulmonary congestion, small right pleural effusion.  -BNP 7023.  -I/Os, daily weights.  -IV diuresis via 40mg lasix BID.  -cardiology consulted.    Atrial fibrillation with RVR.  -HR low 100-110s.  -continue home anticoagulation with Eliquis.  -on 100mg amiodarone M-Th (has been trying to wean) and 25mg metoprolol daily at home.   -amiodarone dose increased per cardiology, will plan to dc on 200mg daily.  -increase metoprolol to 50mg.    Hypokalemia.  -K 2.9 OA, currently 3.3.  -continue to monitor and replete PRN.    Abnormal urinalysis.  Chronic urinary retention, self-caths.  -has had to self cath since back surgery in 2016.  -urine with 10-20 WBC, rare bacteria, trace leukocytes.  -urine culture pending.  -tobias catheter placed.  -no urinary sx, hold off on antimicrobials pending culture results.    CKD stage IV.  -Cr 1.3, baseline 1.1-1.4.  -avoid nephrotoxic agents.  -renally dose medications as able.    CAD.  -continue Eliquis, Toprol XL, Crestor.    SSS s/p pacemaker (2022).    Hypertension.  -continue home amlodipine and metoprolol.     Hyperlipidemia.   -continue statin.     Acute on chronic anemia.  -hemoglobin 9.2, baseline 7.6-10.9.  -monitor with daily CBC.     Note above makes patient higher risk for morbidity and mortality requiring testing and treatment.     DVT Prophylaxis: Eliquis  Diet: ADULT DIET; Regular; No Added Salt (3-4 gm)  Code Status: Full Code    Verónica Hope PA-C 4/4/2025 7:53 AM

## 2025-04-04 NOTE — FLOWSHEET NOTE
04/04/25 1708   Vital Signs   Temp 98.3 °F (36.8 °C)   Temp Source Oral   Pulse (!) 117   Heart Rate Source Monitor   Respirations 18   /75   MAP (Calculated) 90   BP Location Right upper arm   BP Method Automatic   Patient Position Semi fowlers   Pain Assessment   Pain Assessment None - Denies Pain   Oxygen Therapy   SpO2 95 %   O2 Device None (Room air)   Height and Weight   Height 1.524 m (5')     Pt arrived to unit in hospital bed, family at bedside. VSS. Pt asymptomatic with afib RVR. Pt has wound on sacrum, see admission skin assessment. Pt denies any pain or SOB. Admission assessment to be completed. Mepelex applied to sacrum and bilateral heels. George care completed.

## 2025-04-04 NOTE — ADT AUTH CERT
[facility] EMERGENCY DEPARTMENT  [address]  [place] [id number]  NPI: [id number]   TAX ID: [id number]        RETURN CONTACT INFORMATION FOR THIS FACILITY     CATALINA PORTILLO  Phone: [phone]  Fax:[phone]     Auth number: [id number]  [id number] - (Medicare Managed)

## 2025-04-04 NOTE — ED NOTES
135 - added Velia Sethi RN to provider spot for routine consult with heart failure nurse/coordinator (as instructed in the binder)    6940 - called and left message for Genoveav Sethi as instructed in the binder.

## 2025-04-04 NOTE — CARE COORDINATION
Case Management Assessment  Initial Evaluation    Date/Time of Evaluation: 4/4/2025 12:28 PM  Assessment Completed by: JESSICA Paniagua    If patient is discharged prior to next notation, then this note serves as note for discharge by case management.    Patient Name: Rima Wyatt                   YOB: 1945  Diagnosis: Acute heart failure with preserved ejection fraction (HFpEF) ([facility]) [I50.31]                   Date / Time: 4/3/2025  5:37 PM    Patient Admission Status: Inpatient   Readmission Risk (Low < 19, Mod (19-27), High > 27): Readmission Risk Score: 22.2    Current PCP: Christopher Gutierrez APRN - CNP  PCP verified by CM? Yes    Chart Reviewed: Yes      History Provided by: Medical Record  Patient Orientation: Alert and Oriented    Patient Cognition: Alert    Hospitalization in the last 30 days (Readmission):  Yes    If yes, Readmission Assessment in CM Navigator will be completed.    Advance Directives:      Code Status: Full Code   Patient's Primary Decision Maker is: Legal Next of Kin    Primary Decision Maker: Becca Brewer - Child - [phone]    Discharge Planning:    Patient lives with: Alone Type of Home: House  Primary Care Giver: Self  Patient Support Systems include: Children, Family Members   Current Financial resources: Medicare  Current community resources: None  Current services prior to admission: Durable Medical Equipment            Current DME: Walker            Type of Home Care services:  None    ADLS  Prior functional level: Independent in ADLs/IADLs  Current functional level: Independent in ADLs/IADLs    PT AM-PAC:   /24  OT AM-PAC:   /24    Family can provide assistance at DC: Yes  Would you like Case Management to discuss the discharge plan with any other family members/significant others, and if so, who? Yes (Daughter)  Plans to Return to Present Housing: Yes  Other Identified Issues/Barriers to RETURNING to current housing: Heart failure  Potential Assistance  needed at discharge: N/A            Potential DME:    Patient expects to discharge to: House  Plan for transportation at discharge: Family    Financial    Payor: [employer] MEDICARE / Plan: [employer] CHOICE-PPO MEDICARE / Product Type: *No Product type* /     Does insurance require precert for SNF: Yes    Potential assistance Purchasing Medications: No  Meds-to-Beds request: Yes      [facility] - [place], [place] - [address] [address] - P [phone] - F [phone]  [address]  [place] [address]  Phone: [phone] Fax: [phone]    [facility] [place] [address] - P [phone] - F [phone]  [address]   [place] [address]  Phone: [phone] Fax: [phone]      Notes:    Factors facilitating achievement of predicted outcomes: Family support, Cooperative, and Pleasant    Barriers to discharge: Heart failure    Additional Case Management Notes: EDUARDO chart reviewed pt while pt was in ED due to recent hospitalization.  Pt lives home alone. Pt is IPTA. Pt uses a walker to ambulate. Pt has no prior home health services.   Pt plans to DC back home and will be transported by daughter.   EDUARDO/RUPINDER following.       The Plan for Transition of Care is related to the following treatment goals of Acute heart failure with preserved ejection fraction (HFpEF) ([facility]) [I50.31]    IF APPLICABLE: The Patient and/or patient representative Rima and her family were provided with a choice of provider and agrees with the discharge plan. Freedom of choice list with basic dialogue that supports the patient's individualized plan of care/goals and shares the quality data associated with the providers was provided to:     Patient Representative Name:       The Patient and/or Patient Representative Agree with the Discharge Plan?      JESSICA Paniagua  Case Management Department  [phone]

## 2025-04-04 NOTE — DISCHARGE INSTRUCTIONS
Heart Failure Resources:  Heart Failure Interactive Workbook:  Go to https://Netops [email or web address].MOTORS.Every1Mobile/[email or web address]=555562 for a Free Heart Failure Interactive Workbook provided by The American Heart Association. This interactive workbook will provide information on Healthier Living with Heart Failure. Please copy and paste link into search bar. Use your mouse to scroll through the pages.    HF [place] go:   Heart Failure Free smart phone go available for iPhone and Android download. Use your phone to track sodium intake, fluid intake, symptoms, and weight.     Low Sodium Diet / Recipes:  Go to www.[employer].SimpleLegal website for “renal” diet which is Low Sodium! [employer] is a dialysis company, but this website offers free seasonal cookbooks. Each quarter, they will release 25-30 new recipes with a breakdown of calories, sodium, and glucose. You can also go to wwwT4 Media/recipes website for free recipes.     Discharge Instruction Video:  Scan the QR code below with your camera and click the canva.com link to open the video and watch educational information on Heart Failure and Medications from one of our nurses.   https://www.LearnVest/design/DAFZnsH_JRk/3NtrikpFITEsfWJtrC3pdq/edit    Home Exercise Program:   Identification of Green/Yellow/Red zones:  You should be able to identify when you feel good (green zone), if you have 1-2 symptoms of HF (yellow zone), or if you are in need of medical attention (red zone).  In your CHF education folder you were provided a “stop light tool” to outline this information.     We want to you to rate your exertion levels:    Our therapy team has discussed means of identification with you such as the \"Katy scale.\"  The Katy rating scale ranges from 6 to 20, where 6 means \"no exertion at all\" and 20 means \"maximal exertion.\" The goal is to use this to gauge how much effort it is taking for you to do your normal daily tasks.   You should be able to recognize when too much exertion is

## 2025-04-04 NOTE — PROGRESS NOTES
Patient admitted to room 312-2 from ED. Patient oriented to room, call light, bed rails, phone, lights and bathroom. Patient instructed about the schedule of the day including: vital sign frequency, lab draws, possible tests, frequency of MD and staff rounds, daily weights, I &O's and prescribed diet. PCU Telemetry box in place, patient aware of placement and reason. Bed locked, in lowest position, side rails up 2/4, call light within reach.        Recliner Assessment  Patient is not able to demonstrated the ability to move from a reclining position to an upright position within the recliner. however patient is alert, oriented and able to provide informed consent       4 Eyes Skin Assessment     NAME:  Rima Wyatt  YOB: 1945  MEDICAL RECORD NUMBER:  [id number]    The patient is being assessed for  Admission    I agree that at least one RN has performed a thorough Head to Toe Skin Assessment on the patient. ALL assessment sites listed below have been assessed.      Areas assessed by both nurses:    Head, Face, Ears, Shoulders, Back, Chest, Arms, Elbows, Hands, Sacrum. Buttock, Coccyx, Ischium, Legs. Feet and Heels, and Under Medical Devices           Does the Patient have a Wound? Yes wound(s) were present on assessment. LDA wound assessment was Initiated and completed by RN       José Miguel Prevention initiated by RN: Yes  Wound Care Orders initiated by RN: Yes    Pressure Injury (Stage 3,4, Unstageable, DTI, NWPT, and Complex wounds) if present, place Wound referral order by RN under : No    New Ostomies, if present place, Ostomy referral order under : No     Nurse 1 eSignature: Electronically signed by Anna Garcia RN on 4/4/25 at 5:17 PM EDT    **SHARE this note so that the co-signing nurse can place an eSignature**    Nurse 2 eSignature: Electronically signed by Minerva Montana RN on 4/4/25 at 6:20 PM EDT

## 2025-04-04 NOTE — PLAN OF CARE
Problem: ABCDS Injury Assessment  Goal: Absence of physical injury  Outcome: Progressing  Flowsheets (Taken 4/4/2025 0311)  Absence of Physical Injury: Implement safety measures based on patient assessment     Problem: Cardiovascular - Adult  Goal: Maintains optimal cardiac output and hemodynamic stability  Outcome: Progressing  Flowsheets (Taken 4/4/2025 0311)  Maintains optimal cardiac output and hemodynamic stability:   Monitor blood pressure and heart rate   Monitor urine output and notify Licensed Independent Practitioner for values outside of normal range

## 2025-04-04 NOTE — ED NOTES
I rounded on this patient and emptied her tobias bag. The patient is aox4, on room air, bed is locked and in lowest position, and call light is within reach.

## 2025-04-04 NOTE — PROGRESS NOTES
HEART FAILURE CARE PLAN:    Comorbidities Reviewed: Yes   Patient has a past medical history of Acute congestive heart failure, unspecified heart failure type (HCC), Acute diastolic CHF (congestive heart failure) (HCC), Arthritis, Atrial fibrillation (HCC), Closed subchondral insufficiency fracture of femoral condyle (HCC), Hypertension, Localized osteoarthrosis not specified whether primary or secondary, pelvic region and thigh, and Neurogenic bladder.     ECHOCARDIOGRAM Reviewed: Yes   Patient's Ejection Fraction (EF) is greater than 40%    Weights Reviewed: Yes   Admission weight: 65.8 kg (145 lb)   Wt Readings from Last 3 Encounters:   04/04/25 68.3 kg (150 lb 9.6 oz)   03/14/25 62.6 kg (138 lb)   03/06/25 59.5 kg (131 lb 3.2 oz)     Intake & Output Reviewed: Yes     Intake/Output Summary (Last 24 hours) at 4/4/2025 0312  Last data filed at 4/4/2025 0154  Gross per 24 hour   Intake 105 ml   Output --   Net 105 ml     Medications Reviewed: Yes   SCHEDULED HOSPITAL MEDICATIONS:   sodium chloride flush  5-40 mL IntraVENous 2 times per day    furosemide  40 mg IntraVENous BID    amiodarone  100 mg Oral Daily    amLODIPine  5 mg Oral Daily    apixaban  2.5 mg Oral BID    ferrous sulfate  325 mg Oral Daily with breakfast    rosuvastatin  10 mg Oral Daily    vitamin B-12  50 mcg Oral Daily    metoprolol succinate  25 mg Oral Daily    ipratropium 0.5 mg-albuterol 2.5 mg  1 Dose Inhalation Once     ACE/ARB/ARNI is REQUIRED for EF </= 40% SYSTOLIC FAILURE:   ACE:: N/A  ARB:: N/A  ARNI:: N/A    Evidenced-Based Beta Blocker is REQUIRED for EF </= 40% SYSTOLIC FAILURE:   :: Metoprolol SUCCinate- Toprol XL    Diuretics:  :: Furosemide    Diet Reviewed: Yes   ADULT DIET; Regular; No Added Salt (3-4 gm)    Goal of Care Reviewed: Yes   Patient and/or Family's stated Goal of Care this Admission: reduce shortness of breath, increase activity tolerance, better understand heart failure and disease management, be more comfortable,  and reduce lower extremity edema prior to discharge.

## 2025-04-04 NOTE — H&P
Hospital Medicine History & Physical      Date of Admission: 4/3/2025    Date of Service:  Pt seen/examined on 4/3/2025    [x]Admitted to Inpatient with expected LOS greater than two midnights due to medical therapy.  []Placed in Observation status.    Chief Admission Complaint: Shortness of breath    Presenting Admission History:      79 y.o. female who presented to [facility] with shortness of breath.  PMHx significant for HFpEF, anemia, paroxysmal A-fib, hypertension, CKD 4, CAD, sick sinus syndrome status post pacemaker in 2022.      Patient presented to ED with worsening shortness of breath and leg swelling.  She noted that she started feeling short of breath on Sunday and then 2 days later, started developing worsening lower extremity edema.  She called her cardiologist who doubled her dose of torsemide which she did but without improvement so she called her cardiologist again and recommended that she come to the hospital for further evaluation.  She denied any chest pain or any heart palpitations.  She notes that she has a history of urinary retention and likely needs a George catheter if we are going to diurese her.  Denies any fevers or chills.  No other acute complaints.    In the ED, noted to be tachycardic to 109 and in A-fib.  Initial BMP showed potassium 2.9 which was replaced with oral potassium, BUN 30, creatinine 1.3.  proBNP was elevated at 7000, troponins flat.  CBC showed hemoglobin of 9.2.  Chest x-ray with cardiomegaly and increasing central pulmonary congestion and small right pleural effusion.    Assessment/Plan:      Current Principal Problem:  Acute heart failure with preserved ejection fraction (HFpEF) ([facility])    #Acute HFpEF exacerbation:  -Start Lasix 40 mg twice daily  -Check bladder scan as patient said that she has problem with urinary retention.  George catheter order placed    #Hypokalemia  #CKD 4: Appears to be near baseline  -Cautious replacement potassium given  Heparin  []IPC/SCDs  [x]Eliquis  []Xarelto  []Coumadin     Code status: [x]Full  []DNR/CCA  []Limited (DNR/CCA with Do Not Intubate)  []DNRCC  Surrogate Decision Maker: DaughterIan  PT/OT Eval Status:   [x]NOT yet ordered  []Ordered and Pending   []Seen with Recommendations for:  []Home independently  []Home w/ assist  []HHC  []SNF  []Acute Rehab    Anticipated Discharge Day/Date: 4/5/2025    Anticipated Discharge Location: [x]Home  []HHC  []SNF  []Acute Rehab  []ECF  []LTAC  []Hospice    Consults:      IP CONSULT TO HOSPITALIST  IP CONSULT TO HEART FAILURE NURSE/COORDINATOR    I personally have obtained, updated and/or reviewed the patient’s medication list on 4/3/2025   --------------------------------------------------------------------------------------------------------------------------------------------------------------------    Imaging:     XR CHEST PORTABLE  Result Date: 4/3/2025  EXAMINATION: ONE XRAY VIEW OF THE CHEST 4/3/2025 10:07 pm COMPARISON: 03/04/2025 HISTORY: ORDERING SYSTEM PROVIDED HISTORY: Shortness of Breath TECHNOLOGIST PROVIDED HISTORY: Reason for exam:->Shortness of Breath Reason for Exam: sob FINDINGS: The heart is mildly enlarged unchanged.  The pulmonary vessels are engorged centrally and less prominent.  There are hazy perihilar and bibasilar subsegmental linear opacities which is more prominent.  There is a small right pleural effusion which is more apparent.  There is a left pacemaker in place which is unchanged.     Stable mild cardiomegaly with increasing central pulmonary congestion. Hypoinflation with mild bibasilar atelectasis or early infiltrates. Small right pleural effusion which is more prominent       PCP: Christopher Gutierrez, APRN - CNP    Past Medical History:        Diagnosis Date    Acute congestive heart failure, unspecified heart failure type ([facility]) 10/06/2023    Acute diastolic CHF (congestive heart failure) ([facility]) 10/07/2023    Arthritis     Atrial fibrillation  by mouth daily (with breakfast)    ProviderEmber MD   amiodarone (CORDARONE) 200 MG tablet Take 0.5 tablets by mouth See Admin Instructions Take 0.5 tablet by mouth Monday through Thursday. 11/21/23 3/14/25  Ana Maria Coyne MD   vitamin B-12 (CYANOCOBALAMIN) 100 MCG tablet Take 0.5 tablets by mouth daily    ProviderEmber MD       Labs: Personally reviewed and interpreted for clinical significance.   Recent Labs     04/03/25 1952   WBC 6.0   HGB 9.2*   HCT 29.2*        Recent Labs     04/03/25  1300 04/03/25 1952    139   K 2.9* 3.8    102   CO2 23 23   BUN 30* 30*   CREATININE 1.3* 1.5*   CALCIUM 9.0 8.6   MG 2.00  --      Recent Labs     04/03/25 1952 04/03/25  2130   PROBNP 7,023*  --    TROPHS 31* 23*     No results for input(s): \"LABA1C\" in the last 72 hours.  No results for input(s): \"AST\", \"ALT\", \"BILIDIR\", \"BILITOT\", \"ALKPHOS\" in the last 72 hours.  Recent Labs     04/03/25 2013   INR 1.52*        Jose L Agrawal MD

## 2025-04-05 PROBLEM — I48.11 LONGSTANDING PERSISTENT ATRIAL FIBRILLATION (HCC): Status: ACTIVE | Noted: 2022-10-11

## 2025-04-05 PROBLEM — Z78.9 SELF-CATHETERIZES URINARY BLADDER: Status: ACTIVE | Noted: 2025-04-05

## 2025-04-05 PROBLEM — N18.32 STAGE 3B CHRONIC KIDNEY DISEASE (HCC): Status: ACTIVE | Noted: 2025-04-05

## 2025-04-05 LAB
ANION GAP SERPL CALCULATED.3IONS-SCNC: 15 MMOL/L (ref 3–16)
BACTERIA UR CULT: NORMAL
BASOPHILS # BLD: 0 K/UL (ref 0–0.2)
BASOPHILS NFR BLD: 0.8 %
BUN SERPL-MCNC: 25 MG/DL (ref 7–20)
CALCIUM SERPL-MCNC: 8.3 MG/DL (ref 8.3–10.6)
CHLORIDE SERPL-SCNC: 104 MMOL/L (ref 99–110)
CO2 SERPL-SCNC: 22 MMOL/L (ref 21–32)
CREAT SERPL-MCNC: 1.3 MG/DL (ref 0.6–1.2)
DEPRECATED RDW RBC AUTO: 16.7 % (ref 12.4–15.4)
EOSINOPHIL # BLD: 0.1 K/UL (ref 0–0.6)
EOSINOPHIL NFR BLD: 1.3 %
GFR SERPLBLD CREATININE-BSD FMLA CKD-EPI: 41 ML/MIN/{1.73_M2}
GLUCOSE SERPL-MCNC: 98 MG/DL (ref 70–99)
HCT VFR BLD AUTO: 27.4 % (ref 36–48)
HGB BLD-MCNC: 8.6 G/DL (ref 12–16)
LYMPHOCYTES # BLD: 0.8 K/UL (ref 1–5.1)
LYMPHOCYTES NFR BLD: 15.6 %
MAGNESIUM SERPL-MCNC: 1.97 MG/DL (ref 1.8–2.4)
MCH RBC QN AUTO: 25 PG (ref 26–34)
MCHC RBC AUTO-ENTMCNC: 31.3 G/DL (ref 31–36)
MCV RBC AUTO: 79.7 FL (ref 80–100)
MONOCYTES # BLD: 0.7 K/UL (ref 0–1.3)
MONOCYTES NFR BLD: 13.2 %
NEUTROPHILS # BLD: 3.5 K/UL (ref 1.7–7.7)
NEUTROPHILS NFR BLD: 69.1 %
NT-PROBNP SERPL-MCNC: 6059 PG/ML (ref 0–449)
PLATELET # BLD AUTO: 290 K/UL (ref 135–450)
PMV BLD AUTO: 7.1 FL (ref 5–10.5)
POTASSIUM SERPL-SCNC: 3.4 MMOL/L (ref 3.5–5.1)
RBC # BLD AUTO: 3.44 M/UL (ref 4–5.2)
SODIUM SERPL-SCNC: 141 MMOL/L (ref 136–145)
WBC # BLD AUTO: 5.1 K/UL (ref 4–11)

## 2025-04-05 PROCEDURE — 99233 SBSQ HOSP IP/OBS HIGH 50: CPT | Performed by: INTERNAL MEDICINE

## 2025-04-05 PROCEDURE — 80048 BASIC METABOLIC PNL TOTAL CA: CPT

## 2025-04-05 PROCEDURE — 83880 ASSAY OF NATRIURETIC PEPTIDE: CPT

## 2025-04-05 PROCEDURE — 2060000000 HC ICU INTERMEDIATE R&B

## 2025-04-05 PROCEDURE — 85025 COMPLETE CBC W/AUTO DIFF WBC: CPT

## 2025-04-05 PROCEDURE — 6370000000 HC RX 637 (ALT 250 FOR IP): Performed by: STUDENT IN AN ORGANIZED HEALTH CARE EDUCATION/TRAINING PROGRAM

## 2025-04-05 PROCEDURE — 2500000003 HC RX 250 WO HCPCS: Performed by: STUDENT IN AN ORGANIZED HEALTH CARE EDUCATION/TRAINING PROGRAM

## 2025-04-05 PROCEDURE — 6360000002 HC RX W HCPCS: Performed by: STUDENT IN AN ORGANIZED HEALTH CARE EDUCATION/TRAINING PROGRAM

## 2025-04-05 PROCEDURE — 6360000002 HC RX W HCPCS: Performed by: INTERNAL MEDICINE

## 2025-04-05 PROCEDURE — 6370000000 HC RX 637 (ALT 250 FOR IP): Performed by: INTERNAL MEDICINE

## 2025-04-05 PROCEDURE — 2580000003 HC RX 258: Performed by: INTERNAL MEDICINE

## 2025-04-05 PROCEDURE — 83735 ASSAY OF MAGNESIUM: CPT

## 2025-04-05 PROCEDURE — 36415 COLL VENOUS BLD VENIPUNCTURE: CPT

## 2025-04-05 RX ADMIN — AMLODIPINE BESYLATE 5 MG: 5 TABLET ORAL at 08:25

## 2025-04-05 RX ADMIN — METOPROLOL SUCCINATE 50 MG: 50 TABLET, EXTENDED RELEASE ORAL at 08:24

## 2025-04-05 RX ADMIN — SODIUM CHLORIDE 1000 MG: 9 INJECTION, SOLUTION INTRAVENOUS at 09:54

## 2025-04-05 RX ADMIN — FERROUS SULFATE TAB 325 MG (65 MG ELEMENTAL FE) 325 MG: 325 (65 FE) TAB at 08:24

## 2025-04-05 RX ADMIN — ROSUVASTATIN CALCIUM 10 MG: 10 TABLET, FILM COATED ORAL at 08:24

## 2025-04-05 RX ADMIN — Medication 3 MG: at 19:43

## 2025-04-05 RX ADMIN — POTASSIUM CHLORIDE 40 MEQ: 1500 TABLET, EXTENDED RELEASE ORAL at 08:24

## 2025-04-05 RX ADMIN — ACETAMINOPHEN 650 MG: 325 TABLET ORAL at 19:43

## 2025-04-05 RX ADMIN — SODIUM CHLORIDE, PRESERVATIVE FREE 10 ML: 5 INJECTION INTRAVENOUS at 08:29

## 2025-04-05 RX ADMIN — SODIUM CHLORIDE, PRESERVATIVE FREE 10 ML: 5 INJECTION INTRAVENOUS at 19:57

## 2025-04-05 RX ADMIN — APIXABAN 2.5 MG: 5 TABLET, FILM COATED ORAL at 19:43

## 2025-04-05 RX ADMIN — FUROSEMIDE 40 MG: 10 INJECTION, SOLUTION INTRAMUSCULAR; INTRAVENOUS at 08:25

## 2025-04-05 RX ADMIN — AMIODARONE HYDROCHLORIDE 400 MG: 200 TABLET ORAL at 08:24

## 2025-04-05 RX ADMIN — Medication 50 MCG: at 08:25

## 2025-04-05 ASSESSMENT — PAIN - FUNCTIONAL ASSESSMENT: PAIN_FUNCTIONAL_ASSESSMENT: ACTIVITIES ARE NOT PREVENTED

## 2025-04-05 ASSESSMENT — PAIN SCALES - GENERAL: PAINLEVEL_OUTOF10: 5

## 2025-04-05 ASSESSMENT — PAIN DESCRIPTION - LOCATION: LOCATION: NECK

## 2025-04-05 ASSESSMENT — PAIN DESCRIPTION - ORIENTATION: ORIENTATION: RIGHT

## 2025-04-05 ASSESSMENT — PAIN DESCRIPTION - DESCRIPTORS: DESCRIPTORS: ACHING

## 2025-04-05 NOTE — PROGRESS NOTES
CARDIOLOGY PROGRESS NOTE      Patient Name: Rima Wyatt  Date of admission: 4/3/2025  5:37 PM  Admission Dx: Hypokalemia [E87.6]  Pulmonary venous congestion [R09.89]  Bilateral lower extremity edema [R60.0]  Self-catheterizes urinary bladder [Z78.9]  Longstanding persistent atrial fibrillation (HCC) [I48.11]  Stage 3b chronic kidney disease (HCC) [N18.32]  Acute heart failure with preserved ejection fraction (HFpEF) (HCC) [I50.31]  Reason for Consult: HFpEF  Requesting Physician: Jose L Agrawal MD  Primary Care physician: Christopher Gutierrez, APRN - CNP    Subjective:     Rima Wyatt is a very pleasant 79 y.o. female with a medical history notable for PAF on chronic amiodarone therapy, s/p pacemaker 10/2022, mitral regurgitation, hypertension, Hyponatremia, CKD, GERD, HFpEF, falls, anemia.     Follows with [facility] and EP.  Last seen by Dr. Plaza 2/10/2025.  At that time noted she had recently fell and broke her sacrum.  No recurrent falls. Dr. Coyne saw same day. Note made that to avoid toxicity amiodarone was gradually reduced to 100 mg 4 times weekly.  Noted AF burden was 1% by device interrogation.      Patient presented with complaints of increasing shortness of breath and bilateral leg edema for the past 5 days.  Dyspnea on exertion with minimal walking up about 15 feet.  Vitals noted slightly elevated heart rates throughout admission.   EKG showed atrial fibrillation with rapid ventricular response, nonspecific ST and T wave changes.  Chest x-ray on admission showed mild cardiomegaly with increasing central pulmonary congestion and small right pleural effusion.  Labs notable for stable anemia, proBNP 7000 which was down from previous in March near 10,000, magnesium and potassium somewhat low, creatinine 1.2-1.3 in line with baseline.  TSH undetectable.  Amiodarone was increased to 400 mg daily, Metoprolol XL continue to 50 daily and Lasix 40 mg IV twice daily

## 2025-04-05 NOTE — FLOWSHEET NOTE
04/04/25 1929   Vital Signs   Temp 97.4 °F (36.3 °C)   Temp Source Oral   Pulse (!) 122   Heart Rate Source Monitor   Respirations 18   BP (!) 103/56   MAP (Calculated) 72   BP Location Left upper arm   BP Method Automatic   Patient Position Semi fowlers   Oxygen Therapy   SpO2 94 %   O2 Device None (Room air)     Shift assessment completed. Scheduled meds given per MAR.  Vital signs stable and in no visible distress. A/O x 4.  Denies any needs at this time.  Call light within reach.

## 2025-04-05 NOTE — PROGRESS NOTES
HEART FAILURE CARE PLAN:    Comorbidities Reviewed: Yes   Patient has a past medical history of Acute congestive heart failure, unspecified heart failure type (HCC), Acute diastolic CHF (congestive heart failure) (HCC), Arthritis, Atrial fibrillation (HCC), Closed subchondral insufficiency fracture of femoral condyle (HCC), Hypertension, Localized osteoarthrosis not specified whether primary or secondary, pelvic region and thigh, and Neurogenic bladder.     Weights Reviewed: Yes   Admission weight: 65.8 kg (145 lb)   Wt Readings from Last 3 Encounters:   04/04/25 68.3 kg (150 lb 9.6 oz)   03/14/25 62.6 kg (138 lb)   03/06/25 59.5 kg (131 lb 3.2 oz)     Intake & Output Reviewed: Yes     Intake/Output Summary (Last 24 hours) at 4/4/2025 2044  Last data filed at 4/4/2025 1719  Gross per 24 hour   Intake 460 ml   Output 2750 ml   Net -2290 ml       ECHOCARDIOGRAM Reviewed: Yes   Patient's Ejection Fraction (EF) is greater than 40%     Medications Reviewed: Yes   SCHEDULED HOSPITAL MEDICATIONS:   sodium chloride flush  5-40 mL IntraVENous 2 times per day    furosemide  40 mg IntraVENous BID    amLODIPine  5 mg Oral Daily    apixaban  2.5 mg Oral BID    ferrous sulfate  325 mg Oral Daily with breakfast    rosuvastatin  10 mg Oral Daily    vitamin B-12  50 mcg Oral Daily    amiodarone  400 mg Oral Daily    [START ON 4/5/2025] metoprolol succinate  50 mg Oral Daily     HOME MEDICATIONS:  Prior to Admission medications    Medication Sig Start Date End Date Taking? Authorizing Provider   famotidine (PEPCID) 20 MG tablet Take 1 tablet by mouth nightly as needed (heartburn) 3/14/25 6/12/25 Yes Minerva Mathews APRN - CNP   torsemide (DEMADEX) 20 MG tablet Take 1 tablet by mouth daily swelling 3/14/25  Yes Minerva Mathews APRN - CNP   rosuvastatin (CRESTOR) 10 MG tablet Take 1 tablet by mouth daily 2/10/25  Yes Enrique Plaza DO   amLODIPine (NORVASC) 5 MG tablet Take 1 tablet by mouth daily 11/7/24  Yes Enrique Plaza,  DO   metoprolol succinate (TOPROL XL) 25 MG extended release tablet Take 1 tablet by mouth daily 11/7/24  Yes Ana Maria Coyne MD   Multiple Vitamins-Minerals (THERAPEUTIC MULTIVITAMIN-MINERALS) tablet Take 1 tablet by mouth daily   Yes ProviderEmber MD   ferrous sulfate (IRON 325) 325 (65 Fe) MG tablet Take 1 tablet by mouth daily (with breakfast)   Yes ProviderEmber MD   vitamin B-12 (CYANOCOBALAMIN) 100 MCG tablet Take 1 tablet by mouth daily   Yes ProviderEmber MD   Handicap Placard MISC by Does not apply route Duration - 5 years 11/19/24   Christopher Gutierrez, APRN - CNP   apixaban (ELIQUIS) 2.5 MG TABS tablet Take 1 tablet by mouth 2 times daily 6/5/24   Ana Maria Coyne MD   amiodarone (CORDARONE) 200 MG tablet Take 0.5 tablets by mouth See Admin Instructions Take 0.5 tablet by mouth Monday through Thursday. 11/21/23 3/14/25  Ana Maria Coyne MD      Diet Reviewed: Yes   ADULT DIET; Regular; No Added Salt (3-4 gm); 1800 ml    Goal of Care Reviewed: Yes   Patient and/or Family's stated Goal of Care this Admission: Reduce shortness of breath, increase activity tolerance, better understand heart failure and disease management, be more comfortable, and reduce lower extremity edema prior to discharge.     Electronically signed by Ba Syed RN on 4/4/2025 at 8:44 PM

## 2025-04-05 NOTE — PLAN OF CARE
Problem: ABCDS Injury Assessment  Goal: Absence of physical injury  Outcome: Progressing  Flowsheets (Taken 4/4/2025 1732 by Anna Garcia, RN)  Absence of Physical Injury: Implement safety measures based on patient assessment     Problem: Cardiovascular - Adult  Goal: Maintains optimal cardiac output and hemodynamic stability  Outcome: Progressing     Problem: Safety - Adult  Goal: Free from fall injury  Outcome: Progressing     Problem: Chronic Conditions and Co-morbidities  Goal: Patient's chronic conditions and co-morbidity symptoms are monitored and maintained or improved  Outcome: Progressing     Problem: Discharge Planning  Goal: Discharge to home or other facility with appropriate resources  Outcome: Progressing  Flowsheets (Taken 4/4/2025 1730 by Anna Garcia, RN)  Discharge to home or other facility with appropriate resources:   Identify barriers to discharge with patient and caregiver   Identify discharge learning needs (meds, wound care, etc)   Refer to discharge planning if patient needs post-hospital services based on physician order or complex needs related to functional status, cognitive ability or social support system   Arrange for needed discharge resources and transportation as appropriate     Problem: Skin/Tissue Integrity  Goal: Skin integrity remains intact  Description: 1.  Monitor for areas of redness and/or skin breakdown  2.  Assess vascular access sites hourly  3.  Every 4-6 hours minimum:  Change oxygen saturation probe site  4.  Every 4-6 hours:  If on nasal continuous positive airway pressure, respiratory therapy assess nares and determine need for appliance change or resting period  Outcome: Progressing  Flowsheets (Taken 4/4/2025 2044)  Skin Integrity Remains Intact:   Monitor for areas of redness and/or skin breakdown   Turn and reposition as indicated   Positioning devices   Check visual cues for pain   Monitor skin under medical devices

## 2025-04-05 NOTE — PLAN OF CARE
Problem: ABCDS Injury Assessment  Goal: Absence of physical injury  4/5/2025 0859 by Umesh Goldstein RN  Outcome: Progressing  4/4/2025 2044 by Ba Syed RN  Outcome: Progressing  Flowsheets (Taken 4/4/2025 1732 by Anna Garcia RN)  Absence of Physical Injury: Implement safety measures based on patient assessment     Problem: Cardiovascular - Adult  Goal: Maintains optimal cardiac output and hemodynamic stability  4/5/2025 0859 by Umesh Goldstein RN  Outcome: Progressing  4/4/2025 2044 by Ba Syed RN  Outcome: Progressing     Problem: Safety - Adult  Goal: Free from fall injury  4/5/2025 0859 by Umesh Goldstein RN  Outcome: Progressing  4/4/2025 2044 by Ba Syed RN  Outcome: Progressing     Problem: Chronic Conditions and Co-morbidities  Goal: Patient's chronic conditions and co-morbidity symptoms are monitored and maintained or improved  4/5/2025 0859 by Umesh Goldstein RN  Outcome: Progressing  4/4/2025 2044 by Ba Syed RN  Outcome: Progressing     Problem: Discharge Planning  Goal: Discharge to home or other facility with appropriate resources  4/5/2025 0859 by Umesh Goldstein RN  Outcome: Progressing  4/4/2025 2044 by Ba Syed RN  Outcome: Progressing  Flowsheets (Taken 4/4/2025 1730 by Anna Garcia RN)  Discharge to home or other facility with appropriate resources:   Identify barriers to discharge with patient and caregiver   Identify discharge learning needs (meds, wound care, etc)   Refer to discharge planning if patient needs post-hospital services based on physician order or complex needs related to functional status, cognitive ability or social support system   Arrange for needed discharge resources and transportation as appropriate     Problem: Skin/Tissue Integrity  Goal: Skin integrity remains intact  Description: 1.  Monitor for areas of redness and/or skin breakdown  2.  Assess vascular access sites hourly  3.  Every 4-6 hours minimum:  Change oxygen saturation probe  site  4.  Every 4-6 hours:  If on nasal continuous positive airway pressure, respiratory therapy assess nares and determine need for appliance change or resting period  4/5/2025 0859 by Umesh Goldstein, RN  Outcome: Progressing  4/4/2025 2044 by Ba Syed, RN  Outcome: Progressing  Flowsheets (Taken 4/4/2025 2044)  Skin Integrity Remains Intact:   Monitor for areas of redness and/or skin breakdown   Turn and reposition as indicated   Positioning devices   Check visual cues for pain   Monitor skin under medical devices

## 2025-04-05 NOTE — PLAN OF CARE
HEART FAILURE CARE PLAN:    Comorbidities Reviewed: Yes   Patient has a past medical history of Acute congestive heart failure, unspecified heart failure type (HCC), Acute diastolic CHF (congestive heart failure) (HCC), Arthritis, Atrial fibrillation (HCC), Closed subchondral insufficiency fracture of femoral condyle (HCC), Hypertension, Localized osteoarthrosis not specified whether primary or secondary, pelvic region and thigh, and Neurogenic bladder.     Weights Reviewed: Yes   Admission weight: 65.8 kg (145 lb)   Wt Readings from Last 3 Encounters:   04/05/25 65.3 kg (143 lb 14.4 oz)   03/14/25 62.6 kg (138 lb)   03/06/25 59.5 kg (131 lb 3.2 oz)     Intake & Output Reviewed: Yes     Intake/Output Summary (Last 24 hours) at 4/5/2025 0901  Last data filed at 4/5/2025 0812  Gross per 24 hour   Intake 650 ml   Output 1500 ml   Net -850 ml       ECHOCARDIOGRAM Reviewed: Yes   Patient's Ejection Fraction (EF) is greater than 40%     Medications Reviewed: Yes   SCHEDULED HOSPITAL MEDICATIONS:   cefTRIAXone (ROCEPHIN) IV  1,000 mg IntraVENous Q24H    sodium chloride flush  5-40 mL IntraVENous 2 times per day    furosemide  40 mg IntraVENous BID    amLODIPine  5 mg Oral Daily    apixaban  2.5 mg Oral BID    ferrous sulfate  325 mg Oral Daily with breakfast    rosuvastatin  10 mg Oral Daily    vitamin B-12  50 mcg Oral Daily    amiodarone  400 mg Oral Daily    metoprolol succinate  50 mg Oral Daily     HOME MEDICATIONS:  Prior to Admission medications    Medication Sig Start Date End Date Taking? Authorizing Provider   famotidine (PEPCID) 20 MG tablet Take 1 tablet by mouth nightly as needed (heartburn) 3/14/25 6/12/25 Yes Minerva Mathews APRN - CNP   torsemide (DEMADEX) 20 MG tablet Take 1 tablet by mouth daily swelling 3/14/25  Yes Minerva Mathews APRN - CNP   rosuvastatin (CRESTOR) 10 MG tablet Take 1 tablet by mouth daily 2/10/25  Yes Enrique Plaza DO   amLODIPine (NORVASC) 5 MG tablet Take 1 tablet by mouth  daily 11/7/24  Yes Enrique Plaza,    metoprolol succinate (TOPROL XL) 25 MG extended release tablet Take 1 tablet by mouth daily 11/7/24  Yes Ana Maria Coyne MD   Multiple Vitamins-Minerals (THERAPEUTIC MULTIVITAMIN-MINERALS) tablet Take 1 tablet by mouth daily   Yes ProviderEmber MD   ferrous sulfate (IRON 325) 325 (65 Fe) MG tablet Take 1 tablet by mouth daily (with breakfast)   Yes ProviderEmber MD   vitamin B-12 (CYANOCOBALAMIN) 100 MCG tablet Take 1 tablet by mouth daily   Yes Provider, Historical, MD   Handicap Placard MISC by Does not apply route Duration - 5 years 11/19/24   Christopher Gutierrez, APRN - CNP   apixaban (ELIQUIS) 2.5 MG TABS tablet Take 1 tablet by mouth 2 times daily 6/5/24   Ana Maria Coyne MD   amiodarone (CORDARONE) 200 MG tablet Take 0.5 tablets by mouth See Admin Instructions Take 0.5 tablet by mouth Monday through Thursday. 11/21/23 3/14/25  Ana Maria Coyne MD      Diet Reviewed: Yes   ADULT DIET; Regular; No Added Salt (3-4 gm); 1800 ml    Goal of Care Reviewed: Yes   Patient and/or Family's stated Goal of Care this Admission: Reduce shortness of breath, increase activity tolerance, better understand heart failure and disease management, be more comfortable, and reduce lower extremity edema prior to discharge.     Electronically signed by Umesh Goldstein RN on 4/5/2025 at 9:01 AM

## 2025-04-05 NOTE — PROGRESS NOTES
Patient assessed and AM medications given. Notable blood in tobias bag.  MD notified.  Eliquis held pending new orders.  Patient sitting edge of bed.  Denies any further needs at this time.  Call light within reach.

## 2025-04-06 ENCOUNTER — HOSPITAL ENCOUNTER (INPATIENT)
Age: 80
LOS: 4 days | Discharge: HOME | DRG: 321 | End: 2025-04-10
Attending: INTERNAL MEDICINE | Admitting: INTERNAL MEDICINE
Payer: MEDICARE

## 2025-04-06 VITALS
DIASTOLIC BLOOD PRESSURE: 85 MMHG | WEIGHT: 144.4 LBS | HEART RATE: 98 BPM | SYSTOLIC BLOOD PRESSURE: 110 MMHG | HEIGHT: 60 IN | BODY MASS INDEX: 28.35 KG/M2 | RESPIRATION RATE: 16 BRPM | TEMPERATURE: 97.6 F | OXYGEN SATURATION: 93 %

## 2025-04-06 DIAGNOSIS — Z87.01 HISTORY OF PNEUMONIA: ICD-10-CM

## 2025-04-06 DIAGNOSIS — N17.9 AKI (ACUTE KIDNEY INJURY): ICD-10-CM

## 2025-04-06 DIAGNOSIS — R09.89 BIBASILAR CRACKLES: ICD-10-CM

## 2025-04-06 DIAGNOSIS — R06.02 SHORTNESS OF BREATH: ICD-10-CM

## 2025-04-06 DIAGNOSIS — R06.2 WHEEZING: ICD-10-CM

## 2025-04-06 DIAGNOSIS — J96.01 ACUTE RESPIRATORY FAILURE WITH HYPOXIA: ICD-10-CM

## 2025-04-06 DIAGNOSIS — R07.9 CHEST PAIN: ICD-10-CM

## 2025-04-06 DIAGNOSIS — I48.0 PAROXYSMAL ATRIAL FIBRILLATION (HCC): Primary | ICD-10-CM

## 2025-04-06 DIAGNOSIS — I50.33 ACUTE ON CHRONIC HEART FAILURE WITH PRESERVED EJECTION FRACTION (HFPEF) (HCC): ICD-10-CM

## 2025-04-06 PROBLEM — R93.89 ABNORMAL CHEST X-RAY: Status: ACTIVE | Noted: 2025-04-06

## 2025-04-06 PROBLEM — I50.21: Status: ACTIVE | Noted: 2025-04-06

## 2025-04-06 PROBLEM — I27.20 PULMONARY HYPERTENSION (HCC): Status: ACTIVE | Noted: 2025-04-06

## 2025-04-06 PROBLEM — R06.09 DYSPNEA ON EXERTION: Status: ACTIVE | Noted: 2025-04-06

## 2025-04-06 LAB
ANION GAP SERPL CALCULATED.3IONS-SCNC: 14 MMOL/L (ref 3–16)
BASOPHILS # BLD: 0 K/UL (ref 0–0.2)
BASOPHILS NFR BLD: 0.5 %
BUN SERPL-MCNC: 28 MG/DL (ref 7–20)
CALCIUM SERPL-MCNC: 8.7 MG/DL (ref 8.3–10.6)
CHLORIDE SERPL-SCNC: 103 MMOL/L (ref 99–110)
CO2 SERPL-SCNC: 22 MMOL/L (ref 21–32)
CREAT SERPL-MCNC: 1.6 MG/DL (ref 0.6–1.2)
DEPRECATED RDW RBC AUTO: 16.9 % (ref 12.4–15.4)
EOSINOPHIL # BLD: 0.1 K/UL (ref 0–0.6)
EOSINOPHIL NFR BLD: 1.5 %
FERRITIN SERPL IA-MCNC: 84 NG/ML (ref 15–150)
GFR SERPLBLD CREATININE-BSD FMLA CKD-EPI: 32 ML/MIN/{1.73_M2}
GLUCOSE SERPL-MCNC: 100 MG/DL (ref 70–99)
HCT VFR BLD AUTO: 28.8 % (ref 36–48)
HGB BLD-MCNC: 9.1 G/DL (ref 12–16)
IRON SATN MFR SERPL: 6 % (ref 15–50)
IRON SERPL-MCNC: 20 UG/DL (ref 37–145)
LYMPHOCYTES # BLD: 0.8 K/UL (ref 1–5.1)
LYMPHOCYTES NFR BLD: 12.8 %
MAGNESIUM SERPL-MCNC: 2.18 MG/DL (ref 1.8–2.4)
MCH RBC QN AUTO: 25 PG (ref 26–34)
MCHC RBC AUTO-ENTMCNC: 31.5 G/DL (ref 31–36)
MCV RBC AUTO: 79.4 FL (ref 80–100)
MONOCYTES # BLD: 0.8 K/UL (ref 0–1.3)
MONOCYTES NFR BLD: 14.4 %
NEUTROPHILS # BLD: 4.2 K/UL (ref 1.7–7.7)
NEUTROPHILS NFR BLD: 70.8 %
PLATELET # BLD AUTO: 310 K/UL (ref 135–450)
PMV BLD AUTO: 7.3 FL (ref 5–10.5)
POTASSIUM SERPL-SCNC: 4.3 MMOL/L (ref 3.5–5.1)
RBC # BLD AUTO: 3.62 M/UL (ref 4–5.2)
SODIUM SERPL-SCNC: 139 MMOL/L (ref 136–145)
TIBC SERPL-MCNC: 337 UG/DL (ref 260–445)
WBC # BLD AUTO: 5.9 K/UL (ref 4–11)

## 2025-04-06 PROCEDURE — 83735 ASSAY OF MAGNESIUM: CPT

## 2025-04-06 PROCEDURE — 2580000003 HC RX 258: Performed by: INTERNAL MEDICINE

## 2025-04-06 PROCEDURE — 6360000002 HC RX W HCPCS: Performed by: INTERNAL MEDICINE

## 2025-04-06 PROCEDURE — 99238 HOSP IP/OBS DSCHRG MGMT 30/<: CPT | Performed by: INTERNAL MEDICINE

## 2025-04-06 PROCEDURE — 2060000000 HC ICU INTERMEDIATE R&B

## 2025-04-06 PROCEDURE — 36415 COLL VENOUS BLD VENIPUNCTURE: CPT

## 2025-04-06 PROCEDURE — 80048 BASIC METABOLIC PNL TOTAL CA: CPT

## 2025-04-06 PROCEDURE — 6370000000 HC RX 637 (ALT 250 FOR IP): Performed by: INTERNAL MEDICINE

## 2025-04-06 PROCEDURE — 6370000000 HC RX 637 (ALT 250 FOR IP): Performed by: STUDENT IN AN ORGANIZED HEALTH CARE EDUCATION/TRAINING PROGRAM

## 2025-04-06 PROCEDURE — 99223 1ST HOSP IP/OBS HIGH 75: CPT | Performed by: INTERNAL MEDICINE

## 2025-04-06 PROCEDURE — 99233 SBSQ HOSP IP/OBS HIGH 50: CPT | Performed by: INTERNAL MEDICINE

## 2025-04-06 PROCEDURE — 85025 COMPLETE CBC W/AUTO DIFF WBC: CPT

## 2025-04-06 PROCEDURE — 2500000003 HC RX 250 WO HCPCS: Performed by: INTERNAL MEDICINE

## 2025-04-06 RX ORDER — ACETAMINOPHEN 325 MG/1
650 TABLET ORAL EVERY 6 HOURS PRN
Status: DISCONTINUED | OUTPATIENT
Start: 2025-04-06 | End: 2025-04-10 | Stop reason: HOSPADM

## 2025-04-06 RX ORDER — FAMOTIDINE 20 MG/1
10 TABLET, FILM COATED ORAL NIGHTLY PRN
Status: DISCONTINUED | OUTPATIENT
Start: 2025-04-06 | End: 2025-04-10 | Stop reason: HOSPADM

## 2025-04-06 RX ORDER — ACETAMINOPHEN 650 MG/1
650 SUPPOSITORY RECTAL EVERY 6 HOURS PRN
Status: DISCONTINUED | OUTPATIENT
Start: 2025-04-06 | End: 2025-04-10 | Stop reason: HOSPADM

## 2025-04-06 RX ORDER — IPRATROPIUM BROMIDE AND ALBUTEROL SULFATE 2.5; .5 MG/3ML; MG/3ML
1 SOLUTION RESPIRATORY (INHALATION) EVERY 4 HOURS PRN
Status: DISCONTINUED | OUTPATIENT
Start: 2025-04-06 | End: 2025-04-10 | Stop reason: HOSPADM

## 2025-04-06 RX ORDER — SODIUM CHLORIDE 0.9 % (FLUSH) 0.9 %
5-40 SYRINGE (ML) INJECTION PRN
Status: DISCONTINUED | OUTPATIENT
Start: 2025-04-06 | End: 2025-04-10 | Stop reason: HOSPADM

## 2025-04-06 RX ORDER — FERROUS SULFATE 325(65) MG
325 TABLET ORAL
Status: DISCONTINUED | OUTPATIENT
Start: 2025-04-07 | End: 2025-04-10 | Stop reason: HOSPADM

## 2025-04-06 RX ORDER — FAMOTIDINE 20 MG/1
20 TABLET, FILM COATED ORAL NIGHTLY PRN
Status: DISCONTINUED | OUTPATIENT
Start: 2025-04-06 | End: 2025-04-06 | Stop reason: SDUPTHER

## 2025-04-06 RX ORDER — SODIUM CHLORIDE 9 MG/ML
INJECTION, SOLUTION INTRAVENOUS PRN
Status: DISCONTINUED | OUTPATIENT
Start: 2025-04-06 | End: 2025-04-10 | Stop reason: HOSPADM

## 2025-04-06 RX ORDER — ROSUVASTATIN CALCIUM 10 MG/1
10 TABLET, COATED ORAL DAILY
Status: DISCONTINUED | OUTPATIENT
Start: 2025-04-07 | End: 2025-04-10 | Stop reason: HOSPADM

## 2025-04-06 RX ORDER — POLYETHYLENE GLYCOL 3350 17 G/17G
17 POWDER, FOR SOLUTION ORAL DAILY PRN
Status: DISCONTINUED | OUTPATIENT
Start: 2025-04-06 | End: 2025-04-10 | Stop reason: HOSPADM

## 2025-04-06 RX ORDER — SODIUM CHLORIDE 0.9 % (FLUSH) 0.9 %
5-40 SYRINGE (ML) INJECTION EVERY 12 HOURS SCHEDULED
Status: DISCONTINUED | OUTPATIENT
Start: 2025-04-06 | End: 2025-04-10 | Stop reason: HOSPADM

## 2025-04-06 RX ORDER — AMLODIPINE BESYLATE 5 MG/1
5 TABLET ORAL DAILY
Status: DISCONTINUED | OUTPATIENT
Start: 2025-04-07 | End: 2025-04-10 | Stop reason: HOSPADM

## 2025-04-06 RX ORDER — ONDANSETRON 2 MG/ML
4 INJECTION INTRAMUSCULAR; INTRAVENOUS EVERY 6 HOURS PRN
Status: DISCONTINUED | OUTPATIENT
Start: 2025-04-06 | End: 2025-04-10 | Stop reason: HOSPADM

## 2025-04-06 RX ORDER — ONDANSETRON 4 MG/1
4 TABLET, ORALLY DISINTEGRATING ORAL EVERY 8 HOURS PRN
Status: DISCONTINUED | OUTPATIENT
Start: 2025-04-06 | End: 2025-04-10 | Stop reason: HOSPADM

## 2025-04-06 RX ORDER — AMIODARONE HYDROCHLORIDE 200 MG/1
100 TABLET ORAL
Status: DISCONTINUED | OUTPATIENT
Start: 2025-04-07 | End: 2025-04-08

## 2025-04-06 RX ORDER — METOPROLOL SUCCINATE 25 MG/1
25 TABLET, EXTENDED RELEASE ORAL DAILY
Status: DISCONTINUED | OUTPATIENT
Start: 2025-04-07 | End: 2025-04-10 | Stop reason: HOSPADM

## 2025-04-06 RX ADMIN — APIXABAN 2.5 MG: 5 TABLET, FILM COATED ORAL at 08:34

## 2025-04-06 RX ADMIN — METOPROLOL SUCCINATE 50 MG: 50 TABLET, EXTENDED RELEASE ORAL at 08:33

## 2025-04-06 RX ADMIN — Medication 10 ML: at 20:58

## 2025-04-06 RX ADMIN — Medication 50 MCG: at 08:33

## 2025-04-06 RX ADMIN — APIXABAN 2.5 MG: 2.5 TABLET, FILM COATED ORAL at 20:58

## 2025-04-06 RX ADMIN — ROSUVASTATIN CALCIUM 10 MG: 10 TABLET, FILM COATED ORAL at 08:33

## 2025-04-06 RX ADMIN — SODIUM CHLORIDE 1000 MG: 9 INJECTION, SOLUTION INTRAVENOUS at 08:38

## 2025-04-06 RX ADMIN — AMIODARONE HYDROCHLORIDE 400 MG: 200 TABLET ORAL at 08:34

## 2025-04-06 RX ADMIN — FUROSEMIDE 5 MG/HR: 10 INJECTION, SOLUTION INTRAMUSCULAR; INTRAVENOUS at 18:16

## 2025-04-06 RX ADMIN — FUROSEMIDE 5 MG/HR: 10 INJECTION, SOLUTION INTRAMUSCULAR; INTRAVENOUS at 13:08

## 2025-04-06 RX ADMIN — FERROUS SULFATE TAB 325 MG (65 MG ELEMENTAL FE) 325 MG: 325 (65 FE) TAB at 08:34

## 2025-04-06 ASSESSMENT — PAIN SCALES - GENERAL: PAINLEVEL_OUTOF10: 1

## 2025-04-06 NOTE — PROGRESS NOTES
Patient assessed and AM medications given.  Patient sitting edge of bed eating breakfast.  Denies pain.  Denies any further needs at this time.  Call light within reach.

## 2025-04-06 NOTE — PROGRESS NOTES
CARDIOLOGY PROGRESS NOTE      Patient Name: Rima Wyatt  Date of admission: 4/3/2025  5:37 PM  Admission Dx: Hypokalemia [E87.6]  Pulmonary venous congestion [R09.89]  Bilateral lower extremity edema [R60.0]  Self-catheterizes urinary bladder [Z78.9]  Longstanding persistent atrial fibrillation (HCC) [I48.11]  Stage 3b chronic kidney disease (HCC) [N18.32]  Acute heart failure with preserved ejection fraction (HFpEF) (HCC) [I50.31]  Reason for Consult: HFpEF  Requesting Physician: Jose L Agrawal MD  Primary Care physician: Christopher Gutierrez, APRN - CNP    Subjective:     Rima Wyatt is a very pleasant 79 y.o. female with a medical history notable for PAF on chronic amiodarone therapy, s/p pacemaker 10/2022, mitral regurgitation, hypertension, Hyponatremia, CKD, GERD, HFpEF, falls, anemia.     Follows with [facility] and EP.  Last seen by Dr. Plaza 2/10/2025.  At that time noted she had recently fell and broke her sacrum.  No recurrent falls. Dr. Coyne saw same day. Note made that to avoid toxicity amiodarone was gradually reduced to 100 mg 4 times weekly.  Noted AF burden was 1% by device interrogation.      Patient presented with complaints of increasing shortness of breath and bilateral leg edema for the past 5 days.  Dyspnea on exertion with minimal walking up about 15 feet.  Vitals noted slightly elevated heart rates throughout admission.   EKG showed atrial fibrillation with rapid ventricular response, nonspecific ST and T wave changes.  Chest x-ray on admission showed mild cardiomegaly with increasing central pulmonary congestion and small right pleural effusion.  Labs notable for stable anemia, proBNP 7000 which was down from previous in March near 10,000, magnesium and potassium somewhat low, creatinine 1.2-1.3 in line with baseline.  TSH undetectable.  Amiodarone was increased to 400 mg daily, Metoprolol XL continue to 50 daily and Lasix 40 mg IV twice daily  low 100s -110 avg     CT chest 1/2025  Mediastinum: Coronary artery calcifications are a marker of atherosclerosis.  A dual lead pacemaker is insitu.  Mitral annular calcifications are present.  There are no enlarged thoracic lymph nodes.  Calcified granulomatous disease  is noted.  No change in the heterogeneous thyroid gland.     Lungs/pleura: The tracheobronchial tree is patent.  There is no pneumothorax.  No change in the small loculated left pleural effusion within the major  fissure.  No change in the eventration of the right hemidiaphragm.  There is  a nonspecific mosaic perfusion pattern with bibasilar scarring.     There is improved aeration with residual nonspecific ground-glass opacities  in the bilateral lungs likely due to resolving pneumonia.  New tree-in-bud opacities have developed within the right lower lobe.  No change in the mild interstitial edema.  The previously noted 6 mm left upper lobe pulmonary  nodule has decreased in size measuring 3 mm.     Echo 4/15/24  Summary   Left ventricular systolic function is normal with ejection fraction   estimated at 55%.   No regional wall motion abnormalities.   There is moderate concentric left ventricular hypertrophy.   Grade II diastolic dysfunction with elevated left ventricular filling   pressure.   Moderate mitral annular calcification.   The left atrium is severely dilated.   Moderate mitral regurgitation.   Mild-moderate tricuspid regurgitation.   Pacemaker / ICD lead is visualized in the right-sided chambers.   Systolic pulmonary artery pressure (SPAP) is estimated at 48 mmHg c/w mild   pulmonary hypertension (Right atrial pressure of 3 mmHg).     Cardiac Amyloidosis Scan 12/20/23  FINDINGS  Quality of the exam is fair.  There is mildly reduced uptake in the ribs and significant persistent blood pool activity as shown on the SPECT CT images.  SPECT CT images show persistent blood pool activity.  No abnormal activity localizes to the myocardium.

## 2025-04-06 NOTE — PLAN OF CARE
Problem: ABCDS Injury Assessment  Goal: Absence of physical injury  Outcome: Progressing     Problem: Cardiovascular - Adult  Goal: Maintains optimal cardiac output and hemodynamic stability  Outcome: Progressing     Problem: Safety - Adult  Goal: Free from fall injury  Outcome: Progressing     Problem: Chronic Conditions and Co-morbidities  Goal: Patient's chronic conditions and co-morbidity symptoms are monitored and maintained or improved  Outcome: Progressing     Problem: Discharge Planning  Goal: Discharge to home or other facility with appropriate resources  Outcome: Progressing     Problem: Skin/Tissue Integrity  Goal: Skin integrity remains intact  Description: 1.  Monitor for areas of redness and/or skin breakdown  2.  Assess vascular access sites hourly  3.  Every 4-6 hours minimum:  Change oxygen saturation probe site  4.  Every 4-6 hours:  If on nasal continuous positive airway pressure, respiratory therapy assess nares and determine need for appliance change or resting period  Outcome: Progressing     Problem: Pain  Goal: Verbalizes/displays adequate comfort level or baseline comfort level  Outcome: Progressing

## 2025-04-06 NOTE — PROGRESS NOTES
Progress Note    Admit Date:  4/3/2025    Subjective:  Ms. Wyatt is up in bed, feels better. BPs better in the afternoon. No pain with the tobias. There was some hematuria earlier this am but this resolved.     Objective:   /78   Pulse (!) 109   Temp 97.2 °F (36.2 °C) (Oral)   Resp 16   Ht 1.524 m (5')   Wt 65.3 kg (143 lb 14.4 oz)   SpO2 90%   BMI 28.10 kg/m²        Intake/Output Summary (Last 24 hours) at 4/5/2025 2038  Last data filed at 4/5/2025 1701  Gross per 24 hour   Intake 780.31 ml   Output --   Net 780.31 ml       Physical Exam:   Gen: No distress. Alert. Pleasant elderly female.  Eyes: PERRL. No sclera icterus. No conjunctival injection.   ENT: No discharge. Pharynx clear.   Neck: No JVD.  No Carotid Bruit. Trachea midline.  Resp: bilateral crackles, diminished right base. No accessory muscle use. No wheezes. No rhonchi.   CV:Irregularly irregular. No murmur.  No rub. 2+ BLE and feet edema.    Capillary Refill: Brisk,< 3 seconds   Peripheral Pulses: +2 palpable, equal bilaterally   GI: Non-tender. Non-distended. No masses. No organomegaly. Normal bowel sounds. No hernia.   Skin: Warm and dry. No nodule on exposed extremities. No rash on exposed extremities.   M/S: No cyanosis. No joint deformity. No clubbing.   Neuro: Awake. Grossly nonfocal    Psych: Oriented x 3. No anxiety or agitation.         Medications:  cefTRIAXone (ROCEPHIN) IV, 1,000 mg, Q24H  sodium chloride flush, 5-40 mL, 2 times per day  [Held by provider] furosemide, 40 mg, BID  [Held by provider] amLODIPine, 5 mg, Daily  apixaban, 2.5 mg, BID  ferrous sulfate, 325 mg, Daily with breakfast  rosuvastatin, 10 mg, Daily  vitamin B-12, 50 mcg, Daily  amiodarone, 400 mg, Daily  metoprolol succinate, 50 mg, Daily      PRN Medications:  sodium chloride flush, 5-40 mL, PRN  sodium chloride, , PRN  ondansetron, 4 mg, Q8H PRN  polyethylene glycol, 17 g, Daily PRN  acetaminophen, 650 mg, Q6H PRN   Or  acetaminophen, 650 mg, Q6H  able.    CAD.  -continue Eliquis, Toprol XL, Crestor.    SSS s/p pacemaker (2022).    Hypertension.  -continue home amlodipine and metoprolol.   -amlodipine is on hold    Hyperlipidemia.   -continue statin.     Acute on chronic anemia.  -hemoglobin 9.2, baseline 7.6-10.9.  -monitor with daily CBC.     Note above makes patient higher risk for morbidity and mortality requiring testing and treatment.     DVT Prophylaxis: Eliquis  Diet: ADULT DIET; Regular; No Added Salt (3-4 gm); 1800 ml  Code Status: Full Code    Anabell Forbes DO 4/5/2025 8:38 PM

## 2025-04-06 NOTE — CONSULTS
Pulmonary & Critical Care Consultation Note    Patient is being seen at the request of Dhaval Perez MD  for a consultation for possible ILD    HISTORY OF PRESENT ILLNESS:   79 years old presented with shortness of breath started since Sunday.  Associated with LE edema.  Denies any cough or sputum production.  No fever.  Lifelong non-smoker.  No inhaled bronchodilators at home.  No home O2.  No history of asthma or seasonal allergies.  No history of occupational exposure.  No birds at home.  Appears comfortable on room air.      PAST MEDICAL HISTORY:  Past Medical History:   Diagnosis Date    Acute congestive heart failure, unspecified heart failure type ([facility]) 10/06/2023    Acute diastolic CHF (congestive heart failure) ([facility]) 10/07/2023    Arthritis     Atrial fibrillation ([facility])     Closed subchondral insufficiency fracture of femoral condyle ([facility]) 08/12/2020    Hypertension     Localized osteoarthrosis not specified whether primary or secondary, pelvic region and thigh 05/08/2015    Neurogenic bladder     caused by a back surgery in 2017 per pt     PAST SURGICAL HISTORY:  Past Surgical History:   Procedure Laterality Date    BACK SURGERY      CATARACT REMOVAL WITH IMPLANT  01/14/2011    LEFT EYE    EYE SURGERY  12/14/2010    cataract rt eye    HYSTERECTOMY (CERVIX STATUS UNKNOWN)      HYSTERECTOMY, TOTAL ABDOMINAL (CERVIX REMOVED)      JOINT REPLACEMENT Bilateral 1997    left and right hip    OTHER SURGICAL HISTORY Right     RIGHT TOTAL KNEE REPLACEMENT     PACEMAKER INSERTION      October 2022    TOTAL KNEE ARTHROPLASTY Right 12/06/2021    RIGHT TOTAL KNEE REPLACEMENT performed by Wilbert Ball MD at [facility] OR       FAMILY HISTORY:  family history includes Arthritis in her mother; Diabetes in her brother, mother, sister, and sister; High Blood Pressure in her brother, mother, and sister.    SOCIAL HISTORY:   reports that she has never smoked. She has never used smokeless tobacco.    Scheduled Meds:

## 2025-04-06 NOTE — FLOWSHEET NOTE
04/06/25 0337   Vital Signs   Temp 97.7 °F (36.5 °C)   Temp Source Oral   Pulse (!) 104   Heart Rate Source Monitor   Respirations 16   /72   MAP (Calculated) 92   BP Location Right upper arm   BP Method Automatic   Patient Position Semi fowlers   Oxygen Therapy   SpO2 93 %   O2 Device None (Room air)   Height and Weight   Weight - Scale 65.5 kg (144 lb 6.4 oz)   Weight Method Standing scale   BMI (Calculated) 28.3     Patient resting in bed. Vitals stable. Patient denies any needs at this time, call light within reach.

## 2025-04-06 NOTE — PLAN OF CARE
HEART FAILURE CARE PLAN:    Comorbidities Reviewed: Yes   Patient has a past medical history of Acute congestive heart failure, unspecified heart failure type (HCC), Acute diastolic CHF (congestive heart failure) (HCC), Arthritis, Atrial fibrillation (HCC), Closed subchondral insufficiency fracture of femoral condyle (HCC), Hypertension, Localized osteoarthrosis not specified whether primary or secondary, pelvic region and thigh, and Neurogenic bladder.     Weights Reviewed: Yes   Admission weight: 65.8 kg (145 lb)   Wt Readings from Last 3 Encounters:   04/06/25 65.5 kg (144 lb 6.4 oz)   03/14/25 62.6 kg (138 lb)   03/06/25 59.5 kg (131 lb 3.2 oz)     Intake & Output Reviewed: Yes     Intake/Output Summary (Last 24 hours) at 4/6/2025 0938  Last data filed at 4/6/2025 0514  Gross per 24 hour   Intake 620.31 ml   Output 450 ml   Net 170.31 ml       ECHOCARDIOGRAM Reviewed: Yes   Patient's Ejection Fraction (EF) is greater than 40%     Medications Reviewed: Yes   SCHEDULED HOSPITAL MEDICATIONS:   cefTRIAXone (ROCEPHIN) IV  1,000 mg IntraVENous Q24H    sodium chloride flush  5-40 mL IntraVENous 2 times per day    [Held by provider] furosemide  40 mg IntraVENous BID    [Held by provider] amLODIPine  5 mg Oral Daily    apixaban  2.5 mg Oral BID    ferrous sulfate  325 mg Oral Daily with breakfast    rosuvastatin  10 mg Oral Daily    vitamin B-12  50 mcg Oral Daily    amiodarone  400 mg Oral Daily    metoprolol succinate  50 mg Oral Daily     HOME MEDICATIONS:  Prior to Admission medications    Medication Sig Start Date End Date Taking? Authorizing Provider   famotidine (PEPCID) 20 MG tablet Take 1 tablet by mouth nightly as needed (heartburn) 3/14/25 6/12/25 Yes Minerva Mathews APRN - CNP   torsemide (DEMADEX) 20 MG tablet Take 1 tablet by mouth daily swelling 3/14/25  Yes Minerva Mathews APRN - CNP   rosuvastatin (CRESTOR) 10 MG tablet Take 1 tablet by mouth daily 2/10/25  Yes Enrique Plaza,    amLODIPine

## 2025-04-06 NOTE — H&P
Hospital Medicine History & Physical    V 1.6    Date of Admission: 4/6/2025    Date of Service:  Pt seen/examined on 6 April     [x]Admitted to Inpatient with expected LOS greater than two midnights due to medical therapy.  []Placed in Observation status.    Chief Admission Complaint:  SOB    Presenting Admission History:        79 y.o. female who presented to [facility] in transfer from [facility] on Lasix gtt for Cardiology evaluation and plan for possible LH/RHCath.  She was apparently admitted on 4 April  with a several day hx of progressive SOB/TITUS and LE edema.      The patient denies any fever/chills or other signs/sxs of systemic illness or identifiable aggravating/alleviating factors.        Assessment/Plan:      Current Principal Problem:  Clinical systolic heart failure, acute (HCC)    CHF - acute on chronic diastolic failure w/ preserved EF 55% by Echo dated April 2024 w/ Grade 2 diastolic dysfunction.  Likely due to hypertensive and/or ischemic heart disease. Continue diuresis as currently ordered - IV Lasix gtt - w/ close monitoring of BUN/Creatinine and electrolytes for ADR.  Continue current medical management and follow input and output as well as daily weights as recorded and clinical response. Consider GDMT at discharge unless contraindicated.  (ACEi/ARB/ARNI, SGLT2i, Aldactone, BBlocker).      HTN - w/out known CAD and no evidence of active signs and/or symptoms of ischemia and/or failure. Currently controlled on home meds w/ vitals documented and reviewed.      HyperLipidemia - normally controlled on home Statin. Continued.  Follow up w/ PCP outpatient for medication initiation and/or adjustment as needed.      Atrial Fibrillation - chronic paroxysmal, of unspecified and clinically unable to determine etiology.  S/P Pacer Oct 2022Normally rate controlled on Amiodarone/BBlocker - continued.  Anticoagulated at baseline on home Eliquis - continued.  Patient w/ hypercoagulable

## 2025-04-06 NOTE — PROGRESS NOTES
Patient educated on discharge instructions as well as new medications use, dosage, administration and possible side effects.  Patient verified knowledge. IV removed without difficulty and dry dressing in place. Telemetry monitor removed and returned to [facility]. Pt left facility in stable condition to [facility] with all of their personal belongings.

## 2025-04-06 NOTE — PROGRESS NOTES
Patient to be transferred to [facility] by [facility] at 1500.  Patient going to A-2 room 219.  Family and patient updated.

## 2025-04-06 NOTE — PROGRESS NOTES
HEART FAILURE CARE PLAN:    Comorbidities Reviewed: Yes   Patient has a past medical history of Acute congestive heart failure, unspecified heart failure type (HCC), Acute diastolic CHF (congestive heart failure) (HCC), Arthritis, Atrial fibrillation (HCC), Closed subchondral insufficiency fracture of femoral condyle (HCC), Hypertension, Localized osteoarthrosis not specified whether primary or secondary, pelvic region and thigh, and Neurogenic bladder.     Weights Reviewed: Yes   Admission weight: 65.8 kg (145 lb)   Wt Readings from Last 3 Encounters:   04/05/25 65.3 kg (143 lb 14.4 oz)   03/14/25 62.6 kg (138 lb)   03/06/25 59.5 kg (131 lb 3.2 oz)     Intake & Output Reviewed: Yes     Intake/Output Summary (Last 24 hours) at 4/5/2025 2345  Last data filed at 4/5/2025 1701  Gross per 24 hour   Intake 620.31 ml   Output --   Net 620.31 ml       ECHOCARDIOGRAM Reviewed: Yes   Patient's Ejection Fraction (EF) is greater than 40%     Medications Reviewed: Yes   SCHEDULED HOSPITAL MEDICATIONS:   cefTRIAXone (ROCEPHIN) IV  1,000 mg IntraVENous Q24H    sodium chloride flush  5-40 mL IntraVENous 2 times per day    [Held by provider] furosemide  40 mg IntraVENous BID    [Held by provider] amLODIPine  5 mg Oral Daily    apixaban  2.5 mg Oral BID    ferrous sulfate  325 mg Oral Daily with breakfast    rosuvastatin  10 mg Oral Daily    vitamin B-12  50 mcg Oral Daily    amiodarone  400 mg Oral Daily    metoprolol succinate  50 mg Oral Daily     HOME MEDICATIONS:  Prior to Admission medications    Medication Sig Start Date End Date Taking? Authorizing Provider   famotidine (PEPCID) 20 MG tablet Take 1 tablet by mouth nightly as needed (heartburn) 3/14/25 6/12/25 Yes Minerva Mathews APRN - CNP   torsemide (DEMADEX) 20 MG tablet Take 1 tablet by mouth daily swelling 3/14/25  Yes Minerva Mathews APRN - CNP   rosuvastatin (CRESTOR) 10 MG tablet Take 1 tablet by mouth daily 2/10/25  Yes Enrique Plaza DO   amLODIPine (NORVASC)  5 MG tablet Take 1 tablet by mouth daily 11/7/24  Yes Enrique Plaza,    metoprolol succinate (TOPROL XL) 25 MG extended release tablet Take 1 tablet by mouth daily 11/7/24  Yes Ana Maria Coyne MD   Multiple Vitamins-Minerals (THERAPEUTIC MULTIVITAMIN-MINERALS) tablet Take 1 tablet by mouth daily   Yes Provider, MD Ember   ferrous sulfate (IRON 325) 325 (65 Fe) MG tablet Take 1 tablet by mouth daily (with breakfast)   Yes Provider, MD Ember   vitamin B-12 (CYANOCOBALAMIN) 100 MCG tablet Take 1 tablet by mouth daily   Yes Provider, Historical, MD   Handicap Placard MISC by Does not apply route Duration - 5 years 11/19/24   Christopher Gutierrez, APRN - CNP   apixaban (ELIQUIS) 2.5 MG TABS tablet Take 1 tablet by mouth 2 times daily 6/5/24   Ana Maria Coyne MD   amiodarone (CORDARONE) 200 MG tablet Take 0.5 tablets by mouth See Admin Instructions Take 0.5 tablet by mouth Monday through Thursday. 11/21/23 3/14/25  Ana Maria Coyne MD      Diet Reviewed: Yes   ADULT DIET; Regular; No Added Salt (3-4 gm); 1800 ml    Goal of Care Reviewed: Yes   Patient and/or Family's stated Goal of Care this Admission: Reduce shortness of breath, increase activity tolerance, better understand heart failure and disease management, be more comfortable, and reduce lower extremity edema prior to discharge.     Electronically signed by Ba Syed RN on 4/5/2025 at 11:45 PM

## 2025-04-06 NOTE — FLOWSHEET NOTE
04/1945   Vital Signs   Temp 97.2 °F (36.2 °C)   Temp Source Oral   Pulse (!) 109   Heart Rate Source Monitor   Respirations 16   /78   MAP (Calculated) 94   BP Location Left upper arm   BP Method Automatic   Patient Position Sitting   Pain Assessment   Pain Assessment 0-10   Pain Level 5   Patient's Stated Pain Goal 2   Pain Location Neck   Pain Orientation Right   Pain Descriptors Aching   Functional Pain Assessment Activities are not prevented   Non-Pharmaceutical Pain Intervention(s) Rest   Oxygen Therapy   SpO2 90 %   O2 Device None (Room air)     Shift assessment completed. Scheduled meds given per MAR.  Vital signs stable and in no visible distress. A/O x 4.  Denies any needs at this time.  Call light within reach.

## 2025-04-06 NOTE — PLAN OF CARE
Problem: ABCDS Injury Assessment  Goal: Absence of physical injury  4/6/2025 0937 by Umesh Goldstein RN  Outcome: Progressing  4/5/2025 2344 by Ba Syed RN  Outcome: Progressing     Problem: Cardiovascular - Adult  Goal: Maintains optimal cardiac output and hemodynamic stability  4/6/2025 0937 by Umesh Goldstein RN  Outcome: Progressing  4/5/2025 2344 by Ba Syed RN  Outcome: Progressing     Problem: Safety - Adult  Goal: Free from fall injury  4/6/2025 0937 by Umesh Goldstein RN  Outcome: Progressing  4/5/2025 2344 by Ba Syed RN  Outcome: Progressing     Problem: Chronic Conditions and Co-morbidities  Goal: Patient's chronic conditions and co-morbidity symptoms are monitored and maintained or improved  4/6/2025 0937 by Umesh Goldstein RN  Outcome: Progressing  4/5/2025 2344 by Ba Syed RN  Outcome: Progressing     Problem: Discharge Planning  Goal: Discharge to home or other facility with appropriate resources  4/6/2025 0937 by Umesh Goldstein RN  Outcome: Progressing  4/5/2025 2344 by Ba Syed RN  Outcome: Progressing     Problem: Skin/Tissue Integrity  Goal: Skin integrity remains intact  Description: 1.  Monitor for areas of redness and/or skin breakdown  2.  Assess vascular access sites hourly  3.  Every 4-6 hours minimum:  Change oxygen saturation probe site  4.  Every 4-6 hours:  If on nasal continuous positive airway pressure, respiratory therapy assess nares and determine need for appliance change or resting period  4/6/2025 0937 by Umesh Goldstein RN  Outcome: Progressing  4/5/2025 2344 by Ba Syed RN  Outcome: Progressing     Problem: Pain  Goal: Verbalizes/displays adequate comfort level or baseline comfort level  4/6/2025 0937 by Umesh Goldstein RN  Outcome: Progressing  4/5/2025 2344 by Ba Syed RN  Outcome: Progressing

## 2025-04-07 ENCOUNTER — ANESTHESIA EVENT (OUTPATIENT)
Dept: CARDIAC CATH/INVASIVE PROCEDURES | Age: 80
DRG: 321 | End: 2025-04-07
Payer: MEDICARE

## 2025-04-07 PROBLEM — R07.9 CHEST PAIN: Status: ACTIVE | Noted: 2025-04-06

## 2025-04-07 PROBLEM — Z51.81 ENCOUNTER FOR MONITORING AMIODARONE THERAPY: Status: ACTIVE | Noted: 2025-04-07

## 2025-04-07 PROBLEM — Z79.899 ENCOUNTER FOR MONITORING AMIODARONE THERAPY: Status: ACTIVE | Noted: 2025-04-07

## 2025-04-07 LAB
ALBUMIN SERPL-MCNC: 4 G/DL (ref 3.4–5)
ANION GAP SERPL CALCULATED.3IONS-SCNC: 12 MMOL/L (ref 3–16)
BUN SERPL-MCNC: 26 MG/DL (ref 7–20)
CALCIUM SERPL-MCNC: 9.6 MG/DL (ref 8.3–10.6)
CHLORIDE SERPL-SCNC: 101 MMOL/L (ref 99–110)
CO2 SERPL-SCNC: 27 MMOL/L (ref 21–32)
CREAT SERPL-MCNC: 1.4 MG/DL (ref 0.6–1.2)
DEPRECATED RDW RBC AUTO: 17.5 % (ref 12.4–15.4)
ECHO BSA: 1.67 M2
EKG ATRIAL RATE: 241 BPM
EKG DIAGNOSIS: NORMAL
EKG Q-T INTERVAL: 388 MS
EKG QRS DURATION: 96 MS
EKG QTC CALCULATION (BAZETT): 517 MS
EKG R AXIS: 10 DEGREES
EKG T AXIS: 151 DEGREES
EKG VENTRICULAR RATE: 107 BPM
FERRITIN SERPL IA-MCNC: 104 NG/ML (ref 15–150)
GFR SERPLBLD CREATININE-BSD FMLA CKD-EPI: 38 ML/MIN/{1.73_M2}
GLUCOSE SERPL-MCNC: 91 MG/DL (ref 70–99)
HCT VFR BLD AUTO: 28.5 % (ref 36–48)
HGB BLD-MCNC: 9.1 G/DL (ref 12–16)
IRON SATN MFR SERPL: 6 % (ref 15–50)
IRON SERPL-MCNC: 19 UG/DL (ref 37–145)
MAGNESIUM SERPL-MCNC: 2 MG/DL (ref 1.8–2.4)
MCH RBC QN AUTO: 25.6 PG (ref 26–34)
MCHC RBC AUTO-ENTMCNC: 32 G/DL (ref 31–36)
MCV RBC AUTO: 80 FL (ref 80–100)
PHOSPHATE SERPL-MCNC: 4.4 MG/DL (ref 2.5–4.9)
PLATELET # BLD AUTO: 309 K/UL (ref 135–450)
PMV BLD AUTO: 7.5 FL (ref 5–10.5)
POTASSIUM SERPL-SCNC: 3.9 MMOL/L (ref 3.5–5.1)
RBC # BLD AUTO: 3.57 M/UL (ref 4–5.2)
SODIUM SERPL-SCNC: 140 MMOL/L (ref 136–145)
TIBC SERPL-MCNC: 332 UG/DL (ref 260–445)
WBC # BLD AUTO: 6.9 K/UL (ref 4–11)

## 2025-04-07 PROCEDURE — 92978 ENDOLUMINL IVUS OCT C 1ST: CPT | Performed by: INTERNAL MEDICINE

## 2025-04-07 PROCEDURE — 2700000000 HC OXYGEN THERAPY PER DAY

## 2025-04-07 PROCEDURE — 2060000000 HC ICU INTERMEDIATE R&B

## 2025-04-07 PROCEDURE — C1769 GUIDE WIRE: HCPCS | Performed by: INTERNAL MEDICINE

## 2025-04-07 PROCEDURE — 6370000000 HC RX 637 (ALT 250 FOR IP): Performed by: INTERNAL MEDICINE

## 2025-04-07 PROCEDURE — 2580000003 HC RX 258: Performed by: INTERNAL MEDICINE

## 2025-04-07 PROCEDURE — C1874 STENT, COATED/COV W/DEL SYS: HCPCS | Performed by: INTERNAL MEDICINE

## 2025-04-07 PROCEDURE — 93010 ELECTROCARDIOGRAM REPORT: CPT | Performed by: INTERNAL MEDICINE

## 2025-04-07 PROCEDURE — 4A023N8 MEASUREMENT OF CARDIAC SAMPLING AND PRESSURE, BILATERAL, PERCUTANEOUS APPROACH: ICD-10-PCS | Performed by: INTERNAL MEDICINE

## 2025-04-07 PROCEDURE — 99152 MOD SED SAME PHYS/QHP 5/>YRS: CPT | Performed by: INTERNAL MEDICINE

## 2025-04-07 PROCEDURE — 93571 IV DOP VEL&/PRESS C FLO 1ST: CPT | Performed by: INTERNAL MEDICINE

## 2025-04-07 PROCEDURE — C9600 PERC DRUG-EL COR STENT SING: HCPCS | Performed by: INTERNAL MEDICINE

## 2025-04-07 PROCEDURE — 99153 MOD SED SAME PHYS/QHP EA: CPT | Performed by: INTERNAL MEDICINE

## 2025-04-07 PROCEDURE — 5A2204Z RESTORATION OF CARDIAC RHYTHM, SINGLE: ICD-10-PCS | Performed by: INTERNAL MEDICINE

## 2025-04-07 PROCEDURE — 83540 ASSAY OF IRON: CPT

## 2025-04-07 PROCEDURE — 6360000002 HC RX W HCPCS: Performed by: INTERNAL MEDICINE

## 2025-04-07 PROCEDURE — C1894 INTRO/SHEATH, NON-LASER: HCPCS | Performed by: INTERNAL MEDICINE

## 2025-04-07 PROCEDURE — 2709999900 HC NON-CHARGEABLE SUPPLY: Performed by: INTERNAL MEDICINE

## 2025-04-07 PROCEDURE — 83735 ASSAY OF MAGNESIUM: CPT

## 2025-04-07 PROCEDURE — 85027 COMPLETE CBC AUTOMATED: CPT

## 2025-04-07 PROCEDURE — 82728 ASSAY OF FERRITIN: CPT

## 2025-04-07 PROCEDURE — 83550 IRON BINDING TEST: CPT

## 2025-04-07 PROCEDURE — 2500000003 HC RX 250 WO HCPCS: Performed by: INTERNAL MEDICINE

## 2025-04-07 PROCEDURE — 80069 RENAL FUNCTION PANEL: CPT

## 2025-04-07 PROCEDURE — 6360000004 HC RX CONTRAST MEDICATION: Performed by: INTERNAL MEDICINE

## 2025-04-07 PROCEDURE — 94761 N-INVAS EAR/PLS OXIMETRY MLT: CPT

## 2025-04-07 PROCEDURE — B2151ZZ FLUOROSCOPY OF LEFT HEART USING LOW OSMOLAR CONTRAST: ICD-10-PCS | Performed by: INTERNAL MEDICINE

## 2025-04-07 PROCEDURE — 027036Z DILATION OF CORONARY ARTERY, ONE ARTERY WITH THREE DRUG-ELUTING INTRALUMINAL DEVICES, PERCUTANEOUS APPROACH: ICD-10-PCS | Performed by: INTERNAL MEDICINE

## 2025-04-07 PROCEDURE — 36415 COLL VENOUS BLD VENIPUNCTURE: CPT

## 2025-04-07 PROCEDURE — B211YZZ FLUOROSCOPY OF MULTIPLE CORONARY ARTERIES USING OTHER CONTRAST: ICD-10-PCS | Performed by: INTERNAL MEDICINE

## 2025-04-07 PROCEDURE — 4A033BC MEASUREMENT OF ARTERIAL PRESSURE, CORONARY, PERCUTANEOUS APPROACH: ICD-10-PCS | Performed by: INTERNAL MEDICINE

## 2025-04-07 PROCEDURE — 99223 1ST HOSP IP/OBS HIGH 75: CPT | Performed by: INTERNAL MEDICINE

## 2025-04-07 PROCEDURE — C1887 CATHETER, GUIDING: HCPCS | Performed by: INTERNAL MEDICINE

## 2025-04-07 PROCEDURE — C1753 CATH, INTRAVAS ULTRASOUND: HCPCS | Performed by: INTERNAL MEDICINE

## 2025-04-07 PROCEDURE — 93460 R&L HRT ART/VENTRICLE ANGIO: CPT | Performed by: INTERNAL MEDICINE

## 2025-04-07 PROCEDURE — 93005 ELECTROCARDIOGRAM TRACING: CPT | Performed by: INTERNAL MEDICINE

## 2025-04-07 PROCEDURE — C1725 CATH, TRANSLUMIN NON-LASER: HCPCS | Performed by: INTERNAL MEDICINE

## 2025-04-07 DEVICE — STENT CORONARY ONYX FRONTIER RX 3X22 MM ZOTAROLIMUS ELUT: Type: IMPLANTABLE DEVICE | Status: FUNCTIONAL

## 2025-04-07 DEVICE — STENT CORONARY ONYX FRONTIER RX 2.5X38 MM ZOTAROLIMUS ELUT: Type: IMPLANTABLE DEVICE | Status: FUNCTIONAL

## 2025-04-07 RX ORDER — LORAZEPAM 0.5 MG/1
0.5 TABLET ORAL
Status: DISCONTINUED | OUTPATIENT
Start: 2025-04-07 | End: 2025-04-07 | Stop reason: HOSPADM

## 2025-04-07 RX ORDER — CLOPIDOGREL 300 MG/1
TABLET, FILM COATED ORAL PRN
Status: DISCONTINUED | OUTPATIENT
Start: 2025-04-07 | End: 2025-04-07 | Stop reason: HOSPADM

## 2025-04-07 RX ORDER — SODIUM CHLORIDE 9 MG/ML
INJECTION, SOLUTION INTRAVENOUS PRN
Status: DISCONTINUED | OUTPATIENT
Start: 2025-04-07 | End: 2025-04-07 | Stop reason: HOSPADM

## 2025-04-07 RX ORDER — BIVALIRUDIN 250 MG/5ML
INJECTION, POWDER, LYOPHILIZED, FOR SOLUTION INTRAVENOUS PRN
Status: DISCONTINUED | OUTPATIENT
Start: 2025-04-07 | End: 2025-04-07 | Stop reason: HOSPADM

## 2025-04-07 RX ORDER — SODIUM CHLORIDE 0.9 % (FLUSH) 0.9 %
5-40 SYRINGE (ML) INJECTION PRN
Status: DISCONTINUED | OUTPATIENT
Start: 2025-04-07 | End: 2025-04-10 | Stop reason: SDUPTHER

## 2025-04-07 RX ORDER — ADENOSINE 3 MG/ML
INJECTION, SOLUTION INTRAVENOUS PRN
Status: DISCONTINUED | OUTPATIENT
Start: 2025-04-07 | End: 2025-04-07 | Stop reason: HOSPADM

## 2025-04-07 RX ORDER — SODIUM CHLORIDE 0.9 % (FLUSH) 0.9 %
5-40 SYRINGE (ML) INJECTION EVERY 12 HOURS SCHEDULED
Status: CANCELLED | OUTPATIENT
Start: 2025-04-07

## 2025-04-07 RX ORDER — SODIUM CHLORIDE 0.9 % (FLUSH) 0.9 %
5-40 SYRINGE (ML) INJECTION PRN
Status: DISCONTINUED | OUTPATIENT
Start: 2025-04-07 | End: 2025-04-07 | Stop reason: HOSPADM

## 2025-04-07 RX ORDER — MIDAZOLAM 1 MG/ML
INJECTION INTRAMUSCULAR; INTRAVENOUS PRN
Status: DISCONTINUED | OUTPATIENT
Start: 2025-04-07 | End: 2025-04-07 | Stop reason: HOSPADM

## 2025-04-07 RX ORDER — SODIUM CHLORIDE 9 MG/ML
INJECTION, SOLUTION INTRAVENOUS PRN
Status: CANCELLED | OUTPATIENT
Start: 2025-04-07

## 2025-04-07 RX ORDER — SODIUM CHLORIDE 9 MG/ML
INJECTION, SOLUTION INTRAVENOUS CONTINUOUS
Status: ACTIVE | OUTPATIENT
Start: 2025-04-07 | End: 2025-04-07

## 2025-04-07 RX ORDER — LIDOCAINE HYDROCHLORIDE 10 MG/ML
1 INJECTION, SOLUTION EPIDURAL; INFILTRATION; INTRACAUDAL; PERINEURAL
Status: CANCELLED | OUTPATIENT
Start: 2025-04-07

## 2025-04-07 RX ORDER — IOPAMIDOL 755 MG/ML
INJECTION, SOLUTION INTRAVASCULAR PRN
Status: DISCONTINUED | OUTPATIENT
Start: 2025-04-07 | End: 2025-04-07 | Stop reason: HOSPADM

## 2025-04-07 RX ORDER — SODIUM CHLORIDE 0.9 % (FLUSH) 0.9 %
5-40 SYRINGE (ML) INJECTION EVERY 12 HOURS SCHEDULED
Status: DISCONTINUED | OUTPATIENT
Start: 2025-04-07 | End: 2025-04-07 | Stop reason: HOSPADM

## 2025-04-07 RX ORDER — SODIUM CHLORIDE 0.9 % (FLUSH) 0.9 %
5-40 SYRINGE (ML) INJECTION EVERY 12 HOURS SCHEDULED
Status: DISCONTINUED | OUTPATIENT
Start: 2025-04-07 | End: 2025-04-10 | Stop reason: SDUPTHER

## 2025-04-07 RX ORDER — SODIUM CHLORIDE, SODIUM LACTATE, POTASSIUM CHLORIDE, CALCIUM CHLORIDE 600; 310; 30; 20 MG/100ML; MG/100ML; MG/100ML; MG/100ML
INJECTION, SOLUTION INTRAVENOUS CONTINUOUS
Status: CANCELLED | OUTPATIENT
Start: 2025-04-07

## 2025-04-07 RX ORDER — SODIUM CHLORIDE 0.9 % (FLUSH) 0.9 %
5-40 SYRINGE (ML) INJECTION PRN
Status: CANCELLED | OUTPATIENT
Start: 2025-04-07

## 2025-04-07 RX ORDER — METOPROLOL SUCCINATE 25 MG/1
25 TABLET, EXTENDED RELEASE ORAL ONCE
Status: DISCONTINUED | OUTPATIENT
Start: 2025-04-07 | End: 2025-04-10

## 2025-04-07 RX ORDER — ASPIRIN 325 MG
325 TABLET ORAL ONCE
Status: COMPLETED | OUTPATIENT
Start: 2025-04-07 | End: 2025-04-07

## 2025-04-07 RX ORDER — ONDANSETRON 2 MG/ML
4 INJECTION INTRAMUSCULAR; INTRAVENOUS EVERY 6 HOURS PRN
Status: DISCONTINUED | OUTPATIENT
Start: 2025-04-07 | End: 2025-04-07 | Stop reason: HOSPADM

## 2025-04-07 RX ORDER — MIDAZOLAM HYDROCHLORIDE 1 MG/ML
2 INJECTION, SOLUTION INTRAMUSCULAR; INTRAVENOUS
Status: CANCELLED | OUTPATIENT
Start: 2025-04-07

## 2025-04-07 RX ORDER — NITROGLYCERIN 20 MG/100ML
INJECTION INTRAVENOUS CONTINUOUS PRN
Status: COMPLETED | OUTPATIENT
Start: 2025-04-07 | End: 2025-04-07

## 2025-04-07 RX ORDER — HYDRALAZINE HYDROCHLORIDE 20 MG/ML
10 INJECTION INTRAMUSCULAR; INTRAVENOUS EVERY 6 HOURS PRN
Status: DISCONTINUED | OUTPATIENT
Start: 2025-04-07 | End: 2025-04-10 | Stop reason: HOSPADM

## 2025-04-07 RX ORDER — CLOPIDOGREL BISULFATE 75 MG/1
75 TABLET ORAL DAILY
Status: DISCONTINUED | OUTPATIENT
Start: 2025-04-08 | End: 2025-04-10 | Stop reason: HOSPADM

## 2025-04-07 RX ADMIN — APIXABAN 2.5 MG: 2.5 TABLET, FILM COATED ORAL at 21:31

## 2025-04-07 RX ADMIN — SODIUM CHLORIDE: 0.9 INJECTION, SOLUTION INTRAVENOUS at 13:43

## 2025-04-07 RX ADMIN — ASPIRIN 325 MG: 325 TABLET ORAL at 10:25

## 2025-04-07 RX ADMIN — ONDANSETRON 4 MG: 2 INJECTION, SOLUTION INTRAMUSCULAR; INTRAVENOUS at 13:14

## 2025-04-07 RX ADMIN — METOPROLOL SUCCINATE 25 MG: 25 TABLET, EXTENDED RELEASE ORAL at 08:30

## 2025-04-07 RX ADMIN — AMIODARONE HYDROCHLORIDE 100 MG: 200 TABLET ORAL at 08:29

## 2025-04-07 RX ADMIN — FAMOTIDINE 10 MG: 20 TABLET, FILM COATED ORAL at 23:02

## 2025-04-07 ASSESSMENT — ENCOUNTER SYMPTOMS
RIGHT EYE: 0
STRIDOR: 0
SHORTNESS OF BREATH: 1
WHEEZING: 0
LEFT EYE: 0
HEMATEMESIS: 0
HEMATOCHEZIA: 0
ORTHOPNEA: 1

## 2025-04-07 NOTE — DISCHARGE INSTRUCTIONS
Start taking Lasix 20 mg daily and Spironolactone 12.5 mg daily beginning on 4/12/25    LAB WORK IN 1 WEEK: BMP (Nonfasting lab) to evaluate kidney function and electrolytes                Heart Failure Resources:  Heart Failure Interactive Workbook:  Go to https://Dryad.ID90T/publication/?z=857587 for a Free Heart Failure Interactive Workbook provided by The American [employer] Association. This interactive workbook will provide information on Healthier Living with Heart Failure. Please copy and paste link into search bar. Use your mouse to scroll through the pages.    HF Welcome go:   Heart Failure Free smart phone go available for iPhone and Android download. Use your phone to track sodium intake, fluid intake, symptoms, and weight.     Low Sodium Diet / Recipes:  Go to www.[employer].FIXO website for “renal” diet which is Low Sodium! [employer] is a dialysis company, but this website offers free seasonal cookbooks. Each quarter, they will release 25-30 new recipes with a breakdown of calories, sodium, and glucose. You can also go to www.boaconsulta.com/recipes website for free recipes.     Home Exercise Program:   Identification of Green/Yellow/Red zones:  You should be able to identify when you feel good (green zone), if you have 1-2 symptoms of HF (yellow zone), or if you are in need of medical attention (red zone).  In your CHF education folder you were provided a “stop light tool” to outline this information.     We want to you to rate your exertion levels:    Our therapy team has discussed means of identification with you such as the \"Katy scale.\"  The Katy rating scale ranges from 6 to 20, where 6 means \"no exertion at all\" and 20 means \"maximal exertion.\" The goal is to use this to gauge how much effort it is taking for you to do your normal daily tasks.   You should be able to recognize when too much exertion is being expended.    Elements of Energy Conservation:   Prioritize/Plan: Decide what needs to be

## 2025-04-07 NOTE — PROCEDURES
Post Cardiac Catheterization Note.  Full details available in procedure log    DATE: 4/7/2025  PATIENT: Rima Wyatt  MRN: [id number]    Procedure Performed:  CPT Code: 89761 US guidance for vascular access  ~NOTE: Ultrasound access was used to access the vessel using the modified seldinger technique after local infiltration of 1-2% lidocaine.   Ultrasound documented/visualized patency, pulsatility, and proper location for puncture. An anterior wall puncture was made. It was determined safety to proceed with access.   Left and right heart catheterization  Selective coronary angiography  DFR of the LAD 0.86  FFR of the LAD 0.83  Intravascular ultrasound of the LAD showing critical CAD  Drug-eluting stent insertion x 3 to the LAD    Procedure Findings:  Single-vessel critical coronary artery disease involving tandem 70% stenoses within the proximal and mid left anterior sending artery  Mild pulmonary hypertension with reduced cardiac output and mildly elevated filling pressures  Normal left heart catheterization    Conclusion/Recommendations:  Successful DFR, FFR, and IVUS guided drug-eluting stent insertion to the left anterior descending artery  Initiate single antiplatelet therapy with Plavix 75 mg daily along with the patient's standard DOAC therapy  Continued overnight observation in the hospital to monitor patient's renal function, hemoglobin and tolerance to antiplatelet therapy  Follow-up with primary cardiologist Dr. Enrique Perezi  Interventional Cardiology  [facility]  Office [phone]

## 2025-04-07 NOTE — POST SEDATION
Post Cardiac Catheterization Note.  Full details available in procedure log    DATE: 4/7/2025  PATIENT: Rima Wyatt  MRN: [id number]    Procedure Performed:  CPT Code: 31265 US guidance for vascular access  ~NOTE: Ultrasound access was used to access the vessel using the modified seldinger technique after local infiltration of 1-2% lidocaine.   Ultrasound documented/visualized patency, pulsatility, and proper location for puncture. An anterior wall puncture was made. It was determined safety to proceed with access.   Left and right heart catheterization  Selective coronary angiography  DFR of the LAD 0.86  FFR of the LAD 0.83  Intravascular ultrasound of the LAD showing critical CAD  Drug-eluting stent insertion x 3 to the LAD    Procedure Findings:  Single-vessel critical coronary artery disease involving tandem 70% stenoses within the proximal and mid left anterior sending artery  Mild pulmonary hypertension with reduced cardiac output and mildly elevated filling pressures  Normal left heart catheterization    Conclusion/Recommendations:  Successful DFR, FFR, and IVUS guided drug-eluting stent insertion to the left anterior descending artery  Initiate single antiplatelet therapy with Plavix 75 mg daily along with the patient's standard DOAC therapy  Continued overnight observation in the hospital to monitor patient's renal function, hemoglobin and tolerance to antiplatelet therapy  Follow-up with primary cardiologist Dr. Enrique Perezi  Interventional Cardiology  [facility]  Office [phone]

## 2025-04-07 NOTE — POST SEDATION
Patient:  Rima Wyatt   :   1945    A pre-sedation re-evaluation was performed immediately at the end of the procedure.  Procedure:  Cardiac cath  Medications: Procedural sedation with minimal conscious sedation  Complications: None  Estimated Blood Loss: none  Specimens: Were not obtained        Aaliyah Medication and Procedural Reconciliation:  I agree that the documented medications and procedures performed are true.  The medications were given under my order.  The procedures were performed under my direct supervision.    An immediate re-assessment was completed prior to sedation, and it is determined to be safe to proceed.

## 2025-04-08 ENCOUNTER — ANESTHESIA (OUTPATIENT)
Dept: CARDIAC CATH/INVASIVE PROCEDURES | Age: 80
DRG: 321 | End: 2025-04-08
Payer: MEDICARE

## 2025-04-08 ENCOUNTER — HOSPITAL ENCOUNTER (INPATIENT)
Dept: CARDIAC CATH/INVASIVE PROCEDURES | Age: 80
Discharge: HOME | DRG: 321 | End: 2025-04-10
Attending: INTERNAL MEDICINE
Payer: MEDICARE

## 2025-04-08 VITALS
SYSTOLIC BLOOD PRESSURE: 109 MMHG | OXYGEN SATURATION: 100 % | DIASTOLIC BLOOD PRESSURE: 80 MMHG | HEART RATE: 61 BPM | RESPIRATION RATE: 19 BRPM

## 2025-04-08 PROBLEM — I50.33 ACUTE ON CHRONIC HEART FAILURE WITH PRESERVED EJECTION FRACTION (HFPEF) (HCC): Status: ACTIVE | Noted: 2025-04-08

## 2025-04-08 PROBLEM — I25.10 CAD IN NATIVE ARTERY: Status: ACTIVE | Noted: 2025-04-08

## 2025-04-08 PROBLEM — I27.20 PULMONARY HTN (HCC): Status: ACTIVE | Noted: 2025-04-08

## 2025-04-08 PROBLEM — Z95.5 S/P DRUG ELUTING CORONARY STENT PLACEMENT: Status: ACTIVE | Noted: 2025-04-08

## 2025-04-08 LAB
ALBUMIN SERPL-MCNC: 3.6 G/DL (ref 3.4–5)
ALP SERPL-CCNC: 105 U/L (ref 40–129)
ALT SERPL-CCNC: 48 U/L (ref 10–40)
ANION GAP SERPL CALCULATED.3IONS-SCNC: 12 MMOL/L (ref 3–16)
AST SERPL-CCNC: 39 U/L (ref 15–37)
BILIRUB DIRECT SERPL-MCNC: 0.3 MG/DL (ref 0–0.3)
BILIRUB INDIRECT SERPL-MCNC: 0.2 MG/DL (ref 0–1)
BILIRUB SERPL-MCNC: 0.5 MG/DL (ref 0–1)
BUN SERPL-MCNC: 25 MG/DL (ref 7–20)
CALCIUM SERPL-MCNC: 9.2 MG/DL (ref 8.3–10.6)
CHLORIDE SERPL-SCNC: 98 MMOL/L (ref 99–110)
CHOLEST SERPL-MCNC: 67 MG/DL (ref 0–199)
CO2 SERPL-SCNC: 30 MMOL/L (ref 21–32)
CREAT SERPL-MCNC: 1.4 MG/DL (ref 0.6–1.2)
DEPRECATED RDW RBC AUTO: 17.7 % (ref 12.4–15.4)
ECHO BSA: 1.67 M2
GFR SERPLBLD CREATININE-BSD FMLA CKD-EPI: 38 ML/MIN/{1.73_M2}
GLUCOSE SERPL-MCNC: 113 MG/DL (ref 70–99)
HCT VFR BLD AUTO: 28.3 % (ref 36–48)
HDLC SERPL-MCNC: 26 MG/DL (ref 40–60)
HGB BLD-MCNC: 8.8 G/DL (ref 12–16)
LDLC SERPL CALC-MCNC: 28 MG/DL
MAGNESIUM SERPL-MCNC: 1.96 MG/DL (ref 1.8–2.4)
MCH RBC QN AUTO: 24.9 PG (ref 26–34)
MCHC RBC AUTO-ENTMCNC: 31.1 G/DL (ref 31–36)
MCV RBC AUTO: 80.1 FL (ref 80–100)
PHOSPHATE SERPL-MCNC: 4.5 MG/DL (ref 2.5–4.9)
PLATELET # BLD AUTO: 311 K/UL (ref 135–450)
PMV BLD AUTO: 7.2 FL (ref 5–10.5)
POTASSIUM SERPL-SCNC: 3.5 MMOL/L (ref 3.5–5.1)
PROT SERPL-MCNC: 6 G/DL (ref 6.4–8.2)
RBC # BLD AUTO: 3.54 M/UL (ref 4–5.2)
SODIUM SERPL-SCNC: 140 MMOL/L (ref 136–145)
TRIGL SERPL-MCNC: 63 MG/DL (ref 0–150)
VLDLC SERPL CALC-MCNC: 13 MG/DL
WBC # BLD AUTO: 8.5 K/UL (ref 4–11)

## 2025-04-08 PROCEDURE — 6370000000 HC RX 637 (ALT 250 FOR IP): Performed by: INTERNAL MEDICINE

## 2025-04-08 PROCEDURE — 2060000000 HC ICU INTERMEDIATE R&B

## 2025-04-08 PROCEDURE — 7100000011 HC PHASE II RECOVERY - ADDTL 15 MIN: Performed by: INTERNAL MEDICINE

## 2025-04-08 PROCEDURE — 3700000000 HC ANESTHESIA ATTENDED CARE: Performed by: INTERNAL MEDICINE

## 2025-04-08 PROCEDURE — 99232 SBSQ HOSP IP/OBS MODERATE 35: CPT | Performed by: NURSE PRACTITIONER

## 2025-04-08 PROCEDURE — 7100000010 HC PHASE II RECOVERY - FIRST 15 MIN: Performed by: INTERNAL MEDICINE

## 2025-04-08 PROCEDURE — 6370000000 HC RX 637 (ALT 250 FOR IP): Performed by: NURSE PRACTITIONER

## 2025-04-08 PROCEDURE — 80076 HEPATIC FUNCTION PANEL: CPT

## 2025-04-08 PROCEDURE — 6360000002 HC RX W HCPCS: Performed by: INTERNAL MEDICINE

## 2025-04-08 PROCEDURE — 36415 COLL VENOUS BLD VENIPUNCTURE: CPT

## 2025-04-08 PROCEDURE — 2700000000 HC OXYGEN THERAPY PER DAY

## 2025-04-08 PROCEDURE — 6360000002 HC RX W HCPCS: Performed by: NURSE ANESTHETIST, CERTIFIED REGISTERED

## 2025-04-08 PROCEDURE — 93005 ELECTROCARDIOGRAM TRACING: CPT | Performed by: INTERNAL MEDICINE

## 2025-04-08 PROCEDURE — 80069 RENAL FUNCTION PANEL: CPT

## 2025-04-08 PROCEDURE — 80061 LIPID PANEL: CPT

## 2025-04-08 PROCEDURE — 85027 COMPLETE CBC AUTOMATED: CPT

## 2025-04-08 PROCEDURE — 2580000003 HC RX 258: Performed by: INTERNAL MEDICINE

## 2025-04-08 PROCEDURE — 83735 ASSAY OF MAGNESIUM: CPT

## 2025-04-08 PROCEDURE — 94761 N-INVAS EAR/PLS OXIMETRY MLT: CPT

## 2025-04-08 PROCEDURE — 92960 CARDIOVERSION ELECTRIC EXT: CPT

## 2025-04-08 RX ORDER — SODIUM CHLORIDE 9 MG/ML
INJECTION, SOLUTION INTRAVENOUS PRN
Status: CANCELLED | OUTPATIENT
Start: 2025-04-08

## 2025-04-08 RX ORDER — SODIUM CHLORIDE 9 MG/ML
INJECTION, SOLUTION INTRAVENOUS PRN
Status: DISCONTINUED | OUTPATIENT
Start: 2025-04-08 | End: 2025-04-10

## 2025-04-08 RX ORDER — SODIUM CHLORIDE 0.9 % (FLUSH) 0.9 %
5-40 SYRINGE (ML) INJECTION PRN
Status: DISCONTINUED | OUTPATIENT
Start: 2025-04-08 | End: 2025-04-10 | Stop reason: SDUPTHER

## 2025-04-08 RX ORDER — SODIUM CHLORIDE 0.9 % (FLUSH) 0.9 %
5-40 SYRINGE (ML) INJECTION PRN
Status: CANCELLED | OUTPATIENT
Start: 2025-04-08

## 2025-04-08 RX ORDER — SPIRONOLACTONE 25 MG/1
25 TABLET ORAL DAILY
Status: DISCONTINUED | OUTPATIENT
Start: 2025-04-08 | End: 2025-04-10

## 2025-04-08 RX ORDER — SODIUM CHLORIDE 0.9 % (FLUSH) 0.9 %
5-40 SYRINGE (ML) INJECTION EVERY 12 HOURS SCHEDULED
Status: DISCONTINUED | OUTPATIENT
Start: 2025-04-08 | End: 2025-04-10 | Stop reason: SDUPTHER

## 2025-04-08 RX ORDER — LIDOCAINE HYDROCHLORIDE 20 MG/ML
INJECTION, SOLUTION INFILTRATION; PERINEURAL
Status: DISCONTINUED | OUTPATIENT
Start: 2025-04-08 | End: 2025-04-08 | Stop reason: SDUPTHER

## 2025-04-08 RX ORDER — SODIUM CHLORIDE 0.9 % (FLUSH) 0.9 %
5-40 SYRINGE (ML) INJECTION EVERY 12 HOURS SCHEDULED
Status: CANCELLED | OUTPATIENT
Start: 2025-04-08

## 2025-04-08 RX ORDER — AMIODARONE HYDROCHLORIDE 200 MG/1
200 TABLET ORAL DAILY
Status: DISCONTINUED | OUTPATIENT
Start: 2025-04-08 | End: 2025-04-10 | Stop reason: HOSPADM

## 2025-04-08 RX ORDER — PROPOFOL 10 MG/ML
INJECTION, EMULSION INTRAVENOUS
Status: DISCONTINUED | OUTPATIENT
Start: 2025-04-08 | End: 2025-04-08 | Stop reason: SDUPTHER

## 2025-04-08 RX ORDER — HONEY 100 %
PASTE (ML) TOPICAL DAILY
Status: DISCONTINUED | OUTPATIENT
Start: 2025-04-08 | End: 2025-04-10 | Stop reason: HOSPADM

## 2025-04-08 RX ADMIN — AMLODIPINE BESYLATE 5 MG: 5 TABLET ORAL at 14:36

## 2025-04-08 RX ADMIN — METOPROLOL SUCCINATE 25 MG: 25 TABLET, EXTENDED RELEASE ORAL at 14:31

## 2025-04-08 RX ADMIN — ROSUVASTATIN CALCIUM 10 MG: 10 TABLET, FILM COATED ORAL at 14:31

## 2025-04-08 RX ADMIN — APIXABAN 2.5 MG: 2.5 TABLET, FILM COATED ORAL at 14:31

## 2025-04-08 RX ADMIN — SPIRONOLACTONE 25 MG: 25 TABLET ORAL at 14:31

## 2025-04-08 RX ADMIN — AMIODARONE HYDROCHLORIDE 200 MG: 200 TABLET ORAL at 14:36

## 2025-04-08 RX ADMIN — CLOPIDOGREL BISULFATE 75 MG: 75 TABLET, FILM COATED ORAL at 14:31

## 2025-04-08 RX ADMIN — LIDOCAINE HYDROCHLORIDE 100 MG: 20 INJECTION, SOLUTION INFILTRATION; PERINEURAL at 13:07

## 2025-04-08 RX ADMIN — APIXABAN 2.5 MG: 2.5 TABLET, FILM COATED ORAL at 21:01

## 2025-04-08 RX ADMIN — Medication: at 16:34

## 2025-04-08 RX ADMIN — FERROUS SULFATE TAB 325 MG (65 MG ELEMENTAL FE) 325 MG: 325 (65 FE) TAB at 14:31

## 2025-04-08 RX ADMIN — FUROSEMIDE 5 MG/HR: 10 INJECTION, SOLUTION INTRAMUSCULAR; INTRAVENOUS at 09:21

## 2025-04-08 RX ADMIN — PROPOFOL 100 MG: 10 INJECTION, EMULSION INTRAVENOUS at 13:07

## 2025-04-08 ASSESSMENT — PAIN SCALES - GENERAL
PAINLEVEL_OUTOF10: 0
PAINLEVEL_OUTOF10: 0

## 2025-04-08 ASSESSMENT — ENCOUNTER SYMPTOMS: SHORTNESS OF BREATH: 1

## 2025-04-08 NOTE — ACP (ADVANCE CARE PLANNING)
Advance Care Planning     Advance Care Planning Activator (Inpatient)  Conversation Note      Date of ACP Conversation: 4/8/2025     Conversation Conducted with: patient     ACP Activator: Sonal Franco RN        Health Care Decision Maker:     Current Designated Health Care Decision Maker:     Primary Decision Maker: Becca Brewer - Child - [phone]      Care Preferences    Ventilation:  \"If you were in your present state of health and suddenly became very ill and were unable to breathe on your own, what would your preference be about the use of a ventilator (breathing machine) if it were available to you?\"      Would the patient desire the use of ventilator (breathing machine)?: yes          Resuscitation  \"CPR works best to restart the heart when there is a sudden event, like a heart attack, in someone who is otherwise healthy. Unfortunately, CPR does not typically restart the heart for people who have serious health conditions or who are very sick.\"    \"In the event your heart stopped as a result of an underlying serious health condition, would you want attempts to be made to restart your heart (answer \"yes\" for attempt to resuscitate) or would you prefer a natural death (answer \"no\" for do not attempt to resuscitate)?\" yes       [] Yes   [] No   Educated Patient / Decision Maker regarding differences between Advance Directives and portable DNR orders.    Length of ACP Conversation in minutes:  5    Conversation Outcomes:  ACP discussion completed    Follow-up plan:    [] Schedule follow-up conversation to continue planning  [x] Referred individual to Provider for additional questions/concerns   [] Advised patient/agent/surrogate to review completed ACP document and update if needed with changes in condition, patient preferences or care setting    [] This note routed to one or more involved healthcare providers

## 2025-04-08 NOTE — ANESTHESIA PRE PROCEDURE
reviewed  All available lab & EKG data reviewed)  Induction: intravenous.      Anesthetic plan and risks discussed with patient.      Plan discussed with CRNA.                CARMELO YAO MD   4/8/2025

## 2025-04-08 NOTE — ANESTHESIA POSTPROCEDURE EVALUATION
Department of Anesthesiology  Postprocedure Note    Patient: Rima Wyatt  MRN: [id number]  YOB: 1945  Date of evaluation: 4/8/2025    Procedure Summary       Date: 04/08/25 Room / Location: [facility]    Anesthesia Start: 1306 Anesthesia Stop: 1315    Procedure: MADISON W/ POSSIBLE CARDIOVERSION (PRN CONTRAST/BUBBLE/3D) Diagnosis: Paroxysmal atrial fibrillation (HCC)    Scheduled Providers: Ana Maria Coyne MD Responsible Provider: Joaquin Nielsen MD    Anesthesia Type: MAC ASA Status: 3            Anesthesia Type: MAC    Barrett Phase I: Barrett Score: 9    Barrett Phase II:      Anesthesia Post Evaluation    Patient location during evaluation: bedside  Patient participation: complete - patient participated  Level of consciousness: awake and alert  Airway patency: patent  Nausea & Vomiting: no nausea and no vomiting  Cardiovascular status: blood pressure returned to baseline  Respiratory status: acceptable  Hydration status: euvolemic  Comments: VSS on transfer to phase 2 recovery.  No anesthetic complications.  Pain management: adequate    No notable events documented.

## 2025-04-08 NOTE — CARE COORDINATION
Case Management Assessment  Initial Evaluation    Date/Time of Evaluation: 4/8/2025 4:44 PM  Assessment Completed by: Sonal Franco RN    If patient is discharged prior to next notation, then this note serves as note for discharge by case management.    Patient Name: Rima Wyatt                   YOB: 1945  Diagnosis: CHF exacerbation (HCC) [I50.9]  Clinical systolic heart failure, acute (HCC) [I50.21]                   Date / Time: 4/6/2025  3:50 PM    Patient Admission Status: Inpatient   Readmission Risk (Low < 19, Mod (19-27), High > 27): Readmission Risk Score: 25.3    Current PCP: Christopher Gutierrez, IMELDA - CNP  PCP verified by CM? Yes    Chart Reviewed: Yes      History Provided by: Patient  Patient Orientation: Alert and Oriented    Patient Cognition: Alert    Hospitalization in the last 30 days (Readmission):  Yes    If yes, Readmission Assessment in CM Navigator will be completed.    Advance Directives:      Code Status: Full Code   Patient's Primary Decision Maker is: Legal Next of Kin    Primary Decision Maker: Becca Brewer - Child - [phone]    Discharge Planning:    Patient lives with: Alone Type of Home: House  Primary Care Giver: Self  Patient Support Systems include: Children   Current Financial resources: Medicare  Current community resources: None  Current services prior to admission: Durable Medical Equipment            Current DME: Bedside Commode, Shower Chair, Walker            Type of Home Care services:  None    ADLS  Prior functional level: Independent in ADLs/IADLs  Current functional level: Independent in ADLs/IADLs    PT AM-PAC:   /24  OT AM-PAC:   /24    Family can provide assistance at DC: Yes  Would you like Case Management to discuss the discharge plan with any other family members/significant others, and if so, who? Yes (daughters)  Plans to Return to Present Housing: Yes  Potential Assistance needed at discharge: N/A            Potential DME:    Patient

## 2025-04-08 NOTE — CARE COORDINATION
Attempted to see patient for initial assessment, readmission screening and ACP discussion but patient is in the cath lab for MADISON procedure.  Per previous CM note at [place], patient is from home alone and was independent prior to admission.    Will see patient at a later time today.

## 2025-04-09 PROBLEM — N17.9 AKI (ACUTE KIDNEY INJURY): Status: ACTIVE | Noted: 2025-04-09

## 2025-04-09 LAB
ALBUMIN SERPL-MCNC: 3.6 G/DL (ref 3.4–5)
ANION GAP SERPL CALCULATED.3IONS-SCNC: 12 MMOL/L (ref 3–16)
BUN SERPL-MCNC: 26 MG/DL (ref 7–20)
CALCIUM SERPL-MCNC: 8.6 MG/DL (ref 8.3–10.6)
CHLORIDE SERPL-SCNC: 93 MMOL/L (ref 99–110)
CO2 SERPL-SCNC: 28 MMOL/L (ref 21–32)
CREAT SERPL-MCNC: 1.9 MG/DL (ref 0.6–1.2)
DEPRECATED RDW RBC AUTO: 17.6 % (ref 12.4–15.4)
EKG ATRIAL RATE: 82 BPM
EKG DIAGNOSIS: NORMAL
EKG P-R INTERVAL: 178 MS
EKG Q-T INTERVAL: 480 MS
EKG QRS DURATION: 162 MS
EKG QTC CALCULATION (BAZETT): 560 MS
EKG R AXIS: -8 DEGREES
EKG T AXIS: 155 DEGREES
EKG VENTRICULAR RATE: 82 BPM
GFR SERPLBLD CREATININE-BSD FMLA CKD-EPI: 26 ML/MIN/{1.73_M2}
GLUCOSE SERPL-MCNC: 94 MG/DL (ref 70–99)
HCT VFR BLD AUTO: 26.6 % (ref 36–48)
HGB BLD-MCNC: 8.5 G/DL (ref 12–16)
MAGNESIUM SERPL-MCNC: 2.03 MG/DL (ref 1.8–2.4)
MCH RBC QN AUTO: 25.4 PG (ref 26–34)
MCHC RBC AUTO-ENTMCNC: 31.9 G/DL (ref 31–36)
MCV RBC AUTO: 79.7 FL (ref 80–100)
NT-PROBNP SERPL-MCNC: 5287 PG/ML (ref 0–449)
PHOSPHATE SERPL-MCNC: 4.7 MG/DL (ref 2.5–4.9)
PLATELET # BLD AUTO: 273 K/UL (ref 135–450)
PMV BLD AUTO: 7.3 FL (ref 5–10.5)
POTASSIUM SERPL-SCNC: 3.2 MMOL/L (ref 3.5–5.1)
RBC # BLD AUTO: 3.34 M/UL (ref 4–5.2)
SODIUM SERPL-SCNC: 133 MMOL/L (ref 136–145)
WBC # BLD AUTO: 6.2 K/UL (ref 4–11)

## 2025-04-09 PROCEDURE — 6370000000 HC RX 637 (ALT 250 FOR IP): Performed by: INTERNAL MEDICINE

## 2025-04-09 PROCEDURE — 6370000000 HC RX 637 (ALT 250 FOR IP): Performed by: NURSE PRACTITIONER

## 2025-04-09 PROCEDURE — 2060000000 HC ICU INTERMEDIATE R&B

## 2025-04-09 PROCEDURE — 51701 INSERT BLADDER CATHETER: CPT

## 2025-04-09 PROCEDURE — 6360000002 HC RX W HCPCS: Performed by: INTERNAL MEDICINE

## 2025-04-09 PROCEDURE — 99232 SBSQ HOSP IP/OBS MODERATE 35: CPT | Performed by: NURSE PRACTITIONER

## 2025-04-09 PROCEDURE — 2500000003 HC RX 250 WO HCPCS: Performed by: INTERNAL MEDICINE

## 2025-04-09 PROCEDURE — 83880 ASSAY OF NATRIURETIC PEPTIDE: CPT

## 2025-04-09 PROCEDURE — 36415 COLL VENOUS BLD VENIPUNCTURE: CPT

## 2025-04-09 PROCEDURE — 83735 ASSAY OF MAGNESIUM: CPT

## 2025-04-09 PROCEDURE — 80069 RENAL FUNCTION PANEL: CPT

## 2025-04-09 PROCEDURE — 94761 N-INVAS EAR/PLS OXIMETRY MLT: CPT

## 2025-04-09 PROCEDURE — 2700000000 HC OXYGEN THERAPY PER DAY

## 2025-04-09 PROCEDURE — 85027 COMPLETE CBC AUTOMATED: CPT

## 2025-04-09 PROCEDURE — 2580000003 HC RX 258: Performed by: INTERNAL MEDICINE

## 2025-04-09 PROCEDURE — 1200000000 HC SEMI PRIVATE

## 2025-04-09 RX ORDER — POTASSIUM CHLORIDE 1500 MG/1
40 TABLET, EXTENDED RELEASE ORAL ONCE
Status: COMPLETED | OUTPATIENT
Start: 2025-04-09 | End: 2025-04-09

## 2025-04-09 RX ADMIN — FAMOTIDINE 10 MG: 20 TABLET, FILM COATED ORAL at 20:58

## 2025-04-09 RX ADMIN — ACETAMINOPHEN 650 MG: 325 TABLET ORAL at 23:01

## 2025-04-09 RX ADMIN — APIXABAN 2.5 MG: 2.5 TABLET, FILM COATED ORAL at 09:03

## 2025-04-09 RX ADMIN — AMLODIPINE BESYLATE 5 MG: 5 TABLET ORAL at 08:57

## 2025-04-09 RX ADMIN — CLOPIDOGREL BISULFATE 75 MG: 75 TABLET, FILM COATED ORAL at 09:01

## 2025-04-09 RX ADMIN — Medication: at 10:00

## 2025-04-09 RX ADMIN — ROSUVASTATIN CALCIUM 10 MG: 10 TABLET, FILM COATED ORAL at 09:02

## 2025-04-09 RX ADMIN — FERROUS SULFATE TAB 325 MG (65 MG ELEMENTAL FE) 325 MG: 325 (65 FE) TAB at 09:03

## 2025-04-09 RX ADMIN — APIXABAN 2.5 MG: 2.5 TABLET, FILM COATED ORAL at 20:58

## 2025-04-09 RX ADMIN — AMIODARONE HYDROCHLORIDE 200 MG: 200 TABLET ORAL at 09:01

## 2025-04-09 RX ADMIN — FUROSEMIDE 5 MG/HR: 10 INJECTION, SOLUTION INTRAMUSCULAR; INTRAVENOUS at 06:35

## 2025-04-09 RX ADMIN — POTASSIUM CHLORIDE 40 MEQ: 1500 TABLET, EXTENDED RELEASE ORAL at 08:59

## 2025-04-09 RX ADMIN — Medication 10 ML: at 21:00

## 2025-04-09 RX ADMIN — METOPROLOL SUCCINATE 25 MG: 25 TABLET, EXTENDED RELEASE ORAL at 08:58

## 2025-04-09 ASSESSMENT — PAIN SCALES - GENERAL
PAINLEVEL_OUTOF10: 2
PAINLEVEL_OUTOF10: 0
PAINLEVEL_OUTOF10: 0
PAINLEVEL_OUTOF10: 1
PAINLEVEL_OUTOF10: 3

## 2025-04-09 ASSESSMENT — PAIN DESCRIPTION - ORIENTATION: ORIENTATION: LEFT

## 2025-04-09 ASSESSMENT — PAIN DESCRIPTION - LOCATION: LOCATION: SHOULDER

## 2025-04-10 VITALS
DIASTOLIC BLOOD PRESSURE: 64 MMHG | BODY MASS INDEX: 26.96 KG/M2 | WEIGHT: 137.3 LBS | TEMPERATURE: 97.9 F | SYSTOLIC BLOOD PRESSURE: 124 MMHG | HEART RATE: 63 BPM | OXYGEN SATURATION: 94 % | HEIGHT: 60 IN | RESPIRATION RATE: 20 BRPM

## 2025-04-10 LAB
ALBUMIN SERPL-MCNC: 4 G/DL (ref 3.4–5)
ANION GAP SERPL CALCULATED.3IONS-SCNC: 12 MMOL/L (ref 3–16)
BUN SERPL-MCNC: 30 MG/DL (ref 7–20)
CALCIUM SERPL-MCNC: 9.1 MG/DL (ref 8.3–10.6)
CHLORIDE SERPL-SCNC: 95 MMOL/L (ref 99–110)
CO2 SERPL-SCNC: 28 MMOL/L (ref 21–32)
CREAT SERPL-MCNC: 1.9 MG/DL (ref 0.6–1.2)
DEPRECATED RDW RBC AUTO: 17.5 % (ref 12.4–15.4)
GFR SERPLBLD CREATININE-BSD FMLA CKD-EPI: 26 ML/MIN/{1.73_M2}
GLUCOSE SERPL-MCNC: 101 MG/DL (ref 70–99)
HCT VFR BLD AUTO: 26.6 % (ref 36–48)
HGB BLD-MCNC: 8.5 G/DL (ref 12–16)
MAGNESIUM SERPL-MCNC: 2.16 MG/DL (ref 1.8–2.4)
MCH RBC QN AUTO: 25.5 PG (ref 26–34)
MCHC RBC AUTO-ENTMCNC: 31.9 G/DL (ref 31–36)
MCV RBC AUTO: 79.9 FL (ref 80–100)
PHOSPHATE SERPL-MCNC: 4.4 MG/DL (ref 2.5–4.9)
PLATELET # BLD AUTO: 266 K/UL (ref 135–450)
PMV BLD AUTO: 7.1 FL (ref 5–10.5)
POTASSIUM SERPL-SCNC: 4 MMOL/L (ref 3.5–5.1)
RBC # BLD AUTO: 3.33 M/UL (ref 4–5.2)
SODIUM SERPL-SCNC: 135 MMOL/L (ref 136–145)
WBC # BLD AUTO: 6.1 K/UL (ref 4–11)

## 2025-04-10 PROCEDURE — 2500000003 HC RX 250 WO HCPCS: Performed by: INTERNAL MEDICINE

## 2025-04-10 PROCEDURE — 85027 COMPLETE CBC AUTOMATED: CPT

## 2025-04-10 PROCEDURE — 94761 N-INVAS EAR/PLS OXIMETRY MLT: CPT

## 2025-04-10 PROCEDURE — 83735 ASSAY OF MAGNESIUM: CPT

## 2025-04-10 PROCEDURE — 36415 COLL VENOUS BLD VENIPUNCTURE: CPT

## 2025-04-10 PROCEDURE — 2700000000 HC OXYGEN THERAPY PER DAY

## 2025-04-10 PROCEDURE — 6370000000 HC RX 637 (ALT 250 FOR IP): Performed by: INTERNAL MEDICINE

## 2025-04-10 PROCEDURE — 80069 RENAL FUNCTION PANEL: CPT

## 2025-04-10 RX ORDER — SPIRONOLACTONE 25 MG/1
12.5 TABLET ORAL DAILY
Status: DISCONTINUED | OUTPATIENT
Start: 2025-04-11 | End: 2025-04-10 | Stop reason: HOSPADM

## 2025-04-10 RX ORDER — SPIRONOLACTONE 25 MG/1
12.5 TABLET ORAL DAILY
Qty: 15 TABLET | Refills: 1 | Status: SHIPPED | OUTPATIENT
Start: 2025-04-12

## 2025-04-10 RX ORDER — CLOPIDOGREL BISULFATE 75 MG/1
75 TABLET ORAL DAILY
Qty: 30 TABLET | Refills: 0 | Status: SHIPPED | OUTPATIENT
Start: 2025-04-11 | End: 2025-05-11

## 2025-04-10 RX ORDER — FUROSEMIDE 20 MG/1
20 TABLET ORAL DAILY
Qty: 30 TABLET | Refills: 3 | Status: SHIPPED | OUTPATIENT
Start: 2025-04-12

## 2025-04-10 RX ADMIN — AMIODARONE HYDROCHLORIDE 200 MG: 200 TABLET ORAL at 08:29

## 2025-04-10 RX ADMIN — FERROUS SULFATE TAB 325 MG (65 MG ELEMENTAL FE) 325 MG: 325 (65 FE) TAB at 08:29

## 2025-04-10 RX ADMIN — AMLODIPINE BESYLATE 5 MG: 5 TABLET ORAL at 08:29

## 2025-04-10 RX ADMIN — METOPROLOL SUCCINATE 25 MG: 25 TABLET, EXTENDED RELEASE ORAL at 08:29

## 2025-04-10 RX ADMIN — CLOPIDOGREL BISULFATE 75 MG: 75 TABLET, FILM COATED ORAL at 08:29

## 2025-04-10 RX ADMIN — Medication 10 ML: at 08:31

## 2025-04-10 RX ADMIN — ROSUVASTATIN CALCIUM 10 MG: 10 TABLET, FILM COATED ORAL at 08:29

## 2025-04-10 RX ADMIN — APIXABAN 2.5 MG: 2.5 TABLET, FILM COATED ORAL at 08:29

## 2025-04-10 RX ADMIN — Medication: at 08:31

## 2025-04-10 ASSESSMENT — PAIN SCALES - GENERAL
PAINLEVEL_OUTOF10: 0

## 2025-04-10 NOTE — PROGRESS NOTES
04/06/25 5098   RT Protocol   History Pulmonary Disease 0   Respiratory pattern 0   Breath sounds 0   Cough 0   Indications for Bronchodilator Therapy None   Bronchodilator Assessment Score 0       
  [facility] Medicine Progress Note  V 1.6      Date of Admission: 4/6/2025    Hospital Day: 3      Chief Admission Complaint:  \"SOB\"    Subjective:  no new c/o    Presenting Admission History:       \"79 y.o. female who presented to [facility] in transfer from [facility] on Lasix gtt for Cardiology evaluation and plan for possible LH/RHCath.  She was apparently admitted on 4 April  with a several day hx of progressive SOB/TITUS and LE edema.       The patient denies any fever/chills or other signs/sxs of systemic illness or identifiable aggravating/alleviating factors\"    Assessment/Plan:      Current Principal Problem:  Clinical systolic heart failure, acute (HCC)       CHF - acute on chronic diastolic failure w/ preserved EF 55% by Echo dated April 2024 w/ Grade 2 diastolic dysfunction.  Likely due to hypertensive and/or ischemic heart disease. Continue diuresis as currently ordered - IV Lasix gtt - w/ close monitoring of BUN/Creatinine and electrolytes for ADR.  Continue current medical management and follow input and output as well as daily weights as recorded and clinical response. Consider GDMT at discharge unless contraindicated.  (ACEi/ARB/ARNI, SGLT2i, Aldactone, BBlocker).  Cardiology consulted and appreciated - s/p RHCath/LHCath 7 April s/p KAMILLE x 3 to LAD, now w/ Lasix gtt continued.      HTN - w/out known CAD and no evidence of active signs and/or symptoms of ischemia and/or failure. Currently controlled on home meds w/ vitals documented and reviewed.       HyperLipidemia - normally controlled on home Statin. Continued.  Follow up w/ PCP outpatient for medication initiation and/or adjustment as needed.       Atrial Fibrillation - chronic paroxysmal, of unspecified and clinically unable to determine etiology.  S/P Pacer Oct 2022.  Normally rate controlled on Amiodarone/BBlocker - continued.  Anticoagulated at baseline on home Eliquis - continued.  Patient w/ hypercoagulable state secondary 
  [facility] Medicine Progress Note  V 1.6      Date of Admission: 4/6/2025    Hospital Day: 2      Chief Admission Complaint:  \"SOB\"    Subjective:  no new c/o    Presenting Admission History:       \"79 y.o. female who presented to [facility] in transfer from [facility] on Lasix gtt for Cardiology evaluation and plan for possible LH/RHCath.  She was apparently admitted on 4 April  with a several day hx of progressive SOB/TITUS and LE edema.       The patient denies any fever/chills or other signs/sxs of systemic illness or identifiable aggravating/alleviating factors\"    Assessment/Plan:      Current Principal Problem:  Clinical systolic heart failure, acute (HCC)       CHF - acute on chronic diastolic failure w/ preserved EF 55% by Echo dated April 2024 w/ Grade 2 diastolic dysfunction.  Likely due to hypertensive and/or ischemic heart disease. Continue diuresis as currently ordered - IV Lasix gtt - w/ close monitoring of BUN/Creatinine and electrolytes for ADR.  Continue current medical management and follow input and output as well as daily weights as recorded and clinical response. Consider GDMT at discharge unless contraindicated.  (ACEi/ARB/ARNI, SGLT2i, Aldactone, BBlocker).  Cardiology consulted and appreciated - s/p RHCath/LHCath 7 April now w/ Lasix gtt continued.        HTN - w/out known CAD and no evidence of active signs and/or symptoms of ischemia and/or failure. Currently controlled on home meds w/ vitals documented and reviewed.       HyperLipidemia - normally controlled on home Statin. Continued.  Follow up w/ PCP outpatient for medication initiation and/or adjustment as needed.       Atrial Fibrillation - chronic paroxysmal, of unspecified and clinically unable to determine etiology.  S/P Pacer Oct 2022.  Normally rate controlled on Amiodarone/BBlocker - continued.  Anticoagulated at baseline on home Eliquis - continued.  Patient w/ hypercoagulable state secondary to Atrial 
[facility]   Daily Progress Note      Admit Date:  4/6/2025    Reason for follow up visit: Abnormal stress test/CAD    CC: \"I feel okay.\"    80 y/o female with PMH notable for diastolic HF, PAF on amiodarone, S/P PPM in 10/2022, HTN, hyponatremia, CKD, GERD, HFpEF, falls and anemia who was admitted from [facility] with SOB. She underwent evaluation and suggested significant pulmonary HTN. Referred to [facility] for consideration of Right and left heart cath. On 4/7/25 she underwent right and left heart cath with resultant KAMILLE x 3 to LAD and mild pulmonary HTN and mild filling pressures. On 4/8/2025 she underwent successful MADISON and DCCV with restoration of SR. Remained on IV lasix and Cr increased to 1.9.    Interval History:  Pt. seen and examined; records reviewed  BP stable. Remains on O2 @ 2L  Net diuresis -6.2 L since admit/ < -1L last 24 hours  Cr up to 1.9 today  Remains on IV lasix  Wt 136# today  Denies chest pain, cough, palpitations or dizziness  + SOB improved    Subjective:  Pt with no acute overnight cardiac events.    Review of Systems:   Constitutional: no unanticipated weight loss. There's been no change in energy level, sleep pattern, or activity level.   No fevers, chills.   Eyes: No visual changes or diplopia. No scleral icterus.  ENT: No Headaches, hearing loss or vertigo. No mouth sores or sore throat.  Cardiovascular: as reviewed in HPI  Respiratory: No cough or wheezing, no sputum production. No hemoptysis.    Gastrointestinal: No abdominal pain, appetite loss, blood in stools. No change in bowel or bladder habits.  Genitourinary: No dysuria, trouble voiding, or hematuria.  Musculoskeletal:  No gait disturbance, no joint complaints.  Integumentary: No rash or pruritis.  Neurological: No headache, diplopia, change in muscle strength, numbness or tingling.   Psychiatric: No anxiety or depression.  Endocrine: No temperature intolerance. No excessive thirst, fluid intake, or urination. No 
[facility]   Daily Progress Note      Admit Date:  4/6/2025    Reason for follow up visit: Abnormal stress test/CAD    CC: \"I felt SOB last night and they put O2 back on. My heart was racing.\"    80 y/o female with PMH notable for diastolic HF, PAF on amiodarone, S/P PPM in 10/2022, HTN, hyponatremia, CKD, GERD, HFpEF, falls and anemia who was admitted from [facility] with SOB. She underwent evaluation and suggested significant pulmonary HTN. Referred to [facility] for consideration of Right and left heart cath. On 4/7/25 she underwent right and left heart cath with resultant KAMILLE x 3 to LAD  and mild pulmonary HTN and mild filling pressures.     Interval History:  Pt. seen and examined; records reviewed  Remains on IV lasix @ 5 mg/hr  Net diuresis -5.4 L since admit/-3.1 L last 24 hours  BP noted; tachycardic and HR in the 120's consistently this AM on telemetry  Wt today 137#  Denies chest pain, cough, dizziness  + SOB improving; + palpitations  Plan for MADISON/poss DCCV today    Subjective:  Pt with no acute overnight cardiac events.     Review of Systems:   Constitutional: no unanticipated weight loss. There's been no change in energy level, sleep pattern, or activity level.   No fevers, chills.   Eyes: No visual changes or diplopia. No scleral icterus.  ENT: No Headaches, hearing loss or vertigo. No mouth sores or sore throat.  Cardiovascular: as reviewed in HPI  Respiratory: No cough or wheezing, no sputum production. No hemoptysis.    Gastrointestinal: No abdominal pain, appetite loss, blood in stools. No change in bowel or bladder habits.  Genitourinary: No dysuria, trouble voiding, or hematuria.  Musculoskeletal:  No gait disturbance, no joint complaints.  Integumentary: No rash or pruritis.  Neurological: No headache, diplopia, change in muscle strength, numbness or tingling.   Psychiatric: No anxiety or depression.  Endocrine: No temperature intolerance. No excessive thirst, fluid intake, or urination. No 
Patient admitted to room 219 from [facility].  Patient oriented to room, call light, bed rails, phone, lights and bathroom.  Patient instructed about the schedule of the day including: vital sign frequency, lab draws, possible tests, frequency of MD and staff rounds, including RN/MD rounding together at bedside, daily weights, and I &O's.  Patient instructed about prescribed diet, how to use 8MENU, and television. bed alarm in place, patient aware of placement and reason. Telemetry box 36 in place, patient aware of placement and reason.  Bed locked, in lowest position, side rails up 2/4, call light within reach.      Patient arrived from [facility]. Report received from Umesh VIDAL from [facility]. Vital signs stable. Dr Rosenthal paged for orders.     Vitals:    04/06/25 1600   BP: 134/79   Pulse: 100   Resp: 16   Temp: 97.5 °F (36.4 °C)   SpO2: 96%      
Patient has been resting in bed today. IV lasix gtt stopped this afternoon. George removed. Straight cath kits given to patient for urinating. VSS. Able to ambulate. Possible discharge tomorrow.  
Patient ok for discharge per MD. Tele and IV removed. CMU notified. Discharge instructions given and reviewed. Pt verbalized understanding. Pt instructed to stop at outpatient pharmacy on the way out to  new prescriptions. Pt transported via wheelchair to family car with all belongings.  
Patient out of room , Wound Care will return shortly.  
Patient reported feeling as if her food from dinner was getting stuck in her throat, able to swallow pudding and liquids with no difficulty.  No nausea or vomiting noted.  States she told Dr Saavedra about dysphagia already. Advised patient to take her PRN Pepcid tonight and offered her popsicles and ice chips to help with her throat   
Pt back from cath, 14g in R brachial, TR band on R radial.  Air out at 1345.  Sites are clean and dry.  Pt with no complaints of pain. Will cont to monitor.    Per cath RN, cont angiomax gtt until 1345  
Received patient back from MADISON this afternoon. Stable and no complaints. Bruising/hematoma still noted to R arm from yesterdays cath procedure but improving. Still on lasix gtt with good urine output and no edema in lower extremities.   
for energy/nutrients as evidenced by wounds, intake 0-25%, intake 26-50%, intake 51-75%, weight loss    Nutrition Interventions:   Food and/or Nutrient Delivery: Continue Current Diet, Start Oral Nutrition Supplement  Nutrition Education/Counseling: No recommendation at this time  Coordination of Nutrition Care: Continue to monitor while inpatient       Goals:  Goals: PO intake 50% or greater, prior to discharge  Type of Goal: New goal  Previous Goal Met: New Goal    Nutrition Monitoring and Evaluation:   Behavioral-Environmental Outcomes: None Identified  Food/Nutrient Intake Outcomes: Food and Nutrient Intake, Supplement Intake  Physical Signs/Symptoms Outcomes: Biochemical Data, Nutrition Focused Physical Findings, Weight, Skin    Discharge Planning:    Continue current diet, Continue Oral Nutrition Supplement     Chloe Niño MS, RD, LD  Contact: [phone]    
polyethylene glycol, acetaminophen **OR** acetaminophen, famotidine      Physical Exam Performed:      General appearance:  No apparent distress  Respiratory:  Normal respiratory effort.   Cardiovascular:  Regular rate and rhythm.  Abdomen:  Soft, non-tender, non-distended.  Musculoskeletal:  No edema  Neurologic:  Non-focal  Psychiatric:  Alert and oriented    /64   Pulse 62   Temp 97.9 °F (36.6 °C) (Oral)   Resp 16   Ht 1.524 m (5')   Wt 62.1 kg (136 lb 14.4 oz)   SpO2 96%   BMI 26.74 kg/m²     Telemetry:      Telemetry (Rhythm Strip) monitoring - Personally reviewed and interpreted telemetry (Rhythm Strip) on 4/9/2025 as ordered with the following findings:      [] NSR w/ controlled rate  [] Sinus Tachycardia w/ rate > 100  [] Sinus Bradycardia w/ rate < 60  [] Rate controlled Afib  [] Afib w/ RVR  [x] Other - paced      Diet: ADULT DIET; Regular  ADULT ORAL NUTRITION SUPPLEMENT; Breakfast, Dinner; Standard High Calorie/High Protein Oral Supplement    DVT Prophylaxis: []PPx LMWH  []SQ Heparin  []IPC/SCDs  [x]Eliquis  []Xarelto  []Coumadin  [] Heparin Drip  []Other -      Code status: Full Code    PT/OT Eval Status:   [x]NOT yet ordered  []Ordered and Pending   []Seen with Recommendations for:   []Home independently  []Home w/ assist  []HHC  []SNF  []Acute Rehab    Multi-Disciplinary Rounds with Case Management completed on 4/9/2025 with the following recommendations:  Anticipated Discharge Location: [x]Home w/ []HHC vs []SNF  []Acute Rehab  []LTAC  []Hospice  []Other -    Anticipated Discharge Day/Date:  Wed/Thurs 9/10 April  Barriers to Discharge:   [x] Clinical course     [x] Subspecialty recs  [x] PT/OT eval/recs  [] Placement decision  [] Precertification   Likely rate limiting factor:       --------------------------------------------------    MDM (any 2 required for High level billing)    A. Problems (any 1)  [x] Acute/Chronic Illness/injury posing ongoing threat to life and/or bodily function 
  [] Critical electrolyte abnormalities requiring IV replacement    [] Insulin - Scheduled/SSI or Insulin gtt    [] Anticoagulation (Heparin gtt or Coumadin - other anticoagulants in special circumstances)    [] Cardiac Medications (IV Amiodarone/Diltiazem, Tikosyn, etc)    [] Hemodialysis    [] Other -    [] Change in code status    [] Decision to escalate care    [] Major surgery/procedure with associated risk factors    --------------------------------------------------  C. Data (any 2)    [x] Data Review (any 3)    [x] Consultant notes from yesterday/today    [x] All available current labs reviewed interpreted for clinical significance    [x] Appropriate follow-up labs were ordered  [] Collateral history obtained     [x] Independent Interpretation of tests (any 1)    [x] Telemetry (Rhythm Strip) personally reviewed and interpreted        [] Imaging personally reviewed and interpreted     [x] Discussion (any 1)  [x] Multi-Disciplinary Rounds with Case Management  [] Discussed management of the case with           Labs:  Personally reviewed on 4/10/2025 and interpreted for clinical significance as documented above.     Recent Labs     04/08/25  0410 04/09/25  0417 04/10/25  0410   WBC 8.5 6.2 6.1   HGB 8.8* 8.5* 8.5*   HCT 28.3* 26.6* 26.6*    273 266     Recent Labs     04/08/25  0410 04/09/25  0417 04/10/25  0410    133* 135*   K 3.5 3.2* 4.0   CL 98* 93* 95*   CO2 30 28 28   BUN 25* 26* 30*   CREATININE 1.4* 1.9* 1.9*   CALCIUM 9.2 8.6 9.1   MG 1.96 2.03 2.16   PHOS 4.5 4.7 4.4     Recent Labs     04/09/25 0417   PROBNP 5,287*       No results for input(s): \"LABA1C\" in the last 72 hours.  Recent Labs     04/08/25 0410   AST 39*   ALT 48*   BILIDIR 0.3   BILITOT 0.5   ALKPHOS 105     No results for input(s): \"INR\", \"LACTA\", \"TSH\" in the last 72 hours.    Urine Cultures:   Lab Results   Component Value Date/Time    LABURIN No growth at 18 to 36 hours 04/04/2025 01:45 AM     Blood Cultures:   Lab 
04/03/2025 07:52 PM    CL 98 04/08/2025 04:10 AM    CO2 30 04/08/2025 04:10 AM     CBC:    Lab Results   Component Value Date/Time    WBC 8.5 04/08/2025 04:10 AM    RBC 3.54 04/08/2025 04:10 AM    HGB 8.8 04/08/2025 04:10 AM    HCT 28.3 04/08/2025 04:10 AM    MCV 80.1 04/08/2025 04:10 AM    RDW 17.7 04/08/2025 04:10 AM     04/08/2025 04:10 AM     BNP:  No results found for: \"BNP\"  Fasting Lipid Panel:    Lab Results   Component Value Date/Time    CHOL 78 04/04/2025 06:50 AM    HDL 34 04/04/2025 06:50 AM    TRIG 62 04/04/2025 06:50 AM     Cardiac Enzymes:  CK/MbTroponin  Lab Results   Component Value Date/Time    TROPONINI 0.01 10/14/2022 03:33 AM     PT/ INR   Lab Results   Component Value Date/Time    INR 1.52 04/03/2025 08:13 PM    INR 1.34 03/04/2025 06:43 PM    INR 1.54 10/14/2022 03:32 AM    PROTIME 18.5 04/03/2025 08:13 PM    PROTIME 16.7 03/04/2025 06:43 PM    PROTIME 18.4 10/14/2022 03:32 AM     PTT No components found for: \"PTT\"   Lab Results   Component Value Date/Time    MG 1.96 04/08/2025 04:10 AM      Lab Results   Component Value Date/Time    TSH <0.01 04/04/2025 06:50 AM       Assessment:  Paroxysmal atrial fibrillation (formerly persistent): stable     -[id number] score 5 (age, gender, CHF, HTN)   -maintained on amiodarone   -s/p DCCV 10/2022   -s/p MADISON/DCCV 4/8/2025  Typical atrial flutter  Severely dilated LA   Sinus node dysfunction (tachy-gopi): ongoing   -s/p dual chamber pacemaker implant 10/2022   -patient with known elevated RA thresholds   Severe TR  CAD status post PCI 4/2025  HTN  Acute on chronic HFpEF: compensated  Pulmonary HTN   Frequent falls       Plan:   1. Continue Eliquis (reduced dose for age and creatinine)  2. Continue amiodarone 200 mg PO five days per week   3. Continue Toprol   4. Follow up as scheduled   5. EP will sign off but remains available if needed.        Chloe Mallory, APRN-CNP  [facility] [place]  [phone]         
Toprol  -BNP remains elevated but improving  -not on ACE/ARB/ARNI d/t elevated Cr  -low sodium diet     4) Pulmonary HTN  -mild on right heart cath  -pulmonary consult completed at [place]: PFT's, sleep apnea as outpatient (Dr. Hull)  -diuretic on hold     5) Normocytic anemia  -Hgb stable (8.5 today)  -continue plavix and Eliquis  -continue iron supplement     6) Moderate mitral regurgitation and mild-moderate tricuspid regurgitation  -continue to monitor as an outpatient     7) S/P PPM     8) Hypertension  -remains controlled and @ goal  -continue medical management     9) Hyperlipidemia  -continue statin  -LDL @ goal     10) OVI  -Cr increased to 1.9 today  -suspect 2/2 contrast induced nephropathy + IV diuretic  -Cr chronically ranges 1.2-1.4 (follows Dr. Mederos)  -diuretic and aldactone remain on hold     11) Hypokalemia  -K+ replacement given  -K+ normalized today    Stable from cardiac standpoint. Have asked nephrology to see given her OVI (will need diuretic at discharge; ? Ok with low dose Lasix +/- spironolactone).    Has follow up with cardiology scheduled in [place] on 5/2/25 with Dr. Plaza    Check BMP in 1 week.    If discharged today recommend following meds:  Amiodarone 200 mg daily  Amlodipine 5 mg daily  Eliquis 2.5 mg BID  Plavix 75 mg daily  Toprol Xl 25 mg daily  Rosuvastatin 10 mg daily  Lasix 20 mg daily  Spironolactone 12.5 mg daily      Electronically signed by DIANE M ENZWEILER, APRN - CNP on 4/10/2025 at 11:13 AM

## 2025-04-10 NOTE — CONSULTS
[email or web address]  Nephrology Consult Note           Reason for Consult: Chronic kidney disease stage III/IV  Requesting Physician:  Dr. Enzweiler    Chief Complaint:  No chief complaint on file.    History of Present Illness on 4//10/25:    79 y.o. yo female with PMH of CHF, pulmonary hypertension, CKD stage IV with baseline creatinine of 1.4-1.8, neurogenic bladder needing ISC who is admitted for chest pain, underwent cardiac catheterization with drug-eluting stent x 3 to LAD on 4/7.  Patient with diuresed with IV Lasix  Her weight on admission was 140 pounds and has decreased to 137 pounds.  Creatinine in early April was in the 1.3-1.5 range, increased to 1.9 on/9.  Patient underwent cardiac catheterization on 4/7, 85 mL was used    Past Medical History:        Diagnosis Date    Acute congestive heart failure, unspecified heart failure type ([facility]) 10/06/2023    Acute diastolic CHF (congestive heart failure) ([facility]) 10/07/2023    Arthritis     Atrial fibrillation ([facility])     Closed subchondral insufficiency fracture of femoral condyle ([facility]) 08/12/2020    Hypertension     Localized osteoarthrosis not specified whether primary or secondary, pelvic region and thigh 05/08/2015    Neurogenic bladder     caused by a back surgery in 2017 per pt       Past Surgical History:        Procedure Laterality Date    BACK SURGERY      CARDIAC PROCEDURE N/A 4/7/2025    Left and right heart cath performed by Shantelle Luna MD at [facility] CARDIAC CATH LAB    CARDIAC PROCEDURE N/A 4/7/2025    Fractional flow reserve (FFR) performed by Shantelle Luna MD at [facility] CARDIAC CATH LAB    CARDIAC PROCEDURE N/A 4/7/2025    Insert stent louise coronary performed by Shantelle Luna MD at [facility] CARDIAC CATH LAB    CARDIAC PROCEDURE N/A 4/7/2025    Intravascular ultrasound performed by Shantelle Luna MD at [facility] CARDIAC CATH LAB    CATARACT REMOVAL WITH IMPLANT  01/14/2011    LEFT EYE    EYE SURGERY  12/14/2010    cataract rt eye    HYSTERECTOMY 
  Cardiac Electrophysiology Consult Note      Physician requesting consult: Vinicius Saavedra MD   Reason for Consult: atrial fibrillation  Admission Date: 4/6/2025  Room/Bed:  [address] [place], [place] [address]    Assessment:     1. Atrial fibrillation: patient now has persistent atrial fibrillation (previously also what appeared to be typical atrial flutter).    Associated symptoms: Dyspnea on exertion and Shortness of breath     History of cardioversion: Had electrical cardioversion in the past (October 2022)  History of AF ablation: No history of atrial fibrillation ablation in the past  History of heart surgery/procedure: yes, dual chamber pacemaker implant (October 2022) and coronary angiography (with coronary stenting) April 2025.     Current use of anti-arrhythmic drugs: amiodarone  Previous use of anti-arrhythmic drugs: amiodarone    Overall LV function: Normal left ventricular systolic function  Size of left atrium: Severely dilated LA size  Significant cardiac valvular disease: Other severe tricuspid regurgitation    Alcohol consumption: No alcohol consumption  Caffeine consumption: No/minimal caffeine intake  Smoking status: non-smoker  Obstructive sleep apnea: No/low suspicion  Exercise status: Walks regularly, but no formal exercise    I have had discussions with the patient and her family about issues related to atrial fibrillation (including etiology, disease progression patterns, stroke risk and rate control issues). Patient had her questions answered to her satisfaction.      Patient has a [id number] score of at least 5 [Age over 75 (2 points), Female gender (1 point), Heart failure (1 point) and Hypertension (1 point)]  Longterm anticoagulation is: recommended  Current anticoagulation: Apixaban  Bleeding issues reported: no  Renal function: Abnormal  serum creatinine 1.3-1.6  Thyroid function: Normal    Patient initially presented with highly symptomatic atrial flutter. Also had episodes of atrial fibrillation. Given 
 / discharge planner  [] Home Health Care  [] Supplies  [] Other    Patient/Caregiver Teaching:  Level of patient/caregiver understanding able to: patient  [] Indicates understanding       [x] Needs reinforcement  [] Unsuccessful      [] Verbal Understanding  [] Demonstrated understanding       [] No evidence of learning  [] Refused teaching         [] N/A       Electronically signed by Raisa Odonnell RN, BSN on 4/8/2025 at 2:39 PM        
will be placed.  Renal dysfunction and anemia make patient high risk.  Case discussed with daughter and patient at bedside.    Medical Decision Making:  The following items were considered in medical decision making:  Independent review of images  Review / order clinical lab tests  Review / order radiology tests  Decision to obtain old records  Review and summation of old records as accessed through [facility] (a summary of my findings in these old records)      Time Based Itemization  A total of 80 minutes was spent on today's patient encounter.  If applicable, non-patient-facing activities:  (X )Preparing to see the patient and reviewing records  (X ) Individual interpretation of results  ( ) Discussion or coordination of care with other health care professionals  ( ) Ordering of unique tests, medications, or procedures  (X ) Documentation within the EHR       All questions and concerns were addressed to the patient/family. Alternatives to my treatment were discussed. The note was completed using EMR. Every effort was made to ensure accuracy; however, inadvertent computerized transcription errors may be present.    Shantelle Luna MD, LITA, [facility], [facility]  [phone] [facility]  [phone] [facility]  4/7/2025  8:13 AM

## 2025-04-10 NOTE — DISCHARGE INSTR - DIET

## 2025-04-10 NOTE — PLAN OF CARE
Problem: Chronic Conditions and Co-morbidities  Goal: Patient's chronic conditions and co-morbidity symptoms are monitored and maintained or improved  4/10/2025 1451 by Bonnie Baez RN  Outcome: Progressing     Problem: Discharge Planning  Goal: Discharge to home or other facility with appropriate resources  4/10/2025 1451 by Bonnie Baez, RN  Outcome: Progressing     Problem: Safety - Adult  Goal: Free from fall injury  4/10/2025 1451 by Bonnie Baez, RN  Outcome: Progressing     
  Problem: Chronic Conditions and Co-morbidities  Goal: Patient's chronic conditions and co-morbidity symptoms are monitored and maintained or improved  4/7/2025 1002 by Jeffery Navarro, RN  Outcome: Progressing     Problem: Discharge Planning  Goal: Discharge to home or other facility with appropriate resources  4/7/2025 1002 by Jeffery Navarro, RN  Outcome: Progressing     Problem: Safety - Adult  Goal: Free from fall injury  4/7/2025 1002 by Jeffery Navarro, RN  Outcome: Progressing     Problem: ABCDS Injury Assessment  Goal: Absence of physical injury  4/7/2025 1002 by Jeffery Navarro, RN  Outcome: Progressing     
  Problem: Chronic Conditions and Co-morbidities  Goal: Patient's chronic conditions and co-morbidity symptoms are monitored and maintained or improved  4/9/2025 0315 by Fide Mayorga RN  Outcome: Progressing  Flowsheets (Taken 4/9/2025 0315)  Care Plan - Patient's Chronic Conditions and Co-Morbidity Symptoms are Monitored and Maintained or Improved:   Collaborate with multidisciplinary team to address chronic and comorbid conditions and prevent exacerbation or deterioration   Monitor and assess patient's chronic conditions and comorbid symptoms for stability, deterioration, or improvement   Update acute care plan with appropriate goals if chronic or comorbid symptoms are exacerbated and prevent overall improvement and discharge  4/8/2025 1917 by Roxann Blackwell RN  Outcome: Progressing     Problem: Discharge Planning  Goal: Discharge to home or other facility with appropriate resources  4/9/2025 0315 by iFde Mayorga RN  Outcome: Progressing  4/8/2025 1917 by Roxann Blackwell RN  Outcome: Progressing     Problem: Safety - Adult  Goal: Free from fall injury  4/9/2025 0315 by Fide Mayorga RN  Outcome: Progressing  4/8/2025 1917 by Roxann Blackwell RN  Outcome: Progressing     Problem: ABCDS Injury Assessment  Goal: Absence of physical injury  4/9/2025 0315 by Fide Mayorga RN  Outcome: Progressing  4/8/2025 1917 by Roxann Blackwell RN  Outcome: Progressing     Problem: Pain  Goal: Verbalizes/displays adequate comfort level or baseline comfort level  4/9/2025 0315 by Fide Mayorga RN  Outcome: Progressing  4/8/2025 1917 by Roxann Blackwell RN  Outcome: Progressing     Problem: Skin/Tissue Integrity  Goal: Skin integrity remains intact  Description: 1.  Monitor for areas of redness and/or skin breakdown  2.  Assess vascular access sites hourly  3.  Every 4-6 hours minimum:  Change oxygen saturation probe site  4.  Every 4-6 hours:  If on nasal continuous positive airway pressure, respiratory therapy assess 
  Problem: Chronic Conditions and Co-morbidities  Goal: Patient's chronic conditions and co-morbidity symptoms are monitored and maintained or improved  Outcome: Progressing     Problem: Discharge Planning  Goal: Discharge to home or other facility with appropriate resources  Outcome: Progressing     Problem: Safety - Adult  Goal: Free from fall injury  Outcome: Progressing     Problem: ABCDS Injury Assessment  Goal: Absence of physical injury  Outcome: Progressing     Problem: Pain  Goal: Verbalizes/displays adequate comfort level or baseline comfort level  Outcome: Progressing     Problem: Skin/Tissue Integrity  Goal: Skin integrity remains intact  Description: 1.  Monitor for areas of redness and/or skin breakdown  2.  Assess vascular access sites hourly  3.  Every 4-6 hours minimum:  Change oxygen saturation probe site  4.  Every 4-6 hours:  If on nasal continuous positive airway pressure, respiratory therapy assess nares and determine need for appliance change or resting period  Outcome: Progressing     Problem: Nutrition Deficit:  Goal: Optimize nutritional status  Outcome: Progressing     
  Problem: Chronic Conditions and Co-morbidities  Goal: Patient's chronic conditions and co-morbidity symptoms are monitored and maintained or improved  Outcome: Progressing     Problem: Discharge Planning  Goal: Discharge to home or other facility with appropriate resources  Outcome: Progressing     Problem: Safety - Adult  Goal: Free from fall injury  Outcome: Progressing     Problem: ABCDS Injury Assessment  Goal: Absence of physical injury  Outcome: Progressing     Problem: Pain  Goal: Verbalizes/displays adequate comfort level or baseline comfort level  Outcome: Progressing     Problem: Skin/Tissue Integrity  Goal: Skin integrity remains intact  Description: 1.  Monitor for areas of redness and/or skin breakdown  2.  Assess vascular access sites hourly  3.  Every 4-6 hours minimum:  Change oxygen saturation probe site  4.  Every 4-6 hours:  If on nasal continuous positive airway pressure, respiratory therapy assess nares and determine need for appliance change or resting period  Outcome: Progressing  Flowsheets (Taken 4/9/2025 1723)  Skin Integrity Remains Intact:   Monitor for areas of redness and/or skin breakdown   Every 4-6 hours:  If on nasal continuous positive airway pressure, assess nares and determine need for appliance change or resting period   Assess vascular access sites hourly   Every 4-6 hours minimum:  Change oxygen saturation probe site   Pressure redistribution bed/mattress (bed type)     Problem: Nutrition Deficit:  Goal: Optimize nutritional status  Outcome: Progressing     
  Intake 600.94 ml   Output 650 ml   Net -49.06 ml       Education Booklet Provided: yes    Comorbidities Reviewed Yes    Patient has a past medical history of Acute congestive heart failure, unspecified heart failure type (HCC), Acute diastolic CHF (congestive heart failure) (HCC), Arthritis, Atrial fibrillation (HCC), Closed subchondral insufficiency fracture of femoral condyle (HCC), Hypertension, Localized osteoarthrosis not specified whether primary or secondary, pelvic region and thigh, and Neurogenic bladder.     >>For CHF and Comorbidity documentation on Education Time and Topics, please see Education Tab      CHF Education    Learners:  Patient  Readineess:   Acceptance  Method:   Explanation and Demonstration  Response:   Verbalizes Understanding  Comments:     Time Spent: 5      Pt resting in bed at this time on  1 L O2. Pt with complaints of shortness of breath. Pt without lower extremity edema.     Patient and/or Family's stated Goal of Care this Admission: reduce shortness of breath, increase activity tolerance, better understand heart failure and disease management, be more comfortable, and reduce lower extremity edema prior to discharge        :   
Acceptance  Method:   Explanation  Response:   Verbalizes Understanding  Comments: Pt educated on what s/s of CHF would warrant contacting her PCP/Cardiologist upon d/c from the hospital.  Pt also educated on the need to monitor daily weights to determine fluid status.    Time Spent: 5 min      Pt resting in bed at this time on room air. Pt denies shortness of breath. Pt with nonpitting lower extremity edema.     Patient and/or Family's stated Goal of Care this Admission: reduce shortness of breath, increase activity tolerance, better understand heart failure and disease management, be more comfortable, and reduce lower extremity edema prior to discharge        :       Problem: Discharge Planning  Goal: Discharge to home or other facility with appropriate resources  4/7/2025 0313 by Chika Richard, RN  Outcome: Progressing  Flowsheets (Taken 4/7/2025 0313)  Discharge to home or other facility with appropriate resources:   Identify barriers to discharge with patient and caregiver   Arrange for needed discharge resources and transportation as appropriate   Identify discharge learning needs (meds, wound care, etc)   Refer to discharge planning if patient needs post-hospital services based on physician order or complex needs related to functional status, cognitive ability or social support system     Problem: Safety - Adult  Goal: Free from fall injury  4/7/2025 0313 by Chika Richard, RN  Outcome: Progressing     Problem: ABCDS Injury Assessment  Goal: Absence of physical injury  4/7/2025 0313 by Chika Richard, RN  Outcome: Progressing     
Pt with nonpitting lower extremity edema.     Patient and/or Family's stated Goal of Care this Admission: reduce shortness of breath, increase activity tolerance, better understand heart failure and disease management, be more comfortable, and reduce lower extremity edema prior to discharge        :   
and Comorbidity documentation on Education Time and Topics, please see Education Tab      CHF Education    Learners:  Patient  Readineess:   Eager  Method:   Explanation  Response:   Verbalizes Understanding  Comments:     Time Spent: 10      Pt resting in bed at this time on room air. Pt denies shortness of breath. Pt with nonpitting lower extremity edema.     Patient and/or Family's stated Goal of Care this Admission: reduce shortness of breath, increase activity tolerance, better understand heart failure and disease management, be more comfortable, and reduce lower extremity edema prior to discharge        :   
yes    Comorbidities Reviewed Yes    Patient has a past medical history of Acute congestive heart failure, unspecified heart failure type (HCC), Acute diastolic CHF (congestive heart failure) (HCC), Arthritis, Atrial fibrillation (HCC), Closed subchondral insufficiency fracture of femoral condyle (HCC), Hypertension, Localized osteoarthrosis not specified whether primary or secondary, pelvic region and thigh, and Neurogenic bladder.     >>For CHF and Comorbidity documentation on Education Time and Topics, please see Education Tab      CHF Education    Learners:  Patient  Readineess:   Acceptance  Method:   Explanation  Response:   Verbalizes Understanding  Comments: pt educated on lasix    Time Spent: 5 min      Pt resting in bed at this time on  2 L O2. Pt with complaints of shortness of breath. Pt with nonpitting lower extremity edema.     Patient and/or Family's stated Goal of Care this Admission: reduce shortness of breath, increase activity tolerance, better understand heart failure and disease management, be more comfortable, and reduce lower extremity edema prior to discharge        :

## 2025-04-10 NOTE — CARE COORDINATION
CASE MANAGEMENT DISCHARGE SUMMARY      Discharge to: Home with family ,    Precertification completed: N/A  Hospital Exemption Notification (HENS) completed: N/A    IMM given: (date) 4/9/25    New Durable Medical Equipment ordered/agency: None ordered    Transportation:    Family/car: private car       Confirmed discharge plan with: Patient going home with no Home Care needs. All follow-up appointments are on the AVS patient will be given on discharge.      Patient: yes     Family:  yes Family in room during discharge teaching:           RN, name: Bonnie    Note: Discharging nurse to complete SHIRAZ, reconcile AVS, and place final copy with patient's discharge packet. RN to ensure that written prescriptions for  Level II medications are sent with patient to the facility as per protocol.  .Pao Montana RN

## 2025-04-10 NOTE — DISCHARGE SUMMARY
Hospital Medicine Discharge Summary    Patient: Rima Wyatt   : 1945     Hospital:  [facility]  Admit Date: 2025   Discharge Date: 4/10/2025    Disposition:  [x]Home   []HHC  []SNF  []Acute Rehab  []LTAC  []Hospice  Code status:  [x]Full  []DNR/CCA  []Limited (DNR/CCA with Do Not Intubate)  []DNRCC  Condition at Discharge: Stable  Primary Care Provider: Christopher Gutierrez, APRN - CNP    Admitting Provider: Vinicius Saavedra MD  Discharge Provider: Vinicius Saavedra MD     Discharge Diagnoses:      Active Hospital Problems    Diagnosis     Persistent atrial fibrillation (HCC) [I48.19]      Priority: Medium    OVI (acute kidney injury) [N17.9]     S/P drug eluting coronary stent placement [Z95.5]     Pulmonary HTN (HCC) [I27.20]     CAD in native artery [I25.10]     Acute on chronic heart failure with preserved ejection fraction (HFpEF) ([facility]) [I50.33]     Encounter for monitoring amiodarone therapy [Z51.81, Z79.899]     Clinical systolic heart failure, acute (HCC) [I50.21]     Chest pain [R07.9]     Paroxysmal atrial fibrillation (HCC) [I48.0]        Presenting Admission History:        \"79 y.o. female who presented to [facility] in transfer from [facility] on Lasix gtt for Cardiology evaluation and plan for possible LH/RHCath.  She was apparently admitted on   with a several day hx of progressive SOB/TITUS and LE edema.       The patient denies any fever/chills or other signs/sxs of systemic illness or identifiable aggravating/alleviating factors\"        Assessment/Plan:        CHF - acute on chronic diastolic failure w/ preserved EF 55% by Echo dated 2024 w/ Grade 2 diastolic dysfunction.  Likely due to hypertensive and/or ischemic heart disease. Continue diuresis as currently ordered - now off IV Lasix gtt w/ increased creatinine - w/ close monitoring of BUN/Creatinine and electrolytes for ADR.  Continue current medical management and follow input and

## 2025-04-11 ENCOUNTER — CARE COORDINATION (OUTPATIENT)
Dept: CASE MANAGEMENT | Age: 80
End: 2025-04-11

## 2025-04-11 DIAGNOSIS — I50.31 ACUTE HEART FAILURE WITH PRESERVED EJECTION FRACTION (HFPEF) (HCC): Primary | ICD-10-CM

## 2025-04-11 PROCEDURE — 1111F DSCHRG MED/CURRENT MED MERGE: CPT

## 2025-04-11 NOTE — CARE COORDINATION
24 Hour Call    Schedule Follow Up Appointment with PCP: Completed  Do you have a copy of your discharge instructions?: Yes  Do you have all of your prescriptions and are they filled?: Yes  Have you been contacted by a [facility] Pharmacist?: No  Have you scheduled your follow up appointment?: Yes  Do you feel like you have everything you need to keep you well at home?: Yes  Care Transitions Interventions     Other Services: Declined (Comment: RPM)           Follow Up Appointment:   Discussed follow up appointments. Patient has hospital follow up appointment scheduled within 7 days of discharge.     Future Appointments         Provider Specialty Dept Phone    4/16/2025 2:20 PM Minerva Mathews APRN - CNP Family Medicine [phone]    4/17/2025 3:00 PM (Arrive by 2:30 PM) [facility] MAIN Radiology [phone]    4/18/2025 1:30 PM Enrique Arndt DO Orthopedic Surgery [phone]    5/7/2025 11:15 AM Enrique Plaza DO Cardiology [phone]    6/11/2025 2:15 PM RAMIREZ MARTINI DEVICE CHECK Cardiology [phone]    6/11/2025 2:15 PM Ana Maria Coyne MD Cardiology [phone]    8/11/2025 11:30 AM Howard Hull MD Pulmonology [phone]    8/25/2025 1:15 PM Enrique Plaza DO Cardiology [phone]    9/17/2025 1:00 PM Christopher Gutierrez, APRN [facility] Family [facility] [phone]          Care Transition Nurse provided contact information.  Plan for follow-up call in 6-10 days based on severity of symptoms and risk factors.  Plan for next call: follow-up appointment-4/16 PCP; 4/17 CT; 4/18 tiffanie Avila RN

## 2025-04-16 ENCOUNTER — OFFICE VISIT (OUTPATIENT)
Dept: FAMILY MEDICINE CLINIC | Age: 80
End: 2025-04-16
Payer: MEDICARE

## 2025-04-16 VITALS
OXYGEN SATURATION: 98 % | HEART RATE: 62 BPM | SYSTOLIC BLOOD PRESSURE: 164 MMHG | BODY MASS INDEX: 27.15 KG/M2 | WEIGHT: 139 LBS | DIASTOLIC BLOOD PRESSURE: 72 MMHG

## 2025-04-16 DIAGNOSIS — L89.153 PRESSURE INJURY OF SACRAL REGION, STAGE 3 (HCC): ICD-10-CM

## 2025-04-16 DIAGNOSIS — I50.9 ACUTE CONGESTIVE HEART FAILURE, UNSPECIFIED HEART FAILURE TYPE (HCC): ICD-10-CM

## 2025-04-16 DIAGNOSIS — N18.4 CKD (CHRONIC KIDNEY DISEASE) STAGE 4, GFR 15-29 ML/MIN (HCC): ICD-10-CM

## 2025-04-16 DIAGNOSIS — Z09 HOSPITAL DISCHARGE FOLLOW-UP: Primary | ICD-10-CM

## 2025-04-16 DIAGNOSIS — I10 HYPERTENSION, UNSPECIFIED TYPE: ICD-10-CM

## 2025-04-16 DIAGNOSIS — S32.592A CLOSED FRACTURE OF MULTIPLE PUBIC RAMI, LEFT, INITIAL ENCOUNTER (HCC): ICD-10-CM

## 2025-04-16 PROCEDURE — 3077F SYST BP >= 140 MM HG: CPT | Performed by: NURSE PRACTITIONER

## 2025-04-16 PROCEDURE — 1036F TOBACCO NON-USER: CPT | Performed by: NURSE PRACTITIONER

## 2025-04-16 PROCEDURE — G8427 DOCREV CUR MEDS BY ELIG CLIN: HCPCS | Performed by: NURSE PRACTITIONER

## 2025-04-16 PROCEDURE — 1090F PRES/ABSN URINE INCON ASSESS: CPT | Performed by: NURSE PRACTITIONER

## 2025-04-16 PROCEDURE — 1159F MED LIST DOCD IN RCRD: CPT | Performed by: NURSE PRACTITIONER

## 2025-04-16 PROCEDURE — 99214 OFFICE O/P EST MOD 30 MIN: CPT | Performed by: NURSE PRACTITIONER

## 2025-04-16 PROCEDURE — 1111F DSCHRG MED/CURRENT MED MERGE: CPT | Performed by: NURSE PRACTITIONER

## 2025-04-16 PROCEDURE — G8417 CALC BMI ABV UP PARAM F/U: HCPCS | Performed by: NURSE PRACTITIONER

## 2025-04-16 PROCEDURE — 3078F DIAST BP <80 MM HG: CPT | Performed by: NURSE PRACTITIONER

## 2025-04-16 PROCEDURE — 1123F ACP DISCUSS/DSCN MKR DOCD: CPT | Performed by: NURSE PRACTITIONER

## 2025-04-16 PROCEDURE — G8400 PT W/DXA NO RESULTS DOC: HCPCS | Performed by: NURSE PRACTITIONER

## 2025-04-16 RX ORDER — AMLODIPINE BESYLATE 10 MG/1
10 TABLET ORAL DAILY
Qty: 90 TABLET | Refills: 3 | Status: SHIPPED | OUTPATIENT
Start: 2025-04-16

## 2025-04-16 NOTE — PROGRESS NOTES
Post-Discharge Transitional Care  Follow Up      Rima Wyatt   YOB: 1945    Date of Office Visit:  4/16/2025  Date of Hospital Admission: 4/6/25  Date of Hospital Discharge: 4/10/25  Risk of hospital readmission (high >=14%. Medium >=10%) :Readmission Risk Score: 25.6      Care management risk score Rising risk (score 2-5) and Complex Care (Scores >=6): No Risk Score On File     Non face to face  following discharge, date last encounter closed (first attempt may have been earlier): 04/11/2025    Call initiated 2 business days of discharge: Yes    ASSESSMENT/PLAN:   Hospital discharge follow-up  Discharge summary reviewed.  Medication list updated.  Pt seen today with daughter and seems to be doing well since discharge.  Continues to have intermittent shortness of breath even after Right/ Left coronary angiogram with KAMILLE x 3 to LAD.  CT Chest scheduled tomorrow.  After reviewing results will discuss care with primary Cardiologist Dr. Plaza.  Continue Plavix, BB, statin.  Blood pressure not well controlled today.  Will increase Amlodipine due to CKD Stage 4.  Continue care with EP care for discussion regarding Watchman device due to recurrent falls.  Continue Eliquis and Amiodarone today.  Eliquis has been renally dosed.  Will order TSH and BMP for medication monitoring today and updated on OVI during hospitalization.  ARNI, SGLT2 contraindicated for CHF due to CKD Stage 4.  Encouraged to keep updated OV with Nephrology for now.  Encouraged to keep appt with Pulmonary ([facility]) for possible sleep study.  Noted Sacral ulcer stage 3 during hospitalization.  Encouraged frequent position changes.  Continue Triad as recommended by Wound Care for treatment.  Encouraged to consider use of Mepilex border for treatment.  Noted prior history of acute nondisplaced fracture of left superior pubic ramus near pubic body, nondisplaced fracture of left sacral ala through anterior cortex, nondisplaced fracture of

## 2025-04-17 ENCOUNTER — HOSPITAL ENCOUNTER (OUTPATIENT)
Dept: CT IMAGING | Age: 80
Discharge: HOME | End: 2025-04-17
Payer: MEDICARE

## 2025-04-17 ENCOUNTER — CARE COORDINATION (OUTPATIENT)
Dept: CASE MANAGEMENT | Age: 80
End: 2025-04-17

## 2025-04-17 ENCOUNTER — HOSPITAL ENCOUNTER (OUTPATIENT)
Dept: GENERAL RADIOLOGY | Age: 80
Discharge: HOME | End: 2025-04-17
Payer: MEDICARE

## 2025-04-17 ENCOUNTER — RESULTS FOLLOW-UP (OUTPATIENT)
Dept: FAMILY MEDICINE CLINIC | Age: 80
End: 2025-04-17

## 2025-04-17 DIAGNOSIS — S32.592A CLOSED FRACTURE OF MULTIPLE PUBIC RAMI, LEFT, INITIAL ENCOUNTER (HCC): ICD-10-CM

## 2025-04-17 DIAGNOSIS — R91.1 INCIDENTAL PULMONARY NODULE: ICD-10-CM

## 2025-04-17 DIAGNOSIS — Z87.01 HISTORY OF PNEUMONIA: ICD-10-CM

## 2025-04-17 LAB
ANION GAP SERPL CALCULATED.3IONS-SCNC: 15 MMOL/L (ref 3–16)
BUN SERPL-MCNC: 16 MG/DL (ref 7–20)
CALCIUM SERPL-MCNC: 9.5 MG/DL (ref 8.3–10.6)
CHLORIDE SERPL-SCNC: 105 MMOL/L (ref 99–110)
CO2 SERPL-SCNC: 20 MMOL/L (ref 21–32)
CREAT SERPL-MCNC: 1.3 MG/DL (ref 0.6–1.2)
GFR SERPLBLD CREATININE-BSD FMLA CKD-EPI: 41 ML/MIN/{1.73_M2}
GLUCOSE SERPL-MCNC: 88 MG/DL (ref 70–99)
POTASSIUM SERPL-SCNC: 4.3 MMOL/L (ref 3.5–5.1)
SODIUM SERPL-SCNC: 140 MMOL/L (ref 136–145)
T4 FREE SERPL-MCNC: 2.7 NG/DL (ref 0.9–1.8)
TSH SERPL DL<=0.005 MIU/L-ACNC: 0.02 UIU/ML (ref 0.27–4.2)

## 2025-04-17 PROCEDURE — 73502 X-RAY EXAM HIP UNI 2-3 VIEWS: CPT

## 2025-04-17 PROCEDURE — 71250 CT THORAX DX C-: CPT

## 2025-04-17 NOTE — RESULT ENCOUNTER NOTE
Kidney function has improved since hospitalization.      Thyroid level is abnormal.  It has improved since 2 weeks ago.  I think more than likely this is an inflammatory reaction.  WE should recheck this in 1 month

## 2025-04-17 NOTE — CARE COORDINATION
Care Transitions Note    Follow Up Call     Attempted to reach Becca keith  for transitions of care follow up.  Unable to reach kiera keith .      Outreach Attempts:   Spoke with daughter Becca and patient is currently having a procedure done (CT scan)     Follow Up Appointment:   Future Appointments         Provider Specialty Dept Phone    4/17/2025  2:55 PM (Arrive by 2:25 PM) [facility] [place] 2 Radiology [phone]    4/17/2025  3:00 PM (Arrive by 2:30 PM) [facility] [place] [facility] Radiology [phone]    4/18/2025 1:30 PM Enrique Arndt DO Orthopedic Surgery [phone]    5/7/2025 11:15 AM Enrique Plaza DO Cardiology [phone]    6/11/2025 2:15 PM RAMIREZ MARTINI DEVICE CHECK Cardiology [phone]    6/11/2025 2:15 PM Ana Maria Coyne MD Cardiology [phone]    8/11/2025 11:30 AM Howard Hull MD Pulmonology [phone]    8/25/2025 1:15 PM Enrique Plaza DO Cardiology [phone]    9/17/2025 1:00 PM Christopher Gutierrez, APRN - [facility] [phone]            Plan for follow-up on next business day.  based on severity of symptoms and risk factors. Plan for next call: symptom management-.  self management-.    Keisha Jacobo LPN

## 2025-04-18 ENCOUNTER — RESULTS FOLLOW-UP (OUTPATIENT)
Dept: FAMILY MEDICINE CLINIC | Age: 80
End: 2025-04-18

## 2025-04-18 ENCOUNTER — OFFICE VISIT (OUTPATIENT)
Dept: ORTHOPEDIC SURGERY | Age: 80
End: 2025-04-18
Payer: MEDICARE

## 2025-04-18 ENCOUNTER — CARE COORDINATION (OUTPATIENT)
Dept: CASE MANAGEMENT | Age: 80
End: 2025-04-18

## 2025-04-18 VITALS — BODY MASS INDEX: 27.29 KG/M2 | WEIGHT: 139 LBS | HEIGHT: 60 IN

## 2025-04-18 DIAGNOSIS — M25.512 LEFT SHOULDER PAIN, UNSPECIFIED CHRONICITY: Primary | ICD-10-CM

## 2025-04-18 DIAGNOSIS — M12.812 ROTATOR CUFF ARTHROPATHY OF LEFT SHOULDER: ICD-10-CM

## 2025-04-18 PROCEDURE — 1125F AMNT PAIN NOTED PAIN PRSNT: CPT | Performed by: STUDENT IN AN ORGANIZED HEALTH CARE EDUCATION/TRAINING PROGRAM

## 2025-04-18 PROCEDURE — G8417 CALC BMI ABV UP PARAM F/U: HCPCS | Performed by: STUDENT IN AN ORGANIZED HEALTH CARE EDUCATION/TRAINING PROGRAM

## 2025-04-18 PROCEDURE — 99203 OFFICE O/P NEW LOW 30 MIN: CPT | Performed by: STUDENT IN AN ORGANIZED HEALTH CARE EDUCATION/TRAINING PROGRAM

## 2025-04-18 PROCEDURE — G8400 PT W/DXA NO RESULTS DOC: HCPCS | Performed by: STUDENT IN AN ORGANIZED HEALTH CARE EDUCATION/TRAINING PROGRAM

## 2025-04-18 PROCEDURE — 1090F PRES/ABSN URINE INCON ASSESS: CPT | Performed by: STUDENT IN AN ORGANIZED HEALTH CARE EDUCATION/TRAINING PROGRAM

## 2025-04-18 PROCEDURE — G8427 DOCREV CUR MEDS BY ELIG CLIN: HCPCS | Performed by: STUDENT IN AN ORGANIZED HEALTH CARE EDUCATION/TRAINING PROGRAM

## 2025-04-18 PROCEDURE — 1036F TOBACCO NON-USER: CPT | Performed by: STUDENT IN AN ORGANIZED HEALTH CARE EDUCATION/TRAINING PROGRAM

## 2025-04-18 PROCEDURE — 1123F ACP DISCUSS/DSCN MKR DOCD: CPT | Performed by: STUDENT IN AN ORGANIZED HEALTH CARE EDUCATION/TRAINING PROGRAM

## 2025-04-18 PROCEDURE — 1111F DSCHRG MED/CURRENT MED MERGE: CPT | Performed by: STUDENT IN AN ORGANIZED HEALTH CARE EDUCATION/TRAINING PROGRAM

## 2025-04-18 PROCEDURE — 1159F MED LIST DOCD IN RCRD: CPT | Performed by: STUDENT IN AN ORGANIZED HEALTH CARE EDUCATION/TRAINING PROGRAM

## 2025-04-18 NOTE — PROGRESS NOTES
to the patient and questions were answered.    Assessment & Plan  1. Rotator cuff arthropathy:  X-rays reveal significant arthritis in the left shoulder due to the absence of rotator cuff tendons. Physical exam demonstrates subacromial crepitation on passive range of motion, almost no active range of motion, passive forward flexion to 100 degrees, external rotation to about 45 degrees, and no rotator cuff strength in any plane. Minimal tenderness to palpation.    Physical therapy is advised to improve pain, motion, and function. An order for physical therapy will be provided today. If the shoulder becomes more painful, a corticosteroid injection may be considered. Surgical intervention, such as shoulder replacement, is not recommended at this time due to recent heart stents and overall health.    Follow-up  Follow up in 2 to 3 months to assess progress.    . Rima Wyatt is in agreement with this plan. All questions were answered to patient's satisfaction and was encouraged to call with any further questions.         Enrique Arndt, DO  Orthopedic Surgery and Sports Medicine  4/18/2025    Orders Placed This Encounter   Procedures    XR SHOULDER LEFT (MIN 2 VIEWS)     Standing Status:   Future     Number of Occurrences:   1     Expected Date:   4/18/2025     Expiration Date:   4/17/2026    [facility] - [place] (Ortho & Sports)-OSR     Referral Priority:   Routine     Referral Type:   Eval and Treat     Referral Reason:   Specialty Services Required     Requested Specialty:   Physical Therapy     Number of Visits Requested:   1         The patient (or guardian, if applicable) and other individuals in attendance with the patient were advised that Artificial Intelligence will be utilized during this visit to record, process the conversation to generate a clinical note, and support improvement of the AI technology. The patient (or guardian, if applicable) and other individuals in attendance at the

## 2025-04-18 NOTE — RESULT ENCOUNTER NOTE
CT Chest overall looks like pneumonia is resolving.  The previously seen pulmonary nodule has now resolved.  I have sent your scan to Dr. Hull (lung doctor) for review.  I am hoping with time your shortness of breath will resolve with resolve.

## 2025-04-21 ENCOUNTER — CARE COORDINATION (OUTPATIENT)
Dept: CASE MANAGEMENT | Age: 80
End: 2025-04-21

## 2025-04-21 NOTE — CARE COORDINATION
Care Transitions Note    Follow Up Call     Patient: Rima Wyatt                 Patient : 1945   MRN: [id number]                             Reason for Admission: [facility] x 4 days readmission (txr from [facility] [place] 3 days) -> aeCHF w/ preserved EF 55%, HTN, CAD w/o angina, pulmonary hypertension-mild, HLD, A Fib on Eliquis, CKD3, anemia, s/p LHC w/ stent 25 -> home no needs, f/up Dr Plaza, 50 ounce fluid restriction, intermittent self cath, avoid nephrotoxins-f/up Dr Mederos   Discharge Date: 4/10/25       RURS: Readmission Risk Score: 25.6    Attempted to reach patient for transitions of care follow up.  Unable to reach patient.      Outreach Attempts:   HIPAA compliant voicemail left for patient.     Care Summary Note: 2nd unsuccessful CTN attempt. Care transition program will be closed on 25 if no return call back from Rima Wyatt.     Follow Up Appointment:   Future Appointments         Provider Specialty Dept Phone    2025 3:30 PM Howard Hull MD Pulmonology [phone]    2025 11:15 AM Enrique Plaza DO Cardiology [phone]    2025 2:15 PM RAMIREZ MARTINI DEVICE CHECK Cardiology [phone]    2025 2:15 PM Ana Maria Coyne MD Cardiology [phone]    2025 11:30 AM Howard Hull MD Pulmonology [phone]    2025 1:15 PM Enrique Plaza DO Cardiology [phone]    2025 1:00 PM Christopher Gutierrez, APRN - CNP [facility] Medicine [phone]          Susan Avila RN

## 2025-04-22 ENCOUNTER — RESULTS FOLLOW-UP (OUTPATIENT)
Dept: FAMILY MEDICINE CLINIC | Age: 80
End: 2025-04-22

## 2025-04-22 NOTE — RESULT ENCOUNTER NOTE
Hip fractures are healing.  What we were hoping to see.  Consider recheck in 3 months to ensure healing has completed

## 2025-04-24 ENCOUNTER — OFFICE VISIT (OUTPATIENT)
Dept: PULMONOLOGY | Age: 80
End: 2025-04-24
Payer: MEDICARE

## 2025-04-24 ENCOUNTER — HOSPITAL ENCOUNTER (OUTPATIENT)
Age: 80
Discharge: HOME | End: 2025-04-24
Payer: MEDICARE

## 2025-04-24 VITALS
OXYGEN SATURATION: 97 % | HEART RATE: 67 BPM | BODY MASS INDEX: 26.31 KG/M2 | SYSTOLIC BLOOD PRESSURE: 134 MMHG | HEIGHT: 60 IN | RESPIRATION RATE: 16 BRPM | DIASTOLIC BLOOD PRESSURE: 72 MMHG | WEIGHT: 134 LBS

## 2025-04-24 DIAGNOSIS — R93.89 ABNORMAL CT OF THE CHEST: Primary | ICD-10-CM

## 2025-04-24 DIAGNOSIS — R06.09 DYSPNEA ON EXERTION: ICD-10-CM

## 2025-04-24 DIAGNOSIS — J90 PLEURAL EFFUSION: ICD-10-CM

## 2025-04-24 PROCEDURE — 80069 RENAL FUNCTION PANEL: CPT

## 2025-04-24 PROCEDURE — 1090F PRES/ABSN URINE INCON ASSESS: CPT | Performed by: INTERNAL MEDICINE

## 2025-04-24 PROCEDURE — 3078F DIAST BP <80 MM HG: CPT | Performed by: INTERNAL MEDICINE

## 2025-04-24 PROCEDURE — 99213 OFFICE O/P EST LOW 20 MIN: CPT | Performed by: INTERNAL MEDICINE

## 2025-04-24 PROCEDURE — 1123F ACP DISCUSS/DSCN MKR DOCD: CPT | Performed by: INTERNAL MEDICINE

## 2025-04-24 PROCEDURE — 85027 COMPLETE CBC AUTOMATED: CPT

## 2025-04-24 PROCEDURE — G8400 PT W/DXA NO RESULTS DOC: HCPCS | Performed by: INTERNAL MEDICINE

## 2025-04-24 PROCEDURE — 3075F SYST BP GE 130 - 139MM HG: CPT | Performed by: INTERNAL MEDICINE

## 2025-04-24 PROCEDURE — 1159F MED LIST DOCD IN RCRD: CPT | Performed by: INTERNAL MEDICINE

## 2025-04-24 PROCEDURE — G8417 CALC BMI ABV UP PARAM F/U: HCPCS | Performed by: INTERNAL MEDICINE

## 2025-04-24 PROCEDURE — G8427 DOCREV CUR MEDS BY ELIG CLIN: HCPCS | Performed by: INTERNAL MEDICINE

## 2025-04-24 PROCEDURE — 1036F TOBACCO NON-USER: CPT | Performed by: INTERNAL MEDICINE

## 2025-04-24 PROCEDURE — 1111F DSCHRG MED/CURRENT MED MERGE: CPT | Performed by: INTERNAL MEDICINE

## 2025-04-24 NOTE — PROGRESS NOTES
[facility]                                                                    CHIEF COMPLAINT: Follow up hospitalization         HPI:   Had LHC and 3 stents, feels better  No cough or sputum  No hemoptysis   No IBD  No O2.   Prior records reviewed by me and summarized.    Past Medical History:   Diagnosis Date    Acute congestive heart failure, unspecified heart failure type ([facility]) 10/06/2023    Acute diastolic CHF (congestive heart failure) ([facility]) 10/07/2023    Arthritis     Atrial fibrillation ([facility])     Closed subchondral insufficiency fracture of femoral condyle ([facility]) 08/12/2020    Hypertension     Localized osteoarthrosis not specified whether primary or secondary, pelvic region and thigh 05/08/2015    Neurogenic bladder     caused by a back surgery in 2017 per pt       Past Surgical History:        Procedure Laterality Date    BACK SURGERY      CARDIAC PROCEDURE N/A 4/7/2025    Left and right heart cath performed by Shantelle Luna MD at [facility] CARDIAC CATH LAB    CARDIAC PROCEDURE N/A 4/7/2025    Fractional flow reserve (FFR) performed by Shantelle Luna MD at [facility] CARDIAC CATH LAB    CARDIAC PROCEDURE N/A 4/7/2025    Insert stent louise coronary performed by Shantelle Luna MD at [facility] CARDIAC CATH LAB    CARDIAC PROCEDURE N/A 4/7/2025    Intravascular ultrasound performed by Shantelle Luna MD at [facility] CARDIAC CATH LAB    CATARACT REMOVAL WITH IMPLANT  01/14/2011    LEFT EYE    EYE SURGERY  12/14/2010    cataract rt eye    HYSTERECTOMY (CERVIX STATUS UNKNOWN)      HYSTERECTOMY, TOTAL ABDOMINAL (CERVIX REMOVED)      JOINT REPLACEMENT Bilateral 1997    left and right hip    OTHER SURGICAL HISTORY Right     RIGHT TOTAL KNEE REPLACEMENT     PACEMAKER INSERTION      October 2022    TOTAL KNEE ARTHROPLASTY Right 12/06/2021    RIGHT TOTAL KNEE REPLACEMENT performed by Wilbert Ball MD at [facility] [place]       Allergies:  is allergic to

## 2025-04-25 LAB
ALBUMIN SERPL-MCNC: 4.1 G/DL (ref 3.4–5)
ANION GAP SERPL CALCULATED.3IONS-SCNC: 11 MMOL/L (ref 3–16)
BUN SERPL-MCNC: 18 MG/DL (ref 7–20)
CALCIUM SERPL-MCNC: 9.4 MG/DL (ref 8.3–10.6)
CHLORIDE SERPL-SCNC: 105 MMOL/L (ref 99–110)
CO2 SERPL-SCNC: 23 MMOL/L (ref 21–32)
CREAT SERPL-MCNC: 1.2 MG/DL (ref 0.6–1.2)
DEPRECATED RDW RBC AUTO: 18.3 % (ref 12.4–15.4)
GFR SERPLBLD CREATININE-BSD FMLA CKD-EPI: 45 ML/MIN/{1.73_M2}
GLUCOSE SERPL-MCNC: 96 MG/DL (ref 70–99)
HCT VFR BLD AUTO: 32.3 % (ref 36–48)
HGB BLD-MCNC: 10.3 G/DL (ref 12–16)
MCH RBC QN AUTO: 25 PG (ref 26–34)
MCHC RBC AUTO-ENTMCNC: 31.9 G/DL (ref 31–36)
MCV RBC AUTO: 78.2 FL (ref 80–100)
PHOSPHATE SERPL-MCNC: 3.5 MG/DL (ref 2.5–4.9)
PLATELET # BLD AUTO: 290 K/UL (ref 135–450)
PMV BLD AUTO: 7.5 FL (ref 5–10.5)
POTASSIUM SERPL-SCNC: 3.9 MMOL/L (ref 3.5–5.1)
RBC # BLD AUTO: 4.13 M/UL (ref 4–5.2)
SODIUM SERPL-SCNC: 139 MMOL/L (ref 136–145)
WBC # BLD AUTO: 5.1 K/UL (ref 4–11)

## 2025-04-25 NOTE — PROGRESS NOTES
and off oral-anticoagulation, and the rationale for this referral.  Based on both stroke and bleeding risk, a shared decision has been made to pursue closure of the left atrial appendage as an alternative to oral anticoagulant therapy for stroke prophylaxis and to reduce their long term risk of incidence of bleeding.       Plan  Patient doing very well.  Feels better after her recent PCI's.  Weight has been stable.  Continue taking rosuvastain 10mg daily, metoprolol 25mg daily, torsemide 20mg daily, eliquis 2.5mg twice a day, amlodipine 5mg daily, amiodarone 100mg daily.  Will avoid SGLT2 use as patient has neurogenic bladder and self caths.  Patient to continue Plavix 75 mg daily post PCI.  She will remain on Eliquis and Plavix.  Patient wants to consider watchman.  She has history of falls and anemia.  Patient has never smoked-Educational material provided to patient.    Follow up 3 months    Scribe's attestation:  This note was scribed in the presence of Dr. Enrique Plaza DO. By Juana Menard RN      I, Dr. Enrique Plaza, personally performed the services described in this documentation, as scribed by the above signed scribe in my presence. It is both accurate and complete to my knowledge. I agree with the details independently gathered by the clinical support staff, while the remaining scribed note accurately describes my personal service to the patient. Please note that portions of this note may have been completed with a voice recognition program. Efforts were made to edit the dictations but occasionally words are mis-transcribed.        I will address the patient's cardiac risk factors and adjusted pharmacologic treatment as needed. In addition, I have reinforced the need for patient directed risk factor modification.  All questions and concerns were addressed to the patient/family. Alternatives to my treatment were discussed.     Thank you for allowing us to participate in the care of Rima Wyatt.

## 2025-04-28 NOTE — PROGRESS NOTES
Physician Progress Note      PATIENT:               MICHAEL TALAVERA  CSN #:                  [id number]  :                       1945  ADMIT DATE:       4/3/2025 5:37 PM  DISCH DATE:        2025 3:05 PM  RESPONDING  PROVIDER #:        Dhaval Perez MD          QUERY TEXT:    Congestive Heart Failure is documented in the medical record Progress Notes by   Dhaval Perez MD at 2025.  Please document further specificity regarding   the most likely etiology of the CHF:    The clinical indicators include:  This 79 yrs female presented with Leg Swelling    -Bibasilar rales, no wheezing, no distress in Progress Notes by Dhaval Perez MD at 2025    -CAD, Sick sinus syndrome status post pacemaker placement in , Paroxysmal   A-fib in H&P by Jose L Agrawal MD at 4/3/2025  --last echo 2024 EF 55%, grade II DD, mild to moderate tricuspid   regurgitation.  -CV: Regular rate. irregular rhythm.  -XRAY chest, Stable mild cardiomegaly with increasing central pulmonary   congestion  --BNP 7023  -Bilateral lower extremity edema in Progress Notes by Dhaval Perez MD at   2025    -furosemide (LASIX) 100 mg in sodium chloride 0.9 % 100 mL, metoprolol   succinate, cardiology consultation    Thank you  Harleen Potter [facility] CDS  Options provided:  -- CHF related to Hypertensive Heart Disease  -- CHF related to Hypertensive Heart Disease and CAD  -- CHF not related to Hypertension but related to CAD  -- CHF related to Hypertensive Heart Disease and Valvular Heart Disease  -- Other - I will add my own diagnosis  -- Disagree - Not applicable / Not valid  -- Disagree - Clinically unable to determine / Unknown  -- Refer to Clinical Documentation Reviewer    PROVIDER RESPONSE TEXT:    Acute on chronic CHF related to ischemic cardiomyopathy and atrial   fibrillation    Query created by: Harleen Potter on 2025 5:31 AM      Electronically signed by:  Dhaval Perez MD 2025 9:55 AM

## 2025-05-07 ENCOUNTER — OFFICE VISIT (OUTPATIENT)
Dept: CARDIOLOGY CLINIC | Age: 80
End: 2025-05-07
Payer: MEDICARE

## 2025-05-07 VITALS
SYSTOLIC BLOOD PRESSURE: 138 MMHG | OXYGEN SATURATION: 96 % | HEART RATE: 78 BPM | BODY MASS INDEX: 24.46 KG/M2 | DIASTOLIC BLOOD PRESSURE: 60 MMHG | WEIGHT: 124.6 LBS | HEIGHT: 60 IN

## 2025-05-07 DIAGNOSIS — I48.0 PAF (PAROXYSMAL ATRIAL FIBRILLATION) (HCC): ICD-10-CM

## 2025-05-07 DIAGNOSIS — I25.10 PRESENCE OF STENT IN CORONARY ARTERY IN PATIENT WITH CORONARY ARTERY DISEASE: ICD-10-CM

## 2025-05-07 DIAGNOSIS — I36.1 NONRHEUMATIC TRICUSPID VALVE REGURGITATION: ICD-10-CM

## 2025-05-07 DIAGNOSIS — I49.5 SSS (SICK SINUS SYNDROME) (HCC): ICD-10-CM

## 2025-05-07 DIAGNOSIS — Z95.5 PRESENCE OF STENT IN CORONARY ARTERY IN PATIENT WITH CORONARY ARTERY DISEASE: ICD-10-CM

## 2025-05-07 DIAGNOSIS — E78.2 MIXED HYPERLIPIDEMIA: ICD-10-CM

## 2025-05-07 DIAGNOSIS — I10 ESSENTIAL HYPERTENSION: ICD-10-CM

## 2025-05-07 DIAGNOSIS — D50.8 OTHER IRON DEFICIENCY ANEMIA: ICD-10-CM

## 2025-05-07 DIAGNOSIS — I34.0 NONRHEUMATIC MITRAL VALVE REGURGITATION: ICD-10-CM

## 2025-05-07 DIAGNOSIS — I50.32 CHRONIC HEART FAILURE WITH PRESERVED EJECTION FRACTION (HCC): Primary | ICD-10-CM

## 2025-05-07 DIAGNOSIS — I51.7 LVH (LEFT VENTRICULAR HYPERTROPHY): ICD-10-CM

## 2025-05-07 PROCEDURE — G8420 CALC BMI NORM PARAMETERS: HCPCS | Performed by: STUDENT IN AN ORGANIZED HEALTH CARE EDUCATION/TRAINING PROGRAM

## 2025-05-07 PROCEDURE — 1159F MED LIST DOCD IN RCRD: CPT | Performed by: STUDENT IN AN ORGANIZED HEALTH CARE EDUCATION/TRAINING PROGRAM

## 2025-05-07 PROCEDURE — 1111F DSCHRG MED/CURRENT MED MERGE: CPT | Performed by: STUDENT IN AN ORGANIZED HEALTH CARE EDUCATION/TRAINING PROGRAM

## 2025-05-07 PROCEDURE — 1036F TOBACCO NON-USER: CPT | Performed by: STUDENT IN AN ORGANIZED HEALTH CARE EDUCATION/TRAINING PROGRAM

## 2025-05-07 PROCEDURE — G8400 PT W/DXA NO RESULTS DOC: HCPCS | Performed by: STUDENT IN AN ORGANIZED HEALTH CARE EDUCATION/TRAINING PROGRAM

## 2025-05-07 PROCEDURE — G8427 DOCREV CUR MEDS BY ELIG CLIN: HCPCS | Performed by: STUDENT IN AN ORGANIZED HEALTH CARE EDUCATION/TRAINING PROGRAM

## 2025-05-07 PROCEDURE — 3078F DIAST BP <80 MM HG: CPT | Performed by: STUDENT IN AN ORGANIZED HEALTH CARE EDUCATION/TRAINING PROGRAM

## 2025-05-07 PROCEDURE — 1123F ACP DISCUSS/DSCN MKR DOCD: CPT | Performed by: STUDENT IN AN ORGANIZED HEALTH CARE EDUCATION/TRAINING PROGRAM

## 2025-05-07 PROCEDURE — 3075F SYST BP GE 130 - 139MM HG: CPT | Performed by: STUDENT IN AN ORGANIZED HEALTH CARE EDUCATION/TRAINING PROGRAM

## 2025-05-07 PROCEDURE — 99204 OFFICE O/P NEW MOD 45 MIN: CPT | Performed by: STUDENT IN AN ORGANIZED HEALTH CARE EDUCATION/TRAINING PROGRAM

## 2025-05-07 PROCEDURE — 1090F PRES/ABSN URINE INCON ASSESS: CPT | Performed by: STUDENT IN AN ORGANIZED HEALTH CARE EDUCATION/TRAINING PROGRAM

## 2025-05-07 RX ORDER — SPIRONOLACTONE 25 MG/1
12.5 TABLET ORAL DAILY
Qty: 45 TABLET | Refills: 3 | Status: SHIPPED | OUTPATIENT
Start: 2025-05-07

## 2025-05-07 RX ORDER — FUROSEMIDE 20 MG/1
20 TABLET ORAL DAILY
Qty: 90 TABLET | Refills: 3 | Status: SHIPPED | OUTPATIENT
Start: 2025-05-07

## 2025-05-07 RX ORDER — CLOPIDOGREL BISULFATE 75 MG/1
75 TABLET ORAL DAILY
Qty: 90 TABLET | Refills: 3 | Status: SHIPPED | OUTPATIENT
Start: 2025-05-07

## 2025-05-07 RX ORDER — AMLODIPINE BESYLATE 5 MG/1
5 TABLET ORAL DAILY
Qty: 90 TABLET | Refills: 3 | Status: SHIPPED | OUTPATIENT
Start: 2025-05-07

## 2025-05-07 NOTE — PATIENT INSTRUCTIONS
If you continue to lose weight you will need to follow up with Carlos Gutierrez NP.     Continue taking rosuvastain 5mg daily, metoprolol 25mg daily, torsemide 20mg daily, eliquis 2.5mg twice a day, amlodipine 5mg daily, amiodarone 100mg daily.

## 2025-05-15 ENCOUNTER — TELEPHONE (OUTPATIENT)
Dept: FAMILY MEDICINE CLINIC | Age: 80
End: 2025-05-15

## 2025-05-15 ENCOUNTER — CLINICAL SUPPORT (OUTPATIENT)
Dept: FAMILY MEDICINE CLINIC | Age: 80
End: 2025-05-15
Payer: MEDICARE

## 2025-05-15 DIAGNOSIS — D64.9 ANEMIA, UNSPECIFIED TYPE: Primary | ICD-10-CM

## 2025-05-15 DIAGNOSIS — R53.83 FATIGUE, UNSPECIFIED TYPE: ICD-10-CM

## 2025-05-15 DIAGNOSIS — R79.89 ABNORMAL THYROID BLOOD TEST: Primary | ICD-10-CM

## 2025-05-15 PROCEDURE — 36415 COLL VENOUS BLD VENIPUNCTURE: CPT | Performed by: NURSE PRACTITIONER

## 2025-05-15 NOTE — TELEPHONE ENCOUNTER
Patient came in for repeat tsh stated she was told that she had anemia and should have rechecked at sometime, do you want additional labs doneI drew extra tubes, please advise.

## 2025-05-15 NOTE — PROGRESS NOTES
Joel blood out lt ac space  with 23 gauge Butterfly needle, without incident, applied pressure to draw site and applied bandaid, joel 2 tubes.

## 2025-05-16 ENCOUNTER — RESULTS FOLLOW-UP (OUTPATIENT)
Dept: FAMILY MEDICINE CLINIC | Age: 80
End: 2025-05-16

## 2025-05-16 DIAGNOSIS — R53.83 FATIGUE, UNSPECIFIED TYPE: ICD-10-CM

## 2025-05-16 DIAGNOSIS — R79.89 ABNORMAL THYROID BLOOD TEST: Primary | ICD-10-CM

## 2025-05-16 LAB
BASOPHILS # BLD: 0 K/UL (ref 0–0.2)
BASOPHILS NFR BLD: 0.5 %
DEPRECATED RDW RBC AUTO: 19 % (ref 12.4–15.4)
EOSINOPHIL # BLD: 0.2 K/UL (ref 0–0.6)
EOSINOPHIL NFR BLD: 3.6 %
HCT VFR BLD AUTO: 33.8 % (ref 36–48)
HGB BLD-MCNC: 10.7 G/DL (ref 12–16)
LYMPHOCYTES # BLD: 0.7 K/UL (ref 1–5.1)
LYMPHOCYTES NFR BLD: 12.2 %
MCH RBC QN AUTO: 25.3 PG (ref 26–34)
MCHC RBC AUTO-ENTMCNC: 31.8 G/DL (ref 31–36)
MCV RBC AUTO: 79.4 FL (ref 80–100)
MONOCYTES # BLD: 0.5 K/UL (ref 0–1.3)
MONOCYTES NFR BLD: 9.7 %
NEUTROPHILS # BLD: 4 K/UL (ref 1.7–7.7)
NEUTROPHILS NFR BLD: 74 %
PLATELET # BLD AUTO: 289 K/UL (ref 135–450)
PMV BLD AUTO: 7.8 FL (ref 5–10.5)
RBC # BLD AUTO: 4.25 M/UL (ref 4–5.2)
T4 FREE SERPL-MCNC: 2.4 NG/DL (ref 0.9–1.8)
TSH SERPL DL<=0.005 MIU/L-ACNC: <0.01 UIU/ML (ref 0.27–4.2)
WBC # BLD AUTO: 5.4 K/UL (ref 4–11)

## 2025-05-16 RX ORDER — METHIMAZOLE 5 MG/1
5 TABLET ORAL 2 TIMES DAILY
Qty: 60 TABLET | Refills: 0 | Status: SHIPPED | OUTPATIENT
Start: 2025-05-16 | End: 2025-06-15

## 2025-05-16 NOTE — RESULT ENCOUNTER NOTE
Thyroid level is much lower (meaning too much hormone) than last time.  Lets start some medication twice a day to lower this level to prevent potential health issues.  We are going to attempt to add on some labs to potentially determine why.  I have also ordered a thyroid ultrasound to see if there is an abnormality causing issues.      Staff: Please call lab and add on TPO and thyroglobulin.  Give number to schedule thyroid ultrasound.      Carlos-- this is FYI for you.  Recommend updated labs in 4 weeks with a visit.

## 2025-05-20 ENCOUNTER — PATIENT MESSAGE (OUTPATIENT)
Dept: CARDIOLOGY CLINIC | Age: 80
End: 2025-05-20

## 2025-05-20 ENCOUNTER — HOSPITAL ENCOUNTER (OUTPATIENT)
Dept: LAB | Age: 80
Discharge: HOME | End: 2025-05-20
Payer: MEDICARE

## 2025-05-20 ENCOUNTER — HOSPITAL ENCOUNTER (OUTPATIENT)
Dept: ULTRASOUND IMAGING | Age: 80
Discharge: HOME | End: 2025-05-20
Payer: MEDICARE

## 2025-05-20 DIAGNOSIS — R79.89 ABNORMAL THYROID BLOOD TEST: ICD-10-CM

## 2025-05-20 DIAGNOSIS — R53.83 FATIGUE, UNSPECIFIED TYPE: ICD-10-CM

## 2025-05-20 LAB — THYROPEROXIDASE AB SERPL IA-ACNC: 17 IU/ML

## 2025-05-20 PROCEDURE — 36415 COLL VENOUS BLD VENIPUNCTURE: CPT

## 2025-05-20 PROCEDURE — 84432 ASSAY OF THYROGLOBULIN: CPT

## 2025-05-20 PROCEDURE — 86376 MICROSOMAL ANTIBODY EACH: CPT

## 2025-05-20 PROCEDURE — 76536 US EXAM OF HEAD AND NECK: CPT

## 2025-05-21 NOTE — TELEPHONE ENCOUNTER
This is a duplicate message. Patient sent the same message to Dr. Coyne who has already been sent his message for advisement. Closing.

## 2025-05-22 ENCOUNTER — RESULTS FOLLOW-UP (OUTPATIENT)
Dept: FAMILY MEDICINE CLINIC | Age: 80
End: 2025-05-22

## 2025-05-24 LAB — THYROGLOB SERPL-MCNC: 60.8 NG/ML (ref 1.3–31.8)

## 2025-05-27 NOTE — RESULT ENCOUNTER NOTE
Thyroglobulin very elevated which may indicate more issues with thyroid.  As thyroid ultrasound was negative with low TSH would recommend Endo evaluation and to consider Methimazole for now.  F/u with Carlos regarding these values.

## 2025-06-03 DIAGNOSIS — I99.8 ISCHEMIA: ICD-10-CM

## 2025-06-04 RX ORDER — ROSUVASTATIN CALCIUM 10 MG/1
10 TABLET, COATED ORAL DAILY
Qty: 90 TABLET | Refills: 3 | Status: SHIPPED | OUTPATIENT
Start: 2025-06-04

## 2025-06-04 NOTE — TELEPHONE ENCOUNTER
Lab Results   Component Value Date    CHOL 67 04/08/2025    TRIG 63 04/08/2025    HDL 26 (L) 04/08/2025    LDL 28 04/08/2025    VLDL 13 04/08/2025     Patient last seen on 5/7/25. Advised to follow up 3 months. Next appointment 8/25/25.

## 2025-06-11 ENCOUNTER — OFFICE VISIT (OUTPATIENT)
Dept: CARDIOLOGY CLINIC | Age: 80
End: 2025-06-11

## 2025-06-11 ENCOUNTER — CLINICAL SUPPORT (OUTPATIENT)
Dept: CARDIOLOGY CLINIC | Age: 80
End: 2025-06-11

## 2025-06-11 VITALS
HEIGHT: 60 IN | BODY MASS INDEX: 24.19 KG/M2 | SYSTOLIC BLOOD PRESSURE: 122 MMHG | DIASTOLIC BLOOD PRESSURE: 70 MMHG | WEIGHT: 123.24 LBS | HEART RATE: 52 BPM | OXYGEN SATURATION: 98 %

## 2025-06-11 DIAGNOSIS — Z95.0 MRI SAFE CARDIAC PACEMAKER IN SITU: Primary | ICD-10-CM

## 2025-06-11 DIAGNOSIS — I10 PRIMARY HYPERTENSION: ICD-10-CM

## 2025-06-11 DIAGNOSIS — Z51.81 ENCOUNTER FOR MONITORING AMIODARONE THERAPY: ICD-10-CM

## 2025-06-11 DIAGNOSIS — Z79.899 ENCOUNTER FOR MONITORING AMIODARONE THERAPY: ICD-10-CM

## 2025-06-11 DIAGNOSIS — E05.90 HYPERTHYROIDISM: ICD-10-CM

## 2025-06-11 DIAGNOSIS — I49.5 SINUS NODE DYSFUNCTION (HCC): ICD-10-CM

## 2025-06-11 DIAGNOSIS — I49.5 SSS (SICK SINUS SYNDROME) (HCC): ICD-10-CM

## 2025-06-11 DIAGNOSIS — I25.10 CORONARY ARTERY DISEASE INVOLVING NATIVE CORONARY ARTERY OF NATIVE HEART WITHOUT ANGINA PECTORIS: ICD-10-CM

## 2025-06-11 DIAGNOSIS — I48.0 PAF (PAROXYSMAL ATRIAL FIBRILLATION) (HCC): Primary | ICD-10-CM

## 2025-06-11 ASSESSMENT — ENCOUNTER SYMPTOMS
RIGHT EYE: 0
WHEEZING: 0
HEMATOCHEZIA: 0
STRIDOR: 0
HEMATEMESIS: 0
LEFT EYE: 0

## 2025-06-11 NOTE — PROGRESS NOTES
Multiple Vitamins-Minerals (THERAPEUTIC MULTIVITAMIN-MINERALS) tablet Take 1 tablet by mouth daily      ferrous sulfate (IRON 325) 325 (65 Fe) MG tablet Take 1 tablet by mouth daily (with breakfast)      vitamin B-12 (CYANOCOBALAMIN) 100 MCG tablet Take 1 tablet by mouth daily      methIMAzole (TAPAZOLE) 5 MG tablet Take 1 tablet by mouth in the morning and at bedtime (Patient not taking: Reported on 6/11/2025) 60 tablet 0     Current Facility-Administered Medications   Medication Dose Route Frequency Provider Last Rate Last Admin    ipratropium 0.5 mg-albuterol 2.5 mg (DUONEB) nebulizer solution 1 Dose  1 Dose Inhalation Once          Lab Review     Lab Results   Component Value Date/Time     04/24/2025 04:24 PM    K 3.9 04/24/2025 04:24 PM    K 3.8 04/03/2025 07:52 PM     04/24/2025 04:24 PM    CO2 23 04/24/2025 04:24 PM    BUN 18 04/24/2025 04:24 PM    CREATININE 1.2 04/24/2025 04:24 PM    GLUCOSE 96 04/24/2025 04:24 PM    GLUCOSE 98 10/30/2023 12:00 AM    CALCIUM 9.4 04/24/2025 04:24 PM      Lab Results   Component Value Date    WBC 5.4 05/15/2025    HGB 10.7 (L) 05/15/2025    HCT 33.8 (L) 05/15/2025    MCV 79.4 (L) 05/15/2025     05/15/2025     Lab Results   Component Value Date    TSHFT4 <0.01 (L) 05/15/2025    TSH <0.01 (L) 04/04/2025     No results found for: \"BNP\"    I, Mark Moore RN am scribing for and in the presence of Dr. Ana Maria Coyne. 06/11/25 2:40 PM.

## 2025-06-11 NOTE — PATIENT INSTRUCTIONS
Plan:     Referral to the endocrinologist for thyroid management.   Remote device checks every 3 months.   Continue taking current cardiac medications as prescribed.   Follow up with me in 3 months.

## 2025-06-17 ENCOUNTER — CLINICAL SUPPORT (OUTPATIENT)
Dept: FAMILY MEDICINE CLINIC | Age: 80
End: 2025-06-17
Payer: MEDICARE

## 2025-06-17 DIAGNOSIS — R79.89 ABNORMAL THYROID BLOOD TEST: Primary | ICD-10-CM

## 2025-06-17 PROCEDURE — 36415 COLL VENOUS BLD VENIPUNCTURE: CPT

## 2025-06-18 ENCOUNTER — RESULTS FOLLOW-UP (OUTPATIENT)
Dept: FAMILY MEDICINE CLINIC | Age: 80
End: 2025-06-18

## 2025-06-18 LAB — TSH SERPL DL<=0.005 MIU/L-ACNC: 0.47 UIU/ML (ref 0.27–4.2)

## 2025-06-19 DIAGNOSIS — I48.19 PERSISTENT ATRIAL FIBRILLATION (HCC): ICD-10-CM

## 2025-06-20 NOTE — TELEPHONE ENCOUNTER
Last Office Visit: 6/11/2025 Provider: KULWINDER  **Is provider OOT? Yes    Next Office Visit: 10/28/2025 Provider: KULWINDER    **Has patient already had 30 day supply? No    Lab orders needed? no   Encounter provider correct? Yes If not, change provider  Script changes since last refill? no    LAST LABS:   CBC:  Lab Results   Component Value Date    WBC 5.4 05/15/2025    HGB 10.7 (L) 05/15/2025    HCT 33.8 (L) 05/15/2025    MCV 79.4 (L) 05/15/2025     05/15/2025    LYMPHOPCT 12.2 05/15/2025    RBC 4.25 05/15/2025    MCH 25.3 (L) 05/15/2025    MCHC 31.8 05/15/2025    RDW 19.0 (H) 05/15/2025       BMP:  Lab Results   Component Value Date/Time     04/24/2025 04:24 PM    K 3.9 04/24/2025 04:24 PM    K 3.8 04/03/2025 07:52 PM     04/24/2025 04:24 PM    CO2 23 04/24/2025 04:24 PM    BUN 18 04/24/2025 04:24 PM    CREATININE 1.2 04/24/2025 04:24 PM    GLUCOSE 96 04/24/2025 04:24 PM    GLUCOSE 98 10/30/2023 12:00 AM    CALCIUM 9.4 04/24/2025 04:24 PM    LABGLOM 45 04/24/2025 04:24 PM    LABGLOM 30 10/30/2023 12:00 AM      **Care Everywhere? no     AGK OOT

## 2025-08-03 DIAGNOSIS — I48.19 PERSISTENT ATRIAL FIBRILLATION (HCC): ICD-10-CM

## 2025-08-04 RX ORDER — METOPROLOL SUCCINATE 25 MG/1
25 TABLET, EXTENDED RELEASE ORAL DAILY
Qty: 90 TABLET | Refills: 3 | Status: SHIPPED | OUTPATIENT
Start: 2025-08-04

## 2025-08-25 ENCOUNTER — OFFICE VISIT (OUTPATIENT)
Dept: CARDIOLOGY CLINIC | Age: 80
End: 2025-08-25
Payer: MEDICARE

## 2025-08-25 VITALS
WEIGHT: 138.4 LBS | DIASTOLIC BLOOD PRESSURE: 68 MMHG | SYSTOLIC BLOOD PRESSURE: 124 MMHG | HEIGHT: 60 IN | HEART RATE: 73 BPM | OXYGEN SATURATION: 98 % | BODY MASS INDEX: 27.17 KG/M2

## 2025-08-25 DIAGNOSIS — I25.10 CORONARY ARTERY DISEASE INVOLVING NATIVE CORONARY ARTERY OF NATIVE HEART WITHOUT ANGINA PECTORIS: ICD-10-CM

## 2025-08-25 DIAGNOSIS — R71.8 ABNORMAL RED BLOOD CELL COUNT: ICD-10-CM

## 2025-08-25 DIAGNOSIS — Z95.5 PRESENCE OF STENT IN CORONARY ARTERY IN PATIENT WITH CORONARY ARTERY DISEASE: ICD-10-CM

## 2025-08-25 DIAGNOSIS — I48.0 PAF (PAROXYSMAL ATRIAL FIBRILLATION) (HCC): ICD-10-CM

## 2025-08-25 DIAGNOSIS — Z95.0 MRI SAFE CARDIAC PACEMAKER IN SITU: ICD-10-CM

## 2025-08-25 DIAGNOSIS — I25.10 PRESENCE OF STENT IN CORONARY ARTERY IN PATIENT WITH CORONARY ARTERY DISEASE: ICD-10-CM

## 2025-08-25 DIAGNOSIS — E78.2 MIXED HYPERLIPIDEMIA: ICD-10-CM

## 2025-08-25 DIAGNOSIS — I10 ESSENTIAL HYPERTENSION: ICD-10-CM

## 2025-08-25 DIAGNOSIS — I50.32 CHRONIC HEART FAILURE WITH PRESERVED EJECTION FRACTION (HFPEF) (HCC): ICD-10-CM

## 2025-08-25 DIAGNOSIS — I49.5 SSS (SICK SINUS SYNDROME) (HCC): ICD-10-CM

## 2025-08-25 DIAGNOSIS — Z91.81 HX OF FALLING: ICD-10-CM

## 2025-08-25 DIAGNOSIS — Z79.899 MEDICATION MANAGEMENT: Primary | ICD-10-CM

## 2025-08-25 PROCEDURE — 1036F TOBACCO NON-USER: CPT | Performed by: STUDENT IN AN ORGANIZED HEALTH CARE EDUCATION/TRAINING PROGRAM

## 2025-08-25 PROCEDURE — 3078F DIAST BP <80 MM HG: CPT | Performed by: STUDENT IN AN ORGANIZED HEALTH CARE EDUCATION/TRAINING PROGRAM

## 2025-08-25 PROCEDURE — 1090F PRES/ABSN URINE INCON ASSESS: CPT | Performed by: STUDENT IN AN ORGANIZED HEALTH CARE EDUCATION/TRAINING PROGRAM

## 2025-08-25 PROCEDURE — G8417 CALC BMI ABV UP PARAM F/U: HCPCS | Performed by: STUDENT IN AN ORGANIZED HEALTH CARE EDUCATION/TRAINING PROGRAM

## 2025-08-25 PROCEDURE — G8400 PT W/DXA NO RESULTS DOC: HCPCS | Performed by: STUDENT IN AN ORGANIZED HEALTH CARE EDUCATION/TRAINING PROGRAM

## 2025-08-25 PROCEDURE — 3074F SYST BP LT 130 MM HG: CPT | Performed by: STUDENT IN AN ORGANIZED HEALTH CARE EDUCATION/TRAINING PROGRAM

## 2025-08-25 PROCEDURE — 36415 COLL VENOUS BLD VENIPUNCTURE: CPT | Performed by: STUDENT IN AN ORGANIZED HEALTH CARE EDUCATION/TRAINING PROGRAM

## 2025-08-25 PROCEDURE — G8427 DOCREV CUR MEDS BY ELIG CLIN: HCPCS | Performed by: STUDENT IN AN ORGANIZED HEALTH CARE EDUCATION/TRAINING PROGRAM

## 2025-08-25 PROCEDURE — 1123F ACP DISCUSS/DSCN MKR DOCD: CPT | Performed by: STUDENT IN AN ORGANIZED HEALTH CARE EDUCATION/TRAINING PROGRAM

## 2025-08-25 PROCEDURE — 99214 OFFICE O/P EST MOD 30 MIN: CPT | Performed by: STUDENT IN AN ORGANIZED HEALTH CARE EDUCATION/TRAINING PROGRAM

## 2025-08-25 PROCEDURE — G2211 COMPLEX E/M VISIT ADD ON: HCPCS | Performed by: STUDENT IN AN ORGANIZED HEALTH CARE EDUCATION/TRAINING PROGRAM

## 2025-08-26 LAB
ALBUMIN SERPL-MCNC: 4.7 G/DL (ref 3.4–5)
ALBUMIN/GLOB SERPL: 1.9 {RATIO} (ref 1.1–2.2)
ALP SERPL-CCNC: 93 U/L (ref 40–129)
ALT SERPL-CCNC: 16 U/L (ref 10–40)
ANION GAP SERPL CALCULATED.3IONS-SCNC: 14 MMOL/L (ref 3–16)
AST SERPL-CCNC: 27 U/L (ref 15–37)
BASOPHILS # BLD: 0 K/UL (ref 0–0.2)
BASOPHILS NFR BLD: 0.7 %
BILIRUB SERPL-MCNC: 0.5 MG/DL (ref 0–1)
BUN SERPL-MCNC: 42 MG/DL (ref 7–20)
CALCIUM SERPL-MCNC: 9.9 MG/DL (ref 8.3–10.6)
CHLORIDE SERPL-SCNC: 100 MMOL/L (ref 99–110)
CO2 SERPL-SCNC: 24 MMOL/L (ref 21–32)
CREAT SERPL-MCNC: 1.8 MG/DL (ref 0.6–1.2)
DEPRECATED RDW RBC AUTO: 13.5 % (ref 12.4–15.4)
EOSINOPHIL # BLD: 0.2 K/UL (ref 0–0.6)
EOSINOPHIL NFR BLD: 3.5 %
FERRITIN SERPL IA-MCNC: 266 NG/ML (ref 15–150)
GFR SERPLBLD CREATININE-BSD FMLA CKD-EPI: 28 ML/MIN/{1.73_M2}
GLUCOSE SERPL-MCNC: 100 MG/DL (ref 70–99)
HCT VFR BLD AUTO: 35.6 % (ref 36–48)
HGB BLD-MCNC: 11.6 G/DL (ref 12–16)
IRON SATN MFR SERPL: 29 % (ref 15–50)
IRON SERPL-MCNC: 107 UG/DL (ref 37–145)
LYMPHOCYTES # BLD: 0.8 K/UL (ref 1–5.1)
LYMPHOCYTES NFR BLD: 14.3 %
MCH RBC QN AUTO: 29 PG (ref 26–34)
MCHC RBC AUTO-ENTMCNC: 32.7 G/DL (ref 31–36)
MCV RBC AUTO: 88.8 FL (ref 80–100)
MONOCYTES # BLD: 0.6 K/UL (ref 0–1.3)
MONOCYTES NFR BLD: 10.2 %
NEUTROPHILS # BLD: 4.1 K/UL (ref 1.7–7.7)
NEUTROPHILS NFR BLD: 71.3 %
PLATELET # BLD AUTO: 260 K/UL (ref 135–450)
PMV BLD AUTO: 8 FL (ref 5–10.5)
POTASSIUM SERPL-SCNC: 4.6 MMOL/L (ref 3.5–5.1)
PROT SERPL-MCNC: 7.2 G/DL (ref 6.4–8.2)
RBC # BLD AUTO: 4.01 M/UL (ref 4–5.2)
SODIUM SERPL-SCNC: 138 MMOL/L (ref 136–145)
T4 FREE SERPL-MCNC: 1.1 NG/DL (ref 0.9–1.8)
TIBC SERPL-MCNC: 363 UG/DL (ref 260–445)
TSH SERPL DL<=0.005 MIU/L-ACNC: 7.97 UIU/ML (ref 0.27–4.2)
WBC # BLD AUTO: 5.8 K/UL (ref 4–11)

## 2025-08-27 DIAGNOSIS — N18.4 CKD (CHRONIC KIDNEY DISEASE) STAGE 4, GFR 15-29 ML/MIN (HCC): Primary | ICD-10-CM

## 2025-08-28 ENCOUNTER — APPOINTMENT (OUTPATIENT)
Dept: CT IMAGING | Age: 80
End: 2025-08-28
Payer: MEDICARE

## 2025-08-28 ENCOUNTER — HOSPITAL ENCOUNTER (EMERGENCY)
Age: 80
Discharge: HOME | End: 2025-08-28
Attending: STUDENT IN AN ORGANIZED HEALTH CARE EDUCATION/TRAINING PROGRAM
Payer: MEDICARE

## 2025-08-28 VITALS
OXYGEN SATURATION: 97 % | TEMPERATURE: 98.1 F | RESPIRATION RATE: 17 BRPM | DIASTOLIC BLOOD PRESSURE: 65 MMHG | SYSTOLIC BLOOD PRESSURE: 124 MMHG | HEART RATE: 83 BPM

## 2025-08-28 DIAGNOSIS — E86.0 DEHYDRATION: ICD-10-CM

## 2025-08-28 DIAGNOSIS — R42 DIZZINESS: Primary | ICD-10-CM

## 2025-08-28 LAB
ALBUMIN SERPL-MCNC: 4.6 G/DL (ref 3.4–5)
ALBUMIN/GLOB SERPL: 1.5 {RATIO} (ref 1.1–2.2)
ALP SERPL-CCNC: 89 U/L (ref 40–129)
ALT SERPL-CCNC: 12 U/L (ref 10–40)
ANION GAP SERPL CALCULATED.3IONS-SCNC: 14 MMOL/L (ref 3–16)
AST SERPL-CCNC: 28 U/L (ref 15–37)
BASOPHILS # BLD: 0 K/UL (ref 0–0.2)
BASOPHILS NFR BLD: 0.6 %
BILIRUB SERPL-MCNC: 0.3 MG/DL (ref 0–1)
BUN SERPL-MCNC: 37 MG/DL (ref 7–20)
CALCIUM SERPL-MCNC: 9.8 MG/DL (ref 8.3–10.6)
CHLORIDE SERPL-SCNC: 101 MMOL/L (ref 99–110)
CO2 SERPL-SCNC: 21 MMOL/L (ref 21–32)
CREAT SERPL-MCNC: 1.7 MG/DL (ref 0.6–1.2)
DEPRECATED RDW RBC AUTO: 13.5 % (ref 12.4–15.4)
EOSINOPHIL # BLD: 0.1 K/UL (ref 0–0.6)
EOSINOPHIL NFR BLD: 1.2 %
GFR SERPLBLD CREATININE-BSD FMLA CKD-EPI: 30 ML/MIN/{1.73_M2}
GLUCOSE BLD-MCNC: 116 MG/DL (ref 70–99)
GLUCOSE SERPL-MCNC: 117 MG/DL (ref 70–99)
HCT VFR BLD AUTO: 34.9 % (ref 36–48)
HGB BLD-MCNC: 11.6 G/DL (ref 12–16)
INR PPP: 1.16 (ref 0.86–1.14)
LYMPHOCYTES # BLD: 0.6 K/UL (ref 1–5.1)
LYMPHOCYTES NFR BLD: 7.6 %
MCH RBC QN AUTO: 29.2 PG (ref 26–34)
MCHC RBC AUTO-ENTMCNC: 33.2 G/DL (ref 31–36)
MCV RBC AUTO: 87.9 FL (ref 80–100)
MONOCYTES # BLD: 0.6 K/UL (ref 0–1.3)
MONOCYTES NFR BLD: 7.4 %
NEUTROPHILS # BLD: 6.2 K/UL (ref 1.7–7.7)
NEUTROPHILS NFR BLD: 83.2 %
PERFORMED ON: ABNORMAL
PLATELET # BLD AUTO: 259 K/UL (ref 135–450)
PMV BLD AUTO: 7 FL (ref 5–10.5)
POTASSIUM SERPL-SCNC: 4.4 MMOL/L (ref 3.5–5.1)
POTASSIUM SERPL-SCNC: 4.5 MMOL/L (ref 3.5–5.1)
PROT SERPL-MCNC: 7.6 G/DL (ref 6.4–8.2)
PROTHROMBIN TIME: 15 SEC (ref 12.1–14.9)
RBC # BLD AUTO: 3.97 M/UL (ref 4–5.2)
SODIUM SERPL-SCNC: 136 MMOL/L (ref 136–145)
TROPONIN, HIGH SENSITIVITY: 20 NG/L (ref 0–14)
TROPONIN, HIGH SENSITIVITY: 21 NG/L (ref 0–14)
WBC # BLD AUTO: 7.4 K/UL (ref 4–11)

## 2025-08-28 PROCEDURE — 85025 COMPLETE CBC W/AUTO DIFF WBC: CPT

## 2025-08-28 PROCEDURE — 70450 CT HEAD/BRAIN W/O DYE: CPT

## 2025-08-28 PROCEDURE — 84484 ASSAY OF TROPONIN QUANT: CPT

## 2025-08-28 PROCEDURE — 99284 EMERGENCY DEPT VISIT MOD MDM: CPT

## 2025-08-28 PROCEDURE — 96360 HYDRATION IV INFUSION INIT: CPT

## 2025-08-28 PROCEDURE — 2580000003 HC RX 258: Performed by: STUDENT IN AN ORGANIZED HEALTH CARE EDUCATION/TRAINING PROGRAM

## 2025-08-28 PROCEDURE — 93005 ELECTROCARDIOGRAM TRACING: CPT | Performed by: STUDENT IN AN ORGANIZED HEALTH CARE EDUCATION/TRAINING PROGRAM

## 2025-08-28 PROCEDURE — 80053 COMPREHEN METABOLIC PANEL: CPT

## 2025-08-28 PROCEDURE — 85610 PROTHROMBIN TIME: CPT

## 2025-08-28 PROCEDURE — 84132 ASSAY OF SERUM POTASSIUM: CPT

## 2025-08-28 RX ORDER — 0.9 % SODIUM CHLORIDE 0.9 %
500 INTRAVENOUS SOLUTION INTRAVENOUS ONCE
Status: COMPLETED | OUTPATIENT
Start: 2025-08-28 | End: 2025-08-28

## 2025-08-28 RX ADMIN — SODIUM CHLORIDE 500 ML: 9 INJECTION, SOLUTION INTRAVENOUS at 19:03

## 2025-08-28 ASSESSMENT — PAIN SCALES - GENERAL: PAINLEVEL_OUTOF10: 0

## 2025-08-28 ASSESSMENT — PAIN - FUNCTIONAL ASSESSMENT: PAIN_FUNCTIONAL_ASSESSMENT: 0-10

## 2025-08-29 LAB
EKG DIAGNOSIS: NORMAL
EKG Q-T INTERVAL: 396 MS
EKG QRS DURATION: 84 MS
EKG QTC CALCULATION (BAZETT): 454 MS
EKG R AXIS: -5 DEGREES
EKG T AXIS: 40 DEGREES
EKG VENTRICULAR RATE: 79 BPM

## 2025-08-29 PROCEDURE — 93010 ELECTROCARDIOGRAM REPORT: CPT | Performed by: INTERNAL MEDICINE

## 2025-09-02 ENCOUNTER — CLINICAL SUPPORT (OUTPATIENT)
Dept: FAMILY MEDICINE CLINIC | Age: 80
End: 2025-09-02
Payer: MEDICARE

## 2025-09-02 DIAGNOSIS — N18.4 CKD (CHRONIC KIDNEY DISEASE) STAGE 4, GFR 15-29 ML/MIN (HCC): ICD-10-CM

## 2025-09-02 PROCEDURE — 36415 COLL VENOUS BLD VENIPUNCTURE: CPT

## 2025-09-03 LAB
ANION GAP SERPL CALCULATED.3IONS-SCNC: 13 MMOL/L (ref 3–16)
BUN SERPL-MCNC: 28 MG/DL (ref 7–20)
CALCIUM SERPL-MCNC: 9.5 MG/DL (ref 8.3–10.6)
CHLORIDE SERPL-SCNC: 102 MMOL/L (ref 99–110)
CO2 SERPL-SCNC: 24 MMOL/L (ref 21–32)
CREAT SERPL-MCNC: 1.7 MG/DL (ref 0.6–1.2)
GFR SERPLBLD CREATININE-BSD FMLA CKD-EPI: 30 ML/MIN/{1.73_M2}
GLUCOSE SERPL-MCNC: 93 MG/DL (ref 70–99)
POTASSIUM SERPL-SCNC: 4.7 MMOL/L (ref 3.5–5.1)
SODIUM SERPL-SCNC: 139 MMOL/L (ref 136–145)

## (undated) DEVICE — GLIDESHEATH SLENDER STAINLESS STEEL KIT: Brand: GLIDESHEATH SLENDER

## (undated) DEVICE — SUTURE MCRYL + SZ 4-0 L18IN ABSRB UD L19MM PS-2 3/8 CIR MCP496G

## (undated) DEVICE — PRESSURE GUIDEWIRE: Brand: COMET™ II

## (undated) DEVICE — GAUZE,SPONGE,4"X4",16PLY,XRAY,STRL,LF: Brand: MEDLINE

## (undated) DEVICE — LIGHT HANDLE: Brand: DEVON

## (undated) DEVICE — CATHETER GUID XB 3.5 6 FRX100 CM VISTA BRT TIP

## (undated) DEVICE — PINNACLE INTRODUCER SHEATH: Brand: PINNACLE

## (undated) DEVICE — Device

## (undated) DEVICE — TUBE SUCT L10IN MINI FLTR CRV KAMVAC

## (undated) DEVICE — 35 ML SYRINGE LUER-LOCK TIP: Brand: MONOJECT

## (undated) DEVICE — NEEDLE SPNL L3.5IN PNK HUB S STL REG WALL FIT STYL W/ QNCKE

## (undated) DEVICE — SUTURE ABSORBABLE BRAIDED 2-0 CT-1 27 IN UD VICRYL J259H

## (undated) DEVICE — CATH LAB PACK: Brand: MEDLINE INDUSTRIES, INC.

## (undated) DEVICE — SOLUTION IRRIG 3000ML 0.9% SOD CHL USP UROMATIC PLAS CONT

## (undated) DEVICE — GLOVE ORANGE PI 8 1/2   MSG9085

## (undated) DEVICE — COVER,MAYO STAND,STERILE: Brand: MEDLINE

## (undated) DEVICE — RADIFOCUS OPTITORQUE ANGIOGRAPHIC CATHETER: Brand: OPTITORQUE

## (undated) DEVICE — SHIELD RAD ANGIO FEM ENTRY W/ ABSORBENT ORNG STRL RADPAD LTX

## (undated) DEVICE — SPLINT KNEE L20IN FOR 32IN THGH UNIV FOAM 3 PC DSGN TRIMMED

## (undated) DEVICE — NC TREK NEO™ CORONARY DILATATION CATHETER 3.50 MM X 20 MM / RAPID-EXCHANGE: Brand: NC TREK NEO™

## (undated) DEVICE — HI-TORQUE BALANCE MIDDLEWEIGHT GUIDE WIRE W/HYDROCOAT .014 STRAIGHT TIP 3.0 CM X 190 CM: Brand: HI-TORQUE BALANCE MIDDLEWEIGHT

## (undated) DEVICE — APPLICATOR PREP 26ML 0.7% IOD POVACRYLEX 74% ISO ALC ST

## (undated) DEVICE — CATHETER PULM ART 5FR INFL 0.75CC L110CM BAL DIA8MM SGL WDG

## (undated) DEVICE — SOLUTION IV HEPARIN SODIUM SODIUM CHL 0.9% 500 ML INJ VIAFLX

## (undated) DEVICE — GUIDEWIRE VASC L150CM DIA0.035IN FLX END L7CM J 3MM PTFE

## (undated) DEVICE — CORONARY IMAGING CATHETERS: Brand: OPTICROSS™ 6 HD

## (undated) DEVICE — SOLUTION IV IRRIG 500ML 0.9% SODIUM CHL 2F7123

## (undated) DEVICE — SUTURE VCRL + SZ 0 L27IN ABSRB UD L36MM CT-1 1/2 CIR VCPP41D

## (undated) DEVICE — TR BAND RADIAL ARTERY COMPRESSION DEVICE: Brand: TR BAND

## (undated) DEVICE — TREK CORONARY DILATATION CATHETER 2.50 MM X 20 MM / RAPID-EXCHANGE: Brand: TREK

## (undated) DEVICE — BLADE SURG SAW SAG S STL AGG 905MM LEN 185MM W 127MM THCK

## (undated) DEVICE — GOWN,AURORA,NONREINF,RAGLAN,XXL,STERILE: Brand: MEDLINE

## (undated) DEVICE — SPONGE LAP W18XL18IN WHT COT 4 PLY FLD STRUNG RADPQ DISP ST